# Patient Record
Sex: FEMALE | Race: WHITE | Employment: OTHER | URBAN - METROPOLITAN AREA
[De-identification: names, ages, dates, MRNs, and addresses within clinical notes are randomized per-mention and may not be internally consistent; named-entity substitution may affect disease eponyms.]

---

## 2017-04-27 ENCOUNTER — HOSPITAL ENCOUNTER (OUTPATIENT)
Dept: CARDIAC CATH/INVASIVE PROCEDURES | Age: 72
Discharge: HOME | End: 2017-04-28
Attending: INTERNAL MEDICINE | Admitting: INTERNAL MEDICINE
Payer: MEDICARE

## 2017-04-27 LAB
GLUCOSE BLD-MCNC: 109 MG/DL (ref 74–106)
PLATELET # BLD: 185 K/UL (ref 140–450)
POC BUN: 28 MG/DL (ref 6–20)
POC CHLORIDE: 103 MMOL/L (ref 98–110)
POC CREATININE: 1.5 MG/DL (ref 0.6–1.4)
POC HEMATOCRIT: 36 % (ref 36–46)
POC HEMOGLOBIN: 12.2 GM/DL (ref 12–16)
POC POTASSIUM: 4.2 MMOL/L (ref 3.5–5.1)
POC SODIUM: 142 MMOL/L (ref 136–145)

## 2017-04-27 PROCEDURE — 82565 ASSAY OF CREATININE: CPT

## 2017-04-27 PROCEDURE — 84295 ASSAY OF SERUM SODIUM: CPT

## 2017-04-27 PROCEDURE — 93567 NJX CAR CTH SPRVLV AORTGRPHY: CPT | Performed by: INTERNAL MEDICINE

## 2017-04-27 PROCEDURE — 84520 ASSAY OF UREA NITROGEN: CPT

## 2017-04-27 PROCEDURE — 94640 AIRWAY INHALATION TREATMENT: CPT

## 2017-04-27 PROCEDURE — 84132 ASSAY OF SERUM POTASSIUM: CPT

## 2017-04-27 PROCEDURE — 6370000000 HC RX 637 (ALT 250 FOR IP): Performed by: INTERNAL MEDICINE

## 2017-04-27 PROCEDURE — 92928 PRQ TCAT PLMT NTRAC ST 1 LES: CPT | Performed by: INTERNAL MEDICINE

## 2017-04-27 PROCEDURE — 6360000002 HC RX W HCPCS

## 2017-04-27 PROCEDURE — 85014 HEMATOCRIT: CPT

## 2017-04-27 PROCEDURE — C1894 INTRO/SHEATH, NON-LASER: HCPCS

## 2017-04-27 PROCEDURE — 82947 ASSAY GLUCOSE BLOOD QUANT: CPT

## 2017-04-27 PROCEDURE — 93460 R&L HRT ART/VENTRICLE ANGIO: CPT | Performed by: INTERNAL MEDICINE

## 2017-04-27 PROCEDURE — 2580000003 HC RX 258: Performed by: INTERNAL MEDICINE

## 2017-04-27 PROCEDURE — C1725 CATH, TRANSLUMIN NON-LASER: HCPCS

## 2017-04-27 PROCEDURE — 7100000010 HC PHASE II RECOVERY - FIRST 15 MIN

## 2017-04-27 PROCEDURE — C1751 CATH, INF, PER/CENT/MIDLINE: HCPCS

## 2017-04-27 PROCEDURE — C1876 STENT, NON-COA/NON-COV W/DEL: HCPCS

## 2017-04-27 PROCEDURE — 6370000000 HC RX 637 (ALT 250 FOR IP)

## 2017-04-27 PROCEDURE — 7100000011 HC PHASE II RECOVERY - ADDTL 15 MIN

## 2017-04-27 PROCEDURE — 82435 ASSAY OF BLOOD CHLORIDE: CPT

## 2017-04-27 PROCEDURE — C1769 GUIDE WIRE: HCPCS

## 2017-04-27 PROCEDURE — C1887 CATHETER, GUIDING: HCPCS

## 2017-04-27 PROCEDURE — 2580000003 HC RX 258

## 2017-04-27 PROCEDURE — C1760 CLOSURE DEV, VASC: HCPCS

## 2017-04-27 PROCEDURE — 85049 AUTOMATED PLATELET COUNT: CPT

## 2017-04-27 RX ORDER — SODIUM CHLORIDE 0.9 % (FLUSH) 0.9 %
10 SYRINGE (ML) INJECTION EVERY 12 HOURS SCHEDULED
Status: DISCONTINUED | OUTPATIENT
Start: 2017-04-27 | End: 2017-04-28 | Stop reason: HOSPADM

## 2017-04-27 RX ORDER — METOPROLOL SUCCINATE 25 MG/1
25 TABLET, EXTENDED RELEASE ORAL EVERY EVENING
Status: DISCONTINUED | OUTPATIENT
Start: 2017-04-27 | End: 2017-04-28 | Stop reason: HOSPADM

## 2017-04-27 RX ORDER — SODIUM CHLORIDE 0.9 % (FLUSH) 0.9 %
10 SYRINGE (ML) INJECTION PRN
Status: DISCONTINUED | OUTPATIENT
Start: 2017-04-27 | End: 2017-04-28 | Stop reason: HOSPADM

## 2017-04-27 RX ORDER — ZOLPIDEM TARTRATE 5 MG/1
5 TABLET ORAL NIGHTLY PRN
Status: DISCONTINUED | OUTPATIENT
Start: 2017-04-27 | End: 2017-04-28 | Stop reason: HOSPADM

## 2017-04-27 RX ORDER — ACETAMINOPHEN 325 MG/1
650 TABLET ORAL EVERY 4 HOURS PRN
Status: DISCONTINUED | OUTPATIENT
Start: 2017-04-27 | End: 2017-04-28 | Stop reason: HOSPADM

## 2017-04-27 RX ORDER — CLOPIDOGREL BISULFATE 75 MG/1
75 TABLET ORAL DAILY
Status: DISCONTINUED | OUTPATIENT
Start: 2017-04-28 | End: 2017-04-28 | Stop reason: HOSPADM

## 2017-04-27 RX ORDER — PANTOPRAZOLE SODIUM 20 MG/1
20 TABLET, DELAYED RELEASE ORAL
Status: DISCONTINUED | OUTPATIENT
Start: 2017-04-28 | End: 2017-04-28 | Stop reason: HOSPADM

## 2017-04-27 RX ORDER — MONTELUKAST SODIUM 10 MG/1
10 TABLET ORAL NIGHTLY
Status: DISCONTINUED | OUTPATIENT
Start: 2017-04-27 | End: 2017-04-28 | Stop reason: HOSPADM

## 2017-04-27 RX ORDER — TRAZODONE HYDROCHLORIDE 50 MG/1
50 TABLET ORAL NIGHTLY
Status: DISCONTINUED | OUTPATIENT
Start: 2017-04-27 | End: 2017-04-28 | Stop reason: HOSPADM

## 2017-04-27 RX ORDER — ONDANSETRON 2 MG/ML
4 INJECTION INTRAMUSCULAR; INTRAVENOUS EVERY 6 HOURS PRN
Status: DISCONTINUED | OUTPATIENT
Start: 2017-04-27 | End: 2017-04-28 | Stop reason: HOSPADM

## 2017-04-27 RX ORDER — ASPIRIN 81 MG/1
81 TABLET ORAL EVERY OTHER DAY
Status: DISCONTINUED | OUTPATIENT
Start: 2017-04-28 | End: 2017-04-28 | Stop reason: HOSPADM

## 2017-04-27 RX ORDER — PRAVASTATIN SODIUM 20 MG
20 TABLET ORAL NIGHTLY
Status: DISCONTINUED | OUTPATIENT
Start: 2017-04-27 | End: 2017-04-28 | Stop reason: HOSPADM

## 2017-04-27 RX ORDER — FOLIC ACID 1 MG/1
3 TABLET ORAL DAILY
Status: DISCONTINUED | OUTPATIENT
Start: 2017-04-28 | End: 2017-04-28 | Stop reason: HOSPADM

## 2017-04-27 RX ORDER — SODIUM CHLORIDE 9 MG/ML
INJECTION, SOLUTION INTRAVENOUS CONTINUOUS
Status: DISCONTINUED | OUTPATIENT
Start: 2017-04-27 | End: 2017-04-28 | Stop reason: HOSPADM

## 2017-04-27 RX ORDER — SODIUM CHLORIDE 9 MG/ML
INJECTION, SOLUTION INTRAVENOUS CONTINUOUS
Status: DISCONTINUED | OUTPATIENT
Start: 2017-04-27 | End: 2017-04-27

## 2017-04-27 RX ORDER — METOPROLOL SUCCINATE 50 MG/1
50 TABLET, EXTENDED RELEASE ORAL DAILY
Status: DISCONTINUED | OUTPATIENT
Start: 2017-04-28 | End: 2017-04-28 | Stop reason: HOSPADM

## 2017-04-27 RX ADMIN — Medication 2 PUFF: at 21:39

## 2017-04-27 RX ADMIN — MONTELUKAST SODIUM 10 MG: 10 TABLET ORAL at 21:31

## 2017-04-27 RX ADMIN — METOPROLOL SUCCINATE 25 MG: 25 TABLET, FILM COATED, EXTENDED RELEASE ORAL at 21:31

## 2017-04-27 RX ADMIN — SODIUM CHLORIDE: 9 INJECTION, SOLUTION INTRAVENOUS at 11:03

## 2017-04-27 RX ADMIN — SODIUM CHLORIDE: 9 INJECTION, SOLUTION INTRAVENOUS at 20:30

## 2017-04-27 RX ADMIN — ZOLPIDEM TARTRATE 5 MG: 5 TABLET ORAL at 21:30

## 2017-04-27 RX ADMIN — PRAVASTATIN SODIUM 20 MG: 20 TABLET ORAL at 21:31

## 2017-04-27 ASSESSMENT — PAIN SCALES - GENERAL: PAINLEVEL_OUTOF10: 2

## 2017-04-28 VITALS
OXYGEN SATURATION: 97 % | DIASTOLIC BLOOD PRESSURE: 55 MMHG | SYSTOLIC BLOOD PRESSURE: 128 MMHG | HEART RATE: 68 BPM | TEMPERATURE: 97.7 F | WEIGHT: 170 LBS | HEIGHT: 62 IN | RESPIRATION RATE: 19 BRPM | BODY MASS INDEX: 31.28 KG/M2

## 2017-04-28 LAB
ANION GAP SERPL CALCULATED.3IONS-SCNC: 16 MMOL/L (ref 9–17)
BUN BLDV-MCNC: 20 MG/DL (ref 8–23)
BUN/CREAT BLD: ABNORMAL (ref 9–20)
CALCIUM SERPL-MCNC: 8.7 MG/DL (ref 8.6–10.4)
CHLORIDE BLD-SCNC: 107 MMOL/L (ref 98–107)
CO2: 22 MMOL/L (ref 20–31)
CREAT SERPL-MCNC: 1.03 MG/DL (ref 0.5–0.9)
GFR AFRICAN AMERICAN: >60 ML/MIN
GFR NON-AFRICAN AMERICAN: 53 ML/MIN
GFR SERPL CREATININE-BSD FRML MDRD: ABNORMAL ML/MIN/{1.73_M2}
GFR SERPL CREATININE-BSD FRML MDRD: ABNORMAL ML/MIN/{1.73_M2}
GLUCOSE BLD-MCNC: 112 MG/DL (ref 70–99)
POTASSIUM SERPL-SCNC: 4 MMOL/L (ref 3.7–5.3)
SODIUM BLD-SCNC: 145 MMOL/L (ref 135–144)

## 2017-04-28 PROCEDURE — 36415 COLL VENOUS BLD VENIPUNCTURE: CPT

## 2017-04-28 PROCEDURE — 6370000000 HC RX 637 (ALT 250 FOR IP): Performed by: INTERNAL MEDICINE

## 2017-04-28 PROCEDURE — 94640 AIRWAY INHALATION TREATMENT: CPT

## 2017-04-28 PROCEDURE — 80048 BASIC METABOLIC PNL TOTAL CA: CPT

## 2017-04-28 RX ORDER — NITROGLYCERIN 0.4 MG/1
0.4 TABLET SUBLINGUAL EVERY 5 MIN PRN
Qty: 25 TABLET | Refills: 3 | Status: SHIPPED | OUTPATIENT
Start: 2017-04-28 | End: 2022-01-27 | Stop reason: ALTCHOICE

## 2017-04-28 RX ORDER — CLOPIDOGREL BISULFATE 75 MG/1
75 TABLET ORAL DAILY
Qty: 30 TABLET | Refills: 3 | Status: SHIPPED | OUTPATIENT
Start: 2017-04-28 | End: 2018-06-11 | Stop reason: ALTCHOICE

## 2017-04-28 RX ADMIN — PANTOPRAZOLE SODIUM 20 MG: 20 TABLET, DELAYED RELEASE ORAL at 09:38

## 2017-04-28 RX ADMIN — METOPROLOL SUCCINATE 50 MG: 50 TABLET, FILM COATED, EXTENDED RELEASE ORAL at 09:38

## 2017-04-28 RX ADMIN — ASPIRIN 81 MG: 81 TABLET, COATED ORAL at 09:38

## 2017-04-28 RX ADMIN — FOLIC ACID 3 MG: 1 TABLET ORAL at 09:38

## 2017-04-28 RX ADMIN — Medication 2 PUFF: at 10:33

## 2017-04-28 RX ADMIN — CLOPIDOGREL 75 MG: 75 TABLET, FILM COATED ORAL at 09:38

## 2017-04-28 ASSESSMENT — PAIN SCALES - GENERAL: PAINLEVEL_OUTOF10: 0

## 2017-06-26 PROBLEM — I25.10 CAD IN NATIVE ARTERY: Status: ACTIVE | Noted: 2017-06-26

## 2017-09-11 ENCOUNTER — HOSPITAL ENCOUNTER (OUTPATIENT)
Dept: MAMMOGRAPHY | Age: 72
Discharge: HOME | End: 2017-09-11
Payer: MEDICARE

## 2017-09-11 DIAGNOSIS — Z12.31 ENCOUNTER FOR SCREENING MAMMOGRAM FOR BREAST CANCER: ICD-10-CM

## 2017-09-11 PROCEDURE — 77063 BREAST TOMOSYNTHESIS BI: CPT

## 2018-01-23 ENCOUNTER — HOSPITAL ENCOUNTER (OUTPATIENT)
Age: 73
Setting detail: SPECIMEN
Discharge: HOME | End: 2018-01-23
Payer: MEDICARE

## 2018-01-23 DIAGNOSIS — N30.01 ACUTE CYSTITIS WITH HEMATURIA: ICD-10-CM

## 2018-01-25 LAB
CULTURE: ABNORMAL
CULTURE: ABNORMAL
Lab: ABNORMAL
ORGANISM: ABNORMAL
SPECIMEN DESCRIPTION: ABNORMAL
STATUS: ABNORMAL

## 2018-04-25 ENCOUNTER — HOSPITAL ENCOUNTER (OUTPATIENT)
Dept: GENERAL RADIOLOGY | Facility: CLINIC | Age: 73
Discharge: HOME | End: 2018-04-27
Payer: MEDICARE

## 2018-04-25 DIAGNOSIS — J40 BRONCHITIS: ICD-10-CM

## 2018-04-25 DIAGNOSIS — J45.41 MODERATE PERSISTENT ASTHMA WITH ACUTE EXACERBATION: ICD-10-CM

## 2018-04-25 PROCEDURE — 71046 X-RAY EXAM CHEST 2 VIEWS: CPT

## 2018-06-11 PROBLEM — J43.8 OTHER EMPHYSEMA (HCC): Status: ACTIVE | Noted: 2018-06-11

## 2018-10-17 ENCOUNTER — HOSPITAL ENCOUNTER (OUTPATIENT)
Dept: MAMMOGRAPHY | Age: 73
Discharge: HOME | End: 2018-10-19
Payer: MEDICARE

## 2018-10-17 DIAGNOSIS — Z12.39 BREAST CANCER SCREENING: ICD-10-CM

## 2018-10-17 PROCEDURE — 77063 BREAST TOMOSYNTHESIS BI: CPT

## 2019-03-18 ENCOUNTER — HOSPITAL ENCOUNTER (OUTPATIENT)
Age: 74
Setting detail: SPECIMEN
Discharge: HOME | End: 2019-03-18
Payer: MEDICARE

## 2019-03-18 DIAGNOSIS — K21.9 GASTROESOPHAGEAL REFLUX DISEASE WITHOUT ESOPHAGITIS: ICD-10-CM

## 2019-03-18 DIAGNOSIS — L40.50 PSORIATIC ARTHRITIS (HCC): ICD-10-CM

## 2019-03-18 DIAGNOSIS — E78.00 PURE HYPERCHOLESTEROLEMIA: ICD-10-CM

## 2019-03-18 LAB
ALBUMIN SERPL-MCNC: 4.4 G/DL (ref 3.5–5.2)
ALBUMIN/GLOBULIN RATIO: 1.8 (ref 1–2.5)
ALP BLD-CCNC: 61 U/L (ref 35–104)
ALT SERPL-CCNC: 18 U/L (ref 5–33)
ANION GAP SERPL CALCULATED.3IONS-SCNC: 16 MMOL/L (ref 9–17)
AST SERPL-CCNC: 17 U/L
BILIRUB SERPL-MCNC: 0.6 MG/DL (ref 0.3–1.2)
BUN BLDV-MCNC: 30 MG/DL (ref 8–23)
BUN/CREAT BLD: ABNORMAL (ref 9–20)
CALCIUM SERPL-MCNC: 9.2 MG/DL (ref 8.6–10.4)
CHLORIDE BLD-SCNC: 103 MMOL/L (ref 98–107)
CHOLESTEROL/HDL RATIO: 2.8
CHOLESTEROL: 219 MG/DL
CO2: 25 MMOL/L (ref 20–31)
CREAT SERPL-MCNC: 1.23 MG/DL (ref 0.5–0.9)
GFR AFRICAN AMERICAN: 52 ML/MIN
GFR NON-AFRICAN AMERICAN: 43 ML/MIN
GFR SERPL CREATININE-BSD FRML MDRD: ABNORMAL ML/MIN/{1.73_M2}
GFR SERPL CREATININE-BSD FRML MDRD: ABNORMAL ML/MIN/{1.73_M2}
GLUCOSE FASTING: 102 MG/DL (ref 70–99)
HCT VFR BLD CALC: 38.4 % (ref 36.3–47.1)
HDLC SERPL-MCNC: 79 MG/DL
HEMOGLOBIN: 12.2 G/DL (ref 11.9–15.1)
LDL CHOLESTEROL: 111 MG/DL (ref 0–130)
MCH RBC QN AUTO: 29.2 PG (ref 25.2–33.5)
MCHC RBC AUTO-ENTMCNC: 31.8 G/DL (ref 28.4–34.8)
MCV RBC AUTO: 91.9 FL (ref 82.6–102.9)
NRBC AUTOMATED: 0 PER 100 WBC
PDW BLD-RTO: 16.9 % (ref 11.8–14.4)
PLATELET # BLD: 200 K/UL (ref 138–453)
PMV BLD AUTO: 11.5 FL (ref 8.1–13.5)
POTASSIUM SERPL-SCNC: 3.9 MMOL/L (ref 3.7–5.3)
RBC # BLD: 4.18 M/UL (ref 3.95–5.11)
SODIUM BLD-SCNC: 144 MMOL/L (ref 135–144)
TOTAL PROTEIN: 6.9 G/DL (ref 6.4–8.3)
TRIGL SERPL-MCNC: 147 MG/DL
VLDLC SERPL CALC-MCNC: ABNORMAL MG/DL (ref 1–30)
WBC # BLD: 8.9 K/UL (ref 3.5–11.3)

## 2019-03-19 PROBLEM — N28.9 RENAL INSUFFICIENCY: Status: ACTIVE | Noted: 2019-03-19

## 2019-05-02 ENCOUNTER — HOSPITAL ENCOUNTER (OUTPATIENT)
Age: 74
Setting detail: SPECIMEN
Discharge: HOME | End: 2019-05-02
Payer: MEDICARE

## 2019-05-02 DIAGNOSIS — N28.9 RENAL INSUFFICIENCY: ICD-10-CM

## 2019-05-02 LAB
ANION GAP SERPL CALCULATED.3IONS-SCNC: 13 MMOL/L (ref 9–17)
BUN BLDV-MCNC: 24 MG/DL (ref 8–23)
BUN/CREAT BLD: ABNORMAL (ref 9–20)
CALCIUM SERPL-MCNC: 9.4 MG/DL (ref 8.6–10.4)
CHLORIDE BLD-SCNC: 101 MMOL/L (ref 98–107)
CO2: 25 MMOL/L (ref 20–31)
CREAT SERPL-MCNC: 1.15 MG/DL (ref 0.5–0.9)
GFR AFRICAN AMERICAN: 56 ML/MIN
GFR NON-AFRICAN AMERICAN: 46 ML/MIN
GFR SERPL CREATININE-BSD FRML MDRD: ABNORMAL ML/MIN/{1.73_M2}
GFR SERPL CREATININE-BSD FRML MDRD: ABNORMAL ML/MIN/{1.73_M2}
GLUCOSE BLD-MCNC: 114 MG/DL (ref 70–99)
POTASSIUM SERPL-SCNC: 4 MMOL/L (ref 3.7–5.3)
SODIUM BLD-SCNC: 139 MMOL/L (ref 135–144)

## 2019-07-31 ENCOUNTER — HOSPITAL ENCOUNTER (OUTPATIENT)
Dept: GENERAL RADIOLOGY | Facility: CLINIC | Age: 74
Discharge: HOME | End: 2019-08-02
Payer: MEDICARE

## 2019-07-31 DIAGNOSIS — M25.562 LEFT MEDIAL KNEE PAIN: ICD-10-CM

## 2019-07-31 PROCEDURE — 73560 X-RAY EXAM OF KNEE 1 OR 2: CPT

## 2019-11-20 ENCOUNTER — HOSPITAL ENCOUNTER (OUTPATIENT)
Dept: MAMMOGRAPHY | Age: 74
Discharge: HOME | End: 2019-11-22
Payer: MEDICARE

## 2019-11-20 DIAGNOSIS — Z12.39 BREAST CANCER SCREENING: ICD-10-CM

## 2019-11-20 PROCEDURE — 77063 BREAST TOMOSYNTHESIS BI: CPT

## 2020-08-24 ENCOUNTER — HOSPITAL ENCOUNTER (OUTPATIENT)
Dept: GENERAL RADIOLOGY | Facility: CLINIC | Age: 75
Discharge: HOME | End: 2020-08-26
Payer: MEDICARE

## 2020-08-24 PROCEDURE — 73660 X-RAY EXAM OF TOE(S): CPT

## 2020-09-09 PROBLEM — N18.30 STAGE 3 CHRONIC KIDNEY DISEASE (HCC): Status: ACTIVE | Noted: 2019-03-19

## 2020-09-09 PROBLEM — N20.0 NEPHROLITHIASIS: Status: ACTIVE | Noted: 2020-09-09

## 2020-09-14 ENCOUNTER — HOSPITAL ENCOUNTER (OUTPATIENT)
Dept: ULTRASOUND IMAGING | Age: 75
Discharge: HOME | End: 2020-09-16
Payer: MEDICARE

## 2020-09-14 PROCEDURE — 76770 US EXAM ABDO BACK WALL COMP: CPT

## 2020-09-24 ENCOUNTER — HOSPITAL ENCOUNTER (OUTPATIENT)
Age: 75
Setting detail: SPECIMEN
Discharge: HOME | End: 2020-09-24
Payer: MEDICARE

## 2020-09-24 LAB
ALBUMIN SERPL-MCNC: 4.1 G/DL (ref 3.5–5.2)
ANION GAP SERPL CALCULATED.3IONS-SCNC: 12 MMOL/L (ref 9–17)
BUN BLDV-MCNC: 24 MG/DL (ref 8–23)
BUN/CREAT BLD: ABNORMAL (ref 9–20)
CALCIUM SERPL-MCNC: 9.5 MG/DL (ref 8.6–10.4)
CHLORIDE BLD-SCNC: 103 MMOL/L (ref 98–107)
CO2: 25 MMOL/L (ref 20–31)
CREAT SERPL-MCNC: 1.21 MG/DL (ref 0.5–0.9)
CREATININE URINE: 163.7 MG/DL (ref 28–217)
FREE KAPPA/LAMBDA RATIO: 1.5 (ref 0.26–1.65)
GFR AFRICAN AMERICAN: 53 ML/MIN
GFR NON-AFRICAN AMERICAN: 43 ML/MIN
GFR SERPL CREATININE-BSD FRML MDRD: ABNORMAL ML/MIN/{1.73_M2}
GFR SERPL CREATININE-BSD FRML MDRD: ABNORMAL ML/MIN/{1.73_M2}
GLUCOSE BLD-MCNC: 121 MG/DL (ref 70–99)
HCT VFR BLD CALC: 38.5 % (ref 36.3–47.1)
HEMOGLOBIN: 12.1 G/DL (ref 11.9–15.1)
KAPPA FREE LIGHT CHAINS QNT: 2.6 MG/DL (ref 0.37–1.94)
LAMBDA FREE LIGHT CHAINS QNT: 1.73 MG/DL (ref 0.57–2.63)
MCH RBC QN AUTO: 28.6 PG (ref 25.2–33.5)
MCHC RBC AUTO-ENTMCNC: 31.4 G/DL (ref 28.4–34.8)
MCV RBC AUTO: 91 FL (ref 82.6–102.9)
NRBC AUTOMATED: 0 PER 100 WBC
PDW BLD-RTO: 16.2 % (ref 11.8–14.4)
PHOSPHORUS: 3.5 MG/DL (ref 2.6–4.5)
PLATELET # BLD: 198 K/UL (ref 138–453)
PMV BLD AUTO: 11.6 FL (ref 8.1–13.5)
POTASSIUM SERPL-SCNC: 4.3 MMOL/L (ref 3.7–5.3)
PTH INTACT: 52.87 PG/ML (ref 15–65)
RBC # BLD: 4.23 M/UL (ref 3.95–5.11)
SODIUM BLD-SCNC: 140 MMOL/L (ref 135–144)
TOTAL PROTEIN, URINE: 18 MG/DL
URINE TOTAL PROTEIN CREATININE RATIO: 0.11 (ref 0–0.2)
WBC # BLD: 8.5 K/UL (ref 3.5–11.3)

## 2020-09-25 LAB — ANTI-NUCLEAR ANTIBODY (ANA): NEGATIVE

## 2020-09-28 LAB
ALBUMIN (CALCULATED): 4.3 G/DL (ref 3.2–5.2)
ALBUMIN PERCENT: 68 % (ref 45–65)
ALPHA 1 PERCENT: 3 % (ref 3–6)
ALPHA 2 PERCENT: 10 % (ref 6–13)
ALPHA-1-GLOBULIN: 0.2 G/DL (ref 0.1–0.4)
ALPHA-2-GLOBULIN: 0.6 G/DL (ref 0.5–0.9)
BETA GLOBULIN: 0.6 G/DL (ref 0.5–1.1)
BETA PERCENT: 10 % (ref 11–19)
GAMMA GLOBULIN %: 10 % (ref 9–20)
GAMMA GLOBULIN: 0.7 G/DL (ref 0.5–1.5)
PATHOLOGIST: ABNORMAL
PATHOLOGIST: NORMAL
PROTEIN ELECTROPHORESIS, SERUM: ABNORMAL
SERUM IFX INTERP: NORMAL
TOTAL PROT. SUM,%: 101 % (ref 98–102)
TOTAL PROT. SUM: 6.4 G/DL (ref 6.3–8.2)
TOTAL PROTEIN: 6.4 G/DL (ref 6.4–8.3)

## 2020-10-06 ENCOUNTER — TELEPHONE (OUTPATIENT)
Dept: ONCOLOGY | Age: 75
End: 2020-10-06

## 2020-10-06 NOTE — TELEPHONE ENCOUNTER
#1 RECEIVED FAXED REFERRAL TO DR Neal Arnold, CALLED PT NO ANSWER, LEFT MESSAGE TO SCHEDULE CONSULTATION APPT.

## 2020-10-07 PROBLEM — D47.2 MGUS (MONOCLONAL GAMMOPATHY OF UNKNOWN SIGNIFICANCE): Status: ACTIVE | Noted: 2020-10-07

## 2020-10-27 ENCOUNTER — HOSPITAL ENCOUNTER (OUTPATIENT)
Facility: MEDICAL CENTER | Age: 75
Discharge: HOME | End: 2020-10-27
Payer: MEDICARE

## 2020-10-27 ENCOUNTER — TELEPHONE (OUTPATIENT)
Dept: ONCOLOGY | Age: 75
End: 2020-10-27

## 2020-10-27 ENCOUNTER — INITIAL CONSULT (OUTPATIENT)
Dept: ONCOLOGY | Age: 75
End: 2020-10-27
Payer: MEDICARE

## 2020-10-27 VITALS
BODY MASS INDEX: 28.92 KG/M2 | HEART RATE: 79 BPM | HEIGHT: 63 IN | DIASTOLIC BLOOD PRESSURE: 60 MMHG | TEMPERATURE: 97.6 F | SYSTOLIC BLOOD PRESSURE: 132 MMHG | WEIGHT: 163.2 LBS

## 2020-10-27 DIAGNOSIS — D47.2 MGUS (MONOCLONAL GAMMOPATHY OF UNKNOWN SIGNIFICANCE): ICD-10-CM

## 2020-10-27 LAB
ABSOLUTE EOS #: 0.55 K/UL (ref 0–0.4)
ABSOLUTE IMMATURE GRANULOCYTE: 0.22 K/UL (ref 0–0.3)
ABSOLUTE LYMPH #: 4.47 K/UL (ref 1–4.8)
ABSOLUTE MONO #: 0.87 K/UL (ref 0.2–0.8)
BASOPHILS # BLD: 0 %
BASOPHILS ABSOLUTE: 0 K/UL (ref 0–0.2)
DIFFERENTIAL TYPE: ABNORMAL
EOSINOPHILS RELATIVE PERCENT: 5 % (ref 1–4)
FREE KAPPA/LAMBDA RATIO: 1.5 (ref 0.26–1.65)
HCT VFR BLD CALC: 41.8 % (ref 36.3–47.1)
HEMOGLOBIN: 13.3 G/DL (ref 11.9–15.1)
IGA, LOW RANGE: 50 MG/DL (ref 70–400)
IGA: ABNORMAL MG/DL (ref 70–400)
IGG: 942 MG/DL (ref 700–1600)
IGM: 337 MG/DL (ref 40–230)
IMMATURE GRANULOCYTES: 2 %
KAPPA FREE LIGHT CHAINS QNT: 2.44 MG/DL (ref 0.37–1.94)
LAMBDA FREE LIGHT CHAINS QNT: 1.63 MG/DL (ref 0.57–2.63)
LYMPHOCYTES # BLD: 41 % (ref 24–44)
MCH RBC QN AUTO: 27.4 PG (ref 25.2–33.5)
MCHC RBC AUTO-ENTMCNC: 31.8 G/DL (ref 28.4–34.8)
MCV RBC AUTO: 86 FL (ref 82.6–102.9)
MONOCYTES # BLD: 8 % (ref 1–7)
NRBC AUTOMATED: 0 PER 100 WBC
PDW BLD-RTO: 15.1 % (ref 11.8–14.4)
PLATELET # BLD: 228 K/UL (ref 138–453)
PLATELET ESTIMATE: ABNORMAL
PMV BLD AUTO: 10.5 FL (ref 8.1–13.5)
RBC # BLD: 4.86 M/UL (ref 3.95–5.11)
RBC # BLD: ABNORMAL 10*6/UL
SEG NEUTROPHILS: 44 % (ref 36–66)
SEGMENTED NEUTROPHILS ABSOLUTE COUNT: 4.79 K/UL (ref 1.8–7.7)
WBC # BLD: 10.9 K/UL (ref 3.5–11.3)
WBC # BLD: ABNORMAL 10*3/UL

## 2020-10-27 PROCEDURE — 1090F PRES/ABSN URINE INCON ASSESS: CPT | Performed by: INTERNAL MEDICINE

## 2020-10-27 PROCEDURE — 85025 COMPLETE CBC W/AUTO DIFF WBC: CPT

## 2020-10-27 PROCEDURE — 99204 OFFICE O/P NEW MOD 45 MIN: CPT | Performed by: INTERNAL MEDICINE

## 2020-10-27 PROCEDURE — 82784 ASSAY IGA/IGD/IGG/IGM EACH: CPT

## 2020-10-27 PROCEDURE — 36415 COLL VENOUS BLD VENIPUNCTURE: CPT

## 2020-10-27 PROCEDURE — 99201 HC NEW PT, E/M LEVEL 1: CPT | Performed by: INTERNAL MEDICINE

## 2020-10-27 PROCEDURE — G8417 CALC BMI ABV UP PARAM F/U: HCPCS | Performed by: INTERNAL MEDICINE

## 2020-10-27 PROCEDURE — 84155 ASSAY OF PROTEIN SERUM: CPT

## 2020-10-27 PROCEDURE — 86334 IMMUNOFIX E-PHORESIS SERUM: CPT

## 2020-10-27 PROCEDURE — G8427 DOCREV CUR MEDS BY ELIG CLIN: HCPCS | Performed by: INTERNAL MEDICINE

## 2020-10-27 PROCEDURE — 83883 ASSAY NEPHELOMETRY NOT SPEC: CPT

## 2020-10-27 PROCEDURE — 84165 PROTEIN E-PHORESIS SERUM: CPT

## 2020-10-27 PROCEDURE — G8484 FLU IMMUNIZE NO ADMIN: HCPCS | Performed by: INTERNAL MEDICINE

## 2020-10-27 NOTE — LETTER
_    Violetta Anne MD    10/27/2020     Katharina Mcginnis MD   [address] [facility]    Dear Dr Marnie Kinney: Thank you for referring Jacklyn Merlin, 1945, to me for evaluation. Below are the relevant portions of my assessment and plan of care. Ms. Jacklyn Merlin is a very pleasant 76 y.o. female with history of multiple co morbidities as listed. Patient is referred for further management of abnormal kappa light chain immunoglobulin. Patient had recent evaluation by nephrology for chronic renal insufficiency. .  Work-up was done which included serum protein electrophoresis and immunofixation. Patient was noted to have elevated kappa light chain immunoglobulin and slightly limited M protein with the mild monoclonal gammopathy. Patient denies any fever or night sweats. No repeated infections. She had chronic body aches including chronic back pain. No recent changes. No chest pain or shortness of breath. No fever. No other complaints. Patient denies smoking or alcohol drinking. PAST MEDICAL HISTORY: has a past medical history of Anemia, unspecified, Aortic stenosis, Arthritis, AV block, BBB (bundle branch block), Breast cancer ([facility] [address] Bundle branch block, CAD (coronary artery disease), Carcinoma in situ of breast, Esophageal reflux, Essential hypertension, benign, Insulin resistance, MGUS (monoclonal gammopathy of unknown significance), Nephrolithiasis, Osteoporosis, Pure hypercholesterolemia, Stage 3 chronic kidney disease, and Unspecified asthma(493.90). PAST SURGICAL HISTORY: has a past surgical history that includes Tubal ligation; Mastectomy (Left); Cardiac surgery; Colon surgery; Colonoscopy (2/6/06); Pacemaker insertion ([id number]); Breast reconstruction (Right); Breast surgery (Left); Abdomen surgery; eye surgery (Right); Foot neuroma surgery (Right); Wrist fracture surgery;  Upper gastrointestinal endoscopy (9/2007); Upper gastrointestinal endoscopy (3/2006); Upper gastrointestinal endoscopy (8/2005); Dilation and curettage of uterus (1973); pacemaker placement (4/23/2015); Cardiac catheterization (04/27/2017); Coronary angioplasty with stent (04/27/2017); Cataract removal (Left, 05/20/2019); Lithotripsy (2012); and Cystoscopy (07/31/2012). CURRENT MEDICATIONS:  has a current medication list which includes the following prescription(s): pravastatin, golimumab, montelukast, fluticasone, valsartan-hydrochlorothiazide, denosumab, budesonide-formoterol, xopenex hfa, replens, fluticasone, aspirin, vitamin d3, probiotic product, prednisolone acetate, calcium-vitamin d, metoprolol succinate, lansoprazole, nitroglycerin, and folic acid. ALLERGIES:  is allergic to benadryl [diphenhydramine]; codeine; estrogens; hctz [hydrochlorothiazide]; lisinopril; phenergan [promethazine hcl]; and tape [adhesive tape]. FAMILY HISTORY: Negative for any hematological or oncological conditions. SOCIAL HISTORY:  reports that she quit smoking about 11 years ago. Her smoking use included cigarettes. She has a 10.00 pack-year smoking history. She has never used smokeless tobacco. She reports that she does not drink alcohol or use drugs. REVIEW OF SYSTEMS:     · General: No weakness or fatigue. No unanticipated weight loss or decreased appetite. No fever or chills. · Eyes: No blurred vision, eye pain or double vision. · Ears: No hearing problems or drainage. No tinnitus. · Throat: No sore throat, problems with swallowing or dysphagia. · Respiratory: No cough, sputum or hemoptysis. No shortness of breath. No pleuritic chest pain. · Cardiovascular: No chest pain, orthopnea or PND. No lower extremity edema. No palpitation. · Gastrointestinal: No problems with swallowing. No abdominal pain or bloating. No nausea or vomiting. No diarrhea or constipation. No GI bleeding. · Genitourinary: No dysuria, hematuria, frequency or urgency. · Musculoskeletal: No muscle aches or pains. No limitation of movement. No back pain. No gait disturbance, No joint complaints. · Dermatologic: No skin rashes or pruritus. No skin lesions or discolorations. · Psychiatric: No depression, anxiety, or stress or signs of schizophrenia. No change in mood or affect. · Hematologic: No history of bleeding tendency. No bruises or ecchymosis. No history of clotting problems. · Infectious disease: No fever, chills or frequent infections. · Endocrine: No polydipsia or polyuria. No temperature intolerance. · Neurologic: No headaches or dizziness. No weakness or numbness of the extremities. No changes in balance, coordination,  memory, mentation, behavior. · Allergic/Immunologic: No nasal congestion or hives. No repeated infections. PHYSICAL EXAM:  The patient is not in acute distress. Vital signs: Blood pressure 132/60, pulse 79, temperature 97.6 °F (36.4 °C), temperature source Temporal, height 5' 3\" (1.6 m), weight 163 lb 3.2 oz (74 kg), not currently breastfeeding.      General appearance - well appearing, not in pain or distress  Mental status - good mood, alert and oriented  Eyes - pupils equal and reactive, extraocular eye movements intact  Ears - bilateral TM's and external ear canals normal  Nose - normal and patent, no erythema, discharge or polyps  Mouth - mucous membranes moist, pharynx normal without lesions  Neck - supple, no significant adenopathy  Lymphatics - no palpable lymphadenopathy, no hepatosplenomegaly  Chest - clear to auscultation, no wheezes, rales or rhonchi, symmetric air entry  Heart - normal rate, regular rhythm, normal S1, S2, no murmurs, rubs, clicks or gallops  Abdomen - soft, nontender, nondistended, no masses or organomegaly  Neurological - alert, oriented, normal speech, no focal findings or movement disorder noted Musculoskeletal - no joint tenderness, deformity or swelling  Extremities - peripheral pulses normal, no pedal edema, no clubbing or cyanosis  Skin - normal coloration and turgor, no rashes, no suspicious skin lesions noted     Review of Diagnostic data:   Lab Results   Component Value Date    WBC 10.9 10/27/2020    HGB 13.3 10/27/2020    HCT 41.8 10/27/2020    MCV 86.0 10/27/2020     10/27/2020       Chemistry        Component Value Date/Time     09/24/2020 1503    K 4.3 09/24/2020 1503     09/24/2020 1503    CO2 25 09/24/2020 1503    BUN 24 (H) 09/24/2020 1503    CREATININE 1.21 (H) 09/24/2020 1503        Component Value Date/Time    CALCIUM 9.5 09/24/2020 1503    ALKPHOS 65 05/20/2020    AST 18 05/20/2020    ALT 15 05/20/2020    BILITOT 0.5 05/20/2020            IMPRESSION:   Paraproteinemia/monoclonal gammopathy  MGUS  Chronic renal insufficiency  Breast cancer in remission. Diagnosed more than 25 years ago. Multiple comorbidities as listed    PLAN: For more than 60 minutes of face to face discussion, I explained to the patient the nature of his problem with paraproteinemia and slightly repeated kappa light chain immunoglobulin. M protein is slightly repeated. Multiple myeloma is quite unlikely. Probably dealing with MGUS. No need for bone marrow testing. I will check immunoglobulins panel and will monitor closely. We will consider further work-up if we see any significant increase. Patient will continue follow-up with her nephrologist.  Patient's questions were answered to the best of her satisfaction and she verbalized full understanding and agreement. If you have questions, please do not hesitate to call me. I look forward to following [place] along with you.     Sincerely,                            [address] [facility]                              This note is created with the assistance of a speech recognition program.

## 2020-10-27 NOTE — TELEPHONE ENCOUNTER
[place] ARRIVES AMBULATORY FOR CONSULTATION  DR. Vikram Romero IN TO SEE PT  ORDERS RECEIVED  RV 6 MONTHS  LABS SOON AND BEFORE RV  MD VISIT 1/27/21 @ 1PM  LABS CDP IGG IGA IGM KAPPA LAMBDA FREE LT CHAINS  ON EXIT AND ON 1/20/21  AVS PRINTED WITH INSTRUCTIONS GIVEN TO PT  PT DISCHARGED AMBULATORY

## 2020-10-28 NOTE — PROGRESS NOTES
_               Ms. Gerard Ballard is a very pleasant 76 y.o. female with history of multiple co morbidities as listed. Patient is referred for further management of abnormal kappa light chain immunoglobulin. Patient had recent evaluation by nephrology for chronic renal insufficiency. .  Work-up was done which included serum protein electrophoresis and immunofixation. Patient was noted to have elevated kappa light chain immunoglobulin and slightly limited M protein with the mild monoclonal gammopathy. Patient denies any fever or night sweats. No repeated infections. She had chronic body aches including chronic back pain. No recent changes. No chest pain or shortness of breath. No fever. No other complaints. Patient denies smoking or alcohol drinking. PAST MEDICAL HISTORY: has a past medical history of Anemia, unspecified, Aortic stenosis, Arthritis, AV block, BBB (bundle branch block), Breast cancer ([facility] [address] Bundle branch block, CAD (coronary artery disease), Carcinoma in situ of breast, Esophageal reflux, Essential hypertension, benign, Insulin resistance, MGUS (monoclonal gammopathy of unknown significance), Nephrolithiasis, Osteoporosis, Pure hypercholesterolemia, Stage 3 chronic kidney disease, and Unspecified asthma(493.90). PAST SURGICAL HISTORY: has a past surgical history that includes Tubal ligation; Mastectomy (Left); Cardiac surgery; Colon surgery; Colonoscopy (2/6/06); Pacemaker insertion ([id number]); Breast reconstruction (Right); Breast surgery (Left); Abdomen surgery; eye surgery (Right); Foot neuroma surgery (Right); Wrist fracture surgery; Upper gastrointestinal endoscopy (9/2007); Upper gastrointestinal endoscopy (3/2006); Upper gastrointestinal endoscopy (8/2005); Dilation and curettage of uterus (1973); pacemaker placement (4/23/2015); Cardiac catheterization (04/27/2017);  Coronary angioplasty with stent (04/27/2017); Cataract removal (Left, 05/20/2019); Lithotripsy (2012); and Cystoscopy (07/31/2012). CURRENT MEDICATIONS:  has a current medication list which includes the following prescription(s): pravastatin, golimumab, montelukast, fluticasone, valsartan-hydrochlorothiazide, denosumab, budesonide-formoterol, xopenex hfa, replens, fluticasone, aspirin, vitamin d3, probiotic product, prednisolone acetate, calcium-vitamin d, metoprolol succinate, lansoprazole, nitroglycerin, and folic acid. ALLERGIES:  is allergic to benadryl [diphenhydramine]; codeine; estrogens; hctz [hydrochlorothiazide]; lisinopril; phenergan [promethazine hcl]; and tape [adhesive tape]. FAMILY HISTORY: Negative for any hematological or oncological conditions. SOCIAL HISTORY:  reports that she quit smoking about 11 years ago. Her smoking use included cigarettes. She has a 10.00 pack-year smoking history. She has never used smokeless tobacco. She reports that she does not drink alcohol or use drugs. REVIEW OF SYSTEMS:     · General: No weakness or fatigue. No unanticipated weight loss or decreased appetite. No fever or chills. · Eyes: No blurred vision, eye pain or double vision. · Ears: No hearing problems or drainage. No tinnitus. · Throat: No sore throat, problems with swallowing or dysphagia. · Respiratory: No cough, sputum or hemoptysis. No shortness of breath. No pleuritic chest pain. · Cardiovascular: No chest pain, orthopnea or PND. No lower extremity edema. No palpitation. · Gastrointestinal: No problems with swallowing. No abdominal pain or bloating. No nausea or vomiting. No diarrhea or constipation. No GI bleeding. · Genitourinary: No dysuria, hematuria, frequency or urgency. · Musculoskeletal: No muscle aches or pains. No limitation of movement. No back pain. No gait disturbance, No joint complaints. · Dermatologic: No skin rashes or pruritus. No skin lesions or discolorations.

## 2020-10-29 LAB
ALBUMIN (CALCULATED): 4.8 G/DL (ref 3.2–5.2)
ALBUMIN PERCENT: 66 % (ref 45–65)
ALPHA 1 PERCENT: 2 % (ref 3–6)
ALPHA 2 PERCENT: 10 % (ref 6–13)
ALPHA-1-GLOBULIN: 0.2 G/DL (ref 0.1–0.4)
ALPHA-2-GLOBULIN: 0.7 G/DL (ref 0.5–0.9)
BETA GLOBULIN: 0.7 G/DL (ref 0.5–1.1)
BETA PERCENT: 10 % (ref 11–19)
GAMMA GLOBULIN %: 12 % (ref 9–20)
GAMMA GLOBULIN: 0.8 G/DL (ref 0.5–1.5)
PATHOLOGIST: ABNORMAL
PATHOLOGIST: NORMAL
PROTEIN ELECTROPHORESIS, SERUM: ABNORMAL
SERUM IFX INTERP: NORMAL
TOTAL PROT. SUM,%: 100 % (ref 98–102)
TOTAL PROT. SUM: 7.2 G/DL (ref 6.3–8.2)
TOTAL PROTEIN: 7.2 G/DL (ref 6.4–8.3)

## 2021-03-02 ENCOUNTER — HOSPITAL ENCOUNTER (OUTPATIENT)
Age: 76
Discharge: HOME | End: 2021-03-02
Payer: MEDICARE

## 2021-03-02 DIAGNOSIS — I10 ESSENTIAL HYPERTENSION: ICD-10-CM

## 2021-03-02 DIAGNOSIS — N18.31 STAGE 3A CHRONIC KIDNEY DISEASE (HCC): ICD-10-CM

## 2021-03-02 DIAGNOSIS — I35.0 NONRHEUMATIC AORTIC VALVE STENOSIS: ICD-10-CM

## 2021-03-02 LAB
ALBUMIN SERPL-MCNC: 4.3 G/DL (ref 3.5–5.2)
ANION GAP SERPL CALCULATED.3IONS-SCNC: 12 MMOL/L (ref 9–17)
BUN BLDV-MCNC: 23 MG/DL (ref 8–23)
BUN/CREAT BLD: ABNORMAL (ref 9–20)
CALCIUM SERPL-MCNC: 9.9 MG/DL (ref 8.6–10.4)
CHLORIDE BLD-SCNC: 104 MMOL/L (ref 98–107)
CO2: 23 MMOL/L (ref 20–31)
CREAT SERPL-MCNC: 1.2 MG/DL (ref 0.5–0.9)
CREATININE URINE: 136.2 MG/DL (ref 28–217)
GFR AFRICAN AMERICAN: 53 ML/MIN
GFR NON-AFRICAN AMERICAN: 44 ML/MIN
GFR SERPL CREATININE-BSD FRML MDRD: ABNORMAL ML/MIN/{1.73_M2}
GFR SERPL CREATININE-BSD FRML MDRD: ABNORMAL ML/MIN/{1.73_M2}
GLUCOSE BLD-MCNC: 115 MG/DL (ref 70–99)
HCT VFR BLD CALC: 37.9 % (ref 36.3–47.1)
HEMOGLOBIN: 11.8 G/DL (ref 11.9–15.1)
MCH RBC QN AUTO: 26.4 PG (ref 25.2–33.5)
MCHC RBC AUTO-ENTMCNC: 31.1 G/DL (ref 28.4–34.8)
MCV RBC AUTO: 84.8 FL (ref 82.6–102.9)
NRBC AUTOMATED: 0 PER 100 WBC
PDW BLD-RTO: 16.5 % (ref 11.8–14.4)
PHOSPHORUS: 4.2 MG/DL (ref 2.6–4.5)
PLATELET # BLD: 214 K/UL (ref 138–453)
PMV BLD AUTO: 11.6 FL (ref 8.1–13.5)
POTASSIUM SERPL-SCNC: 4.6 MMOL/L (ref 3.7–5.3)
RBC # BLD: 4.47 M/UL (ref 3.95–5.11)
SODIUM BLD-SCNC: 139 MMOL/L (ref 135–144)
TOTAL PROTEIN, URINE: 17 MG/DL
URINE TOTAL PROTEIN CREATININE RATIO: 0.12 (ref 0–0.2)
WBC # BLD: 9.7 K/UL (ref 3.5–11.3)

## 2021-03-02 PROCEDURE — 80048 BASIC METABOLIC PNL TOTAL CA: CPT

## 2021-03-02 PROCEDURE — 82040 ASSAY OF SERUM ALBUMIN: CPT

## 2021-03-02 PROCEDURE — 82570 ASSAY OF URINE CREATININE: CPT

## 2021-03-02 PROCEDURE — 84100 ASSAY OF PHOSPHORUS: CPT

## 2021-03-02 PROCEDURE — 36415 COLL VENOUS BLD VENIPUNCTURE: CPT

## 2021-03-02 PROCEDURE — 85027 COMPLETE CBC AUTOMATED: CPT

## 2021-03-02 PROCEDURE — 84156 ASSAY OF PROTEIN URINE: CPT

## 2021-03-18 PROBLEM — I25.10 CAD IN NATIVE ARTERY: Status: RESOLVED | Noted: 2017-06-26 | Resolved: 2021-03-18

## 2021-04-20 ENCOUNTER — HOSPITAL ENCOUNTER (OUTPATIENT)
Age: 76
Setting detail: SPECIMEN
Discharge: HOME | End: 2021-04-20
Payer: MEDICARE

## 2021-04-20 ENCOUNTER — HOSPITAL ENCOUNTER (OUTPATIENT)
Facility: MEDICAL CENTER | Age: 76
Discharge: HOME | End: 2021-04-20
Payer: MEDICARE

## 2021-04-20 DIAGNOSIS — D47.2 MGUS (MONOCLONAL GAMMOPATHY OF UNKNOWN SIGNIFICANCE): ICD-10-CM

## 2021-04-20 DIAGNOSIS — E78.00 PURE HYPERCHOLESTEROLEMIA: ICD-10-CM

## 2021-04-20 LAB
ABSOLUTE EOS #: 1.12 K/UL (ref 0–0.44)
ABSOLUTE IMMATURE GRANULOCYTE: 0 K/UL (ref 0–0.3)
ABSOLUTE LYMPH #: 4.09 K/UL (ref 1.1–3.7)
ABSOLUTE MONO #: 0.84 K/UL (ref 0.1–1.2)
BASOPHILS # BLD: 1 % (ref 0–2)
BASOPHILS ABSOLUTE: 0.09 K/UL (ref 0–0.2)
CHOLESTEROL/HDL RATIO: 2.9
CHOLESTEROL: 182 MG/DL
DIFFERENTIAL TYPE: ABNORMAL
EOSINOPHILS RELATIVE PERCENT: 12 % (ref 1–4)
FREE KAPPA/LAMBDA RATIO: 1.88 (ref 0.26–1.65)
HCT VFR BLD CALC: 38.4 % (ref 36.3–47.1)
HDLC SERPL-MCNC: 63 MG/DL
HEMOGLOBIN: 12.2 G/DL (ref 11.9–15.1)
IGA: 52 MG/DL (ref 70–400)
IGG: 973 MG/DL (ref 700–1600)
IGM: 340 MG/DL (ref 40–230)
IMMATURE GRANULOCYTES: 0 %
KAPPA FREE LIGHT CHAINS QNT: 3.26 MG/DL (ref 0.37–1.94)
LAMBDA FREE LIGHT CHAINS QNT: 1.73 MG/DL (ref 0.57–2.63)
LDL CHOLESTEROL: 88 MG/DL (ref 0–130)
LYMPHOCYTES # BLD: 44 % (ref 24–43)
MCH RBC QN AUTO: 26.3 PG (ref 25.2–33.5)
MCHC RBC AUTO-ENTMCNC: 31.8 G/DL (ref 28.4–34.8)
MCV RBC AUTO: 82.9 FL (ref 82.6–102.9)
MONOCYTES # BLD: 9 % (ref 3–12)
NRBC AUTOMATED: 0 PER 100 WBC
PDW BLD-RTO: 15.1 % (ref 11.8–14.4)
PLATELET # BLD: 197 K/UL (ref 138–453)
PLATELET ESTIMATE: ABNORMAL
PMV BLD AUTO: 10.8 FL (ref 8.1–13.5)
RBC # BLD: 4.63 M/UL (ref 3.95–5.11)
RBC # BLD: ABNORMAL 10*6/UL
SEG NEUTROPHILS: 34 % (ref 36–65)
SEGMENTED NEUTROPHILS ABSOLUTE COUNT: 3.16 K/UL (ref 1.5–8.1)
TRIGL SERPL-MCNC: 154 MG/DL
VLDLC SERPL CALC-MCNC: ABNORMAL MG/DL (ref 1–30)
WBC # BLD: 9.3 K/UL (ref 3.5–11.3)
WBC # BLD: ABNORMAL 10*3/UL

## 2021-04-20 PROCEDURE — 80061 LIPID PANEL: CPT

## 2021-04-20 PROCEDURE — 85025 COMPLETE CBC W/AUTO DIFF WBC: CPT

## 2021-04-20 PROCEDURE — 84155 ASSAY OF PROTEIN SERUM: CPT

## 2021-04-20 PROCEDURE — 83883 ASSAY NEPHELOMETRY NOT SPEC: CPT

## 2021-04-20 PROCEDURE — 86334 IMMUNOFIX E-PHORESIS SERUM: CPT

## 2021-04-20 PROCEDURE — 82784 ASSAY IGA/IGD/IGG/IGM EACH: CPT

## 2021-04-20 PROCEDURE — 36415 COLL VENOUS BLD VENIPUNCTURE: CPT

## 2021-04-20 PROCEDURE — 84165 PROTEIN E-PHORESIS SERUM: CPT

## 2021-04-20 NOTE — RESULT ENCOUNTER NOTE
Please notify [place]. Results have been reviewed and are normal and/or in a range that I feel is acceptable.

## 2021-04-21 ENCOUNTER — HOSPITAL ENCOUNTER (OUTPATIENT)
Facility: MEDICAL CENTER | Age: 76
End: 2021-04-21
Payer: MEDICARE

## 2021-04-21 LAB
ALBUMIN (CALCULATED): 4.6 G/DL (ref 3.2–5.2)
ALBUMIN PERCENT: 67 % (ref 45–65)
ALPHA 1 PERCENT: 3 % (ref 3–6)
ALPHA 2 PERCENT: 10 % (ref 6–13)
ALPHA-1-GLOBULIN: 0.2 G/DL (ref 0.1–0.4)
ALPHA-2-GLOBULIN: 0.7 G/DL (ref 0.5–0.9)
BETA GLOBULIN: 0.6 G/DL (ref 0.5–1.1)
BETA PERCENT: 9 % (ref 11–19)
GAMMA GLOBULIN %: 12 % (ref 9–20)
GAMMA GLOBULIN: 0.8 G/DL (ref 0.5–1.5)
PATHOLOGIST: ABNORMAL
PATHOLOGIST: NORMAL
PROTEIN ELECTROPHORESIS, SERUM: ABNORMAL
SERUM IFX INTERP: NORMAL
TOTAL PROT. SUM,%: 101 % (ref 98–102)
TOTAL PROT. SUM: 6.9 G/DL (ref 6.3–8.2)
TOTAL PROTEIN: 6.9 G/DL (ref 6.4–8.3)

## 2021-04-27 ENCOUNTER — TELEPHONE (OUTPATIENT)
Dept: ONCOLOGY | Age: 76
End: 2021-04-27

## 2021-04-27 ENCOUNTER — OFFICE VISIT (OUTPATIENT)
Dept: ONCOLOGY | Age: 76
End: 2021-04-27
Payer: MEDICARE

## 2021-04-27 VITALS
HEART RATE: 78 BPM | TEMPERATURE: 97.6 F | BODY MASS INDEX: 29.51 KG/M2 | WEIGHT: 166.6 LBS | DIASTOLIC BLOOD PRESSURE: 60 MMHG | RESPIRATION RATE: 16 BRPM | SYSTOLIC BLOOD PRESSURE: 110 MMHG

## 2021-04-27 DIAGNOSIS — D47.2 MGUS (MONOCLONAL GAMMOPATHY OF UNKNOWN SIGNIFICANCE): Primary | ICD-10-CM

## 2021-04-27 PROCEDURE — G8399 PT W/DXA RESULTS DOCUMENT: HCPCS | Performed by: INTERNAL MEDICINE

## 2021-04-27 PROCEDURE — G8417 CALC BMI ABV UP PARAM F/U: HCPCS | Performed by: INTERNAL MEDICINE

## 2021-04-27 PROCEDURE — G8427 DOCREV CUR MEDS BY ELIG CLIN: HCPCS | Performed by: INTERNAL MEDICINE

## 2021-04-27 PROCEDURE — 1090F PRES/ABSN URINE INCON ASSESS: CPT | Performed by: INTERNAL MEDICINE

## 2021-04-27 PROCEDURE — 1123F ACP DISCUSS/DSCN MKR DOCD: CPT | Performed by: INTERNAL MEDICINE

## 2021-04-27 PROCEDURE — 99214 OFFICE O/P EST MOD 30 MIN: CPT | Performed by: INTERNAL MEDICINE

## 2021-04-27 PROCEDURE — 99211 OFF/OP EST MAY X REQ PHY/QHP: CPT | Performed by: INTERNAL MEDICINE

## 2021-04-27 PROCEDURE — 4040F PNEUMOC VAC/ADMIN/RCVD: CPT | Performed by: INTERNAL MEDICINE

## 2021-04-27 PROCEDURE — 1036F TOBACCO NON-USER: CPT | Performed by: INTERNAL MEDICINE

## 2021-05-05 NOTE — PROGRESS NOTES
_     Chief Complaint   Patient presents with    Follow-up   [address] Other     can left arm be used for blood draws/ blood pressures , mastectomy 28 years ago      DIAGNOSIS:       Paraproteinemia/monoclonal gammopathy  MGUS  Chronic renal insufficiency  Breast cancer in remission. Diagnosed more than 25 years ago. Multiple comorbidities as listed      CURRENT THERAPY:         Observation monitoring for MGUS    BRIEF CASE HISTORY:      Ms. Gabrielle Purdy is a very pleasant 68 y.o. female with history of multiple co morbidities as listed. Patient is referred for further management of abnormal kappa light chain immunoglobulin. Patient had recent evaluation by nephrology for chronic renal insufficiency. .  Work-up was done which included serum protein electrophoresis and immunofixation. Patient was noted to have elevated kappa light chain immunoglobulin and slightly limited M protein with the mild monoclonal gammopathy. Patient denies any fever or night sweats. No repeated infections. She had chronic body aches including chronic back pain. No recent changes. No chest pain or shortness of breath. No fever. No other complaints. Patient denies smoking or alcohol drinking. INTERIM HISTORY:   Patient seen for follow-up paraproteinemia. Doing well clinically. No chest pain or shortness of breath. No back pain or bone aches. No fever. No weight loss or decreased appetite. No other complaints.           PAST MEDICAL HISTORY: has a past medical history of Anemia, unspecified, Aortic stenosis, Arthritis, AV block, BBB (bundle branch block), Breast cancer ([facility] [address] Bundle branch block, CAD (coronary artery disease), Carcinoma in situ of breast, Esophageal reflux, Essential hypertension, benign, Insulin resistance, MGUS (monoclonal gammopathy of unknown significance), Nephrolithiasis, Osteoporosis, Pure · Ears: No hearing problems or drainage. No tinnitus. · Throat: No sore throat, problems with swallowing or dysphagia. · Respiratory: No cough, sputum or hemoptysis. No shortness of breath. No pleuritic chest pain. · Cardiovascular: No chest pain, orthopnea or PND. No lower extremity edema. No palpitation. · Gastrointestinal: No problems with swallowing. No abdominal pain or bloating. No nausea or vomiting. No diarrhea or constipation. No GI bleeding. · Genitourinary: No dysuria, hematuria, frequency or urgency. · Musculoskeletal: No muscle aches or pains. No limitation of movement. No back pain. No gait disturbance, No joint complaints. · Dermatologic: No skin rashes or pruritus. No skin lesions or discolorations. · Psychiatric: No depression, anxiety, or stress or signs of schizophrenia. No change in mood or affect. · Hematologic: No history of bleeding tendency. No bruises or ecchymosis. No history of clotting problems. · Infectious disease: No fever, chills or frequent infections. · Endocrine: No polydipsia or polyuria. No temperature intolerance. · Neurologic: No headaches or dizziness. No weakness or numbness of the extremities. No changes in balance, coordination,  memory, mentation, behavior. · Allergic/Immunologic: No nasal congestion or hives. No repeated infections. PHYSICAL EXAM:  The patient is not in acute distress. Vital signs: Blood pressure 110/60, pulse 78, temperature 97.6 °F (36.4 °C), temperature source Temporal, resp. rate 16, weight 166 lb 9.6 oz (75.6 kg), not currently breastfeeding.      General appearance - well appearing, not in pain or distress  Mental status - good mood, alert and oriented  Eyes - pupils equal and reactive, extraocular eye movements intact  Ears - bilateral TM's and external ear canals normal  Nose - normal and patent, no erythema, discharge or polyps  Mouth - mucous membranes moist, pharynx normal without lesions  Neck - supple, no significant increase. Patient will continue follow-up with her nephrologist.  Patient's questions were answered to the best of her satisfaction and she verbalized full understanding and agreement.

## 2021-05-20 ENCOUNTER — TELEPHONE (OUTPATIENT)
Dept: INTERVENTIONAL RADIOLOGY/VASCULAR | Age: 76
End: 2021-05-20

## 2021-05-20 NOTE — TELEPHONE ENCOUNTER
Contacted Corry Lopez PA office to confirm that liver bx is still needed to be completed as attempts have been made with no response. Nurse stated that she will send message to PA to confirm as pt has been declining all treatment. Call back number given.

## 2021-05-21 ENCOUNTER — TELEPHONE (OUTPATIENT)
Dept: INTERVENTIONAL RADIOLOGY/VASCULAR | Age: 76
End: 2021-05-21

## 2021-05-21 NOTE — TELEPHONE ENCOUNTER
Per Corry Barlow's office, would like bx to be completed. However, have also been unable to reach pt. Office stated that they will continue reach out to pt and call us to schedule.

## 2021-05-24 ENCOUNTER — HOSPITAL ENCOUNTER (OUTPATIENT)
Dept: MAMMOGRAPHY | Age: 76
Discharge: HOME | End: 2021-05-26
Payer: MEDICARE

## 2021-05-24 DIAGNOSIS — Z12.31 ENCOUNTER FOR SCREENING MAMMOGRAM FOR BREAST CANCER: ICD-10-CM

## 2021-05-24 PROCEDURE — 77063 BREAST TOMOSYNTHESIS BI: CPT

## 2021-06-02 ENCOUNTER — HOSPITAL ENCOUNTER (OUTPATIENT)
Dept: CARDIAC CATH/INVASIVE PROCEDURES | Age: 76
Discharge: HOME | End: 2021-06-02
Payer: MEDICARE

## 2021-06-02 VITALS
SYSTOLIC BLOOD PRESSURE: 164 MMHG | OXYGEN SATURATION: 99 % | HEART RATE: 60 BPM | BODY MASS INDEX: 29.41 KG/M2 | DIASTOLIC BLOOD PRESSURE: 60 MMHG | WEIGHT: 166 LBS | HEIGHT: 63 IN | TEMPERATURE: 98.4 F | RESPIRATION RATE: 19 BRPM

## 2021-06-02 LAB
GFR NON-AFRICAN AMERICAN: 40 ML/MIN
GFR SERPL CREATININE-BSD FRML MDRD: 48 ML/MIN
GFR SERPL CREATININE-BSD FRML MDRD: ABNORMAL ML/MIN/{1.73_M2}
GLUCOSE BLD-MCNC: 107 MG/DL (ref 74–100)
PLATELET # BLD: 204 K/UL (ref 138–453)
POC BUN: 27 MG/DL (ref 8–26)
POC CHLORIDE: 102 MMOL/L (ref 98–107)
POC CREATININE: 1.3 MG/DL (ref 0.51–1.19)
POC HEMATOCRIT: 37 % (ref 36–46)
POC HEMOGLOBIN: 12.6 G/DL (ref 12–16)
POC POTASSIUM: 3.9 MMOL/L (ref 3.5–4.5)
POC SODIUM: 142 MMOL/L (ref 138–146)

## 2021-06-02 PROCEDURE — 6360000004 HC RX CONTRAST MEDICATION

## 2021-06-02 PROCEDURE — 84520 ASSAY OF UREA NITROGEN: CPT

## 2021-06-02 PROCEDURE — 2709999900 HC NON-CHARGEABLE SUPPLY

## 2021-06-02 PROCEDURE — 2500000003 HC RX 250 WO HCPCS

## 2021-06-02 PROCEDURE — 93454 CORONARY ARTERY ANGIO S&I: CPT | Performed by: INTERNAL MEDICINE

## 2021-06-02 PROCEDURE — 85049 AUTOMATED PLATELET COUNT: CPT

## 2021-06-02 PROCEDURE — 84132 ASSAY OF SERUM POTASSIUM: CPT

## 2021-06-02 PROCEDURE — 82565 ASSAY OF CREATININE: CPT

## 2021-06-02 PROCEDURE — 6360000002 HC RX W HCPCS

## 2021-06-02 PROCEDURE — 85014 HEMATOCRIT: CPT

## 2021-06-02 PROCEDURE — 84295 ASSAY OF SERUM SODIUM: CPT

## 2021-06-02 PROCEDURE — 82435 ASSAY OF BLOOD CHLORIDE: CPT

## 2021-06-02 PROCEDURE — 82947 ASSAY GLUCOSE BLOOD QUANT: CPT

## 2021-06-02 PROCEDURE — C1725 CATH, TRANSLUMIN NON-LASER: HCPCS

## 2021-06-02 PROCEDURE — C1769 GUIDE WIRE: HCPCS

## 2021-06-02 PROCEDURE — C1894 INTRO/SHEATH, NON-LASER: HCPCS

## 2021-06-02 RX ORDER — SODIUM CHLORIDE 9 MG/ML
INJECTION, SOLUTION INTRAVENOUS CONTINUOUS
Status: DISCONTINUED | OUTPATIENT
Start: 2021-06-02 | End: 2021-06-03 | Stop reason: HOSPADM

## 2021-06-02 RX ADMIN — SODIUM CHLORIDE: 9 INJECTION, SOLUTION INTRAVENOUS at 12:51

## 2021-06-02 NOTE — PROGRESS NOTES
Ambulated to bathroom and in halls. Gait steady. . Right radial puncture site and right  assessment unchanged.

## 2021-06-02 NOTE — PROGRESS NOTES
Received post procedure to [facility] to room 14. Assessment obtained. Restrictions reviewed with patient. Post procedure pathway initiated. Right radial  site soft ,Vasc band  dry and intact. No hematoma noted. Family at side. Patient without complaints.

## 2021-06-02 NOTE — OP NOTE
[facility]    CARDIAC CATHETERIZATION    Date:   6/2/2021  Patient name:  [place]  Date of admission:  6/2/2021 11:42 AM  MRN:   [id number]  YOB: 1945    Operators:  Primary:   Armand Gibson MD (Attending Physician)    Assistant/CV fellow: Elvira Malcolm MD      Procedure performed:     [x] Left Heart Catheterization. [] Graft Angiography. [x] Left Ventriculography. [] Right Heart Catheterization. [x] Coronary Angiography. [] Aortic Valve Studies. [] PCI:      [] Other:       Pre Procedure Conscious Sedation Data:  ASA Class:    [] I [x] II [] III [] IV    Mallampati Class:  [] I [x] II [] III [] IV      Indication:  [] STEMI      [] + Stress test  [] ACS      [] + EKG Changes  [] Non Q MI       [] Significant Risk Factors  [] Recurrent Angina             [] Diabetes Mellitus    [] New LBBB      [] Other.  [] CHF / Low EF changes     [] Abnormal CTA / Ca Score      Procedure:  Access:  [] Femoral  [] Radial  artery       [x] Right  [] Left    Procedure: After informed consent was obtained with explanation of the risks and benefits, patient was brought to the cath lab. The access area was prepped and draped in sterile fashion. 1% lidocaine was used for local block. The artery was cannulated with 6  Fr sheath with brisk arterial blood return. The side port was frequently flushed and aspirated with normal saline. Estimated Blood Loss:  [x] Minimal < 25 cc [] Moderate 25-50 cc  [] >50 cc    Findings:    LMCA: Mild irregularities 10-20%. LAD: Mild irregularities 10-20%. second diagonal has 50% mid stenosis. LCx: Mild irregularities 10-20%. RCA: patent ostial stent, 50% instent restenosis. No pressure dampening,   (Stress test showing apical ischemia). Conclusions:    1. Patent RCA stent with non-obstructive CAD.        Recommendations:  Post-cath protocol  Continue optimal medical therapy  Risk factor modification      ____________________________________________________________________    History and Risk Factors    [x] Hypertension     [] Family history of CAD  [x] Hyperlipidemia     [] Cerebrovascular Disease   [] Prior MI       [] Peripheral Vascular disease   [x] Prior PCI              [] Diabetes Mellitus    [] Left Main PCI. [] Currently on Dialysis. [] Prior CABG. [] Currently smoker. [] Cardiac Arrest outside of healthcare facility. [] Yes    [x] No        Witnessed     [] Yes   [] No     Arrest after arrival of EMS  [] Yes   [] No     [] Cardiac Arrest at other Facility. [] Yes   [] No    Pre-Procedure Information. Heart Failure       [] Yes    [x] No        Class  [] I      [] II  [] III    [] IV. New Diagnosis    [] Yes  [] No    HF Type      [] Systolic   [] Diastolic          [] Unknown. Diagnostic Test:   EKG       [] Normal   [x] Abnormal    New antiarrhythmia medications:    [] Yes   [] No   New onset atrial fibrillation / Flutter     [] Yes   [] No   ECG Abnormalities:      [] V. Fib   [] [place] T[place]           [] NS V. T   [] New LBBB           [] T. Inv  []  ST dev > 0.5 mm         [] PVC's freq  [] PVC's infrequent    Stress Test Performed:      [x] Yes    [] No     Type:     [] Stress Echo   [] Exercise Stress Test (no imaging)      [x] Stress Nuclear  [] Stress Imaging     Results   [] Negative   [x] Positive        [] Indeterminate  [] Unavailable     If Positive/ Risk / Extent of Ischemia:       [] Low  [] Intermediate         [] High  [] Unavailable      Cardiac CTA Performed:     [] Yes    [x] No      Results   [] CAD   [] Non obstructive CAD      [] No CAD   [] Uncertain      [] Unknown   [] Structural Disease.      Pre Procedure Medications:   [x] Yes    [] No         [x] ASA   [x] Beta Blockers      [] Nitrate   [] Ca Channel Blockers      [] Ranolazine   [] Statin       [] Plavix/Others antiplatelets      Electronically signed on 6/2/2021 at 2:29 PM by:    Aida Bowie MD  Fellow, [id number] MD, Attending Physician  [facility].

## 2021-06-02 NOTE — H&P
[facility] Cardiology   History & Physical               Today's Date: 6/2/2021  Patient Name: Cristina Castillo  Date of admission: 6/2/2021 11:42 AM  Patient's age: 68 y. o., 1945  Admission Dx: Abnormal stress ECG [R94.39]    Reason for Admission:  Left heart cath    CHIEF COMPLAINT:  Chest pain   No chief complaint on file. History Obtained From:  patient, electronic medical record    HISTORY OF PRESENT ILLNESS:      The patient is a 68 y.o. female who is admitted to the hospital for left heart cath. She has history of coronary artery disease s/p PCI with last Cardiac cath on 04/27/2017 showing stenosis in the right coronary artery successfully treated with bare metal stent. The previous stent to the LAD was widely patent. Moderate aortic stenosis was seen with a mean transaortic gradient of 30 mmHg and normal right heart pressures. LVEF was 55%. He was complaining of chest pain and worsening shortness of breath last echocardiogram and stress test was ordered. Past Medical History:   has a past medical history of Anemia, unspecified, Aortic stenosis, Arthritis, AV block, BBB (bundle branch block), Breast cancer ([facility] [address] Bundle branch block, CAD (coronary artery disease), Carcinoma in situ of breast, Esophageal reflux, Essential hypertension, benign, Insulin resistance, MGUS (monoclonal gammopathy of unknown significance), Nephrolithiasis, Osteoporosis, Pure hypercholesterolemia, Stage 3 chronic kidney disease, and Unspecified asthma(493.90). Past Surgical History:   has a past surgical history that includes Tubal ligation; Mastectomy (Left); Colon surgery; Colonoscopy (2/6/06); Pacemaker insertion ([id number]); Breast reconstruction (Right); Breast surgery (Left); eye surgery (Right); Foot neuroma surgery (Right); Wrist fracture surgery; Upper gastrointestinal endoscopy (9/2007); Upper gastrointestinal endoscopy (3/2006); Upper gastrointestinal endoscopy (8/2005);  Dilation and curettage of uterus (1973); pacemaker placement (4/23/2015); Coronary angioplasty with stent (04/27/2017); Cataract removal (Left, 05/20/2019); Lithotripsy (2012); Cystoscopy (07/31/2012); Tooth Extraction (03/05/2021); Abdomen surgery; Cardiac surgery; and Cardiac catheterization (04/27/2017). Home Medications:    Prior to Admission medications    Medication Sig Start Date End Date Taking?  Authorizing Provider   nystatin (MYCOSTATIN) 481689 UNIT/GM powder Apply 3 times daily prn rash 5/27/21  Yes Tommy Ross MD   nystatin (MYCOSTATIN) 982239 UNIT/GM cream Apply at HS prn groin rash 5/27/21  Yes Tommy Ross MD   pravastatin (PRAVACHOL) 20 MG tablet TAKE 1 TABLET BY MOUTH EVERY DAY IN THE EVENING 5/10/21  Yes Tommy Ross MD   Omega-3 Fatty Acids (OMEGA 3 500 PO) Take 640 mg by mouth daily   Yes Historical Provider, MD   fluticasone (CUTIVATE) 0.05 % cream APPLY TOPICALLY TO AFFECTED AREA TWICE DAILY 12/24/20  Yes Tommy Ross MD   montelukast (SINGULAIR) 10 MG tablet TAKE 1 TABLET BY MOUTH EVERY DAY IN THE EVENING 12/16/20  Yes Tommy Ross MD   Golimumab ([address] ARIA IV) Infuse intravenously Every 8 weeks   Yes Historical Provider, MD   valsartan-hydrochlorothiazide (DIOVAN-HCT) 80-12.5 MG per tablet TAKE 1 TABLET BY MOUTH EVERY DAY 6/27/19  Yes Historical Provider, MD   budesonide-formoterol (SYMBICORT) 160-4.5 MCG/ACT AERO INHALE 2 PUFFS BY MOUTH TWICE DAILY 3/11/19  Yes Tommy Ross MD   aspirin 81 MG tablet Take 81 mg by mouth every other day    Yes Historical Provider, MD   Cholecalciferol (VITAMIN D3) 2000 UNITS CAPS Take by mouth daily    Yes Historical Provider, MD   Probiotic Product (PROBIOTIC DAILY PO) Take by mouth daily    Yes Historical Provider, MD   Calcium Carbonate-Vitamin D (CALCIUM-VITAMIN D) 500-200 MG-UNIT per tablet Take 2 tablets by mouth daily    Yes Historical Provider, MD   metoprolol (TOPROL-XL) 50 MG XL tablet Take 75 mg by mouth daily Take full tab in am and half tab in pm   Yes Historical Provider, MD   lansoprazole (PREVACID) 15 MG capsule Take 15 mg by mouth daily. Yes Historical Provider, MD   Denosumab (PROLIA SC) Inject into the skin Every 6 months    Historical Provider, MD   XOPENEX HFA 45 MCG/ACT inhaler INHALE TWO PUFFS BY MOUTH EVERY FOUR HOURS  Patient taking differently: PRN 10/10/17   Stefan Lora MD   nitroGLYCERIN (NITROSTAT) 0.4 MG SL tablet Place 1 tablet under the tongue every 5 minutes as needed for Chest pain up to max of 3 total doses. If no relief after 1 dose, call 911. 4/28/17   ROSALINDA Corona - CNP   Vaginal Lubricant (REPLENS) GEL Place vaginally    Historical Provider, MD   fluticasone (FLONASE) 50 MCG/ACT nasal spray USE TWO SPRAYS IN EACH NOSTRIL DAILY   Patient taking differently: as needed  3/4/16   Stefan Lora MD   prednisoLONE acetate (PRED FORTE) 1 % ophthalmic suspension as needed  4/17/15   Historical Provider, MD        Current Facility-Administered Medications: 0.9 % sodium chloride infusion, , Intravenous, Continuous    Allergies:  Azithromycin, Benadryl [diphenhydramine], Codeine, Estrogens, Hctz [hydrochlorothiazide], Lisinopril, Phenergan [promethazine hcl], Tape [adhesive tape], and Amoxicillin    Social History:   reports that she quit smoking about 11 years ago. Her smoking use included cigarettes. She has a 10.00 pack-year smoking history. She has never used smokeless tobacco. She reports that she does not drink alcohol and does not use drugs. Family History: family history includes Cancer in her mother and sister; Heart Disease in her brother and father; Hypertension in her father and mother. REVIEW OF SYSTEMS:      · Constitutional: there has been no unanticipated weight loss. · Eyes: No visual changes or diplopia. · ENT: No Headaches  · Cardiovascular:  Remaining as above  · Respiratory: No cough  · Gastrointestinal: No abdominal pain. No change in bowel or bladder habits. · Genitourinary: No dysuria, trouble voiding, or hematuria. · Neurological: No headache. PHYSICAL EXAM:      There were no vitals taken for this visit. No intake or output data in the 24 hours ending 06/02/21 1227        Constitutional and General Appearance:   alert, cooperative, no distress and appears stated age  HEENT:  · PERRL, EOMI  Respiratory:  · Normal excursion and expansion without use of accessory muscles  · Resp Auscultation:  Good respiratory effort. No for increased work of breathing. On auscultation: clear to auscultation bilaterally  Cardiovascular:  · Regular rate and rhythm  · E4/I3  · Systolic murmur   · The apical impulse is not displaced  Abdomen:  · Soft  · Bowel sounds present  · Non-tender to palpation  Extremities:  · No cyanosis or clubbing  · Lower extremity edema: No  Skin:  · Warm and dry  Neurological:  · Alert and oriented. · Moves all extremities well      Labs:     CBC: No results for input(s): WBC, HGB, HCT, PLT in the last 72 hours. BMP: No results for input(s): NA, K, CO2, BUN, CREATININE, LABGLOM, GLUCOSE in the last 72 hours. Pro-BNP:  No results for input(s): PROBNP in the last 72 hours. BNP: No results for input(s): BNP in the last 72 hours. PT/INR: No results for input(s): PROTIME, INR in the last 72 hours. APTT:No results for input(s): APTT in the last 72 hours. CARDIAC ENZYMES:No results for input(s): CKTOTAL, CKMB, CKMBINDEX, TROPONINI in the last 72 hours. Invalid input(s):  TROPONINT  No results for input(s): TROPONINT in the last 72 hours. FASTING LIPID PANEL:  Lab Results   Component Value Date    HDL 63 04/20/2021    LDLCALC 79 05/20/2020    TRIG 154 04/20/2021     LIVER PROFILE:No results for input(s): AST, ALT, LABALBU in the last 72 hours. Patient's Active Problem List  Active Problems:    * No active hospital problems. *  Resolved Problems:    * No resolved hospital problems.  *        IMPRESSION: 1. Chest pain  2. Positive stress test as above  3. Aortic stenosis . Remains moderate-to-severe  4. CAD with h/o PCI to RCA;  5. Essential HTN  6. Hyperlipidemia  7. SSS and AVB with dual chamber pacemaker      RECOMMENDATIONS:  1. Left heart cath  2. Need cardiac cath for risk stratification. Risk and benefits of cardiac catheterization were discussed in detail. Risk of bleeding, requiring blood transfusion, vascular complication requiring surgery, renal insufficieny with need of dialysis, CVA, MI, death and anesthesia complications including intubation were discussed. Patient agrees to proceed and verbalizes understanding. Pre Procedure Conscious Sedation Data:     ASA Class:                  [] I [x] II [] III [] IV     Mallampati Class:       [] I [x] II [] III [] IV        Discussed with patient and Nurse. Karen Mahmood MD, M.D. Fellow, [address] [place] [address]      Please note that part of this chart were generated using voice recognition  dictation software. Although every effort was made to ensure the accuracy of this automated transcription, some errors in transcription may have occurred. Attestation signed by      Attending Physician Statement:    I have discussed the care [facility] , including pertinent history and exam findings, with the Cardiology fellow/resident. I have seen and examined the patient and the key elements of all parts of the encounter have been performed by me. I agree with the assessment, plan and orders as documented by the fellow/resident, after I modified exam findings and plan of treatments, and the final version is my approved version of the assessment.      Additional Comments:

## 2021-06-17 PROBLEM — I49.5 SICK SINUS SYNDROME (HCC): Status: ACTIVE | Noted: 2021-06-17

## 2021-06-18 ENCOUNTER — TELEPHONE (OUTPATIENT)
Dept: ONCOLOGY | Age: 76
End: 2021-06-18

## 2021-08-10 ENCOUNTER — HOSPITAL ENCOUNTER (OUTPATIENT)
Age: 76
Setting detail: SPECIMEN
Discharge: HOME | End: 2021-08-10
Payer: MEDICARE

## 2021-08-11 DIAGNOSIS — N95.2 ATROPHIC VAGINITIS: ICD-10-CM

## 2021-08-11 DIAGNOSIS — N76.0 ACUTE VAGINITIS: ICD-10-CM

## 2021-08-11 DIAGNOSIS — R30.0 DYSURIA: ICD-10-CM

## 2021-08-11 LAB
DIRECT EXAM: NORMAL
Lab: NORMAL
SPECIMEN DESCRIPTION: NORMAL

## 2021-08-12 LAB
CULTURE: ABNORMAL
Lab: ABNORMAL
SPECIMEN DESCRIPTION: ABNORMAL

## 2021-08-17 ENCOUNTER — HOSPITAL ENCOUNTER (OUTPATIENT)
Age: 76
Setting detail: SPECIMEN
Discharge: HOME | End: 2021-08-17
Payer: MEDICARE

## 2021-08-17 DIAGNOSIS — N30.00 ACUTE CYSTITIS WITHOUT HEMATURIA: ICD-10-CM

## 2021-08-18 LAB
CULTURE: NORMAL
Lab: NORMAL
SPECIMEN DESCRIPTION: NORMAL

## 2021-08-20 ENCOUNTER — HOSPITAL ENCOUNTER (OUTPATIENT)
Age: 76
Setting detail: SPECIMEN
Discharge: HOME | End: 2021-08-20
Payer: MEDICARE

## 2021-08-20 DIAGNOSIS — I35.0 NONRHEUMATIC AORTIC VALVE STENOSIS: ICD-10-CM

## 2021-08-20 DIAGNOSIS — I10 ESSENTIAL HYPERTENSION: ICD-10-CM

## 2021-08-20 DIAGNOSIS — N18.31 STAGE 3A CHRONIC KIDNEY DISEASE (HCC): ICD-10-CM

## 2021-08-20 LAB
ALBUMIN SERPL-MCNC: 4.3 G/DL (ref 3.5–5.2)
ANION GAP SERPL CALCULATED.3IONS-SCNC: 10 MMOL/L (ref 9–17)
BUN BLDV-MCNC: 28 MG/DL (ref 8–23)
BUN/CREAT BLD: ABNORMAL (ref 9–20)
CALCIUM SERPL-MCNC: 9.6 MG/DL (ref 8.6–10.4)
CHLORIDE BLD-SCNC: 102 MMOL/L (ref 98–107)
CO2: 26 MMOL/L (ref 20–31)
CREAT SERPL-MCNC: 1.22 MG/DL (ref 0.5–0.9)
CREATININE URINE: 136 MG/DL (ref 28–217)
GFR AFRICAN AMERICAN: 52 ML/MIN
GFR NON-AFRICAN AMERICAN: 43 ML/MIN
GFR SERPL CREATININE-BSD FRML MDRD: ABNORMAL ML/MIN/{1.73_M2}
GFR SERPL CREATININE-BSD FRML MDRD: ABNORMAL ML/MIN/{1.73_M2}
GLUCOSE BLD-MCNC: 85 MG/DL (ref 70–99)
HCT VFR BLD CALC: 39.7 % (ref 36.3–47.1)
HEMOGLOBIN: 11.8 G/DL (ref 11.9–15.1)
MCH RBC QN AUTO: 26.2 PG (ref 25.2–33.5)
MCHC RBC AUTO-ENTMCNC: 29.7 G/DL (ref 28.4–34.8)
MCV RBC AUTO: 88.2 FL (ref 82.6–102.9)
NRBC AUTOMATED: 0 PER 100 WBC
PDW BLD-RTO: 16 % (ref 11.8–14.4)
PHOSPHORUS: 4 MG/DL (ref 2.6–4.5)
PLATELET # BLD: 194 K/UL (ref 138–453)
PMV BLD AUTO: 11.2 FL (ref 8.1–13.5)
POTASSIUM SERPL-SCNC: 4.5 MMOL/L (ref 3.7–5.3)
RBC # BLD: 4.5 M/UL (ref 3.95–5.11)
SODIUM BLD-SCNC: 138 MMOL/L (ref 135–144)
TOTAL PROTEIN, URINE: 22 MG/DL
URINE TOTAL PROTEIN CREATININE RATIO: 0.16 (ref 0–0.2)
WBC # BLD: 9.8 K/UL (ref 3.5–11.3)

## 2021-08-20 PROCEDURE — 82040 ASSAY OF SERUM ALBUMIN: CPT

## 2021-08-20 PROCEDURE — 36415 COLL VENOUS BLD VENIPUNCTURE: CPT

## 2021-08-20 PROCEDURE — 84156 ASSAY OF PROTEIN URINE: CPT

## 2021-08-20 PROCEDURE — 80048 BASIC METABOLIC PNL TOTAL CA: CPT

## 2021-08-20 PROCEDURE — 82570 ASSAY OF URINE CREATININE: CPT

## 2021-08-20 PROCEDURE — 85027 COMPLETE CBC AUTOMATED: CPT

## 2021-08-20 PROCEDURE — 84100 ASSAY OF PHOSPHORUS: CPT

## 2021-10-22 ENCOUNTER — HOSPITAL ENCOUNTER (OUTPATIENT)
Facility: MEDICAL CENTER | Age: 76
End: 2021-10-22
Payer: MEDICARE

## 2021-10-26 ENCOUNTER — TELEPHONE (OUTPATIENT)
Dept: ONCOLOGY | Age: 76
End: 2021-10-26

## 2021-10-26 ENCOUNTER — OFFICE VISIT (OUTPATIENT)
Dept: ONCOLOGY | Age: 76
End: 2021-10-26
Payer: MEDICARE

## 2021-10-26 VITALS — RESPIRATION RATE: 16 BRPM | BODY MASS INDEX: 30.71 KG/M2 | WEIGHT: 167.9 LBS | TEMPERATURE: 98.1 F

## 2021-10-26 DIAGNOSIS — D47.2 MGUS (MONOCLONAL GAMMOPATHY OF UNKNOWN SIGNIFICANCE): Primary | ICD-10-CM

## 2021-10-26 PROCEDURE — 99214 OFFICE O/P EST MOD 30 MIN: CPT | Performed by: INTERNAL MEDICINE

## 2021-10-26 PROCEDURE — 1123F ACP DISCUSS/DSCN MKR DOCD: CPT | Performed by: INTERNAL MEDICINE

## 2021-10-26 PROCEDURE — 99211 OFF/OP EST MAY X REQ PHY/QHP: CPT | Performed by: INTERNAL MEDICINE

## 2021-10-26 PROCEDURE — G8399 PT W/DXA RESULTS DOCUMENT: HCPCS | Performed by: INTERNAL MEDICINE

## 2021-10-26 PROCEDURE — 1036F TOBACCO NON-USER: CPT | Performed by: INTERNAL MEDICINE

## 2021-10-26 PROCEDURE — G8484 FLU IMMUNIZE NO ADMIN: HCPCS | Performed by: INTERNAL MEDICINE

## 2021-10-26 PROCEDURE — G8417 CALC BMI ABV UP PARAM F/U: HCPCS | Performed by: INTERNAL MEDICINE

## 2021-10-26 PROCEDURE — 1090F PRES/ABSN URINE INCON ASSESS: CPT | Performed by: INTERNAL MEDICINE

## 2021-10-26 PROCEDURE — 4040F PNEUMOC VAC/ADMIN/RCVD: CPT | Performed by: INTERNAL MEDICINE

## 2021-10-26 PROCEDURE — G8428 CUR MEDS NOT DOCUMENT: HCPCS | Performed by: INTERNAL MEDICINE

## 2021-10-26 NOTE — TELEPHONE ENCOUNTER
[place] ARRIVES AMBULATORY FOR MD VISIT  DR Marcos Coleman IN TO SEE PATIENT  ORDERS RECEIVED  RV 6 MONTHS WITH LABS BEFORE  REFER TO GENETICS  RV 6 MONTHS WITH LABS BEFORE  NOTIFIED [address] OF REFERRAL  LABS CDP SIFX KAPPA LAMBDA FREE LIGHT CHAINS IGG IGA IGM TO BE DONE 4/14/22 AT [facility]  MD VISIT 4/28/22 @2PM  AVS PRINTED AND GIVEN TO PATIENT WITH INSTRUCTIONS  PATIENT DISCHARGED AMBULATORY

## 2021-11-01 ENCOUNTER — HOSPITAL ENCOUNTER (OUTPATIENT)
Age: 76
Setting detail: SPECIMEN
Discharge: HOME | End: 2021-11-01
Payer: MEDICARE

## 2021-11-01 DIAGNOSIS — N95.2 ATROPHIC VAGINITIS: ICD-10-CM

## 2021-11-02 LAB
DIRECT EXAM: NORMAL
Lab: NORMAL
SPECIMEN DESCRIPTION: NORMAL

## 2021-11-02 NOTE — RESULT ENCOUNTER NOTE
Please notify [place]. Results have been reviewed and are normal   We are waiting for her Rakesh Leon dr to clear her for topical estrogens.      If we dont hear from them by Thursday AM please call them

## 2021-11-02 NOTE — PROGRESS NOTES
_     Chief Complaint   Patient presents with    Follow-up    Discuss Labs     scanned in media     DIAGNOSIS:       Paraproteinemia/monoclonal gammopathy  MGUS  Chronic renal insufficiency  Breast cancer in remission. Diagnosed more than 25 years ago. Multiple comorbidities as listed      CURRENT THERAPY:         Observation monitoring for MGUS    BRIEF CASE HISTORY:      Ms. Kaylie Caldwell is a very pleasant 68 y.o. female with history of multiple co morbidities as listed. Patient is referred for further management of abnormal kappa light chain immunoglobulin. Patient had recent evaluation by nephrology for chronic renal insufficiency. .  Work-up was done which included serum protein electrophoresis and immunofixation. Patient was noted to have elevated kappa light chain immunoglobulin and slightly limited M protein with the mild monoclonal gammopathy. Patient denies any fever or night sweats. No repeated infections. She had chronic body aches including chronic back pain. No recent changes. No chest pain or shortness of breath. No fever. No other complaints. Patient denies smoking or alcohol drinking. INTERIM HISTORY:   Patient seen for follow-up paraproteinemia. Doing well clinically. No chest pain or shortness of breath. No back pain or bone aches. No fever. No weight loss or decreased appetite. No other complaints.           PAST MEDICAL HISTORY: has a past medical history of Anemia, unspecified, Aortic stenosis, Arthritis, AV block, BBB (bundle branch block), Breast cancer ([address] Bundle branch block, CAD (coronary artery disease), Carcinoma in situ of breast, Esophageal reflux, Essential hypertension, benign, Insulin resistance, MGUS (monoclonal gammopathy of unknown significance), Nephrolithiasis, Osteoporosis, Pure hypercholesterolemia, Stage 3 chronic kidney disease ([address] and Unspecified asthma(493.90). PAST SURGICAL HISTORY: has a past surgical history that includes Tubal ligation; Mastectomy (Left); Colon surgery; Colonoscopy (02/06/2006); Pacemaker insertion (05/01/2007); Breast reconstruction (Right); Breast surgery (Left); eye surgery (Right); Foot neuroma surgery (Right); Wrist fracture surgery; Upper gastrointestinal endoscopy (09/2007); Upper gastrointestinal endoscopy (03/2006); Upper gastrointestinal endoscopy (08/2005); Dilation and curettage of uterus (1973); pacemaker placement (04/23/2015); Coronary angioplasty with stent (04/27/2017); Cataract removal (Left, 05/20/2019); Lithotripsy (2012); Cystoscopy (07/31/2012); Tooth Extraction (03/05/2021); Abdomen surgery; Cardiac surgery; Cardiac catheterization (04/27/2017); and Cardiac catheterization (2021). CURRENT MEDICATIONS:  has a current medication list which includes the following prescription(s): nystatin, vitamin e, pravastatin, omega-3 fatty acids, fluticasone, golimumab, valsartan-hydrochlorothiazide, denosumab, budesonide-formoterol, xopenex hfa, replens, fluticasone, aspirin, vitamin d3, probiotic product, prednisolone acetate, calcium-vitamin d, metoprolol succinate, lansoprazole, montelukast, and nitroglycerin. ALLERGIES:  is allergic to azithromycin, benadryl [diphenhydramine], codeine, estrogens, hctz [hydrochlorothiazide], lisinopril, phenergan [promethazine hcl], tape [adhesive tape], and amoxicillin. FAMILY HISTORY: Negative for any hematological or oncological conditions. SOCIAL HISTORY:  reports that she quit smoking about 12 years ago. Her smoking use included cigarettes. She has a 10.00 pack-year smoking history. She has never used smokeless tobacco. She reports that she does not drink alcohol and does not use drugs. REVIEW OF SYSTEMS:     · General: No weakness or fatigue. No unanticipated weight loss or decreased appetite. No fever or chills.    · Eyes: No blurred vision, eye pain or double vision. · Ears: No hearing problems or drainage. No tinnitus. · Throat: No sore throat, problems with swallowing or dysphagia. · Respiratory: No cough, sputum or hemoptysis. No shortness of breath. No pleuritic chest pain. · Cardiovascular: No chest pain, orthopnea or PND. No lower extremity edema. No palpitation. · Gastrointestinal: No problems with swallowing. No abdominal pain or bloating. No nausea or vomiting. No diarrhea or constipation. No GI bleeding. · Genitourinary: No dysuria, hematuria, frequency or urgency. · Musculoskeletal: No muscle aches or pains. No limitation of movement. No back pain. No gait disturbance, No joint complaints. · Dermatologic: No skin rashes or pruritus. No skin lesions or discolorations. · Psychiatric: No depression, anxiety, or stress or signs of schizophrenia. No change in mood or affect. · Hematologic: No history of bleeding tendency. No bruises or ecchymosis. No history of clotting problems. · Infectious disease: No fever, chills or frequent infections. · Endocrine: No polydipsia or polyuria. No temperature intolerance. · Neurologic: No headaches or dizziness. No weakness or numbness of the extremities. No changes in balance, coordination,  memory, mentation, behavior. · Allergic/Immunologic: No nasal congestion or hives. No repeated infections. PHYSICAL EXAM:  The patient is not in acute distress. Vital signs: Temperature 98.1 °F (36.7 °C), temperature source Temporal, resp. rate 16, weight 167 lb 14.4 oz (76.2 kg), not currently breastfeeding.      General appearance - well appearing, not in pain or distress  Mental status - good mood, alert and oriented  Eyes - pupils equal and reactive, extraocular eye movements intact  Ears - bilateral TM's and external ear canals normal  Nose - normal and patent, no erythema, discharge or polyps  Mouth - mucous membranes moist, pharynx normal without lesions  Neck - supple, no significant adenopathy  Lymphatics - no palpable lymphadenopathy, no hepatosplenomegaly  Chest - clear to auscultation, no wheezes, rales or rhonchi, symmetric air entry  Heart - normal rate, regular rhythm, normal S1, S2, no murmurs, rubs, clicks or gallops  Abdomen - soft, nontender, nondistended, no masses or organomegaly  Neurological - alert, oriented, normal speech, no focal findings or movement disorder noted  Musculoskeletal - no joint tenderness, deformity or swelling  Extremities - peripheral pulses normal, no pedal edema, no clubbing or cyanosis  Skin - normal coloration and turgor, no rashes, no suspicious skin lesions noted     Review of Diagnostic data:   Lab Results   Component Value Date    WBC 9.8 08/20/2021    HGB 11.8 (L) 08/20/2021    HCT 39.7 08/20/2021    MCV 88.2 08/20/2021     08/20/2021       Chemistry        Component Value Date/Time     08/20/2021 1535    K 4.5 08/20/2021 1535     08/20/2021 1535    CO2 26 08/20/2021 1535    BUN 28 (H) 08/20/2021 1535    CREATININE 1.22 (H) 08/20/2021 1535        Component Value Date/Time    CALCIUM 9.6 08/20/2021 1535    ALKPHOS 65 05/20/2020 0000    AST 18 05/20/2020 0000    ALT 15 05/20/2020 0000    BILITOT 0.5 05/20/2020 0000            IMPRESSION:   Paraproteinemia/monoclonal gammopathy  MGUS  Chronic renal insufficiency  Breast cancer in remission. Diagnosed more than 25 years ago. Multiple comorbidities as listed    PLAN: I again explained to the patient the nature of his problem with paraproteinemia and slightly repeated kappa light chain immunoglobulin. M protein is slightly repeated. Multiple myeloma is quite unlikely. Probably dealing with MGUS. No need for bone marrow testing. There is a need for continued monitoring. I explained the importance of monitoring. I will check immunoglobulins panel and will monitor closely. We will repeat the labs soon and again before next visit.   I was here we will consider further work-up if we see any significant increase. Patient will continue follow-up with her nephrologist.  Patient's questions were answered to the best of her satisfaction and she verbalized full understanding and agreement.

## 2021-11-05 ENCOUNTER — TELEPHONE (OUTPATIENT)
Dept: ONCOLOGY | Age: 76
End: 2021-11-05

## 2021-11-05 NOTE — TELEPHONE ENCOUNTER
RECEIVED VM FROM [address] [place] [address] OFFICE  . STATES LETTER WAS FAXED ON 11/01/21 TO INQUIRE IF CLEARED BY DR Lynette Amezcua TO USE ESTROGEN CREAM.     WRITER DID NOT  FIND FAX . REVIEWED CHART AND TOOK DR Desiree Mccloud NOTES TO DR Lynette Amezcua TO REVIEW. DR Lynette Amezcua WROTE RESPONSE THAT IS CLEAR FOR USING TOPICAL ESTROGENS FORM HEME/ONC PERSPECTIVE. FAXED HIS NOTE TO DR Desiree Mccloud OFFICE THIS  [phone]/ CONFIRMATION.

## 2021-11-17 ENCOUNTER — OFFICE VISIT (OUTPATIENT)
Dept: DERMATOLOGY | Age: 76
End: 2021-11-17
Payer: MEDICARE

## 2021-11-17 VITALS
HEIGHT: 63 IN | OXYGEN SATURATION: 93 % | TEMPERATURE: 97.2 F | WEIGHT: 165.6 LBS | BODY MASS INDEX: 29.34 KG/M2 | HEART RATE: 70 BPM | SYSTOLIC BLOOD PRESSURE: 135 MMHG | DIASTOLIC BLOOD PRESSURE: 68 MMHG

## 2021-11-17 DIAGNOSIS — L30.9 VULVAR DERMATITIS: ICD-10-CM

## 2021-11-17 DIAGNOSIS — L57.8 ACTINIC SKIN DAMAGE: ICD-10-CM

## 2021-11-17 DIAGNOSIS — L82.0 INFLAMED SEBORRHEIC KERATOSIS: ICD-10-CM

## 2021-11-17 DIAGNOSIS — L91.8 ACROCHORDON: ICD-10-CM

## 2021-11-17 DIAGNOSIS — L82.1 SEBORRHEIC KERATOSIS: Primary | ICD-10-CM

## 2021-11-17 DIAGNOSIS — L72.0 MILIAL CYST: ICD-10-CM

## 2021-11-17 PROCEDURE — G8427 DOCREV CUR MEDS BY ELIG CLIN: HCPCS | Performed by: DERMATOLOGY

## 2021-11-17 PROCEDURE — G8417 CALC BMI ABV UP PARAM F/U: HCPCS | Performed by: DERMATOLOGY

## 2021-11-17 PROCEDURE — 1123F ACP DISCUSS/DSCN MKR DOCD: CPT | Performed by: DERMATOLOGY

## 2021-11-17 PROCEDURE — 1036F TOBACCO NON-USER: CPT | Performed by: DERMATOLOGY

## 2021-11-17 PROCEDURE — G8399 PT W/DXA RESULTS DOCUMENT: HCPCS | Performed by: DERMATOLOGY

## 2021-11-17 PROCEDURE — 17110 DESTRUCTION B9 LES UP TO 14: CPT | Performed by: DERMATOLOGY

## 2021-11-17 PROCEDURE — 99204 OFFICE O/P NEW MOD 45 MIN: CPT | Performed by: DERMATOLOGY

## 2021-11-17 PROCEDURE — G8484 FLU IMMUNIZE NO ADMIN: HCPCS | Performed by: DERMATOLOGY

## 2021-11-17 PROCEDURE — 4040F PNEUMOC VAC/ADMIN/RCVD: CPT | Performed by: DERMATOLOGY

## 2021-11-17 PROCEDURE — 1090F PRES/ABSN URINE INCON ASSESS: CPT | Performed by: DERMATOLOGY

## 2021-11-17 RX ORDER — FLUCONAZOLE 200 MG/1
TABLET ORAL
Qty: 2 TABLET | Refills: 0 | Status: SHIPPED | OUTPATIENT
Start: 2021-11-17 | End: 2021-12-16 | Stop reason: ALTCHOICE

## 2021-11-17 RX ORDER — FOLIC ACID 1 MG/1
1 TABLET ORAL DAILY
COMMUNITY
End: 2021-12-16 | Stop reason: ALTCHOICE

## 2021-11-17 RX ORDER — MOXIFLOXACIN 5 MG/ML
1 SOLUTION/ DROPS OPHTHALMIC 3 TIMES DAILY
COMMUNITY
End: 2021-12-16 | Stop reason: ALTCHOICE

## 2021-11-17 RX ORDER — LEUCOVORIN CALCIUM 5 MG/1
5 TABLET ORAL DAILY
COMMUNITY
End: 2021-12-16 | Stop reason: ALTCHOICE

## 2021-11-17 RX ORDER — FUROSEMIDE 20 MG/1
20 TABLET ORAL 2 TIMES DAILY
COMMUNITY
End: 2021-12-16 | Stop reason: ALTCHOICE

## 2021-11-17 RX ORDER — TRIAMCINOLONE ACETONIDE 0.25 MG/G
OINTMENT TOPICAL
Qty: 80 G | Refills: 2 | Status: SHIPPED | OUTPATIENT
Start: 2021-11-17 | End: 2021-11-24

## 2021-11-17 NOTE — PROGRESS NOTES
Dermatology Patient Note  [facility] [id number]  [address]  Dept: [phone]  Dept Fax: [phone]      VISITDATE: 11/17/2021   REFERRING PROVIDER: Valeria Boone is a 68 y.o. female  who presents today in the office for:    No chief complaint on file. HISTORY OF PRESENT ILLNESS:  ***    MEDICAL PROBLEMS:  Patient Active Problem List    Diagnosis Date Noted    Sick sinus syndrome ([facility] 06/17/2021     Pacemaker       MGUS (monoclonal gammopathy of unknown significance) 10/07/2020     IgM kappa monoclonal gammopathy quantified at 0.06 g/dL      Nephrolithiasis 09/09/2020     Lithotripsy in 2012      Stage 3 chronic kidney disease ([facility] 03/19/2019     Tommy Solitario MD: Baseline creatinine has been in the 1.11.3 range with slow progression. Likely etiology ischemic nephrosclerosis given history of coronary artery disease. Also suspected EFRNANDO from Mtx use. Doubt glomerular ds from Psoriatic arthritis      Other emphysema ([facility] 06/11/2018    Insomnia 12/13/2016     Med contract signed 1/23/18      Murmur, cardiac 12/13/2016     Dr. Eric Bennett follows this. Mitral valve -- per, pt Dr. Joseluis Hernandez thinks she will need a valve replacement in 2017.  Atrophic vaginitis 03/21/2016     On Replens (non-hormonal) treatment       Hiatal hernia 11/17/2015     Dr. Efren Sawyer has told her she should not have this surgically repaired.  Genital herpes simplex type 2 04/20/2015     Pos labs 4/2015      AV heart block 12/31/2014     Paroxysmal 4/30/07      Bundle branch block 12/31/2014    Aortic stenosis 12/31/2014     CARROL 0.6 cm2, peak gradient 30 mmg Hg      Osteoporosis 12/31/2014     Off Actonel. GYN has ordered this in the past.  Pt will not take any meds for this except Vit D and Ca. Dr. Yudi Clark ordered her dexa 2018 and reccommended evista or prolia. Pt did not want injection.   Pt says that she was having muscle pains with the fosamax, so she quit the med. Dr. Dyan Hughes ordered prolia (first injection will be 12/13/18). 6/30/2020-prolia      Arthritis with psoriasis ([address] 12/01/2014     On MTX, leucovorin and now Simponi - She no longer wants to see dr. Dyan Hughes;  Needs a ref to Dr. Henson Pass Pacemaker 07/29/2014     Duel chamber 5/1/07      Pure hypercholesterolemia 07/16/2014     Pt went off the zocor due to muscle cramps. Also had cramps with lipitor and crestor.  Doing great on Pravachol      Essential hypertension 07/16/2014     Echo 1/20/15 EF 55%, mod AS , mild MR and TR  Dr. Vickey Shirley Asthma 07/16/2014     Dr. Meena Ordaz, \"allergic asthma\"       Esophageal reflux 07/16/2014    Carcinoma in situ of breast 07/16/2014     Estrogen positive       Anemia 07/16/2014    Arthritis 07/16/2014     Pt see's Rheum       Insulin resistance 07/16/2014    CAD (coronary artery disease) 07/16/2014     She had heart cath 9/2014, With LAD stent 4/2017  Echo 1/20/15 (Nl EF, grade 1 LV DD, Mod AS, Mild MR & TR)         CURRENT MEDICATIONS:   Current Outpatient Medications   Medication Sig Dispense Refill    Omega-3 Fatty Acids (OMEGA 3 500 PO) Take 640 mg by mouth daily      fluticasone (CUTIVATE) 0.05 % cream APPLY TOPICALLY TO AFFECTED AREA TWICE DAILY 60 g 2    Denosumab (PROLIA SC) Inject into the skin Every 6 months      budesonide-formoterol (SYMBICORT) 160-4.5 MCG/ACT AERO INHALE 2 PUFFS BY MOUTH TWICE DAILY 10.2 Inhaler 3    fluticasone (FLONASE) 50 MCG/ACT nasal spray USE TWO SPRAYS IN EACH NOSTRIL DAILY  (Patient taking differently: as needed ) 16 g 1    aspirin 81 MG tablet Take 81 mg by mouth every other day       Cholecalciferol (VITAMIN D3) 2000 UNITS CAPS Take by mouth daily       Calcium Carbonate-Vitamin D (CALCIUM-VITAMIN D) 500-200 MG-UNIT per tablet Take 2 tablets by mouth daily       montelukast (SINGULAIR) 10 MG tablet TAKE 1 TABLET BY MOUTH EVERY DAY IN THE EVENING 90 tablet 0  Vitamin E 400 units TABS Take by mouth 2 times daily      pravastatin (PRAVACHOL) 20 MG tablet TAKE 1 TABLET BY MOUTH EVERY DAY IN THE EVENING 90 tablet 0    Golimumab (SIMPONI ARIA IV) Infuse intravenously Every 8 weeks      valsartan-hydrochlorothiazide (DIOVAN-HCT) 80-12.5 MG per tablet TAKE 1 TABLET BY MOUTH EVERY DAY  2    XOPENEX HFA 45 MCG/ACT inhaler INHALE TWO PUFFS BY MOUTH EVERY FOUR HOURS (Patient taking differently: PRN) 15 Inhaler 0    nitroGLYCERIN (NITROSTAT) 0.4 MG SL tablet Place 1 tablet under the tongue every 5 minutes as needed for Chest pain up to max of 3 total doses. If no relief after 1 dose, call 911. 25 tablet 3    Vaginal Lubricant (REPLENS) GEL Place vaginally      Probiotic Product (PROBIOTIC DAILY PO) Take by mouth daily       prednisoLONE acetate (PRED FORTE) 1 % ophthalmic suspension as needed       metoprolol (TOPROL-XL) 50 MG XL tablet Take 75 mg by mouth daily Take full tab in am and half tab in pm      lansoprazole (PREVACID) 15 MG capsule Take 15 mg by mouth daily. No current facility-administered medications for this visit.        ALLERGIES:   Allergies   Allergen Reactions    Azithromycin Diarrhea    Benadryl [Diphenhydramine]     Codeine Nausea Only    Estrogens Other (See Comments)     Estrogen positive Breast cancer    Hctz [Hydrochlorothiazide]     Lisinopril     Phenergan [Promethazine Hcl] Other (See Comments)     Dystonia    Tape [place] Tape]     Amoxicillin Diarrhea       SOCIAL HISTORY:  Social History     Tobacco Use    Smoking status: Former Smoker     Packs/day: 1.00     Years: 10.00     Pack years: 10.00     Types: Cigarettes     Quit date: 6/10/2009     Years since quittin.4    Smokeless tobacco: Never Used   Substance Use Topics    Alcohol use: No       Pertinent ROS:  Review of Systems  Skin: Denies any new changing, growing or bleeding lesions or rashes except as described in the HPI   Constitutional: Denies fevers, chills, and malaise. PHYSICAL EXAM:   Temp 97.2 °F (36.2 °C)   Ht 5' 2.5\" (1.588 m)   Wt 165 lb 9.6 oz (75.1 kg)   BMI 29.81 kg/m²     The patient is generally well appearing, well nourished, alert and conversational. Affect is normal.    Cutaneous Exam:  Physical Exam  {Blank single:28413::\"Face and neck only was examined. \",\"Focused exam of *** was performed\",\"Acne exam: exam of face, neck, chest, and back was performed. \",\"Sun-exposed skin: head/face, neck, both arms, digits and nails were examined. \",\"Waist-up skin: the head/face,neck, both arms, chest, back, abdomen, digits and/or nails, was examined. \",\"Sun exposed + limited LEs: Head/face,neck, both arms, digits and/or nails and legs visible with pants/shorts and shoes/socks on was examined. \",\"Waist-up + limited LEs: Head/face,neck, both arms, chest, back, abdomen, digits and/or nails, and legs visible with pants/shorts and shoes/socks on was examined. \",\"Total body skin exam including external genitalia: head/face, neck, both arms, chest, back, abdomen, both legs, genitalia, buttocks, digits and/or nails, was examined. Complete visualization of scalp may be limited by hair density, length, and/or style\",\"Total body skin exam excluding external genitalia: head/face, neck, both arms, chest, back, abdomen, both legs, buttocks, digits and/or nails, was examined. Genital exam was deferred as patient denied having any lesions in this area. Complete visualization of scalp may be limited by hair density, length, and/or style\"}    Facial covering {Blank single:65722::\"was\",\"was not\"} removed during examination. Diagnoses/exam findings/medical history pertinent to this visit are listed below:    Assessment:  {No diagnosis found.  (Refresh or delete this SmartLink)}     Plan:  ***    RTC ***    Future Appointments   Date Time Provider Lanette Lambert   12/16/2021  1:45 PM Mandi Prado MD [facility] None   1/13/2022  1:00 PM Jones [facility]   3/9/2022  1:50 PM MD [facility] None   4/28/2022  2:00 PM Aly Varma MD [facility]   7/8/2022  1:30 PM SCHEDULE, BARB HERRERA RN BARB [facility] None         There are no Patient Instructions on file for this visit. This note was created with the assistance of a speech-recognition program.  Although the intention is to generate a document that actually reflects the content of the visit, no guarantees can be provided that every mistake has been identified and corrected by editing. I, Dr. Ariana Austin, personally performed the services described in this documentation, as scribed by [facility] in my presence, and it is both accurate and complete.      Electronically signed by Feli Rai MD on 11/17/21 at 12:01 PM EST

## 2021-11-17 NOTE — PATIENT INSTRUCTIONS
- Take Diflucan 1 pill today then again in 1 week. - Triamcinolone oint twice daily to groin & buttock (can use fluticasone on buttock)  - Avoid use of products in groin area (wash only with water, use plain toilet paper)  -       Cryotherapy    Liquid Nitrogen - \"freeze\" (Cryotherapy)  Your doctor has treated your skin lesions with a very cold substance. The liquid nitrogen is so cold that it may feel like the skin is burning during application. A clear blister or blood blister may form after treatment and may later form a scab. Leave the area alone. Usually this scab will fall of within 1-2 weeks. The area should be kept clean and can be covered with Vaseline and a Band-Aid if needed. If a large blister develops it is ok to use a clean needle to gently pop the blister. Please call our office with any concerns at [phone]. Sun Protection     There are two types of sun rays that are harmful to the skin. UVA rays cause skin aging and skin cancer, such as melanoma. UVB rays cause sunburns, cataracts, and also contribute to skin cancer. The American-Academy of Dermatology recommends that children and adults wear a broad spectrum, waterproof sunscreen with a Sun Protection Factor (SPF) of 30 or higher. It is important to check the ingredient label to be sure the sunscreen will protect the skin from both UVA and UVB sunrays. Your sunscreen should contain at least one of the following ingredients: titanium dioxide, zinc oxide, or avobenzone. Sunscreen will not be effective unless it is applied to all exposed skin. Sunscreens work best if they are applied 30 minutes before sun exposure. They should be reapplied every 2 hours and after any water exposure. Sunscreen is not perfect. It is important to use other methods to protect the skin from sun exposure also. Wear hats, sunglasses and other sun protective clothing when outdoors.   Stay in the shade during the peak hours of sun exposure between 10 AM and 4 PM.      Seborrheic Keratosis  Seborrheic keratoses are common benign growths of unknown cause seen in adults due to a thickening of an area of the top skin layer. Who's At Risk  Although they can occur anytime after puberty, almost everyone over 48 has one or more of these and they increase in number with age. Some families have an inherited tendency to grow multiple lesions. Men and women are equally as likely to develop seborrheic keratoses. Dark-skinned people are less affected than those with light skin; a variant seen in blacks is called dermatosis papulosa nigra. Signs & Symptoms  One or more spots can occur anywhere on the body, except for palms, soles, and mucous membranes (eg, in the mouth or rectum). They do not go away. They do not turn into cancers, but some cancers resemble seborrheic keratosis. They start as light brown to skin-colored, flat areas, which are round to oval and of varying size (usually less than a half inch, but sometimes much larger). As they grow thicker and rise above the skin surface, seborrheic keratoses may become dark brown to almost black with a \"stuck on\" appearance. The surface may feel smooth or rough. Self-Care Guidelines  No treatment is needed unless there is irritation from clothing with itching or bleeding. There is no way to prevent new spots from forming. Some lotions with alpha hydroxyl acids may make the areas feel smoother with regular use but will not eliminate them. OTC freezing techniques are available but usually not effective. When to [facility]  If a spot on the skin is growing, bleeding, painful, or itchy, or any other concerning changes, then see your doctor.

## 2021-11-18 NOTE — PROGRESS NOTES
Dermatology Patient Note  [facility] [address]  [address]  Dept: [phone]  Dept Fax: [phone]      VISITDATE: 11/17/2021   REFERRING PROVIDER: Rita Mayers is a 68 y.o. female  who presents today in the office for:    New Patient (Skin tags on the neck. Itching on the rt side of the neck, and a growth on the left side of the neck. Itching in the vaginal area, took an estrogen cream and a fungus cream. Pharm. Suggested Hydrocortisone which helped with itching. Also a lesion on the leg. FBSC- no self/family hx of skin CA.)      HISTORY OF PRESENT ILLNESS:  Patient with history of psoriatic arthritis on Simponi presents with complaints above    - she continues to itch on her vulva although hydrocortisone helps. She states when using estrogen cream it was \"so itchy she wanted to cry\"  - she reports recent history of jock itch which was treated with antifungals      MEDICAL PROBLEMS:  Patient Active Problem List    Diagnosis Date Noted    Sick sinus syndrome ([facility] 06/17/2021     Pacemaker       MGUS (monoclonal gammopathy of unknown significance) 10/07/2020     IgM kappa monoclonal gammopathy quantified at 0.06 g/dL      Nephrolithiasis 09/09/2020     Lithotripsy in 2012      Stage 3 chronic kidney disease ([facility] 03/19/2019     Al Valdovinos MD: Baseline creatinine has been in the 1.1-1.3 range with slow progression. Likely etiology ischemic nephrosclerosis given history of coronary artery disease. Also suspected FERNANDO from Mtx use. Doubt glomerular ds from Psoriatic arthritis      Other emphysema ([facility] 06/11/2018    Insomnia 12/13/2016     Med contract signed 1/23/18      Murmur, cardiac 12/13/2016     Dr. Vashti Mcginnis follows this. Mitral valve -- per, pt Dr. Lucas Arriaga thinks she will need a valve replacement in 2017.         Atrophic vaginitis 03/21/2016     On Replens (non-hormonal) treatment       Hiatal hernia 11/17/2015     Dr. Cedric Rogel has told her she should not have this surgically repaired.  Genital herpes simplex type 2 04/20/2015     Pos labs 4/2015      AV heart block 12/31/2014     Paroxysmal 4/30/07      Bundle branch block 12/31/2014    Aortic stenosis 12/31/2014     CARROL 0.6 cm2, peak gradient 30 mmg Hg      Osteoporosis 12/31/2014     Off Actonel. GYN has ordered this in the past.  Pt will not take any meds for this except Vit D and Ca. Dr. Real Salazar ordered her dexa 2018 and reccommended evista or prolia. Pt did not want injection. Pt says that she was having muscle pains with the fosamax, so she quit the med. Dr. Real Salazar ordered prolia (first injection will be 12/13/18). 6/30/2020-prolia      Arthritis with psoriasis ([facility] 12/01/2014     On MTX, leucovorin and now Simponi - She no longer wants to see dr. Real Salazar;  Needs a ref to Dr. Xander Andujar Pacemaker 07/29/2014     Duel chamber 5/1/07      Pure hypercholesterolemia 07/16/2014     Pt went off the zocor due to muscle cramps. Also had cramps with lipitor and crestor.  Doing great on Pravachol      Essential hypertension 07/16/2014     Echo 1/20/15 EF 55%, mod AS , mild MR and TR  Dr. Debbie Beaulieu Asthma 07/16/2014     Dr. Sara Escobar, \"allergic asthma\"       Esophageal reflux 07/16/2014    Carcinoma in situ of breast 07/16/2014     Estrogen positive       Anemia 07/16/2014    Arthritis 07/16/2014     Pt see's Rheum       Insulin resistance 07/16/2014    CAD (coronary artery disease) 07/16/2014     She had heart cath 9/2014, With LAD stent 4/2017  Echo 1/20/15 (Nl EF, grade 1 LV DD, Mod AS, Mild MR & TR)         CURRENT MEDICATIONS:   Current Outpatient Medications   Medication Sig Dispense Refill    folic acid (FOLVITE) 1 MG tablet Take 1 mg by mouth daily Indications: 0.5 mg      furosemide (LASIX) 20 MG tablet Take 20 mg by mouth 2 times daily      leucovorin calcium (WELLCOVORIN) 5 MG tablet Take 5 mg by mouth daily  methotrexate (RHEUMATREX) 2.5 MG chemo tablet Take by mouth once a week      moxifloxacin (VIGAMOX) 0.5 % ophthalmic solution 1 drop 3 times daily      montelukast (SINGULAIR) 10 MG tablet TAKE 1 TABLET BY MOUTH EVERY DAY IN THE EVENING 90 tablet 0    Vitamin E 400 units TABS Take by mouth 2 times daily      Omega-3 Fatty Acids (OMEGA 3 500 PO) Take 640 mg by mouth daily      fluticasone (CUTIVATE) 0.05 % cream APPLY TOPICALLY TO AFFECTED AREA TWICE DAILY 60 g 2    Golimumab (SIMPONI ARIA IV) Infuse intravenously Every 8 weeks      valsartan-hydrochlorothiazide (DIOVAN-HCT) 80-12.5 MG per tablet TAKE 1 TABLET BY MOUTH EVERY DAY  2    Denosumab (PROLIA SC) Inject into the skin Every 6 months      budesonide-formoterol (SYMBICORT) 160-4.5 MCG/ACT AERO INHALE 2 PUFFS BY MOUTH TWICE DAILY 10.2 Inhaler 3    XOPENEX HFA 45 MCG/ACT inhaler INHALE TWO PUFFS BY MOUTH EVERY FOUR HOURS (Patient taking differently: PRN) 15 Inhaler 0    nitroGLYCERIN (NITROSTAT) 0.4 MG SL tablet Place 1 tablet under the tongue every 5 minutes as needed for Chest pain up to max of 3 total doses. If no relief after 1 dose, call 911. 25 tablet 3    Vaginal Lubricant (REPLENS) GEL Place vaginally      fluticasone (FLONASE) 50 MCG/ACT nasal spray USE TWO SPRAYS IN EACH NOSTRIL DAILY  (Patient taking differently: as needed ) 16 g 1    aspirin 81 MG tablet Take 81 mg by mouth every other day       Cholecalciferol (VITAMIN D3) 2000 UNITS CAPS Take by mouth daily       Probiotic Product (PROBIOTIC DAILY PO) Take by mouth daily       Calcium Carbonate-Vitamin D (CALCIUM-VITAMIN D) 500-200 MG-UNIT per tablet Take 2 tablets by mouth daily       metoprolol (TOPROL-XL) 50 MG XL tablet Take 75 mg by mouth daily Take full tab in am and half tab in pm      lansoprazole (PREVACID) 15 MG capsule Take 15 mg by mouth daily. No current facility-administered medications for this visit.        ALLERGIES:   Allergies   Allergen Reactions    Azithromycin Diarrhea    Benadryl [Diphenhydramine]     Codeine Nausea Only    Estrogens Other (See Comments)     Estrogen positive Breast cancer    Hctz [Hydrochlorothiazide]     Lisinopril     Phenergan [Promethazine Hcl] Other (See Comments)     Dystonia    Tape [place] Tape]     Amoxicillin Diarrhea       SOCIAL HISTORY:  Social History     Tobacco Use    Smoking status: Former Smoker     Packs/day: 1.00     Years: 10.00     Pack years: 10.00     Types: Cigarettes     Quit date: 6/10/2009     Years since quittin.4    Smokeless tobacco: Never Used   Substance Use Topics    Alcohol use: No       Pertinent ROS:  Review of Systems  Skin: Denies any new changing, growing or bleeding lesions or rashes except as described in the HPI   Constitutional: Denies fevers, chills, and malaise. PHYSICAL EXAM:   /68 (Site: Left Upper Arm, Position: Sitting, Cuff Size: Medium Adult)   Pulse 70   Temp 97.2 °F (36.2 °C)   Ht 5' 2.5\" (1.588 m)   Wt 165 lb 9.6 oz (75.1 kg)   SpO2 93%   BMI 29.81 kg/m²     The patient is generally well appearing, well nourished, alert and conversational. Affect is normal.    Cutaneous Exam:  Physical Exam  Total body skin exam excluding external genitalia: head/face, neck, both arms, chest, back, abdomen, both legs, buttocks, digits and/or nails, was examined. Genital exam was deferred as patient denied having any lesions in this area. Complete visualization of scalp may be limited by hair density, length, and/or style    Facial covering was removed during examination. Diagnoses/exam findings/medical history pertinent to this visit are listed below:    Assessment:   Diagnosis Orders   1. Seborrheic keratosis     2. Actinic skin damage     3. Acrochordon     4. Milial cyst     5. Inflamed seborrheic keratosis  SD DESTRUCTION BENIGN LESIONS UP TO 14   6. Vulvar dermatitis          Plan:  1. Seborrheic keratosis  reassurance and education    2.  Actinic skin damage  - patient was counseled that UV-damaged skin increases lifetime risk for skin cancer  - I recommended the patient apply broad spectrum spf 30+ sunscreen daily, reapplying every 2 hours. - In additional to regular use of sunscreen, I recommended broad-rimmed hats, long sleeves, and seeking the shade. 3. Acrochordon  reassurance and education    4. Milial cyst  reassurance and education    5. Inflamed seborrheic keratosis  Cryotherapy: After verbal consent was obtained including discussion of the risks (lesion persistence, lesion recurrence and hypo/hyperpigmentation) and benefits (resolution of the lesion) 1 total Inflamed Seborrheic Keratosis on the left back were treated with liquid nitrogen to achieve a 2-3 mm freeze border.  - AL DESTRUCTION BENIGN LESIONS UP TO 14    6. Vulvar dermatitis - ddx includes contact dermatitis, LP, candidiasis, lichen sclerosus  - triam 0.025 twice daily  - fluconazole 200 mg now and in 1 week  - if not resolved next appt, consider biopsy    7. Eczematous patch of right buttock (favor ecema over psoriasis despite history of PsA)  - patient has been using fluticasone, ok to continue         RTC 3 months    Future Appointments   Date Time Provider Lanette Petra   12/16/2021  1:45 PM MD BARB Gonsalez [facility] None   1/13/2022  1:00 PM Naeem Andrade [facility]   1/27/2022 11:30 AM Felicitas Middleton MD  derm [facility]   3/9/2022  1:50 PM MD BARB Castillo Mau None   4/28/2022  2:00 PM Neo Reyes MD [facility]   7/8/2022  1:30 PM SCHEDULE, BARB HERRERA RN AFL [facility] None         Patient Instructions   - Take Diflucan 1 pill today then again in 1 week.   - Triamcinolone oint twice daily to groin & buttock (can use fluticasone on buttock)  - Avoid use of products in groin area (wash only with water, use plain toilet paper)  -       Cryotherapy    Liquid Nitrogen - \"freeze\" (Cryotherapy)  Your doctor has treated your skin lesions with a very cold substance. The liquid nitrogen is so cold that it may feel like the skin is burning during application. A clear blister or blood blister may form after treatment and may later form a scab. Leave the area alone. Usually this scab will fall of within 1-2 weeks. The area should be kept clean and can be covered with Vaseline and a Band-Aid if needed. If a large blister develops it is ok to use a clean needle to gently pop the blister. Please call our office with any concerns at [phone]. Sun Protection     There are two types of sun rays that are harmful to the skin. UVA rays cause skin aging and skin cancer, such as melanoma. UVB rays cause sunburns, cataracts, and also contribute to skin cancer. The American-Academy of Dermatology recommends that children and adults wear a broad spectrum, waterproof sunscreen with a Sun Protection Factor (SPF) of 30 or higher. It is important to check the ingredient label to be sure the sunscreen will protect the skin from both UVA and UVB sunrays. Your sunscreen should contain at least one of the following ingredients: titanium dioxide, zinc oxide, or avobenzone. Sunscreen will not be effective unless it is applied to all exposed skin. Sunscreens work best if they are applied 30 minutes before sun exposure. They should be reapplied every 2 hours and after any water exposure. Sunscreen is not perfect. It is important to use other methods to protect the skin from sun exposure also. Wear hats, sunglasses and other sun protective clothing when outdoors. Stay in the shade during the peak hours of sun exposure between 10 AM and 4 PM.      Seborrheic Keratosis  Seborrheic keratoses are common benign growths of unknown cause seen in adults due to a thickening of an area of the top skin layer.   Who's At Risk  Although they can occur anytime after puberty, almost everyone over 50 has one or more of these and they increase in number with

## 2022-01-06 ENCOUNTER — HOSPITAL ENCOUNTER (OUTPATIENT)
Facility: MEDICAL CENTER | Age: 77
End: 2022-01-06

## 2022-01-13 ENCOUNTER — INITIAL CONSULT (OUTPATIENT)
Dept: ONCOLOGY | Age: 77
End: 2022-01-13
Payer: MEDICARE

## 2022-01-13 DIAGNOSIS — Z80.41 FAMILY HISTORY OF OVARIAN CANCER: ICD-10-CM

## 2022-01-13 DIAGNOSIS — Z85.3 HISTORY OF BREAST CANCER: Primary | ICD-10-CM

## 2022-01-13 DIAGNOSIS — Z80.3 FAMILY HISTORY OF BREAST CANCER: ICD-10-CM

## 2022-01-13 PROCEDURE — 96040 PR GENETIC COUNSELING, EACH 30 MIN: CPT | Performed by: GENETIC COUNSELOR, MS

## 2022-01-13 NOTE — PROGRESS NOTES
3 [facility]   Hereditary Cancer Risk Assessment     Name: [place]   YOB: 1945   Date of Consultation: 1/13/22     Ms. Megan Lucero was seen at the [address] [place] [address] for genetic counseling on 1/13/22. Ms. Megan Lucero was referred by Susu Mirza MD due to her personal history of breast cancer. PERSONAL HISTORY   Ms. Megan Lucero is a 68 y.o. female with a prior history of breast cancer. She underwent left mastectomy in 18 (age 53y). Ms. Megan Lucero reports menarche at age 15, first child at age 24, and underwent natural menopause at age 48. Ms. Megan Lucero has never had a hysterectomy and both ovaries are intact. FAMILY HISTORY  Ms. Megan Lucero has one daughter (54y) and two granddaughters (in their 25s). Her full brother is living at age 80, no cancer. Her full sister passed away at age 44 from ovarian cancer. Ms. Sveta Flores mother passed away at age 80 from metastatic cancer. 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 Her mother had 5 brothers and 5 sisters. At least one of Ms. Gunderson's maternal aunts had ovarian cancer in her 76s. Another maternal aunt had a blood cancer. Her maternal grandmother had liver cancer. Ms. Sveta Flores father passed away at age 64, no cancer. Her paternal grandmother passed away from breast cancer at age 55. Otherwise, there are no known cancers in her extended paternal family. Ms. Megan Lucero reports [place] and [place] ancestry and denies any known Ashkenazi Scientologist heritage. RISK ASSESSMENT   We discussed that approximately 5-10% of cancers are due to a hereditary gene mutation which causes an increased risk for certain cancers. Hereditary cancers are typically diagnosed at younger ages (under age 46y) and occur in multiple generations of a family.  Multiple individuals with the same type of cancer (example: breast or colorectal) or uncommon cancers (example: ovarian, pancreatic, male breast cancer) are also features of hereditary cancers. We discussed that Ms. Gunderson's personal history is somewhat concerning for a hereditary predisposition given that she was diagnosed with early onset breast cancer and that she has several close relatives with breast and ovarian cancer. In summary, Ms. Catherine Turk meets the [facility] (NCCN) guidelines for genetic testing of the BRCA1/2 genes based on her diagnosis of breast cancer under age 48 and having a first degree relative with ovarian cancer and a first degree relative with breast cancer. The NCCN guidelines also recognize that an individual's personal and/or family history may be explained by more than one inherited cancer syndrome. Thus, a multi-gene panel may be more efficient, more cost effecting, and increases the yield of detecting a hereditary mutation which would impact medical management. Given her personal and/or family history, we recommend testing for the following genes at minimum: JOURDAN, CHEK2, NBN, and PALB2. DISCUSSION  We discussed that the BRCA1/2 genes are the most common genes associated with hereditary breast and ovarian cancer. We also discussed that genetic testing is available for multiple other genes related to hereditary cancer. Some of these genes are known to carry a significant increased risk for several cancers including colon, breast, uterine, ovarian, stomach, and pancreatic cancer, while some of these genes are believed to have a moderate increased risk for breast and other cancers. We discussed the possibility of finding a mutation in genes with limited information to guide medical management, as well we as the possibility of identifying variants of uncertain significance (VUS). We discussed the risks, benefits, and limitations of genetic testing.  Possible test results were discussed as well as potential screening and prevention strategies. Specifically, we discussed increased breast cancer surveillance by mammogram and breast MRI as well as the option of prophylactic mastectomy. We discussed the recommendation for prophylactic oophorectomy for results which suggest an increased risk for ovarian cancer. Lastly, we discussed that the results of Ms. Gunderson's genetic testing may be beneficial in defining her risk for cancer as well as for her family members. SUMMARY & PLAN  1) Ms. Miriam Rivera meets the NCCN criteria for genetic testing based on her personal history of breast cancer and family history of breast and ovarian cancer. 2) Genetic testing via a multi-gene panel was recommended and offered to Ms. Miiram Rivera. 3) Ms. Miriam Rivera elected to proceed with the CancerNext Expanded + RNA Insight gene panel. 4) Ms. Miriam Rivera is aware that she will receive a notification from [facility] if the out of pocket cost for testing exceeds $100 (based on individual insurance plan) and the option to proceed with the self pay price of $249.     5) Informed consent was obtained and a blood sample was sent to [facility]. We will call Ms. Miriam Rivera with results as soon as they are available. A follow up appointment may be recommended. A summary letter with results and final medical management recommendations will be sent once available. A total of 40 minutes were spent face to face with Ms. Miriam Rivera and 50% of the time was spent educating and counseling. The [facility] would be glad to offer our assistance should you have any questions or concerns about this information. Please feel free to contact us at [phone]. Catia Ho.  Lawrence Aragon MS, [facility]   Licensed Genetic Counselor

## 2022-01-27 ENCOUNTER — OFFICE VISIT (OUTPATIENT)
Dept: DERMATOLOGY | Age: 77
End: 2022-01-27
Payer: MEDICARE

## 2022-01-27 VITALS
DIASTOLIC BLOOD PRESSURE: 72 MMHG | TEMPERATURE: 97.1 F | SYSTOLIC BLOOD PRESSURE: 135 MMHG | HEART RATE: 76 BPM | OXYGEN SATURATION: 95 % | WEIGHT: 172 LBS | HEIGHT: 63 IN | BODY MASS INDEX: 30.48 KG/M2

## 2022-01-27 DIAGNOSIS — L30.8 OTHER ECZEMA: ICD-10-CM

## 2022-01-27 DIAGNOSIS — L30.9 VULVAR DERMATITIS: Primary | ICD-10-CM

## 2022-01-27 DIAGNOSIS — B35.4 TINEA CORPORIS: ICD-10-CM

## 2022-01-27 PROCEDURE — G8417 CALC BMI ABV UP PARAM F/U: HCPCS | Performed by: DERMATOLOGY

## 2022-01-27 PROCEDURE — 1090F PRES/ABSN URINE INCON ASSESS: CPT | Performed by: DERMATOLOGY

## 2022-01-27 PROCEDURE — 1123F ACP DISCUSS/DSCN MKR DOCD: CPT | Performed by: DERMATOLOGY

## 2022-01-27 PROCEDURE — G8399 PT W/DXA RESULTS DOCUMENT: HCPCS | Performed by: DERMATOLOGY

## 2022-01-27 PROCEDURE — 4040F PNEUMOC VAC/ADMIN/RCVD: CPT | Performed by: DERMATOLOGY

## 2022-01-27 PROCEDURE — G8484 FLU IMMUNIZE NO ADMIN: HCPCS | Performed by: DERMATOLOGY

## 2022-01-27 PROCEDURE — G8427 DOCREV CUR MEDS BY ELIG CLIN: HCPCS | Performed by: DERMATOLOGY

## 2022-01-27 PROCEDURE — 1036F TOBACCO NON-USER: CPT | Performed by: DERMATOLOGY

## 2022-01-27 PROCEDURE — 99213 OFFICE O/P EST LOW 20 MIN: CPT | Performed by: DERMATOLOGY

## 2022-01-27 RX ORDER — TRIAMCINOLONE ACETONIDE 0.25 MG/G
OINTMENT TOPICAL
COMMUNITY
Start: 2021-12-27 | End: 2022-01-27 | Stop reason: SDUPTHER

## 2022-01-27 RX ORDER — TRIAMCINOLONE ACETONIDE 0.25 MG/G
OINTMENT TOPICAL
Qty: 80 G | Refills: 2 | Status: SHIPPED | OUTPATIENT
Start: 2022-01-27 | End: 2022-04-20

## 2022-01-27 NOTE — PATIENT INSTRUCTIONS
For patches on buttocks:  - apply ciclopirox twice daily for 2 weeks  - OK to use triamcinolone if itchy    For vulvar dermatitis:  - apply triamcinolone to affected areas as needed    Call if getting worse, but follow up in 6-7 months

## 2022-01-27 NOTE — PROGRESS NOTES
Dermatology Patient Note  [facility] [address]  [place] [id number]  Dept: [phone]  Dept Fax: [phone]      VISITDATE: 1/27/2022   REFERRING PROVIDER: No ref. provider found      Javid Carr is a 68 y.o. female  who presents today in the office for:    Dermatitis (2m f/u GROIN dermatitis- PT states she is doing much better and she is happy with the progress she is making witht he provided treatment plan. )      HISTORY OF PRESENT ILLNESS:  As above. Patient states that she has another patch similar to one she had previously on her buttocks. Says insurance will not cover the fluticasone she was using for it. She uses triamcinolone on it and says it is mostly gone. Patient states she had jock itch this summer. MEDICAL PROBLEMS:  Patient Active Problem List    Diagnosis Date Noted    Sick sinus syndrome ([facility] 06/17/2021     Pacemaker       MGUS (monoclonal gammopathy of unknown significance) 10/07/2020     IgM kappa monoclonal gammopathy quantified at 0.06 g/dL      Nephrolithiasis 09/09/2020     Lithotripsy in 2012      Stage 3 chronic kidney disease ([facility] 03/19/2019     Laurel Sibley MD: Baseline creatinine has been in the 1.1-1.3 range with slow progression. Likely etiology ischemic nephrosclerosis given history of coronary artery disease. Also suspected FERNANDO from Mtx use. Doubt glomerular ds from Psoriatic arthritis      [facility] 06/11/2018    Insomnia 12/13/2016     Med contract signed 1/23/18      Murmur, cardiac 12/13/2016     Dr. Geraldo Rucker follows this. Mitral valve -- per, pt Dr. Joie Pabon thinks she will need a valve replacement in 2017.  Atrophic vaginitis 03/21/2016     On Replens (non-hormonal) treatment       Hiatal hernia 11/17/2015     Dr. Dillon Mcqueen has told her she should not have this surgically repaired.        Genital herpes simplex type 2 04/20/2015     Pos labs 4/2015      AV heart block 12/31/2014     Paroxysmal 4/30/07      Bundle branch block 12/31/2014    Aortic stenosis 12/31/2014     CARROL 0.6 cm2, peak gradient 30 mmg Hg      Osteoporosis 12/31/2014     Off Actonel. GYN has ordered this in the past.  Pt will not take any meds for this except Vit D and Ca. Dr. Richie Prasad ordered her dexa 2018 and reccommended evista or prolia. Pt did not want injection. Pt says that she was having muscle pains with the fosamax, so she quit the med. Dr. Richie Prasad ordered prolia (first injection will be 12/13/18). 6/30/2020-prolia      Arthritis with psoriasis ([facility] 12/01/2014     On MTX, leucovorin and now Simponi - She no longer wants to see dr. Richie Prasad;  Needs a ref to Dr. Lonna Homans Pacemaker 07/29/2014     Duel chamber 5/1/07      Pure hypercholesterolemia 07/16/2014     Pt went off the zocor due to muscle cramps. Also had cramps with lipitor and crestor.  Doing great on Pravachol      Essential hypertension 07/16/2014     Echo 1/20/15 EF 55%, mod AS , mild MR and TR  Dr. Jillian Taylor Asthma 07/16/2014     Dr. Jing Yadav, \"allergic asthma\"       Esophageal reflux 07/16/2014    Carcinoma in situ of breast 07/16/2014     Estrogen positive       Anemia 07/16/2014    Arthritis 07/16/2014     Pt see's Rheum       Insulin resistance 07/16/2014    CAD (coronary artery disease) 07/16/2014     She had heart cath 9/2014, With LAD stent 4/2017  Echo 1/20/15 (Nl EF, grade 1 LV DD, Mod AS, Mild MR & TR)         CURRENT MEDICATIONS:   Current Outpatient Medications   Medication Sig Dispense Refill    triamcinolone (KENALOG) 0.025 % ointment       montelukast (SINGULAIR) 10 MG tablet TAKE 1 TABLET BY MOUTH EVERY DAY IN THE EVENING 90 tablet 0    valACYclovir (VALTREX) 500 MG tablet TAKE 1 TABLET BY MOUTH TWICE A DAY FOR 3 DAYS 6 tablet 3    pravastatin (PRAVACHOL) 20 MG tablet TAKE 1 TABLET BY MOUTH EVERY DAY IN THE EVENING 90 tablet 0    Vitamin E 400 units TABS Take by mouth 2 times daily      Omega-3 Fatty Acids (OMEGA 3 500 PO) Take 640 mg by mouth daily      fluticasone (CUTIVATE) 0.05 % cream APPLY TOPICALLY TO AFFECTED AREA TWICE DAILY 60 g 2    Golimumab (SIMPONI ARIA IV) Infuse intravenously Every 8 weeks      valsartan-hydrochlorothiazide (DIOVAN-HCT) 80-12.5 MG per tablet TAKE 1 TABLET BY MOUTH EVERY DAY  2    Denosumab (PROLIA SC) Inject into the skin Every 6 months      budesonide-formoterol (SYMBICORT) 160-4.5 MCG/ACT AERO INHALE 2 PUFFS BY MOUTH TWICE DAILY 10.2 Inhaler 3    XOPENEX HFA 45 MCG/ACT inhaler INHALE TWO PUFFS BY MOUTH EVERY FOUR HOURS (Patient taking differently: PRN) 15 Inhaler 0    fluticasone (FLONASE) 50 MCG/ACT nasal spray USE TWO SPRAYS IN EACH NOSTRIL DAILY  (Patient taking differently: as needed ) 16 g 1    aspirin 81 MG tablet Take 81 mg by mouth every other day       Cholecalciferol (VITAMIN D3) 2000 UNITS CAPS Take by mouth daily       Probiotic Product (PROBIOTIC DAILY PO) Take by mouth daily       metoprolol (TOPROL-XL) 50 MG XL tablet Take 75 mg by mouth daily Take full tab in am and half tab in pm      lansoprazole (PREVACID) 15 MG capsule Take 15 mg by mouth daily. No current facility-administered medications for this visit.        ALLERGIES:   Allergies   Allergen Reactions    Azithromycin Diarrhea    Benadryl [Diphenhydramine]     Codeine Nausea Only    Estrogens Other (See Comments)     Estrogen positive Breast cancer    Hctz [Hydrochlorothiazide]     Lisinopril     Phenergan [Promethazine Hcl] Other (See Comments)     Dystonia    Tape Jacqlyn Kidd Tape]     Amoxicillin Diarrhea       SOCIAL HISTORY:  Social History     Tobacco Use    Smoking status: Former Smoker     Packs/day: 1.00     Years: 10.00     Pack years: 10.00     Types: Cigarettes     Quit date: 6/10/2009     Years since quittin.6    Smokeless tobacco: Never Used   Substance Use Topics    Alcohol use: No       Pertinent ROS:  Review of Systems  Skin: Denies any new changing, growing or bleeding lesions or rashes except as described in the HPI   Constitutional: Denies fevers, chills, and malaise. PHYSICAL EXAM:   /72 (Site: Left Upper Arm, Position: Sitting, Cuff Size: Medium Adult)   Pulse 76   Temp 97.1 °F (36.2 °C) (Temporal)   Ht 5' 3\" (1.6 m)   Wt 172 lb (78 kg)   LMP  (LMP Unknown)   SpO2 95%   Breastfeeding No   BMI 30.47 kg/m²     The patient is generally well appearing, well nourished, alert and conversational. Affect is normal.    Cutaneous Exam:  Physical Exam  Focused exam of buttocks, genitals was performed    Facial covering was not removed during examination. Diagnoses/exam findings/medical history pertinent to this visit are listed below:    Assessment:   Diagnosis Orders   1. Vulvar dermatitis     2. Other eczema          Plan:  Vulvar dermatitis  - improved  - start triamcinolone 0.025% ointment to affected areas as needed  - discussed with patient that cream may contain more preservatives which could cause an allergic reaction, patient would like ointment    Eczematous patch of right buttock  - KOH negative but could be due to diflucan  - start ciclopirox twice daily for 2 weeks  - if ineffective continue triamcinolone 0.025% to affected area as needed    RTC 6-7 months    Future Appointments   Date Time Provider Lanette Lambert   3/17/2022  1:45 PM MD BARB Fitzpatrick SylvLkFP None   3/30/2022  1:50 PM MD BARB Giordano Neph Jose None   4/28/2022  2:00 PM Amadou Vieyra [facility]   7/8/2022  1:30 PM SCHEDULE, BARB DAY SylvLhumaFP None         There are no Patient Instructions on file for this visit.     This note was created with the assistance of a speech-recognition program.  Although the intention is to generate a document that actually reflects the content of the visit, no guarantees can be provided that every mistake has been identified and corrected by editing. I, Dr. Ismael Freedman, personally performed the services described in this documentation, as scribed by Doneen Homans in my presence, and it is both accurate and complete.     Electronically signed by Marylin Gonzalez MD on 1/27/22 at 11:36 AM EST

## 2022-01-31 ENCOUNTER — TELEPHONE (OUTPATIENT)
Dept: DERMATOLOGY | Age: 77
End: 2022-01-31

## 2022-02-03 ENCOUNTER — TELEPHONE (OUTPATIENT)
Dept: ONCOLOGY | Age: 77
End: 2022-02-03

## 2022-02-03 NOTE — TELEPHONE ENCOUNTER
3 [facility]   Hereditary Cancer Risk Assessment     Name: Abdulaziz Nogueira  YOB: 1945  Date of Results Disclosure: 02/03/22      HISTORY   Ms. Marquis Locke was seen for genetic counseling at the request of Hugo Bourgeois MD due to her personal and family history of cancer. At that time, Ms. Marquis Locke chose to pursue genetic testing via the CancerNext Expanded + RNA gene panel. These results were discussed with Ms. Marquis Locke via telephone. A summary of Ms. Gunderson's results and recommendations are below. RESULTS  [email or web address] CancerNext-Expanded Panel + RNAinsight: NEGATIVE - NO CLINICALLY SIGNIFICANT MUTATIONS DETECTED   This panel included the analysis of 77 genes associated with hereditary cancer including: AIP, ALK, APC, JOURDAN, BAP1, BARD1, BLM, BMPR1A, BRCA1, BRCA2, BRIP1, CDC73, CDH1, CDK4, CDKN1B, CDKN2A, CHEK2, CTNNA1, DICER1, EGFR, EGLN1, EPCAM, FANCC, FH, FLCN, GALNT12, GREM1, HOXB13, KIF1B, KIT1, LZTR1, MAX, MEN1, MET, MITF, MLH1, MSH2, MSH3, MSH6, MUTYH, NBN, NF1, NF2, NTHL1, PALB2, PDGFRA, PHOX2B, PMS2, POLD1, POLE, POT1, LLDHA3R, PTCH1, PTEN, RAD51C, RAD51D, RB1, RECQL, RET, SDHA, SDHAF2, SDHB, SDHC, ,SDHD, SMAD4, SMARCA4, SMARCB1, SMARCE1, STK11, SUFU, TFFL791, TP53, TSC1, TSC2, VHL, and XRCC2. In addition, no clinically relevant aberrant RNA transcripts were detected in select genes. Please refer to genetic test report for technical details. We discussed that Ms. Gunderson's negative test result greatly reduces the likelihood that her personal history of cancer is due to a hereditary mutation. It is possible that her personal history of cancer may be due to a gene for which testing was not performed or which has yet to be discovered. RECOMMENDATIONS  1) Ms. Marquis Locke should continue breast cancer surveillance as directed by her physicians. 2) Ms. Marquis Locke should continue general population cancer screening guidelines as directed by her physicians. RECOMMENDATIONS FOR FAMILY MEMBERS   1) Genetic testing is not recommended for Ms. Gunderson's children based on her negative test results. However, this recommendation does not take into consideration any family history of cancer in their paternal family. 2) It is possible that the cancers in Ms. Gunderson's family are due to a hereditary gene mutation that she did not inherit. Therefore, her relatives (particularly those with a current or previous cancer diagnosis) may consider genetic counseling and testing to clarify this possibility. Relatives may contact the [address] [place] [address] at [phone] or locate a genetic counselor at [email or web address].     3) We encourage Ms. Gunderson's relatives to discuss their family history of cancer with their physicians to determine the most appropriate cancer screening recommendations. Ms. Yariel Corrigan female relatives may benefit from a formal breast cancer risk assessment by the Toula Bale or Betsy Contrerasieu risk models to determine if additional breast cancer screening is warranted. SUMMARY & PLAN   1) Ms. Bolanos genetic test results are negative meaning there were no clinically significant mutations detected in the 77 genes analyzed. 2) There are no changes in medical management for Ms. Artemio Baldwin based on her negative genetic test results. 3) We encourage Ms. Artemio Baldwin to contact us every 1-2 years to determine if there are any new genetic testing or research options available. 4) We encourage Ms. Artemio Baldwin to contact us with updates to her personal and/or familys cancer history as this information may alter our assessment and/or recommendations. The [address] [place] [address] would be glad to offer our assistance should you have any questions or concerns about this information. Please feel free to contact us at [phone]. Casimiro Lhezmjo. [address] Scott MS, [facility]   Licensed Genetic Counselor         CC:  Ms. Deborah Ayala Kirt Mckeon MD

## 2022-03-21 ENCOUNTER — HOSPITAL ENCOUNTER (OUTPATIENT)
Age: 77
Setting detail: SPECIMEN
Discharge: HOME | End: 2022-03-21
Payer: MEDICARE

## 2022-03-21 DIAGNOSIS — N18.31 STAGE 3A CHRONIC KIDNEY DISEASE (HCC): ICD-10-CM

## 2022-03-21 DIAGNOSIS — N20.0 NEPHROLITHIASIS: ICD-10-CM

## 2022-03-21 DIAGNOSIS — I35.0 NONRHEUMATIC AORTIC VALVE STENOSIS: ICD-10-CM

## 2022-03-21 LAB
ALBUMIN SERPL-MCNC: 4.3 G/DL (ref 3.5–5.2)
ANION GAP SERPL CALCULATED.3IONS-SCNC: 13 MMOL/L (ref 9–17)
BUN BLDV-MCNC: 22 MG/DL (ref 8–23)
CALCIUM SERPL-MCNC: 9.6 MG/DL (ref 8.6–10.4)
CHLORIDE BLD-SCNC: 99 MMOL/L (ref 98–107)
CO2: 24 MMOL/L (ref 20–31)
CREAT SERPL-MCNC: 1.26 MG/DL (ref 0.5–0.9)
CREATININE URINE: 221.9 MG/DL (ref 28–217)
GFR AFRICAN AMERICAN: 50 ML/MIN
GFR NON-AFRICAN AMERICAN: 41 ML/MIN
GFR SERPL CREATININE-BSD FRML MDRD: ABNORMAL ML/MIN/{1.73_M2}
GLUCOSE BLD-MCNC: 101 MG/DL (ref 70–99)
HCT VFR BLD CALC: 36.3 % (ref 36.3–47.1)
HEMOGLOBIN: 11.7 G/DL (ref 11.9–15.1)
MCH RBC QN AUTO: 27.1 PG (ref 25.2–33.5)
MCHC RBC AUTO-ENTMCNC: 32.2 G/DL (ref 28.4–34.8)
MCV RBC AUTO: 84.2 FL (ref 82.6–102.9)
NRBC AUTOMATED: 0 PER 100 WBC
PDW BLD-RTO: 15.5 % (ref 11.8–14.4)
PHOSPHORUS: 3.6 MG/DL (ref 2.6–4.5)
PLATELET # BLD: 191 K/UL (ref 138–453)
PMV BLD AUTO: 11.8 FL (ref 8.1–13.5)
POTASSIUM SERPL-SCNC: 4.4 MMOL/L (ref 3.7–5.3)
PTH INTACT: 85.1 PG/ML (ref 15–65)
RBC # BLD: 4.31 M/UL (ref 3.95–5.11)
SODIUM BLD-SCNC: 136 MMOL/L (ref 135–144)
TOTAL PROTEIN, URINE: 32 MG/DL
URINE TOTAL PROTEIN CREATININE RATIO: 0.14 (ref 0–0.2)
WBC # BLD: 8.3 K/UL (ref 3.5–11.3)

## 2022-03-21 PROCEDURE — 82040 ASSAY OF SERUM ALBUMIN: CPT

## 2022-03-21 PROCEDURE — 85027 COMPLETE CBC AUTOMATED: CPT

## 2022-03-21 PROCEDURE — 82570 ASSAY OF URINE CREATININE: CPT

## 2022-03-21 PROCEDURE — 84100 ASSAY OF PHOSPHORUS: CPT

## 2022-03-21 PROCEDURE — 36415 COLL VENOUS BLD VENIPUNCTURE: CPT

## 2022-03-21 PROCEDURE — 84156 ASSAY OF PROTEIN URINE: CPT

## 2022-03-21 PROCEDURE — 83970 ASSAY OF PARATHORMONE: CPT

## 2022-03-21 PROCEDURE — 80048 BASIC METABOLIC PNL TOTAL CA: CPT

## 2022-04-19 DIAGNOSIS — L30.9 VULVAR DERMATITIS: ICD-10-CM

## 2022-04-20 RX ORDER — TRIAMCINOLONE ACETONIDE 0.25 MG/G
OINTMENT TOPICAL
Qty: 80 G | Refills: 2 | Status: SHIPPED | OUTPATIENT
Start: 2022-04-20 | End: 2022-09-19 | Stop reason: SDUPTHER

## 2022-05-04 ENCOUNTER — HOSPITAL ENCOUNTER (OUTPATIENT)
Age: 77
Setting detail: SPECIMEN
Discharge: HOME | End: 2022-05-04
Payer: MEDICARE

## 2022-05-04 DIAGNOSIS — D47.2 MGUS (MONOCLONAL GAMMOPATHY OF UNKNOWN SIGNIFICANCE): ICD-10-CM

## 2022-05-04 LAB
ABSOLUTE EOS #: 0.43 K/UL (ref 0–0.44)
ABSOLUTE IMMATURE GRANULOCYTE: 0.02 K/UL (ref 0–0.3)
ABSOLUTE LYMPH #: 2.85 K/UL (ref 1.1–3.7)
ABSOLUTE MONO #: 0.89 K/UL (ref 0.1–1.2)
BASOPHILS # BLD: 1 % (ref 0–2)
BASOPHILS ABSOLUTE: 0.08 K/UL (ref 0–0.2)
EOSINOPHILS RELATIVE PERCENT: 5 % (ref 1–4)
FREE KAPPA/LAMBDA RATIO: 1.82 (ref 0.26–1.65)
HCT VFR BLD CALC: 39.1 % (ref 36.3–47.1)
HEMOGLOBIN: 12.3 G/DL (ref 11.9–15.1)
IGA: 55 MG/DL (ref 70–400)
IGG: 966 MG/DL (ref 700–1600)
IGM: 326 MG/DL (ref 40–230)
IMMATURE GRANULOCYTES: 0 %
KAPPA FREE LIGHT CHAINS QNT: 3.58 MG/DL (ref 0.37–1.94)
LAMBDA FREE LIGHT CHAINS QNT: 1.97 MG/DL (ref 0.57–2.63)
LYMPHOCYTES # BLD: 32 % (ref 24–43)
MCH RBC QN AUTO: 26.6 PG (ref 25.2–33.5)
MCHC RBC AUTO-ENTMCNC: 31.5 G/DL (ref 28.4–34.8)
MCV RBC AUTO: 84.6 FL (ref 82.6–102.9)
MONOCYTES # BLD: 10 % (ref 3–12)
NRBC AUTOMATED: 0 PER 100 WBC
PDW BLD-RTO: 15.5 % (ref 11.8–14.4)
PLATELET # BLD: 206 K/UL (ref 138–453)
PMV BLD AUTO: 10.8 FL (ref 8.1–13.5)
RBC # BLD: 4.62 M/UL (ref 3.95–5.11)
RBC # BLD: ABNORMAL 10*6/UL
SEG NEUTROPHILS: 52 % (ref 36–65)
SEGMENTED NEUTROPHILS ABSOLUTE COUNT: 4.74 K/UL (ref 1.5–8.1)
WBC # BLD: 9 K/UL (ref 3.5–11.3)

## 2022-05-04 PROCEDURE — 83883 ASSAY NEPHELOMETRY NOT SPEC: CPT

## 2022-05-04 PROCEDURE — 85025 COMPLETE CBC W/AUTO DIFF WBC: CPT

## 2022-05-04 PROCEDURE — 84165 PROTEIN E-PHORESIS SERUM: CPT

## 2022-05-04 PROCEDURE — 82784 ASSAY IGA/IGD/IGG/IGM EACH: CPT

## 2022-05-04 PROCEDURE — 86334 IMMUNOFIX E-PHORESIS SERUM: CPT

## 2022-05-04 PROCEDURE — 36415 COLL VENOUS BLD VENIPUNCTURE: CPT

## 2022-05-04 PROCEDURE — 84155 ASSAY OF PROTEIN SERUM: CPT

## 2022-05-05 LAB
ALBUMIN (CALCULATED): 4.4 G/DL (ref 3.2–5.2)
ALBUMIN PERCENT: 64 % (ref 45–65)
ALPHA 1 PERCENT: 3 % (ref 3–6)
ALPHA 2 PERCENT: 10 % (ref 6–13)
ALPHA-1-GLOBULIN: 0.2 G/DL (ref 0.1–0.4)
ALPHA-2-GLOBULIN: 0.7 G/DL (ref 0.5–0.9)
BETA GLOBULIN: 0.7 G/DL (ref 0.5–1.1)
BETA PERCENT: 11 % (ref 11–19)
GAMMA GLOBULIN %: 13 % (ref 9–20)
GAMMA GLOBULIN: 0.9 G/DL (ref 0.5–1.5)
PATHOLOGIST: NORMAL
PATHOLOGIST: NORMAL
PROTEIN ELECTROPHORESIS, SERUM: NORMAL
SERUM IFX INTERP: NORMAL
TOTAL PROT. SUM,%: 101 % (ref 98–102)
TOTAL PROT. SUM: 6.9 G/DL (ref 6.3–8.2)
TOTAL PROTEIN: 6.8 G/DL (ref 6.4–8.3)

## 2022-05-12 ENCOUNTER — HOSPITAL ENCOUNTER (OUTPATIENT)
Facility: MEDICAL CENTER | Age: 77
End: 2022-05-12

## 2022-05-12 ENCOUNTER — TELEPHONE (OUTPATIENT)
Dept: ONCOLOGY | Age: 77
End: 2022-05-12

## 2022-05-12 ENCOUNTER — OFFICE VISIT (OUTPATIENT)
Dept: ONCOLOGY | Age: 77
End: 2022-05-12
Payer: MEDICARE

## 2022-05-12 VITALS
RESPIRATION RATE: 16 BRPM | TEMPERATURE: 97.4 F | HEART RATE: 62 BPM | BODY MASS INDEX: 33.34 KG/M2 | SYSTOLIC BLOOD PRESSURE: 136 MMHG | DIASTOLIC BLOOD PRESSURE: 56 MMHG | WEIGHT: 170.7 LBS

## 2022-05-12 DIAGNOSIS — D47.2 MGUS (MONOCLONAL GAMMOPATHY OF UNKNOWN SIGNIFICANCE): Primary | ICD-10-CM

## 2022-05-12 PROCEDURE — 1036F TOBACCO NON-USER: CPT | Performed by: INTERNAL MEDICINE

## 2022-05-12 PROCEDURE — 99214 OFFICE O/P EST MOD 30 MIN: CPT | Performed by: INTERNAL MEDICINE

## 2022-05-12 PROCEDURE — G8417 CALC BMI ABV UP PARAM F/U: HCPCS | Performed by: INTERNAL MEDICINE

## 2022-05-12 PROCEDURE — 1090F PRES/ABSN URINE INCON ASSESS: CPT | Performed by: INTERNAL MEDICINE

## 2022-05-12 PROCEDURE — G8399 PT W/DXA RESULTS DOCUMENT: HCPCS | Performed by: INTERNAL MEDICINE

## 2022-05-12 PROCEDURE — 4040F PNEUMOC VAC/ADMIN/RCVD: CPT | Performed by: INTERNAL MEDICINE

## 2022-05-12 PROCEDURE — 1123F ACP DISCUSS/DSCN MKR DOCD: CPT | Performed by: INTERNAL MEDICINE

## 2022-05-12 PROCEDURE — G8427 DOCREV CUR MEDS BY ELIG CLIN: HCPCS | Performed by: INTERNAL MEDICINE

## 2022-05-12 PROCEDURE — 99211 OFF/OP EST MAY X REQ PHY/QHP: CPT | Performed by: INTERNAL MEDICINE

## 2022-05-12 NOTE — TELEPHONE ENCOUNTER
[place] ARRIVES AMBULATORY FOR MD VISIT  DR Lou Martin IN TO SEE PATIENT  ORDERS RECEIVED  RV 6 MONTHS WITH LABS BEFORE  [facility]I FOR [facility]  MADI SPOKE TO PATIENT TODAY  LABS CDP IGG IGA IGM KAPPA LAMBDA FREE LT CHAINS SIFX 11/15/22, ORDERS GIVEN TO PATIENT  MD VISIT 11/22/22 @3:30PM  AVS PRINTED AND GIVEN TO PATIENT WITH INSTRUCTIONS  PATIENT DISCHARGED AMBULATORY

## 2022-05-12 NOTE — PROGRESS NOTES
_     Chief Complaint   Patient presents with    Follow-up    Discuss Labs     DIAGNOSIS:       Paraproteinemia/monoclonal gammopathy  MGUS  Chronic renal insufficiency  Breast cancer in remission. Diagnosed more than 25 years ago. Multiple comorbidities as listed      CURRENT THERAPY:         Observation monitoring for MGUS    BRIEF CASE HISTORY:      Ms. Jhon Dominguez is a very pleasant 68 y.o. female with history of multiple co morbidities as listed. Patient is referred for further management of abnormal kappa light chain immunoglobulin. Patient had recent evaluation by nephrology for chronic renal insufficiency. .  Work-up was done which included serum protein electrophoresis and immunofixation. Patient was noted to have elevated kappa light chain immunoglobulin and slightly limited M protein with the mild monoclonal gammopathy. Patient denies any fever or night sweats. No repeated infections. She had chronic body aches including chronic back pain. No recent changes. No chest pain or shortness of breath. No fever. No other complaints. Patient denies smoking or alcohol drinking. INTERIM HISTORY:   Patient seen for follow-up paraproteinemia. Doing well clinically. No chest pain or shortness of breath. No back pain or bone aches. No fever. No weight loss or decreased appetite. No other complaints. PAST MEDICAL HISTORY: has a past medical history of Anemia, unspecified, Aortic stenosis, Arthritis, AV block, BBB (bundle branch block), Breast cancer ([address] Bundle branch block, CAD (coronary artery disease), Carcinoma in situ of breast, Esophageal reflux, Essential hypertension, benign, Insulin resistance, MGUS (monoclonal gammopathy of unknown significance), Nephrolithiasis, Osteoporosis, Pure hypercholesterolemia, Stage 3 chronic kidney disease ([address] and Unspecified asthma(493.90).     PAST SURGICAL HISTORY: has a past surgical history that includes Tubal ligation; Mastectomy (Left); Colon surgery; Colonoscopy (02/06/2006); Pacemaker insertion (05/01/2007); Breast reconstruction (Right); Breast surgery (Left); eye surgery (Right); Foot neuroma surgery (Right); Wrist fracture surgery; Upper gastrointestinal endoscopy (09/2007); Upper gastrointestinal endoscopy (03/2006); Upper gastrointestinal endoscopy (08/2005); Dilation and curettage of uterus (1973); pacemaker placement (04/23/2015); Coronary angioplasty with stent (04/27/2017); Cataract removal (Left, 05/20/2019); Lithotripsy (2012); Cystoscopy (07/31/2012); Tooth Extraction (03/05/2021); Abdomen surgery; Cardiac surgery; Cardiac catheterization (04/27/2017); and Cardiac catheterization (2021). CURRENT MEDICATIONS:  has a current medication list which includes the following prescription(s): montelukast, triamcinolone, calcium carbonate, vitamin c, therapeutic multivitamin-minerals, pravastatin, ciclopirox, vitamin e, omega-3 fatty acids, fluticasone, golimumab, valsartan-hydrochlorothiazide, denosumab, budesonide-formoterol, xopenex hfa, fluticasone, aspirin, vitamin d3, probiotic product, metoprolol succinate, and lansoprazole. ALLERGIES:  is allergic to azithromycin, benadryl [diphenhydramine], codeine, estrogens, hctz [hydrochlorothiazide], lisinopril, phenergan [promethazine hcl], tape [adhesive tape], and amoxicillin. FAMILY HISTORY: Negative for any hematological or oncological conditions. SOCIAL HISTORY:  reports that she quit smoking about 12 years ago. Her smoking use included cigarettes. She has a 10.00 pack-year smoking history. She has never used smokeless tobacco. She reports that she does not drink alcohol and does not use drugs. REVIEW OF SYSTEMS:     · General: No weakness or fatigue. No unanticipated weight loss or decreased appetite. No fever or chills. · Eyes: No blurred vision, eye pain or double vision. · Ears: No hearing problems or drainage. No tinnitus. · Throat: No sore throat, problems with swallowing or dysphagia. · Respiratory: No cough, sputum or hemoptysis. No shortness of breath. No pleuritic chest pain. · Cardiovascular: No chest pain, orthopnea or PND. No lower extremity edema. No palpitation. · Gastrointestinal: No problems with swallowing. No abdominal pain or bloating. No nausea or vomiting. No diarrhea or constipation. No GI bleeding. · Genitourinary: No dysuria, hematuria, frequency or urgency. · Musculoskeletal: No muscle aches or pains. No limitation of movement. No back pain. No gait disturbance, No joint complaints. · Dermatologic: No skin rashes or pruritus. No skin lesions or discolorations. · Psychiatric: No depression, anxiety, or stress or signs of schizophrenia. No change in mood or affect. · Hematologic: No history of bleeding tendency. No bruises or ecchymosis. No history of clotting problems. · Infectious disease: No fever, chills or frequent infections. · Endocrine: No polydipsia or polyuria. No temperature intolerance. · Neurologic: No headaches or dizziness. No weakness or numbness of the extremities. No changes in balance, coordination,  memory, mentation, behavior. · Allergic/Immunologic: No nasal congestion or hives. No repeated infections. PHYSICAL EXAM:  The patient is not in acute distress. Vital signs: Blood pressure (!) 136/56, pulse 62, temperature 97.4 °F (36.3 °C), temperature source Temporal, resp. rate 16, weight 170 lb 11.2 oz (77.4 kg), not currently breastfeeding.      General appearance - well appearing, not in pain or distress  Mental status - good mood, alert and oriented  Eyes - pupils equal and reactive, extraocular eye movements intact  Ears - bilateral TM's and external ear canals normal  Nose - normal and patent, no erythema, discharge or polyps  Mouth - mucous membranes moist, pharynx normal without lesions  Neck - supple, no significant adenopathy  Lymphatics - no palpable lymphadenopathy, no hepatosplenomegaly  Chest - clear to auscultation, no wheezes, rales or rhonchi, symmetric air entry  Heart - normal rate, regular rhythm, normal S1, S2, no murmurs, rubs, clicks or gallops  Abdomen - soft, nontender, nondistended, no masses or organomegaly  Neurological - alert, oriented, normal speech, no focal findings or movement disorder noted  Musculoskeletal - no joint tenderness, deformity or swelling  Extremities - peripheral pulses normal, no pedal edema, no clubbing or cyanosis  Skin - normal coloration and turgor, no rashes, no suspicious skin lesions noted     Review of Diagnostic data:   Lab Results   Component Value Date    WBC 9.0 05/04/2022    HGB 12.3 05/04/2022    HCT 39.1 05/04/2022    MCV 84.6 05/04/2022     05/04/2022       Chemistry        Component Value Date/Time     03/21/2022 1426    K 4.4 03/21/2022 1426    CL 99 03/21/2022 1426    CO2 24 03/21/2022 1426    BUN 22 03/21/2022 1426    CREATININE 1.26 (H) 03/21/2022 1426        Component Value Date/Time    CALCIUM 9.6 03/21/2022 1426    ALKPHOS 65 05/20/2020 0000    AST 18 05/20/2020 0000    ALT 15 05/20/2020 0000    BILITOT 0.5 05/20/2020 0000            IMPRESSION:   Paraproteinemia/monoclonal gammopathy  MGUS  Chronic renal insufficiency  Breast cancer in remission. Diagnosed more than 25 years ago. Multiple comorbidities as listed    PLAN: I again explained to the patient the nature of his problem with paraproteinemia and slightly repeated kappa light chain immunoglobulin. M protein is slightly repeated. Multiple myeloma is quite unlikely. Probably dealing with MGUS. No need for bone marrow testing. There is a need for continued monitoring. I explained the importance of monitoring. I will check immunoglobulins panel and will monitor closely. We will repeat the labs soon and again before next visit.   I was here we will consider further work-up if we see any significant increase. Patient was referred clinical trial through [facility] and she agreed. She signed a consent. Patient will continue follow-up with her nephrologist.  Patient's questions were answered to the best of her satisfaction and she verbalized full understanding and agreement.

## 2022-06-24 ENCOUNTER — HOSPITAL ENCOUNTER (OUTPATIENT)
Age: 77
Discharge: HOME | End: 2022-06-24
Payer: MEDICARE

## 2022-06-24 DIAGNOSIS — E78.00 PURE HYPERCHOLESTEROLEMIA: ICD-10-CM

## 2022-06-24 LAB
ALBUMIN SERPL-MCNC: 4.4 G/DL (ref 3.5–5.2)
ALP BLD-CCNC: 77 U/L (ref 35–104)
ALT SERPL-CCNC: 12 U/L (ref 5–33)
AST SERPL-CCNC: 18 U/L
CHOLESTEROL/HDL RATIO: 3.4
CHOLESTEROL: 196 MG/DL
CREAT SERPL-MCNC: 1.12 MG/DL (ref 0.5–0.9)
GFR AFRICAN AMERICAN: 57 ML/MIN
GFR NON-AFRICAN AMERICAN: 47 ML/MIN
GFR SERPL CREATININE-BSD FRML MDRD: ABNORMAL ML/MIN/{1.73_M2}
HCT VFR BLD CALC: 38.3 % (ref 36.3–47.1)
HDLC SERPL-MCNC: 58 MG/DL
HEMOGLOBIN: 12.2 G/DL (ref 11.9–15.1)
HIGH SENSITIVE C-REACTIVE PROTEIN: 30.5 MG/L
LDL CHOLESTEROL: 99 MG/DL (ref 0–130)
MCH RBC QN AUTO: 27.1 PG (ref 25.2–33.5)
MCHC RBC AUTO-ENTMCNC: 31.9 G/DL (ref 28.4–34.8)
MCV RBC AUTO: 85.1 FL (ref 82.6–102.9)
NRBC AUTOMATED: 0 PER 100 WBC
PDW BLD-RTO: 15.5 % (ref 11.8–14.4)
PLATELET # BLD: 211 K/UL (ref 138–453)
PMV BLD AUTO: 11 FL (ref 8.1–13.5)
RBC # BLD: 4.5 M/UL (ref 3.95–5.11)
SEDIMENTATION RATE, ERYTHROCYTE: 32 MM/HR (ref 0–30)
TRIGL SERPL-MCNC: 195 MG/DL
WBC # BLD: 8.9 K/UL (ref 3.5–11.3)

## 2022-06-24 PROCEDURE — 80061 LIPID PANEL: CPT

## 2022-06-24 PROCEDURE — 36415 COLL VENOUS BLD VENIPUNCTURE: CPT

## 2022-06-24 PROCEDURE — 82565 ASSAY OF CREATININE: CPT

## 2022-06-24 PROCEDURE — 85652 RBC SED RATE AUTOMATED: CPT

## 2022-06-24 PROCEDURE — 84075 ASSAY ALKALINE PHOSPHATASE: CPT

## 2022-06-24 PROCEDURE — 85027 COMPLETE CBC AUTOMATED: CPT

## 2022-06-24 PROCEDURE — 84450 TRANSFERASE (AST) (SGOT): CPT

## 2022-06-24 PROCEDURE — 84460 ALANINE AMINO (ALT) (SGPT): CPT

## 2022-06-24 PROCEDURE — 82040 ASSAY OF SERUM ALBUMIN: CPT

## 2022-06-24 PROCEDURE — 86141 C-REACTIVE PROTEIN HS: CPT

## 2022-06-30 PROBLEM — I12.9 RENAL HYPERTENSION: Status: ACTIVE | Noted: 2022-06-30

## 2022-07-05 ENCOUNTER — HOSPITAL ENCOUNTER (OUTPATIENT)
Dept: MAMMOGRAPHY | Age: 77
Discharge: HOME | End: 2022-07-07
Payer: MEDICARE

## 2022-07-05 DIAGNOSIS — Z12.31 ENCOUNTER FOR SCREENING MAMMOGRAM FOR MALIGNANT NEOPLASM OF BREAST: ICD-10-CM

## 2022-07-05 DIAGNOSIS — Z78.0 POSTMENOPAUSAL: ICD-10-CM

## 2022-07-05 PROCEDURE — 77080 DXA BONE DENSITY AXIAL: CPT

## 2022-07-05 PROCEDURE — 77063 BREAST TOMOSYNTHESIS BI: CPT

## 2022-09-19 ENCOUNTER — OFFICE VISIT (OUTPATIENT)
Dept: DERMATOLOGY | Age: 77
End: 2022-09-19
Payer: MEDICARE

## 2022-09-19 VITALS
HEIGHT: 63 IN | BODY MASS INDEX: 29.06 KG/M2 | OXYGEN SATURATION: 93 % | HEART RATE: 88 BPM | TEMPERATURE: 90.1 F | SYSTOLIC BLOOD PRESSURE: 113 MMHG | WEIGHT: 164 LBS | DIASTOLIC BLOOD PRESSURE: 72 MMHG

## 2022-09-19 DIAGNOSIS — L30.9 VULVAR DERMATITIS: Primary | ICD-10-CM

## 2022-09-19 DIAGNOSIS — I78.8 CAPILLARITIS: ICD-10-CM

## 2022-09-19 PROCEDURE — 1090F PRES/ABSN URINE INCON ASSESS: CPT | Performed by: DERMATOLOGY

## 2022-09-19 PROCEDURE — 1036F TOBACCO NON-USER: CPT | Performed by: DERMATOLOGY

## 2022-09-19 PROCEDURE — 1123F ACP DISCUSS/DSCN MKR DOCD: CPT | Performed by: DERMATOLOGY

## 2022-09-19 PROCEDURE — G8399 PT W/DXA RESULTS DOCUMENT: HCPCS | Performed by: DERMATOLOGY

## 2022-09-19 PROCEDURE — G8427 DOCREV CUR MEDS BY ELIG CLIN: HCPCS | Performed by: DERMATOLOGY

## 2022-09-19 PROCEDURE — G8417 CALC BMI ABV UP PARAM F/U: HCPCS | Performed by: DERMATOLOGY

## 2022-09-19 PROCEDURE — 99213 OFFICE O/P EST LOW 20 MIN: CPT | Performed by: DERMATOLOGY

## 2022-09-19 RX ORDER — VALACYCLOVIR HYDROCHLORIDE 500 MG/1
TABLET, FILM COATED ORAL
COMMUNITY
Start: 2022-08-23

## 2022-09-19 RX ORDER — TRIAMCINOLONE ACETONIDE 0.25 MG/G
OINTMENT TOPICAL
Qty: 80 G | Refills: 2 | Status: SHIPPED | OUTPATIENT
Start: 2022-09-19

## 2022-09-19 RX ORDER — ZOLPIDEM TARTRATE 10 MG/1
TABLET ORAL
COMMUNITY
Start: 2022-08-23 | End: 2022-09-27 | Stop reason: SDUPTHER

## 2022-09-19 NOTE — PROGRESS NOTES
Dermatology Patient Note  [address]  [facility]  Dept: [phone]  Dept Fax: [phone]      VISITDATE: 9/19/2022   REFERRING PROVIDER: No ref. provider found      Arnav Alicea is a 68 y.o. female  who presents today in the office for:    Follow-up (Skin doing better since last visit. Needs triam. Refils )      HISTORY OF PRESENT ILLNESS:  As above. Patient states that she uses triamcinolone as needed. She uses it at least once every two days up to x2 daily. Patient states she also uses an estrogen cream for her vagina, which helps her dryness and decreases the itchiness. MEDICAL PROBLEMS:  Patient Active Problem List    Diagnosis Date Noted    Renal hypertension 06/30/2022     Priority: Medium    Sick sinus syndrome ([facility] 06/17/2021     Pacemaker       MGUS (monoclonal gammopathy of unknown significance) 10/07/2020     IgM kappa monoclonal gammopathy quantified at 0.06 g/dL      Nephrolithiasis 09/09/2020     Lithotripsy in 2012      Stage 3 chronic kidney disease ([facility] 03/19/2019     Uma Marie MD: Baseline creatinine has been in the 1.1-1.3 range with slow progression. Likely etiology ischemic nephrosclerosis given history of coronary artery disease. Also suspected FERNANDO from Mtx use. Doubt glomerular ds from Psoriatic arthritis      Other emphysema ([facility] 06/11/2018    Insomnia 12/13/2016     Med contract signed 1/23/18      Murmur, cardiac 12/13/2016     Dr. Navneet Reid follows this. Mitral valve -- per, pt Dr. Elsy Crump thinks she will need a valve replacement in 2017. Atrophic vaginitis 03/21/2016     On Replens (non-hormonal) treatment       Hiatal hernia 11/17/2015     Dr. Poly Quintana has told her she should not have this surgically repaired.        Genital herpes simplex type 2 04/20/2015     Pos labs 4/2015      AV heart block 12/31/2014     Paroxysmal 4/30/07      Bundle branch block 12/31/2014    Aortic stenosis 12/31/2014     CARROL 0.6 cm2, peak gradient 30 mmg Hg      Osteoporosis 12/31/2014     Off Actonel. GYN has ordered this in the past.  Pt will not take any meds for this except Vit D and Ca. Dr. Mayito Robertson ordered her dexa 2018 and reccommended evista or prolia. Pt did not want injection. Pt says that she was having muscle pains with the fosamax, so she quit the med. Dr. Mayito Robertson ordered prolia (first injection will be 12/13/18). 6/30/2020-prolia      Arthritis with psoriasis ([facility] 12/01/2014     On MTX, leucovorin and now Simponi - She no longer wants to see dr. Mayito Robertson;  Needs a ref to Dr. Ksenia Gillespie      Pacemaker 07/29/2014     Duel chamber 5/1/07      Pure hypercholesterolemia 07/16/2014     Pt went off the zocor due to muscle cramps. Also had cramps with lipitor and crestor.  Doing great on Pravachol      Asthma 07/16/2014     Dr. Дмитрий Clarke, \"allergic asthma\"       Esophageal reflux 07/16/2014    Carcinoma in situ of breast 07/16/2014     Estrogen positive       Anemia 07/16/2014    Arthritis 07/16/2014     Pt see's Rheum       Insulin resistance 07/16/2014    CAD (coronary artery disease) 07/16/2014     She had heart cath 9/2014, With LAD stent 4/2017  Echo 1/20/15 (Nl EF, grade 1 LV DD, Mod AS, Mild MR & TR)         CURRENT MEDICATIONS:   Current Outpatient Medications   Medication Sig Dispense Refill    zolpidem (AMBIEN) 10 MG tablet TAKE 1 TABLET BY MOUTH EVERYDAY AT BEDTIME      valACYclovir (VALTREX) 500 MG tablet TAKE 1 TABLET BY MOUTH TWICE A DAY FOR 3 DAYS      pravastatin (PRAVACHOL) 20 MG tablet TAKE 1 TABLET BY MOUTH EVERY DAY IN THE EVENING 90 tablet 0    montelukast (SINGULAIR) 10 MG tablet TAKE 1 TABLET BY MOUTH EVERY DAY IN THE EVENING 90 tablet 0    triamcinolone (KENALOG) 0.025 % ointment APPLY TO VULVA TWICE DAILY 80 g 2    calcium carbonate (OSCAL) 500 MG TABS tablet Take 1,000 mg by mouth daily      Multiple Vitamins-Minerals (THERAPEUTIC MULTIVITAMIN-MINERALS) tablet Take 1 tablet by mouth daily      Vitamin E 400 units TABS Take by mouth 2 times daily      Omega-3 Fatty Acids (OMEGA 3 500 PO) Take 640 mg by mouth daily      fluticasone (CUTIVATE) 0.05 % cream APPLY TOPICALLY TO AFFECTED AREA TWICE DAILY 60 g 2    Golimumab (SIMPONI ARIA IV) Infuse intravenously Every 8 weeks      valsartan-hydrochlorothiazide (DIOVAN-HCT) 80-12.5 MG per tablet TAKE 1 TABLET BY MOUTH EVERY DAY  2    Denosumab (PROLIA SC) Inject into the skin Every 6 months      budesonide-formoterol (SYMBICORT) 160-4.5 MCG/ACT AERO INHALE 2 PUFFS BY MOUTH TWICE DAILY 10.2 Inhaler 3    aspirin 81 MG tablet Take 81 mg by mouth every other day       Cholecalciferol (VITAMIN D3) 2000 UNITS CAPS Take by mouth daily       Probiotic Product (PROBIOTIC DAILY PO) Take by mouth daily       metoprolol (TOPROL-XL) 50 MG XL tablet Take 75 mg by mouth daily Take full tab in am and half tab in pm      lansoprazole (PREVACID) 15 MG delayed release capsule Take 15 mg by mouth daily. Ascorbic Acid (VITAMIN C) 250 MG tablet Take 250 mg by mouth daily (Patient not taking: Reported on 9/19/2022)      ciclopirox (LOPROX) 0.77 % cream Apply to rash on buttock twice daily x 2 weeks (Patient not taking: Reported on 9/19/2022) 90 g 2    XOPENEX HFA 45 MCG/ACT inhaler INHALE TWO PUFFS BY MOUTH EVERY FOUR HOURS (Patient taking differently: PRN) 15 Inhaler 0     No current facility-administered medications for this visit.        ALLERGIES:   Allergies   Allergen Reactions    Azithromycin Diarrhea    Benadryl [Diphenhydramine]     Codeine Nausea Only    Estrogens Other (See Comments)     Estrogen positive Breast cancer    Hctz [Hydrochlorothiazide]     Lisinopril     Phenergan [Promethazine Hcl] Other (See Comments)     Dystonia    Tape Joycelyn Constant Tape]     Amoxicillin Diarrhea       SOCIAL HISTORY:  Social History     Tobacco Use    Smoking status: Former     Packs/day: 1.00     Years: 10.00     Pack years: 10.00     Types: Cigarettes Quit date: 6/10/2009     Years since quittin.2    Smokeless tobacco: Never   Substance Use Topics    Alcohol use: No       Pertinent ROS:  Review of Systems  Skin: Denies any new changing, growing or bleeding lesions or rashes except as described in the HPI   Constitutional: Denies fevers, chills, and malaise. PHYSICAL EXAM:   /72   Pulse 88   Temp (!) 90.1 °F (32.3 °C)   Ht 5' 2.5\" (1.588 m)   Wt 164 lb (74.4 kg)   LMP  (LMP Unknown)   SpO2 93%   BMI 29.52 kg/m²     The patient is generally well appearing, well nourished, alert and conversational. Affect is normal.    Cutaneous Exam:  Physical Exam  Focused exam of groin was performed    Facial covering was not removed during examination. Diagnoses/exam findings/medical history pertinent to this visit are listed below:    Assessment:   Diagnosis Orders   1. Vulvar dermatitis        2. Capillaritis             Plan:  Vulvar dermatitis  - stable chronic illness   - no skin atrophy observed  - continue triamcinolone 0.025% ointment to affected areas as needed  - OK to continue estrogen cream prescribed by OBGYNe    Capillaritis of left dorsal foot  - not related to shoes  - reassurance and education   - may decrease appearance through use of compression socks, patient is not interested    RTC 1 year    Future Appointments   Date Time Provider Lanette Lambert   2022  2:00 PM Lorence Klinefelter, MD [facility] None   2022  2:50 PM Luci Ruiz MD [facility] None   2022  3:30 PM Nuvia Yusuf MD  [facility]         Patient Instructions   - Continue triamcinolone 0.025% ointment to affected areas as needed  - OK to continue estrogen cream prescribed by OBGYN, use up to x2 daily for 2 weeks at a time. If you need to increase this topical's frequency then please let me know. Derrick Gregg, personally scribed the services dictated to me by Dr. Gail Okeefe in this documentation.      I, Dr. Gail Okeefe, personally performed the services described in this documentation, as scribed by Juanjose Santana in my presence, and it is both accurate and complete.     Electronically signed by Uziel Robbins MD on 9/19/2022 at 10:30 PM

## 2022-09-19 NOTE — PATIENT INSTRUCTIONS
- Continue triamcinolone 0.025% ointment to affected areas as needed  - OK to continue estrogen cream prescribed by OBGYN, use up to x2 daily for 2 weeks at a time. If you need to increase this topical's frequency then please let me know.

## 2022-09-21 ENCOUNTER — HOSPITAL ENCOUNTER (OUTPATIENT)
Age: 77
Setting detail: SPECIMEN
Discharge: HOME | End: 2022-09-21
Payer: MEDICARE

## 2022-09-21 DIAGNOSIS — N18.31 STAGE 3A CHRONIC KIDNEY DISEASE (HCC): ICD-10-CM

## 2022-09-21 LAB
ALBUMIN SERPL-MCNC: 4.6 G/DL (ref 3.5–5.2)
ANION GAP SERPL CALCULATED.3IONS-SCNC: 9 MMOL/L (ref 9–17)
BUN BLDV-MCNC: 27 MG/DL (ref 8–23)
CALCIUM SERPL-MCNC: 9.9 MG/DL (ref 8.6–10.4)
CHLORIDE BLD-SCNC: 101 MMOL/L (ref 98–107)
CO2: 28 MMOL/L (ref 20–31)
CREAT SERPL-MCNC: 1.3 MG/DL (ref 0.5–0.9)
CREATININE URINE: 89 MG/DL (ref 28–217)
GFR AFRICAN AMERICAN: 48 ML/MIN
GFR NON-AFRICAN AMERICAN: 40 ML/MIN
GFR SERPL CREATININE-BSD FRML MDRD: ABNORMAL ML/MIN/{1.73_M2}
GLUCOSE BLD-MCNC: 107 MG/DL (ref 70–99)
HCT VFR BLD CALC: 38.4 % (ref 36.3–47.1)
HEMOGLOBIN: 12.2 G/DL (ref 11.9–15.1)
MCH RBC QN AUTO: 26.9 PG (ref 25.2–33.5)
MCHC RBC AUTO-ENTMCNC: 31.8 G/DL (ref 28.4–34.8)
MCV RBC AUTO: 84.8 FL (ref 82.6–102.9)
NRBC AUTOMATED: 0 PER 100 WBC
PDW BLD-RTO: 16 % (ref 11.8–14.4)
PHOSPHORUS: 3.4 MG/DL (ref 2.6–4.5)
PLATELET # BLD: 207 K/UL (ref 138–453)
PMV BLD AUTO: 11.5 FL (ref 8.1–13.5)
POTASSIUM SERPL-SCNC: 4.5 MMOL/L (ref 3.7–5.3)
PTH INTACT: 64.9 PG/ML (ref 14–72)
RBC # BLD: 4.53 M/UL (ref 3.95–5.11)
SODIUM BLD-SCNC: 138 MMOL/L (ref 135–144)
TOTAL PROTEIN, URINE: 11 MG/DL
URINE TOTAL PROTEIN CREATININE RATIO: 0.12 (ref 0–0.2)
WBC # BLD: 10.7 K/UL (ref 3.5–11.3)

## 2022-09-21 PROCEDURE — 82040 ASSAY OF SERUM ALBUMIN: CPT

## 2022-09-21 PROCEDURE — 84156 ASSAY OF PROTEIN URINE: CPT

## 2022-09-21 PROCEDURE — 84100 ASSAY OF PHOSPHORUS: CPT

## 2022-09-21 PROCEDURE — 83970 ASSAY OF PARATHORMONE: CPT

## 2022-09-21 PROCEDURE — 80048 BASIC METABOLIC PNL TOTAL CA: CPT

## 2022-09-21 PROCEDURE — 36415 COLL VENOUS BLD VENIPUNCTURE: CPT

## 2022-09-21 PROCEDURE — 82570 ASSAY OF URINE CREATININE: CPT

## 2022-09-21 PROCEDURE — 85027 COMPLETE CBC AUTOMATED: CPT

## 2022-11-16 ENCOUNTER — HOSPITAL ENCOUNTER (OUTPATIENT)
Age: 77
Setting detail: SPECIMEN
Discharge: HOME | End: 2022-11-16
Payer: MEDICARE

## 2022-11-16 DIAGNOSIS — D47.2 MGUS (MONOCLONAL GAMMOPATHY OF UNKNOWN SIGNIFICANCE): Primary | ICD-10-CM

## 2022-11-16 DIAGNOSIS — D47.2 MGUS (MONOCLONAL GAMMOPATHY OF UNKNOWN SIGNIFICANCE): ICD-10-CM

## 2022-11-16 LAB
ABSOLUTE EOS #: 0.45 K/UL (ref 0–0.44)
ABSOLUTE IMMATURE GRANULOCYTE: <0.03 K/UL (ref 0–0.3)
ABSOLUTE LYMPH #: 2.36 K/UL (ref 1.1–3.7)
ABSOLUTE MONO #: 0.8 K/UL (ref 0.1–1.2)
BASOPHILS # BLD: 1 % (ref 0–2)
BASOPHILS ABSOLUTE: 0.07 K/UL (ref 0–0.2)
EOSINOPHILS RELATIVE PERCENT: 6 % (ref 1–4)
FREE KAPPA/LAMBDA RATIO: 1.78 (ref 0.26–1.65)
HCT VFR BLD CALC: 36.1 % (ref 36.3–47.1)
HEMOGLOBIN: 11.3 G/DL (ref 11.9–15.1)
IGA, LOW RANGE: 48 MG/DL (ref 70–400)
IGA: ABNORMAL MG/DL (ref 70–400)
IGG: 840 MG/DL (ref 700–1600)
IGM: 285 MG/DL (ref 40–230)
IMMATURE GRANULOCYTES: 0 %
KAPPA FREE LIGHT CHAINS QNT: 3.3 MG/DL (ref 0.37–1.94)
LAMBDA FREE LIGHT CHAINS QNT: 1.85 MG/DL (ref 0.57–2.63)
LYMPHOCYTES # BLD: 32 % (ref 24–43)
MCH RBC QN AUTO: 27.7 PG (ref 25.2–33.5)
MCHC RBC AUTO-ENTMCNC: 31.3 G/DL (ref 28.4–34.8)
MCV RBC AUTO: 88.5 FL (ref 82.6–102.9)
MONOCYTES # BLD: 11 % (ref 3–12)
NRBC AUTOMATED: 0 PER 100 WBC
PDW BLD-RTO: 16.4 % (ref 11.8–14.4)
PLATELET # BLD: 201 K/UL (ref 138–453)
PMV BLD AUTO: 11.2 FL (ref 8.1–13.5)
RBC # BLD: 4.08 M/UL (ref 3.95–5.11)
RBC # BLD: ABNORMAL 10*6/UL
SEG NEUTROPHILS: 50 % (ref 36–65)
SEGMENTED NEUTROPHILS ABSOLUTE COUNT: 3.75 K/UL (ref 1.5–8.1)
WBC # BLD: 7.4 K/UL (ref 3.5–11.3)

## 2022-11-16 PROCEDURE — 86334 IMMUNOFIX E-PHORESIS SERUM: CPT

## 2022-11-16 PROCEDURE — 82784 ASSAY IGA/IGD/IGG/IGM EACH: CPT

## 2022-11-16 PROCEDURE — 83521 IG LIGHT CHAINS FREE EACH: CPT

## 2022-11-16 PROCEDURE — 84165 PROTEIN E-PHORESIS SERUM: CPT

## 2022-11-16 PROCEDURE — 36415 COLL VENOUS BLD VENIPUNCTURE: CPT

## 2022-11-16 PROCEDURE — 85025 COMPLETE CBC W/AUTO DIFF WBC: CPT

## 2022-11-16 PROCEDURE — 84155 ASSAY OF PROTEIN SERUM: CPT

## 2022-11-18 LAB
ALBUMIN (CALCULATED): 4.3 G/DL (ref 3.2–5.2)
ALBUMIN PERCENT: 67 % (ref 45–65)
ALPHA 1 PERCENT: 3 % (ref 3–6)
ALPHA 2 PERCENT: 10 % (ref 6–13)
ALPHA-1-GLOBULIN: 0.2 G/DL (ref 0.1–0.4)
ALPHA-2-GLOBULIN: 0.6 G/DL (ref 0.5–0.9)
BETA GLOBULIN: 0.6 G/DL (ref 0.5–1.1)
BETA PERCENT: 10 % (ref 11–19)
GAMMA GLOBULIN %: 11 % (ref 9–20)
GAMMA GLOBULIN: 0.7 G/DL (ref 0.5–1.5)
PATHOLOGIST: ABNORMAL
PATHOLOGIST: NORMAL
PROTEIN ELECTROPHORESIS, SERUM: ABNORMAL
SERUM IFX INTERP: NORMAL
TOTAL PROT. SUM,%: 101 % (ref 98–102)
TOTAL PROT. SUM: 6.4 G/DL (ref 6.3–8.2)
TOTAL PROTEIN: 6.4 G/DL (ref 6.4–8.3)

## 2022-11-22 ENCOUNTER — TELEPHONE (OUTPATIENT)
Dept: ONCOLOGY | Age: 77
End: 2022-11-22

## 2022-11-22 ENCOUNTER — OFFICE VISIT (OUTPATIENT)
Dept: ONCOLOGY | Age: 77
End: 2022-11-22
Payer: MEDICARE

## 2022-11-22 VITALS
SYSTOLIC BLOOD PRESSURE: 120 MMHG | BODY MASS INDEX: 30.11 KG/M2 | RESPIRATION RATE: 16 BRPM | DIASTOLIC BLOOD PRESSURE: 71 MMHG | TEMPERATURE: 98.5 F | WEIGHT: 167.3 LBS | HEART RATE: 63 BPM

## 2022-11-22 DIAGNOSIS — D47.2 MGUS (MONOCLONAL GAMMOPATHY OF UNKNOWN SIGNIFICANCE): Primary | ICD-10-CM

## 2022-11-22 PROCEDURE — 99211 OFF/OP EST MAY X REQ PHY/QHP: CPT | Performed by: INTERNAL MEDICINE

## 2022-11-22 PROCEDURE — G8417 CALC BMI ABV UP PARAM F/U: HCPCS | Performed by: INTERNAL MEDICINE

## 2022-11-22 PROCEDURE — G8484 FLU IMMUNIZE NO ADMIN: HCPCS | Performed by: INTERNAL MEDICINE

## 2022-11-22 PROCEDURE — 99214 OFFICE O/P EST MOD 30 MIN: CPT | Performed by: INTERNAL MEDICINE

## 2022-11-22 PROCEDURE — 1090F PRES/ABSN URINE INCON ASSESS: CPT | Performed by: INTERNAL MEDICINE

## 2022-11-22 PROCEDURE — 1123F ACP DISCUSS/DSCN MKR DOCD: CPT | Performed by: INTERNAL MEDICINE

## 2022-11-22 PROCEDURE — G8427 DOCREV CUR MEDS BY ELIG CLIN: HCPCS | Performed by: INTERNAL MEDICINE

## 2022-11-22 PROCEDURE — 1036F TOBACCO NON-USER: CPT | Performed by: INTERNAL MEDICINE

## 2022-11-22 PROCEDURE — G8399 PT W/DXA RESULTS DOCUMENT: HCPCS | Performed by: INTERNAL MEDICINE

## 2022-11-22 RX ORDER — ZOLPIDEM TARTRATE 10 MG/1
TABLET ORAL
COMMUNITY
Start: 2022-11-04

## 2022-11-22 NOTE — TELEPHONE ENCOUNTER
[place] ARRIVES AMBULATORY FOR MD VISIT  DR Lis Scales IN TO SEE PATIENT  ORDERS RECEIVED  RV 6 MONTHS WITH LABS BEFORE  LABS CDP IGG IGA IGM KAPPA LAMBDA FREE LT CHAINS SIFX SPEP 05/18/23, ORDERS GIVEN TO PT  MD VISIT 05/25/23 @2PM  AVS PRINTED AND GIVEN TO PATIENT WITH INSTRUCTIONS  PATIENT DISCHARGED AMBULATORY

## 2022-11-25 NOTE — PROGRESS NOTES
_     Chief Complaint   Patient presents with    Follow-up    Discuss Labs     DIAGNOSIS:       Paraproteinemia/monoclonal gammopathy  MGUS  Chronic renal insufficiency  Breast cancer in remission. Diagnosed more than 25 years ago. Multiple comorbidities as listed      CURRENT THERAPY:         Observation monitoring for MGUS    BRIEF CASE HISTORY:      Ms. Taylor English is a very pleasant 68 y.o. female with history of multiple co morbidities as listed. Patient is referred for further management of abnormal kappa light chain immunoglobulin. Patient had recent evaluation by nephrology for chronic renal insufficiency. .  Work-up was done which included serum protein electrophoresis and immunofixation. Patient was noted to have elevated kappa light chain immunoglobulin and slightly limited M protein with the mild monoclonal gammopathy. Patient denies any fever or night sweats. No repeated infections. She had chronic body aches including chronic back pain. No recent changes. No chest pain or shortness of breath. No fever. No other complaints. Patient denies smoking or alcohol drinking. INTERIM HISTORY:   Patient seen for follow-up paraproteinemia. Doing well clinically. No chest pain or shortness of breath. No back pain or bone aches. No fever. No weight loss or decreased appetite. No other complaints.           PAST MEDICAL HISTORY: has a past medical history of Anemia, unspecified, Aortic stenosis, Arthritis, AV block, BBB (bundle branch block), Breast cancer ([facility] [address] Bundle branch block, CAD (coronary artery disease), Carcinoma in situ of breast, Esophageal reflux, Essential hypertension, benign, Insulin resistance, MGUS (monoclonal gammopathy of unknown significance), Nephrolithiasis, Osteoporosis, Patient in clinical research study, Patient in clinical research study, Pure hypercholesterolemia, Stage 3 chronic kidney disease ([facility] [address] and Unspecified asthma(493.90). PAST SURGICAL HISTORY: has a past surgical history that includes Tubal ligation; Mastectomy (Left); Colon surgery; Colonoscopy (02/06/2006); Pacemaker insertion (05/01/2007); Breast reconstruction (Right); Breast surgery (Left); eye surgery (Right); Foot neuroma surgery (Right); Wrist fracture surgery; Upper gastrointestinal endoscopy (09/2007); Upper gastrointestinal endoscopy (03/2006); Upper gastrointestinal endoscopy (08/2005); Dilation and curettage of uterus (1973); pacemaker placement (04/23/2015); Coronary angioplasty with stent (04/27/2017); Cataract removal (Left, 05/20/2019); Lithotripsy (2012); Cystoscopy (07/31/2012); Tooth Extraction (03/05/2021); Abdomen surgery; Cardiac surgery; Cardiac catheterization (04/27/2017); and Cardiac catheterization (2021). CURRENT MEDICATIONS:  has a current medication list which includes the following prescription(s): zolpidem, montelukast, fluticasone, valacyclovir, triamcinolone, pravastatin, calcium carbonate, vitamin c, therapeutic multivitamin-minerals, ciclopirox, vitamin e, omega-3 fatty acids, golimumab, valsartan-hydrochlorothiazide, denosumab, budesonide-formoterol, xopenex hfa, aspirin, vitamin d3, probiotic product, metoprolol succinate, and lansoprazole. ALLERGIES:  is allergic to azithromycin, benadryl [diphenhydramine], codeine, estrogens, hctz [hydrochlorothiazide], lisinopril, phenergan [promethazine hcl], tape [adhesive tape], and amoxicillin. FAMILY HISTORY: Negative for any hematological or oncological conditions. SOCIAL HISTORY:  reports that she quit smoking about 13 years ago. Her smoking use included cigarettes. She has a 10.00 pack-year smoking history. She has never used smokeless tobacco. She reports that she does not drink alcohol and does not use drugs. REVIEW OF SYSTEMS:     General: No weakness or fatigue. No unanticipated weight loss or decreased appetite.  No fever or chills. Eyes: No blurred vision, eye pain or double vision. Ears: No hearing problems or drainage. No tinnitus. Throat: No sore throat, problems with swallowing or dysphagia. Respiratory: No cough, sputum or hemoptysis. No shortness of breath. No pleuritic chest pain. Cardiovascular: No chest pain, orthopnea or PND. No lower extremity edema. No palpitation. Gastrointestinal: No problems with swallowing. No abdominal pain or bloating. No nausea or vomiting. No diarrhea or constipation. No GI bleeding. Genitourinary: No dysuria, hematuria, frequency or urgency. Musculoskeletal: No muscle aches or pains. No limitation of movement. No back pain. No gait disturbance, No joint complaints. Dermatologic: No skin rashes or pruritus. No skin lesions or discolorations. Psychiatric: No depression, anxiety, or stress or signs of schizophrenia. No change in mood or affect. Hematologic: No history of bleeding tendency. No bruises or ecchymosis. No history of clotting problems. Infectious disease: No fever, chills or frequent infections. Endocrine: No polydipsia or polyuria. No temperature intolerance. Neurologic: No headaches or dizziness. No weakness or numbness of the extremities. No changes in balance, coordination,  memory, mentation, behavior. Allergic/Immunologic: No nasal congestion or hives. No repeated infections. PHYSICAL EXAM:  The patient is not in acute distress. Vital signs: Blood pressure 120/71, pulse 63, temperature 98.5 °F (36.9 °C), temperature source Temporal, resp. rate 16, weight 167 lb 4.8 oz (75.9 kg), not currently breastfeeding.      General appearance - well appearing, not in pain or distress  Mental status - good mood, alert and oriented  Eyes - pupils equal and reactive, extraocular eye movements intact  Ears - bilateral TM's and external ear canals normal  Nose - normal and patent, no erythema, discharge or polyps  Mouth - mucous membranes moist, pharynx normal without lesions  Neck - supple, no significant adenopathy  Lymphatics - no palpable lymphadenopathy, no hepatosplenomegaly  Chest - clear to auscultation, no wheezes, rales or rhonchi, symmetric air entry  Heart - normal rate, regular rhythm, normal S1, S2, no murmurs, rubs, clicks or gallops  Abdomen - soft, nontender, nondistended, no masses or organomegaly  Neurological - alert, oriented, normal speech, no focal findings or movement disorder noted  Musculoskeletal - no joint tenderness, deformity or swelling  Extremities - peripheral pulses normal, no pedal edema, no clubbing or cyanosis  Skin - normal coloration and turgor, no rashes, no suspicious skin lesions noted     Review of Diagnostic data:   Lab Results   Component Value Date    WBC 7.4 11/16/2022    HGB 11.3 (L) 11/16/2022    HCT 36.1 (L) 11/16/2022    MCV 88.5 11/16/2022     11/16/2022       Chemistry        Component Value Date/Time     09/21/2022 1613    K 4.5 09/21/2022 1613     09/21/2022 1613    CO2 28 09/21/2022 1613    BUN 27 (H) 09/21/2022 1613    CREATININE 1.30 (H) 09/21/2022 1613        Component Value Date/Time    CALCIUM 9.9 09/21/2022 1613    ALKPHOS 77 06/24/2022 1347    AST 18 06/24/2022 1347    ALT 12 06/24/2022 1347    BILITOT 0.5 05/20/2020 0000            IMPRESSION:   Paraproteinemia/monoclonal gammopathy  MGUS  Chronic renal insufficiency  Breast cancer in remission. Diagnosed more than 25 years ago. Multiple comorbidities as listed    PLAN: I again explained to the patient the nature of his problem with paraproteinemia and slightly repeated kappa light chain immunoglobulin. M protein is slightly repeated. Multiple myeloma is quite unlikely. Probably dealing with MGUS. No need for bone marrow testing. There is a need for continued monitoring. I explained the importance of monitoring. I will check immunoglobulins panel and will monitor closely.   We will repeat the labs soon and again before next visit.  I was here we will consider further work-up if we see any significant increase. Patient was referred clinical trial through [facility] and she agreed. She signed a consent. Patient will continue follow-up with her nephrologist.  Patient's questions were answered to the best of her satisfaction and she verbalized full understanding and agreement.

## 2023-01-16 ENCOUNTER — HOSPITAL ENCOUNTER (OUTPATIENT)
Dept: CARDIAC CATH/INVASIVE PROCEDURES | Age: 78
Discharge: HOME | End: 2023-01-16
Payer: MEDICARE

## 2023-01-16 VITALS
BODY MASS INDEX: 30.18 KG/M2 | WEIGHT: 164 LBS | HEART RATE: 67 BPM | OXYGEN SATURATION: 96 % | SYSTOLIC BLOOD PRESSURE: 147 MMHG | DIASTOLIC BLOOD PRESSURE: 49 MMHG | HEIGHT: 62 IN | TEMPERATURE: 98.2 F | RESPIRATION RATE: 22 BRPM

## 2023-01-16 LAB
EGFR, POC: 46 ML/MIN/1.73M2
GLUCOSE BLD-MCNC: 92 MG/DL (ref 74–100)
PLATELET # BLD: 190 K/UL (ref 138–453)
POC BUN: 24 MG/DL (ref 8–26)
POC CHLORIDE: 103 MMOL/L (ref 98–107)
POC CREATININE: 1.2 MG/DL (ref 0.51–1.19)
POC HEMATOCRIT: 40 % (ref 36–46)
POC HEMOGLOBIN: 13.6 G/DL (ref 12–16)
POC IONIZED CALCIUM: 1.23 MMOL/L (ref 1.15–1.33)
POC POTASSIUM: 4.3 MMOL/L (ref 3.5–4.5)
POC SODIUM: 140 MMOL/L (ref 138–146)

## 2023-01-16 PROCEDURE — 82565 ASSAY OF CREATININE: CPT

## 2023-01-16 PROCEDURE — 99152 MOD SED SAME PHYS/QHP 5/>YRS: CPT

## 2023-01-16 PROCEDURE — 82435 ASSAY OF BLOOD CHLORIDE: CPT

## 2023-01-16 PROCEDURE — 84520 ASSAY OF UREA NITROGEN: CPT

## 2023-01-16 PROCEDURE — 85014 HEMATOCRIT: CPT

## 2023-01-16 PROCEDURE — 2580000003 HC RX 258: Performed by: INTERNAL MEDICINE

## 2023-01-16 PROCEDURE — 6360000002 HC RX W HCPCS

## 2023-01-16 PROCEDURE — C1769 GUIDE WIRE: HCPCS

## 2023-01-16 PROCEDURE — 82330 ASSAY OF CALCIUM: CPT

## 2023-01-16 PROCEDURE — 93460 R&L HRT ART/VENTRICLE ANGIO: CPT

## 2023-01-16 PROCEDURE — 7100000001 HC PACU RECOVERY - ADDTL 15 MIN

## 2023-01-16 PROCEDURE — 85049 AUTOMATED PLATELET COUNT: CPT

## 2023-01-16 PROCEDURE — 7100000000 HC PACU RECOVERY - FIRST 15 MIN

## 2023-01-16 PROCEDURE — 2500000003 HC RX 250 WO HCPCS

## 2023-01-16 PROCEDURE — C1894 INTRO/SHEATH, NON-LASER: HCPCS

## 2023-01-16 PROCEDURE — 6360000004 HC RX CONTRAST MEDICATION

## 2023-01-16 PROCEDURE — C1751 CATH, INF, PER/CENT/MIDLINE: HCPCS

## 2023-01-16 PROCEDURE — 99153 MOD SED SAME PHYS/QHP EA: CPT

## 2023-01-16 PROCEDURE — C1887 CATHETER, GUIDING: HCPCS

## 2023-01-16 PROCEDURE — 84132 ASSAY OF SERUM POTASSIUM: CPT

## 2023-01-16 PROCEDURE — 82947 ASSAY GLUCOSE BLOOD QUANT: CPT

## 2023-01-16 PROCEDURE — 2709999900 HC NON-CHARGEABLE SUPPLY

## 2023-01-16 PROCEDURE — 84295 ASSAY OF SERUM SODIUM: CPT

## 2023-01-16 PROCEDURE — C1892 INTRO/SHEATH,FIXED,PEEL-AWAY: HCPCS

## 2023-01-16 RX ORDER — ACETAMINOPHEN 325 MG/1
650 TABLET ORAL EVERY 4 HOURS PRN
Status: DISCONTINUED | OUTPATIENT
Start: 2023-01-16 | End: 2023-01-17 | Stop reason: HOSPADM

## 2023-01-16 RX ORDER — SODIUM CHLORIDE 0.9 % (FLUSH) 0.9 %
5-40 SYRINGE (ML) INJECTION PRN
Status: DISCONTINUED | OUTPATIENT
Start: 2023-01-16 | End: 2023-01-17 | Stop reason: HOSPADM

## 2023-01-16 RX ORDER — SODIUM CHLORIDE 9 MG/ML
INJECTION, SOLUTION INTRAVENOUS PRN
Status: DISCONTINUED | OUTPATIENT
Start: 2023-01-16 | End: 2023-01-17 | Stop reason: HOSPADM

## 2023-01-16 RX ORDER — SODIUM CHLORIDE 0.9 % (FLUSH) 0.9 %
5-40 SYRINGE (ML) INJECTION EVERY 12 HOURS SCHEDULED
Status: DISCONTINUED | OUTPATIENT
Start: 2023-01-16 | End: 2023-01-17 | Stop reason: HOSPADM

## 2023-01-16 RX ORDER — SODIUM CHLORIDE 9 MG/ML
INJECTION, SOLUTION INTRAVENOUS CONTINUOUS
Status: DISCONTINUED | OUTPATIENT
Start: 2023-01-16 | End: 2023-01-17 | Stop reason: HOSPADM

## 2023-01-16 RX ADMIN — SODIUM CHLORIDE: 9 INJECTION, SOLUTION INTRAVENOUS at 11:58

## 2023-01-16 NOTE — DISCHARGE INSTRUCTIONS
Discharge Instructions for angiogram  Home Care     Ok to shower in AM. Discontinue band aid in AM.   Do not apply further band aids. Keep clean, dry and open to air. No powder or lotion.  Do not soak in a pool or tub and do not swim for one week.   If there is any bleeding at the catheter site, apply firm pressure with your hands until the bleeding stops. If bleeding continues after 3 minutes call 911.   If there is any swelling or firm areas at your puncture site, this could be bleeding under the skin(hematoma), and if you have any concerns seek help immediately.        Drink plenty of fluids after the test. This will flush the x-ray dye from your system.   Return to your normal diet.       The sedative will make you sleepy. Rest until the effects have worn off.   Ask your doctor when you will be able to return to work.   Avoid heavy lifting objects greater than 10  pounds, physically demanding activities, and sexual activity for 5-7 days.   Your activity will also depend upon where the catheter was inserted: Do not sit for long periods of time. Try to change positions frequently.   Medications   If advised by your doctor, resume taking your normal medicines. Use acetaminophen (Tylenol) for pain relief.   Do not take metformin (Glucophage) or glyburide and metformin (Glucovance) for 48 hours after the test.    If you had to stop taking these medications before the procedure, ask your doctor when you can resume taking them:   If youAnti-inflammatory drugs (eg, ibuprofen )   Blood thinners, such as warfarin (Coumadin)   Clopidogrel (Plavix)   If you are taking medicines, follow these general guidelines:   Take your medicine as directed. Do not change the amount or the schedule.   Do not stop taking them without talking to your doctor.   Do not share them.   Know what the results and side effects. Report them to your doctor.   Some drugs can be dangerous when mixed. Talk to a doctor or pharmacist if you are taking  more than one drug. This includes over-the-counter medicine and herb or dietary supplements. Plan ahead for refills so you don't run out. Call Your Doctor If Any of the Following Occurs     :   Signs of infection, including fever and chills   Redness, swelling, increasing pain, excessive bleeding, or any discharge from the catheter insertion site   CALL 911 if you have symptoms including:   Drooping facial muscles   Changes in vision or speech   Difficulty walking or using your limbs   Change in sensation to affected leg, including numbness, feeling cold, or change in color   Extreme sweating, nausea or vomiting   Dizziness or lightheadedness   Chest pain   Rapid, irregular heartbeat   Palpitations   Cough, shortness of breath, or difficulty breathing   Weakness or fainting   If you think you have an emergency, CALL 911 . Coronary artery disease (CAD) occurs when plaque builds up in the arteries that bring oxygen-rich blood to your heart. Plaque is a fatty substance made of cholesterol, calcium, and other substances in the blood. This process is called hardening of the arteries, or atherosclerosis. What happens when you have coronary artery disease? Plaque may narrow the coronary arteries. Narrowed arteries cause poor blood flow. This can lead to angina symptoms such as chest pain or discomfort. If blood flow is completely blocked, you could have a heart attack. You can slow CAD and reduce the risk of future problems by making changes in your lifestyle. These include quitting smoking and eating heart-healthy foods. Treatments for CAD, along with changes in your lifestyle, can help you live a longer and healthier life. How can you prevent coronary artery disease? Do not smoke. It may be the best thing you can do to prevent heart disease. If you need help quitting, talk to your doctor about stop-smoking programs and medicines.  These can increase your chances of quitting for good. Be active. Get at least 30 minutes of exercise on most days of the week. Walking is a good choice. You also may want to do other activities, such as running, swimming, cycling, or playing tennis or team sports. Eat heart-healthy foods. Eat more fruits and vegetables and less foods that contain saturated and trans fats. Limit alcohol, sodium, and sweets. Stay at a healthy weight. Lose weight if you need to. Manage other health problems such as diabetes, high blood pressure, and high cholesterol. Talk to your doctor about taking a daily aspirin. Manage stress. Stress can hurt your heart. To keep stress low, talk about your problems and feelings. Don't keep your feelings hidden. How is coronary artery disease treated? Your doctor will suggest that you make lifestyle changes. For example, your doctor may ask you to eat healthy foods, quit smoking, lose extra weight, and be more active. You will have to take medicines. Your doctor may suggest a procedure to open narrowed or blocked arteries. This is called angioplasty. Or your doctor may suggest using healthy blood vessels to create detours around narrowed or blocked arteries. This is called bypass surgery. Follow-up care is a key part of your treatment and safety. Be sure to make and go to all appointments, and call your doctor if you are having problems. It's also a good idea to know your test results and keep a list of the medicines you take. Where can you learn more? Go to https://ele.[employer]. org and sign in to your [id number] account. Enter [phone] in the [facility] box to learn more about Learning About Coronary Artery Disease (CAD).     If you do not have an account, please click on the Sign Up Now link. © 5770-8565 Healthwise, Incorporated. Care instructions adapted under license by [facility] ([facility]).  This care instruction is for use with your licensed healthcare professional. If you have questions about a medical condition or this instruction, always ask your healthcare professional. [place] [address] any warranty or liability for your use of this information. Content Version: 84.9.381710; Current as of: February 20, 2015      Discharge Instructions      SEDATION / ANALGESIA INFORMATION / [address] have received the sedation/analgesia medication during your visit    Sedation/analgesia is used during short medical procedures under controlled supervision. The medication will produce a strong relaxation. You will be able to hear, speak and follow instructions, but your memory and alertness will be decreased. You will be able to swallow and breathe on your own. During sedation/analgesia your blood pressure, heart and breathing will be watched closely. After the procedure, you may not remember what was said or done. You may have the following effects from the medication. \" Drowsiness, dizziness, sleepiness or confusion. \" Difficulty remembering or delayed reaction times. \" Loss of fine muscle control or difficulty with your balance especially while walking. \" Difficulty focusing or blurred vision. You may not be aware of slight changes in your behavior and/or your reaction time because of the medication used during the procedure. Therefore you should follow these instructions. \" Have someone responsible help you with your care. \" Do not drive for 24 hours. \" Do not operate equipment for 24 hours (lawnmowers, power tools, kitchen accessories, stove). \" Do not drink any alcoholic beverages for a minimum of 24 hours. \" Do not make important personal, legal or business decisions for 24 hours. \" You may experience dizziness or lightheadedness. Move slowly and carefully, do not make sudden position changes. \" Drink extra amounts of fluids today. \" Increase your diet as tolerated (unless you have received specific instructions from your doctor).   \" If you feel nauseated, continue with liquids until the nausea is gone. \" Notify your physician if you have not urinated within 8 hours after the procedure.   \" Resume your medications unless otherwise instructed

## 2023-01-16 NOTE — PROGRESS NOTES
Ambulated to bathroom and in halls. Gait steady. . Right groin puncture site and right pedal pulse assessment unchanged. All discharge instructions reviewed, questions answered, paper signed and given copy. Patient discharged per wheelchair with daughter and belongings.

## 2023-01-16 NOTE — PROGRESS NOTES
Received post cardiac cath procedure to [facility] room 10. Assessment obtained. Restrictions reviewed with patient. Post procedure pathway initiated. Right groin site soft , dressing dry and intact. No hematoma noted. Family at side. Patient without complaints. Head of bed flat with right leg straight.

## 2023-01-16 NOTE — OP NOTE
[facility]    CARDIAC CATHETERIZATION    Date:   1/16/2023  Patient name:  [place]  Date of admission:  1/16/2023 11:24 AM  MRN:   [id number]  YOB: 1945    Operators:  Primary:   Sathya Carmona MD (Attending Physician)    Assistant fellow   : Rogers Membreno ( CV fellow )        Procedure performed:     [x] Left Heart Catheterization. [] Graft Angiography. [x] Left Ventriculography. [] Right Heart Catheterization. [x] Coronary Angiography. [] Aortic Valve Studies. [] PCI:      [] Other:       Pre Procedure Conscious Sedation Data:  ASA Class:    [] I [] II [x] III [] IV    Mallampati Class:  [x] I [] II [] III [] IV      Indication:  - Severe AS   - TABARES       Procedure:  Access:  Right femoral artery and vein     Procedure: After informed consent was obtained with explanation of the risks and benefits, patient was brought to the cath lab. The access area was prepped and draped in sterile fashion. 1% lidocaine was used for local block. The artery was cannulated with 6  Fr sheath with brisk arterial blood return. The side port was frequently flushed and aspirated with normal saline. Findings:   Angiographic Findings        Cardiac Arteries and Lesion Findings       LMCA: Normal 0% stenosis. LAD: Normal 0% stenosis. LCx: Normal 0% stenosis. RCA: Normal 0% stenosis. proximal in stent 50% stenosis      Coronary Tree        Dominance: Right       LV Analysis   LV function assessed as:Normal.      Conclusions        Procedure Summary        Patent RCA stent    Minimal disease in LAD and LCX    Preserved LV function    Aortic stenosis with mean gradient 59 mm Hg, valve area of 0.35        Recommendations     +--------+-----+----+----------------------+---+---------------------------+   ! Location! pCO2 ! pO2 !% Saturation          ! Hgb! O2 Content                 ! +--------+-----+----+----------------------+---+---------------------------+   ! FA      !      ! !90.7                  !   !                           !   +--------+-----+----+----------------------+---+---------------------------+   ! PA      !     !    !59.5                  !   !                           !   +--------+-----+----+----------------------+---+---------------------------+     Pressure   +-----+--------------------------------------------------------------------+   ! Site ! Pressure                                                            ! +-----+--------------------------------------------------------------------+   ! RA   !10/8 (7)                                                            !   +-----+--------------------------------------------------------------------+   ! RV   !41/3 ,7                                                             !   +-----+--------------------------------------------------------------------+   ! PA   !40/16 (24)                                                          !   +-----+--------------------------------------------------------------------+   ! PCW  !15/16 (13)                                                          !   +-----+--------------------------------------------------------------------+   ! AO   !149/52 (87)                                                         !   +-----+--------------------------------------------------------------------+   ! LV   !204/7 ,16                                                           ! +-----+--------------------------------------------------------------------+     Cardiac Output   +------+-----------------------+--------------------------+----------------+   ! Method! CO (l/min)             ! CI (l/min/m2)             ! SV (ml)         !   +------+-----------------------+--------------------------+----------------+   ! Sendy  !2.66                   !1.5                       !42.98           !   +------+-----------------------+--------------------------+----------------+     Valve Gradients and Areas   +----------+--------+--------+--------+---------+----------+---------------+   !Valve     !Peak    !Mean    !Area    !Index    !Flow      !Source         !   +----------+--------+--------+--------+---------+----------+---------------+   !Aortic    !55      !59      !0.35    !0.2      !119.87    !Sendy           !   +----------+--------+--------+--------+---------+----------+---------------+   !Aortic    !55      !59      !        !         !          !               !     Estimated Blood Loss: 15 mL      ____________________________________________________________________    History and Risk Factors    [x] Hypertension     [] Family history of CAD  [x] Hyperlipidemia     [] Cerebrovascular Disease   [] Prior MI       [] Peripheral Vascular disease   [] Prior PCI              [] Diabetes Mellitus    [] Left Main PCI.     [] Currently on Dialysis.  [] Prior CABG.      [] Currently smoker.    [] Cardiac Arrest outside of healthcare facility. [] Yes    [x] No        Witnessed     [] Yes   [] No     Arrest after arrival of EMS  [] Yes   [] No     [] Cardiac Arrest at other Facility.    [] Yes   [x] No    Pre-Procedure Information.  Heart Failure       [x] Yes    [] No        Class  [] I      [x] II  [] III    [] IV.     New Diagnosis    [] Yes  [x] No    HF Type      [] Systolic   [x] Diastolic          [] Unknown.    Diagnostic Test:   EKG       [] Normal   [x] Abnormal    New antiarrhythmia medications:    [] Yes   [] No   New onset atrial fibrillation / Flutter     [] Yes   [] No   ECG Abnormalities:      [] V. Fib   [] [place] T[place]           [] NS V. T   [] New LBBB           [] T. Inv  []  ST dev > 0.5 mm         [] PVC's freq  [] PVC's infrequent    Stress Test Performed:      [] Yes    [x] No     Type:     [] Stress Echo   [] Exercise Stress Test (no imaging)      [] Stress Nuclear  [] Stress Imaging     Results   [] Negative   [] Positive        [] Indeterminate  [] Unavailable     If Positive/ Risk / Extent of  Ischemia:       [] Low  [] Intermediate         [] High  [] Unavailable      Cardiac CTA Performed:     [] Yes    [x] No      Results   [] CAD   [] Non obstructive CAD      [] No CAD   [] Uncertain      [] Unknown   [] Structural Disease. Pre Procedure Medications:   [x] Yes    [] No         [x] ASA   [] Beta Blockers      [] Nitrate   [] Ca Channel Blockers      [] Ranolazine   [] Statin       [] Plavix/Others antiplatelets      Electronically signed on 1/16/2023 at 2:23 PM by:    Anup Cifuentes MD  Fellow, [address]      I have reviewed the case / procedure with resident / fellow  I have examined the patient personally  Patient agree with treatment plan as discussed before, final arrangement based on my evaluation and exam.    Risk and benefit of procedure planned were explained in details. Procedure was performed by me personally, with all aspect of the procedure being done using standard protocol. Note was modified based on my own assessment and treatment.     Anni Estrada MD  [facility]

## 2023-01-16 NOTE — H&P
[facility]    Consult / H&P               Today's Date: 1/16/2023  Patient Name: Joie Alexis  Date of admission: No admission date for patient encounter. Patient's age: 66 y. o., 1945  Admission Dx: No admission diagnoses are documented for this encounter. Reason for Consult:  Cardiac evaluation    Requesting Physician: No admitting provider for patient encounter. CHIEF COMPLAINT:  Elective cath    History Obtained From:  patient    HISTORY OF PRESENT ILLNESS:      The patient is a 66 y.o. female is here for elective cath   H/o Moderate to severe AS on ECHO   C/o TABARES and chest pain     Past Medical History:   has a past medical history of Anemia, unspecified, Aortic stenosis, Arthritis, AV block, BBB (bundle branch block), Breast cancer ([address] Bundle branch block, CAD (coronary artery disease), Carcinoma in situ of breast, Esophageal reflux, Essential hypertension, benign, Insulin resistance, MGUS (monoclonal gammopathy of unknown significance), Nephrolithiasis, Osteoporosis, Patient in clinical research study, Patient in clinical research study, Pure hypercholesterolemia, Stage 3 chronic kidney disease ([address] and Unspecified asthma(493.90). Past Surgical History:   has a past surgical history that includes Tubal ligation; Mastectomy (Left); Colon surgery; Colonoscopy (02/06/2006); Pacemaker insertion (05/01/2007); Breast reconstruction (Right); Breast surgery (Left); eye surgery (Right); Foot neuroma surgery (Right); Wrist fracture surgery; Upper gastrointestinal endoscopy (09/2007); Upper gastrointestinal endoscopy (03/2006); Upper gastrointestinal endoscopy (08/2005); Dilation and curettage of uterus (1973); pacemaker placement (04/23/2015); Coronary angioplasty with stent (04/27/2017); Cataract removal (Left, 05/20/2019); Lithotripsy (2012); Cystoscopy (07/31/2012); Tooth Extraction (03/05/2021);  Abdomen surgery; Cardiac surgery; Cardiac catheterization (04/27/2017); and Cardiac catheterization (2021). Home Medications:    Prior to Admission medications    Medication Sig Start Date End Date Taking?  Authorizing Provider   dicyclomine (BENTYL) 10 MG capsule Take 1 capsule by mouth 3 times daily as needed ((IBS)) 12/27/22   Merna Alfred MD   citalopram (CELEXA) 10 MG tablet Take 1 tablet by mouth daily 12/27/22   Merna Alfred MD   zolpidem (AMBIEN) 10 MG tablet TAKE 1 TABLET BY MOUTH EVERYDAY AT BEDTIME 11/4/22   Historical Provider, MD   montelukast (SINGULAIR) 10 MG tablet TAKE 1 TABLET BY MOUTH EVERY DAY IN THE EVENING 10/24/22   Merna Alfred MD   fluticasone (CUTIVATE) 0.05 % cream APPLY TO AFFECTED AREA TWICE A DAY 10/3/22   Merna Alfred MD   valACYclovir (VALTREX) 500 MG tablet PRN 8/23/22   Historical Provider, MD   triamcinolone (KENALOG) 0.025 % ointment APPLY TO VULVA TWICE DAILY 9/19/22   Julio Cesar Ahmadi MD   pravastatin (PRAVACHOL) 20 MG tablet TAKE 1 TABLET BY MOUTH EVERY DAY IN THE EVENING 9/5/22   Merna Alfred MD   calcium carbonate (OSCAL) 500 MG TABS tablet Take 1,000 mg by mouth daily    Historical Provider, MD   Ascorbic Acid (VITAMIN C) 250 MG tablet Take 250 mg by mouth daily    Historical Provider, MD   Multiple Vitamins-Minerals (THERAPEUTIC MULTIVITAMIN-MINERALS) tablet Take 1 tablet by mouth daily    Historical Provider, MD   ciclopirox (LOPROX) 0.77 % cream Apply to rash on buttock twice daily x 2 weeks 1/27/22   Julio Cesar Ahmadi MD   Vitamin E 400 units TABS Take by mouth 2 times daily    Historical Provider, MD   Omega-3 Fatty Acids (OMEGA 3 500 PO) Take 640 mg by mouth daily    Historical Provider, MD   Golimumab ([address] ARIA IV) Infuse intravenously Every 8 weeks    Historical Provider, MD   valsartan-hydrochlorothiazide (DIOVAN-HCT) 80-12.5 MG per tablet TAKE 1 TABLET BY MOUTH EVERY DAY 6/27/19   Historical Provider, MD   Denosumab (PROLIA SC) Inject into the skin Every 6 months    Historical Provider, MD   budesonide-formoterol (SYMBICORT) 160-4.5 MCG/ACT AERO INHALE 2 PUFFS BY MOUTH TWICE DAILY 3/11/19   Nicole Bazan MD   XOPENEX HFA 45 MCG/ACT inhaler INHALE TWO PUFFS BY MOUTH EVERY FOUR HOURS  Patient taking differently: PRN 10/10/17   Nicole Bazan MD   aspirin 81 MG tablet Take 81 mg by mouth every other day     Historical Provider, MD   Cholecalciferol (VITAMIN D3) 2000 UNITS CAPS Take by mouth daily     Historical Provider, MD   Probiotic Product (PROBIOTIC DAILY PO) Take by mouth daily     Historical Provider, MD   metoprolol (TOPROL-XL) 50 MG XL tablet Take 75 mg by mouth daily Take full tab in am and half tab in pm    Historical Provider, MD   lansoprazole (PREVACID) 15 MG delayed release capsule Take 15 mg by mouth daily. Historical Provider, MD      No current facility-administered medications for this encounter. Allergies:  Azithromycin, Benadryl [diphenhydramine], Codeine, Estrogens, Hctz [hydrochlorothiazide], Lisinopril, Phenergan [promethazine hcl], Tape [adhesive tape], and Amoxicillin    Social History:   reports that she quit smoking about 13 years ago. Her smoking use included cigarettes. She has a 10.00 pack-year smoking history. She has never used smokeless tobacco. She reports that she does not drink alcohol and does not use drugs. Family History: family history includes Cancer in her mother and sister; Heart Disease in her brother and father; Hypertension in her father and mother. No h/o sudden cardiac death. No for premature CAD    REVIEW OF SYSTEMS:    Constitutional: there has been no unanticipated weight loss. There's been No change in energy level, No change in activity level. Eyes: No visual changes or diplopia. No scleral icterus. ENT: No Headaches  Cardiovascular: No cardiac history  Respiratory: No previous pulmonary problems, No cough  Gastrointestinal: No abdominal pain. No change in bowel or bladder habits.   Genitourinary: No dysuria, trouble voiding, or hematuria. Musculoskeletal:  No gait disturbance, No weakness or joint complaints. Integumentary: No rash or pruritis. Neurological: No headache, diplopia, change in muscle strength, numbness or tingling. No change in gait, balance, coordination, mood, affect, memory, mentation, behavior. Psychiatric: No anxiety, or depression. Endocrine: No temperature intolerance. No excessive thirst, fluid intake, or urination. No tremor. Hematologic/Lymphatic: No abnormal bruising or bleeding, blood clots or swollen lymph nodes. Allergic/Immunologic: No nasal congestion or hives. PHYSICAL EXAM:      LMP  (LMP Unknown)    Constitutional and General Appearance: alert, cooperative, no distress and appears stated age  [de-identified]: PERRL, no cervical lymphadenopathy. No masses palpable. Normal oral mucosa  Respiratory:  Normal excursion and expansion without use of accessory muscles  Resp Auscultation: Good respiratory effort. No for increased work of breathing. On auscultation: clear to auscultation bilaterally  Cardiovascular:  5/6 systolic ejection murmur present in aortic area   Abdomen:   No masses or tenderness  Bowel sounds present  Extremities:   No Cyanosis or Clubbing   Lower extremity edema: No   Skin: Warm and dry  Neurological:  Alert and oriented. Moves all extremities well  No abnormalities of mood, affect, memory, mentation, or behavior are noted    DATA:      Labs:     CBC: No results for input(s): WBC, HGB, HCT, PLT in the last 72 hours. BMP: No results for input(s): NA, K, CO2, BUN, CREATININE, LABGLOM, GLUCOSE in the last 72 hours. BNP: No results for input(s): BNP in the last 72 hours. PT/INR: No results for input(s): PROTIME, INR in the last 72 hours. APTT:No results for input(s): APTT in the last 72 hours. CARDIAC ENZYMES:No results for input(s): CKTOTAL, CKMB, CKMBINDEX, TROPONINI in the last 72 hours.   FASTING LIPID PANEL:  Lab Results   Component Value Date/Time    HDL 58 06/24/2022 01:46 PM    LDLCALC 79 05/20/2020 12:00 AM    TRIG 195 06/24/2022 01:46 PM     LIVER PROFILE:No results for input(s): AST, ALT, LABALBU in the last 72 hours. IMPRESSION:    Patient Active Problem List   Diagnosis    Pure hypercholesterolemia    Asthma    Esophageal reflux    Carcinoma in situ of breast    Anemia    Insulin resistance    CAD (coronary artery disease)    Pacemaker    Arthritis with psoriasis (HCC)    AV heart block    Bundle branch block    Aortic stenosis    Osteoporosis    Genital herpes simplex type 2    Hiatal hernia    Atrophic vaginitis    Insomnia    Murmur, cardiac    Other emphysema (HCC)    Stage 3 chronic kidney disease (HCC)    Nephrolithiasis    MGUS (monoclonal gammopathy of unknown significance)    Sick sinus syndrome (HCC)    Renal hypertension         ECHO   1. Overall preserved LV systolic function, EF 30%. 2. Reduced LV diastolic function, DD grade II. 3. Aortic stenosis with a mean gradient of 45 mmHg  4. Moderate aortic insufficiency. 5. Mild to moderate mitral regurgitation. 6. Mild tricuspid regurgitation. 7. Increased PAP to 45 mm                   Aortic stenosis  Severe. Recent ECHO showed mean gradient 45 and valve area 0.4 cm.     2. CAD  Remains stable with catheterization from 6/2/21 showing a widely patent RCA stent with no other areas of coronary stenosis. Will continue ASA and metoprolol;    3. Essential HTN  Maintaining excellent control on valsartan and metoprolol    4. Hyperlipidemia  Continue pravastatin;    5. SSS and AVB with dual chamber pacemaker  PPM is functioning normally on evaluation today; will continue current settings. Discussed with patient and Nurse. Electronically signed by Mackenzie Rodriguez MD on 1/16/2023 at 7:38 AM    [facility]      [phone]    I reviewed the patient history personally, and examined by me during the visit.   I have reviewed the H&P/Consult / Progress note as completed, and made appropriate changes to the patient exam and treatment plans.       I have reviewed the case in details including physical exam and treatment plan with resident / fellow / NP    Patient treatment plan was explained to patient, correction in notes was made as appropriate, and discussed final arrangement based on  my evaluation and exam.    Additional Recommendations:      Sathya Carmona MD  [facility]

## 2023-01-16 NOTE — PROGRESS NOTES
Patient admitted, consent signed and questions answered. Patient ready for procedure. Call light to reach with side rails up 2 of 2. Bialteral groin areas clipped with writer and Pedro Marie present. Daughter Leyda Mckeon at bedside with patient.   History and physical needs complete

## 2023-01-18 ENCOUNTER — TELEPHONE (OUTPATIENT)
Dept: CARDIOLOGY CLINIC | Age: 78
End: 2023-01-18

## 2023-01-18 DIAGNOSIS — I35.0 AORTIC STENOSIS, SEVERE: Primary | ICD-10-CM

## 2023-01-18 NOTE — TELEPHONE ENCOUNTER
Writer ALMA for patient and called daughter to discuss the next step in the TAVR process is an appointment with the cardiothoracic surgeon and the office will be contacting her to schedule.

## 2023-02-07 DIAGNOSIS — I71.21 ANEURYSM OF ASCENDING AORTA WITHOUT RUPTURE: Primary | ICD-10-CM

## 2023-03-15 ENCOUNTER — INITIAL CONSULT (OUTPATIENT)
Dept: CARDIOTHORACIC SURGERY | Age: 78
End: 2023-03-15
Payer: MEDICARE

## 2023-03-15 VITALS
RESPIRATION RATE: 20 BRPM | HEART RATE: 66 BPM | DIASTOLIC BLOOD PRESSURE: 62 MMHG | BODY MASS INDEX: 30.36 KG/M2 | HEIGHT: 62 IN | SYSTOLIC BLOOD PRESSURE: 113 MMHG | OXYGEN SATURATION: 98 % | WEIGHT: 165 LBS | TEMPERATURE: 98 F

## 2023-03-15 DIAGNOSIS — I35.0 AORTIC STENOSIS, SEVERE: Primary | ICD-10-CM

## 2023-03-15 PROCEDURE — 99203 OFFICE O/P NEW LOW 30 MIN: CPT | Performed by: NURSE PRACTITIONER

## 2023-03-15 PROCEDURE — 1123F ACP DISCUSS/DSCN MKR DOCD: CPT | Performed by: NURSE PRACTITIONER

## 2023-03-15 PROCEDURE — G8427 DOCREV CUR MEDS BY ELIG CLIN: HCPCS | Performed by: NURSE PRACTITIONER

## 2023-03-15 PROCEDURE — 1090F PRES/ABSN URINE INCON ASSESS: CPT | Performed by: NURSE PRACTITIONER

## 2023-03-15 PROCEDURE — 1036F TOBACCO NON-USER: CPT | Performed by: NURSE PRACTITIONER

## 2023-03-15 PROCEDURE — G8399 PT W/DXA RESULTS DOCUMENT: HCPCS | Performed by: NURSE PRACTITIONER

## 2023-03-15 PROCEDURE — G8417 CALC BMI ABV UP PARAM F/U: HCPCS | Performed by: NURSE PRACTITIONER

## 2023-03-15 PROCEDURE — G8484 FLU IMMUNIZE NO ADMIN: HCPCS | Performed by: NURSE PRACTITIONER

## 2023-03-15 NOTE — PROGRESS NOTES
[facility] Cardiothoracic Surgery  Consult    Patient's Name/Date of Birth: Nehal Wilcox / 1945 (64 y.o.)    Date: March 15, 2023     Chief Complaint: severe AS    HPI: Nehal Wilcox is a 66 y.o.  female who presented to cardiothoracic surgery today for evaluation regarding her severe aortic stenosis. Based on echocardiogram patient has severe aortic stenosis with preserved EF. No coronary artery disease. Patient has multiple comorbidities including poor physical therapy. Patient came today to discuss the option of TAVR. Patient admits to having TABARES. Increased fatigue.       ROS:   CONSTITUTIONAL:  A&0x4  Respiratory: TABARES with exertion only  Cardiovascular: negative  Gastrointestinal: negative  Genitourinary:negative  Hematologic/lymphatic: negative  Musculoskeletal:negative  Neurological: negative  Endocrine: negative  Psychiatric: negative  Past Medical History:   Diagnosis Date    Anemia, unspecified 07/16/2014    Aortic stenosis     mild    Arthritis 07/16/2014    AV block     BBB (bundle branch block)     Breast cancer [facility])     age 52 / left breast    Bundle branch block     CAD (coronary artery disease) 07/16/2014    Carcinoma in situ of breast 07/16/2014    Esophageal reflux 07/16/2014    Essential hypertension, benign 07/16/2014    Insulin resistance 07/16/2014    MGUS (monoclonal gammopathy of unknown significance) 10/07/2020    IgM kappa monoclonal gammopathy quantified at 0.06 g/dL    Nephrolithiasis 09/09/2020    Lithotripsy in 2012    Osteoporosis     Patient in clinical research study     Patient in clinical research study 05/12/2022    [facility] [id number]    Pure hypercholesterolemia 07/16/2014    Stage 3 chronic kidney disease ([address] 03/19/2019    Unspecified asthma(493.90) 07/16/2014     Past Surgical History:   Procedure Laterality Date    ABDOMEN SURGERY      bowel resection    BREAST RECONSTRUCTION Right     BREAST SURGERY Left     mastectomy    CARDIAC CATHETERIZATION 2017    with Dr. Ribera [place]      [address]  2023    Right and left heart cath    CARDIAC SURGERY      cardiac cath and stent    CATARACT REMOVAL Left 2019    Dr Madonna Pinto      COLONOSCOPY  2006    CORONARY ANGIOPLASTY WITH STENT PLACEMENT  2017    LAD stent    CYSTOSCOPY  2012    DILATION AND CURETTAGE OF UTERUS  1973    EYE SURGERY Right     cataract sx    FOOT NEUROMA SURGERY Right     LITHOTRIPSY  2012    MASTECTOMY Left     PACEMAKER INSERTION  2007    PACEMAKER PLACEMENT  2015    Generator change; medtronic device    TOOTH EXTRACTION  2021    TUBAL LIGATION      UPPER GASTROINTESTINAL ENDOSCOPY  2007    UPPER GASTROINTESTINAL ENDOSCOPY  2006    UPPER GASTROINTESTINAL ENDOSCOPY  2005    WRIST FRACTURE SURGERY       Allergies   Allergen Reactions    Azithromycin Diarrhea    Benadryl [Diphenhydramine]     Codeine Nausea Only    Estrogens Other (See Comments)     Estrogen positive Breast cancer    Hctz [Hydrochlorothiazide]     Lisinopril     Phenergan [Promethazine Hcl] Other (See Comments)     Dystonia    Tape [Adhesive Tape]     Amoxicillin Diarrhea     Family History   Problem Relation Age of Onset    Cancer Mother     Hypertension Mother     Heart Disease Father     Hypertension Father     Cancer Sister     Heart Disease Brother      Social History     Socioeconomic History    Marital status: Single     Spouse name: Not on file    Number of children: 1    Years of education: Not on file    Highest education level: Not on file   Occupational History    Occupation: retired   Tobacco Use    Smoking status: Former     Packs/day: 1.00     Years: 10.00     Pack years: 10.00     Types: Cigarettes     Quit date: 6/10/2009     Years since quittin.7    Smokeless tobacco: Never   Substance and Sexual Activity    Alcohol use: No    Drug use: No    Sexual activity: Not on file   Other Topics Concern Not on file   Social History Narrative    Not on file     Social Determinants of Health     Financial Resource Strain: Not on file   Food Insecurity: Not on file   Transportation Needs: Not on file   Physical Activity: Not on file   Stress: Not on file   Social Connections: Not on file   Intimate Partner Violence: Not on file   Housing Stability: Not on file       Current Outpatient Medications   Medication Sig Dispense Refill    pravastatin (PRAVACHOL) 20 MG tablet TAKE 1 TABLET BY MOUTH EVERY DAY IN THE EVENING 90 tablet 0    valACYclovir (VALTREX) 500 MG tablet TAKE 1 TABLET BY MOUTH TWICE A DAY FOR 3 DAYS 6 tablet 3    montelukast (SINGULAIR) 10 MG tablet TAKE 1 TABLET BY MOUTH EVERY DAY IN THE EVENING 90 tablet 0    citalopram (CELEXA) 10 MG tablet Take 1 tablet by mouth daily 30 tablet 5    zolpidem (AMBIEN) 10 MG tablet TAKE 1 TABLET BY MOUTH EVERYDAY AT BEDTIME      fluticasone (CUTIVATE) 0.05 % cream APPLY TO AFFECTED AREA TWICE A DAY 60 g 2    triamcinolone (KENALOG) 0.025 % ointment APPLY TO VULVA TWICE DAILY 80 g 2    calcium carbonate (OSCAL) 500 MG TABS tablet Take 1,000 mg by mouth daily      Multiple Vitamins-Minerals (THERAPEUTIC MULTIVITAMIN-MINERALS) tablet Take 1 tablet by mouth daily      ciclopirox (LOPROX) 0.77 % cream Apply to rash on buttock twice daily x 2 weeks 90 g 2    Vitamin E 400 units TABS Take by mouth 2 times daily      Omega-3 Fatty Acids (OMEGA 3 500 PO) Take 640 mg by mouth daily      Golimumab ([place] ARIA IV) Infuse intravenously Every 8 weeks      valsartan-hydrochlorothiazide (DIOVAN-HCT) 80-12.5 MG per tablet TAKE 1 TABLET BY MOUTH EVERY DAY  2    Denosumab (PROLIA SC) Inject into the skin Every 6 months      budesonide-formoterol (SYMBICORT) 160-4.5 MCG/ACT AERO INHALE 2 PUFFS BY MOUTH TWICE DAILY 10.2 Inhaler 3    XOPENEX HFA 45 MCG/ACT inhaler INHALE TWO PUFFS BY MOUTH EVERY FOUR HOURS (Patient taking differently: PRN) 15 Inhaler 0    aspirin 81 MG tablet Take 81 mg by mouth every other day       Cholecalciferol (VITAMIN D3) 2000 UNITS CAPS Take by mouth daily       Probiotic Product (PROBIOTIC DAILY PO) Take by mouth daily       metoprolol (TOPROL-XL) 50 MG XL tablet Take 75 mg by mouth daily Take full tab in am and half tab in pm      lansoprazole (PREVACID) 15 MG delayed release capsule Take 15 mg by mouth daily. No current facility-administered medications for this visit. Physical Exam:  Vitals:    03/15/23 0914   BP: 113/62   Pulse: 66   Resp: 20   Temp: 98 °F (36.7 °C)   SpO2: 98%     Weight: Weight: 165 lb (74.8 kg)    Weight: 165 lb (74.8 kg)        General: Alert and Oriented x3. Sitting up in bed. No apparent distress. HEENT:  Normocephalic and atraumatic. PERRL. EOMI. Lips and oral mucosa moist and without lesions. Neck:  Supple. Trachea midline. Chest:  No abnormality. Equal and symmetric expansion with respiration. Lungs:  Clear to auscultation. Cardiac:  Regular rate and rhythm without murmurs, rubs or gallops. Abdomen:  Soft, non-tender, normoactive bowel sounds. No masses or organomegaly. Extremities:  No cyanosis, clubbing, or edema. Intact pulses in all four extremities. Musculoskeletal:  Intact range of motion of peripheral joints. Normal muscular strength. Neurologic:  Cranial nerves are grossly intact. Non-focal sensory deficits on exam.  Psychiatric: Mood and affect are appropriate. Imaging Studies:    Cardiac Cath: Cardiac Arteries and Lesion Findings       LMCA: Normal 0% stenosis. LAD: Normal 0% stenosis. LCx: Normal 0% stenosis. RCA: Normal 0% stenosis. proximal in stent 50% stenosis      Echo: Shows represents severe aortic stenosis based on gradients and area reviewed  Valve Gradients and Areas   +----------+--------+--------+--------+---------+----------+---------------+   ! Valve     ! Peak    ! Mean    ! Area    ! Index    ! Flow      ! Source         ! +----------+--------+--------+--------+---------+----------+---------------+   ! Aortic    !55      !59      !0.35    !0.2      !119.87    ! Sendy           !   +----------+--------+--------+--------+---------+----------+---------------+   ! Aortic    !54      !61      !        !         !          !               !        CT:NA      Assessment   Patient Active Problem List   Diagnosis    Pure hypercholesterolemia    Asthma    Esophageal reflux    Carcinoma in situ of breast    Anemia    Insulin resistance    CAD (coronary artery disease)    Pacemaker    Arthritis with psoriasis (HCC)    AV heart block    Bundle branch block    Aortic stenosis    Osteoporosis    Genital herpes simplex type 2    Hiatal hernia    Atrophic vaginitis    Insomnia    Murmur, cardiac    Other emphysema (HCC)    Stage 3 chronic kidney disease (HCC)    Nephrolithiasis    MGUS (monoclonal gammopathy of unknown significance)    Sick sinus syndrome ([address]    Renal hypertension       Risks Reviewed w/pt  No plans for surgery     PLAN:  Patient will be evaluated for catheter with cardiology. Dr. Tano Cabello agrees. No plans for open heart surgery.   Structural heart bedside      ROSALINDA Vásquez - NP, CNP  Phone: [phone]

## 2023-03-20 DIAGNOSIS — I35.0 AORTIC VALVE STENOSIS, ETIOLOGY OF CARDIAC VALVE DISEASE UNSPECIFIED: Primary | ICD-10-CM

## 2023-03-23 DIAGNOSIS — L30.9 VULVAR DERMATITIS: ICD-10-CM

## 2023-03-24 RX ORDER — TRIAMCINOLONE ACETONIDE 0.25 MG/G
OINTMENT TOPICAL
Qty: 75 G | Refills: 2 | Status: SHIPPED | OUTPATIENT
Start: 2023-03-24

## 2023-03-28 PROBLEM — F39 MOOD DISORDER (HCC): Status: ACTIVE | Noted: 2023-03-28

## 2023-04-03 ENCOUNTER — TELEPHONE (OUTPATIENT)
Dept: CARDIOLOGY CLINIC | Age: 78
End: 2023-04-03

## 2023-04-26 DIAGNOSIS — R06.02 SHORTNESS OF BREATH: ICD-10-CM

## 2023-04-26 DIAGNOSIS — R42 DIZZINESS AND GIDDINESS: ICD-10-CM

## 2023-04-26 DIAGNOSIS — I35.0 AORTIC VALVE STENOSIS, ETIOLOGY OF CARDIAC VALVE DISEASE UNSPECIFIED: Primary | ICD-10-CM

## 2023-05-02 ENCOUNTER — TELEPHONE (OUTPATIENT)
Dept: CARDIOLOGY CLINIC | Age: 78
End: 2023-05-02

## 2023-05-08 ENCOUNTER — TELEPHONE (OUTPATIENT)
Dept: CARDIOLOGY | Age: 78
End: 2023-05-08

## 2023-05-08 ENCOUNTER — PREP FOR PROCEDURE (OUTPATIENT)
Dept: CARDIOLOGY | Age: 78
End: 2023-05-08

## 2023-05-08 ENCOUNTER — HOSPITAL ENCOUNTER (OUTPATIENT)
Age: 78
Discharge: HOME | End: 2023-05-08
Payer: MEDICARE

## 2023-05-08 ENCOUNTER — TELEPHONE (OUTPATIENT)
Dept: CARDIOLOGY CLINIC | Age: 78
End: 2023-05-08

## 2023-05-08 DIAGNOSIS — N18.30 STAGE 3 CHRONIC KIDNEY DISEASE, UNSPECIFIED WHETHER STAGE 3A OR 3B CKD (HCC): Primary | ICD-10-CM

## 2023-05-08 DIAGNOSIS — I35.0 AORTIC VALVE STENOSIS, ETIOLOGY OF CARDIAC VALVE DISEASE UNSPECIFIED: ICD-10-CM

## 2023-05-08 LAB
ANION GAP SERPL CALCULATED.3IONS-SCNC: 19 MMOL/L (ref 9–17)
BUN SERPL-MCNC: 22 MG/DL (ref 8–23)
CALCIUM SERPL-MCNC: 9.6 MG/DL (ref 8.6–10.4)
CHLORIDE SERPL-SCNC: 102 MMOL/L (ref 98–107)
CO2 SERPL-SCNC: 19 MMOL/L (ref 20–31)
CREAT SERPL-MCNC: 1.25 MG/DL (ref 0.5–0.9)
GFR SERPL CREATININE-BSD FRML MDRD: 44 ML/MIN/1.73M2
GLUCOSE SERPL-MCNC: 110 MG/DL (ref 70–99)
POTASSIUM SERPL-SCNC: 4.3 MMOL/L (ref 3.7–5.3)
SODIUM SERPL-SCNC: 140 MMOL/L (ref 135–144)

## 2023-05-08 PROCEDURE — 36415 COLL VENOUS BLD VENIPUNCTURE: CPT

## 2023-05-08 PROCEDURE — 80048 BASIC METABOLIC PNL TOTAL CA: CPT

## 2023-05-08 RX ORDER — ACETYLCYSTEINE 200 MG/ML
600 SOLUTION ORAL; RESPIRATORY (INHALATION) 2 TIMES DAILY
Qty: 12 ML | Refills: 0 | OUTPATIENT
Start: 2023-05-10 | End: 2023-05-12

## 2023-05-08 RX ORDER — SODIUM CHLORIDE 9 MG/ML
INJECTION, SOLUTION INTRAVENOUS CONTINUOUS
Status: CANCELLED | OUTPATIENT
Start: 2023-05-11 | End: 2023-05-11

## 2023-05-08 NOTE — TELEPHONE ENCOUNTER
[employer]  (5/10 and 5/11) ordered per telephone to [facility] ([employer].

## 2023-05-11 ENCOUNTER — HOSPITAL ENCOUNTER (OUTPATIENT)
Dept: PULMONOLOGY | Age: 78
Discharge: HOME | End: 2023-05-11
Payer: MEDICARE

## 2023-05-11 ENCOUNTER — HOSPITAL ENCOUNTER (OUTPATIENT)
Dept: CARDIOLOGY CLINIC | Age: 78
Discharge: HOME | End: 2023-05-11
Attending: INTERNAL MEDICINE
Payer: MEDICARE

## 2023-05-11 ENCOUNTER — HOSPITAL ENCOUNTER (OUTPATIENT)
Dept: ONCOLOGY | Age: 78
Discharge: HOME | End: 2023-05-11
Attending: INTERNAL MEDICINE | Admitting: INTERNAL MEDICINE
Payer: MEDICARE

## 2023-05-11 ENCOUNTER — HOSPITAL ENCOUNTER (OUTPATIENT)
Dept: CT IMAGING | Age: 78
Discharge: HOME | End: 2023-05-13
Payer: MEDICARE

## 2023-05-11 ENCOUNTER — HOSPITAL ENCOUNTER (OUTPATIENT)
Dept: VASCULAR LAB | Age: 78
Discharge: HOME | End: 2023-05-11
Payer: MEDICARE

## 2023-05-11 VITALS
DIASTOLIC BLOOD PRESSURE: 68 MMHG | SYSTOLIC BLOOD PRESSURE: 150 MMHG | HEART RATE: 67 BPM | RESPIRATION RATE: 18 BRPM | TEMPERATURE: 98.2 F | OXYGEN SATURATION: 96 %

## 2023-05-11 DIAGNOSIS — Z82.49 FAMILY HISTORY OF PREMATURE CAD: ICD-10-CM

## 2023-05-11 DIAGNOSIS — N18.30 STAGE 3 CHRONIC KIDNEY DISEASE, UNSPECIFIED WHETHER STAGE 3A OR 3B CKD (HCC): ICD-10-CM

## 2023-05-11 DIAGNOSIS — R06.02 SHORTNESS OF BREATH: ICD-10-CM

## 2023-05-11 DIAGNOSIS — I35.0 AORTIC VALVE STENOSIS, ETIOLOGY OF CARDIAC VALVE DISEASE UNSPECIFIED: ICD-10-CM

## 2023-05-11 DIAGNOSIS — N18.32 STAGE 3B CHRONIC KIDNEY DISEASE (HCC): Primary | ICD-10-CM

## 2023-05-11 DIAGNOSIS — R42 DIZZINESS AND GIDDINESS: ICD-10-CM

## 2023-05-11 PROBLEM — N18.9 CKD (CHRONIC KIDNEY DISEASE): Status: ACTIVE | Noted: 2023-05-11

## 2023-05-11 PROCEDURE — 75572 CT HRT W/3D IMAGE: CPT

## 2023-05-11 PROCEDURE — 93880 EXTRACRANIAL BILAT STUDY: CPT

## 2023-05-11 PROCEDURE — G1010 CDSM STANSON: HCPCS

## 2023-05-11 PROCEDURE — 2580000003 HC RX 258: Performed by: NURSE PRACTITIONER

## 2023-05-11 PROCEDURE — 96361 HYDRATE IV INFUSION ADD-ON: CPT

## 2023-05-11 PROCEDURE — 6360000004 HC RX CONTRAST MEDICATION: Performed by: NURSE PRACTITIONER

## 2023-05-11 PROCEDURE — 96360 HYDRATION IV INFUSION INIT: CPT

## 2023-05-11 RX ORDER — DICYCLOMINE HYDROCHLORIDE 10 MG/1
10 CAPSULE ORAL 3 TIMES DAILY PRN
COMMUNITY

## 2023-05-11 RX ORDER — SODIUM CHLORIDE 9 MG/ML
INJECTION, SOLUTION INTRAVENOUS CONTINUOUS
Status: ACTIVE | OUTPATIENT
Start: 2023-05-11 | End: 2023-05-11

## 2023-05-11 RX ADMIN — SODIUM CHLORIDE: 9 INJECTION, SOLUTION INTRAVENOUS at 08:17

## 2023-05-11 RX ADMIN — IOPAMIDOL 180 ML: 755 INJECTION, SOLUTION INTRAVENOUS at 12:34

## 2023-05-11 ASSESSMENT — ENCOUNTER SYMPTOMS
BLOOD IN STOOL: 0
ABDOMINAL PAIN: 0
COUGH: 1
DIARRHEA: 1
EYE DISCHARGE: 0
SHORTNESS OF BREATH: 1

## 2023-05-11 NOTE — PROGRESS NOTES
Date:  2023  Time:  10:15 AM    [facility] at [facility]    Office: [phone] Fax: [phone]    Re:  Akbar Lilly   Patient : 1945    + shortness of breath w/ most activity   Decreased exercise tolerance  + constant   fatigue  Denies Edema   + chest discomfort from hiatal hernia   Denies  palpitations   No orthopnea , nor PND         Limiting activities to avoid symptoms   Breast L   age 52  No chemo no radiation   And reconstuction  Lift on R            Review of Systems   Constitutional:  Positive for activity change and fatigue. Negative for chills and fever. Eyes:  Negative for discharge and visual disturbance. Respiratory:  Positive for cough (from asthma) and shortness of breath. Cardiovascular:  Negative for chest pain and leg swelling. Gastrointestinal:  Positive for diarrhea (has meds for prn use). Negative for abdominal pain and blood in stool. Endocrine: Negative for cold intolerance and heat intolerance. Genitourinary:  Negative for dysuria and flank pain. Musculoskeletal:  Negative for joint swelling and myalgias. Skin:  Negative for pallor and rash. Neurological:  Negative for dizziness and headaches. Psychiatric/Behavioral:  Negative for hallucinations and suicidal ideas. Physical Exam  Vitals and nursing note reviewed. Constitutional:       Comments:      HENT:      Head: Normocephalic and atraumatic. Eyes:      General: No scleral icterus. Conjunctiva/sclera: Conjunctivae normal.   Cardiovascular:      Rate and Rhythm: Normal rate and regular rhythm. Heart sounds: Murmur heard. Comments: Grade III/VI systolic ejection murmur    Pulmonary:      Effort: Pulmonary effort is normal.      Breath sounds: Normal breath sounds. No wheezing or rales. Abdominal:      General: Bowel sounds are normal.      Palpations: Abdomen is soft. Musculoskeletal:         General: Normal range of motion.       Cervical back:

## 2023-05-12 ENCOUNTER — APPOINTMENT (OUTPATIENT)
Dept: VASCULAR LAB | Age: 78
End: 2023-05-12
Payer: MEDICARE

## 2023-05-12 NOTE — PROCEDURES
[facility]                               PULMONARY FUNCTION    PATIENT NAME: Connor Perry                  :        1945  MED REC NO:   [id number]                             ROOM:  ACCOUNT NO:   [de-identified]                           ADMIT DATE: 2023  PROVIDER:     Berkley Bobby    DATE OF PROCEDURE:  2023    The patient's spirometry shows obstructive flow-volume loop. FEV1 55%  predicted, FVC 63% predicted. FEV1 to FVC ratio is 67. Total lung capacity by body box 98% predicted, % predicted and  diffusion capacity normal at 122% predicted, uncorrected. Corrected for  alveolar volume 147% predicted with increase in airway resistance. FINAL IMPRESSION:  This is stage II COPD with mild hyperinflation and  normal diffusion capacity. Clinical correlation advised.         Therese Acevedo    D: 2023 22:38:45       T: 2023 22:41:14     [email or web address]  Job#: [id number]     Doc#: [id number]    CC:

## 2023-05-16 ENCOUNTER — TELEPHONE (OUTPATIENT)
Dept: CARDIOLOGY CLINIC | Age: 78
End: 2023-05-16

## 2023-05-16 ENCOUNTER — HOSPITAL ENCOUNTER (OUTPATIENT)
Age: 78
Setting detail: SPECIMEN
Discharge: HOME | End: 2023-05-16
Payer: MEDICARE

## 2023-05-16 DIAGNOSIS — N18.32 STAGE 3B CHRONIC KIDNEY DISEASE (HCC): ICD-10-CM

## 2023-05-16 LAB
ANION GAP SERPL CALCULATED.3IONS-SCNC: 11 MMOL/L (ref 9–17)
BUN SERPL-MCNC: 19 MG/DL (ref 8–23)
BUN/CREAT SERPL: 16 (ref 9–20)
CALCIUM SERPL-MCNC: 9.6 MG/DL (ref 8.6–10.4)
CHLORIDE SERPL-SCNC: 101 MMOL/L (ref 98–107)
CO2 SERPL-SCNC: 27 MMOL/L (ref 20–31)
CREAT SERPL-MCNC: 1.21 MG/DL (ref 0.5–0.9)
GFR SERPL CREATININE-BSD FRML MDRD: 46 ML/MIN/1.73M2
GLUCOSE SERPL-MCNC: 103 MG/DL (ref 70–99)
POTASSIUM SERPL-SCNC: 4.6 MMOL/L (ref 3.7–5.3)
SODIUM SERPL-SCNC: 139 MMOL/L (ref 135–144)

## 2023-05-16 PROCEDURE — 80048 BASIC METABOLIC PNL TOTAL CA: CPT

## 2023-05-16 PROCEDURE — 36415 COLL VENOUS BLD VENIPUNCTURE: CPT

## 2023-06-07 ENCOUNTER — HOSPITAL ENCOUNTER (OUTPATIENT)
Age: 78
Discharge: HOME | End: 2023-06-07
Payer: MEDICARE

## 2023-06-07 DIAGNOSIS — D47.2 MGUS (MONOCLONAL GAMMOPATHY OF UNKNOWN SIGNIFICANCE): ICD-10-CM

## 2023-06-07 LAB
BASOPHILS # BLD: 0.09 K/UL (ref 0–0.2)
BASOPHILS NFR BLD: 1 % (ref 0–2)
EOSINOPHIL # BLD: 0.46 K/UL (ref 0–0.44)
EOSINOPHILS RELATIVE PERCENT: 5 % (ref 1–4)
ERYTHROCYTE [DISTWIDTH] IN BLOOD BY AUTOMATED COUNT: 14.6 % (ref 11.8–14.4)
FREE KAPPA/LAMBDA RATIO: 1.65 (ref 0.26–1.65)
HCT VFR BLD AUTO: 36.4 % (ref 36.3–47.1)
HGB BLD-MCNC: 11.5 G/DL (ref 11.9–15.1)
IGA SERPL-MCNC: 51 MG/DL (ref 70–400)
IGG SERPL-MCNC: 826 MG/DL (ref 700–1600)
IGM SERPL-MCNC: 285 MG/DL (ref 40–230)
IMM GRANULOCYTES # BLD AUTO: 0.03 K/UL (ref 0–0.3)
IMM GRANULOCYTES NFR BLD: 0 %
KAPPA LC FREE SER-MCNC: 3.54 MG/DL (ref 0.37–1.94)
LAMBDA LC FREE SERPL-MCNC: 2.15 MG/DL (ref 0.57–2.63)
LYMPHOCYTES # BLD: 24 % (ref 24–43)
LYMPHOCYTES NFR BLD: 2.2 K/UL (ref 1.1–3.7)
MCH RBC QN AUTO: 27.6 PG (ref 25.2–33.5)
MCHC RBC AUTO-ENTMCNC: 31.6 G/DL (ref 28.4–34.8)
MCV RBC AUTO: 87.3 FL (ref 82.6–102.9)
MONOCYTES NFR BLD: 0.86 K/UL (ref 0.1–1.2)
MONOCYTES NFR BLD: 9 % (ref 3–12)
NEUTROPHILS NFR BLD: 61 % (ref 36–65)
NEUTS SEG NFR BLD: 5.6 K/UL (ref 1.5–8.1)
NRBC AUTOMATED: 0 PER 100 WBC
PLATELET # BLD AUTO: 194 K/UL (ref 138–453)
PMV BLD AUTO: 10.8 FL (ref 8.1–13.5)
RBC # BLD AUTO: 4.17 M/UL (ref 3.95–5.11)
RBC # BLD: ABNORMAL 10*6/UL
WBC OTHER # BLD: 9.2 K/UL (ref 3.5–11.3)

## 2023-06-07 PROCEDURE — 82784 ASSAY IGA/IGD/IGG/IGM EACH: CPT

## 2023-06-07 PROCEDURE — 84165 PROTEIN E-PHORESIS SERUM: CPT

## 2023-06-07 PROCEDURE — 83521 IG LIGHT CHAINS FREE EACH: CPT

## 2023-06-07 PROCEDURE — 85027 COMPLETE CBC AUTOMATED: CPT

## 2023-06-07 PROCEDURE — 86334 IMMUNOFIX E-PHORESIS SERUM: CPT

## 2023-06-07 PROCEDURE — 36415 COLL VENOUS BLD VENIPUNCTURE: CPT

## 2023-06-07 PROCEDURE — 84155 ASSAY OF PROTEIN SERUM: CPT

## 2023-06-09 LAB
ALBUMIN PERCENT: 68 % (ref 45–65)
ALBUMIN SERPL-MCNC: 4.5 G/DL (ref 3.2–5.2)
ALPHA 2 PERCENT: 10 % (ref 6–13)
ALPHA1 GLOB SERPL ELPH-MCNC: 0.2 G/DL (ref 0.1–0.4)
ALPHA1 GLOB SERPL ELPH-MCNC: 3 % (ref 3–6)
ALPHA2 GLOB SERPL ELPH-MCNC: 0.6 G/DL (ref 0.5–0.9)
B-GLOBULIN SERPL ELPH-MCNC: 0.7 G/DL (ref 0.5–1.1)
B-GLOBULIN SERPL ELPH-MCNC: 10 % (ref 11–19)
GAMMA GLOB SERPL ELPH-MCNC: 0.7 G/DL (ref 0.5–1.5)
GAMMA GLOBULIN %: 10 % (ref 9–20)
INTERPRETATION SERPL IFE-IMP: NORMAL
PATHOLOGIST: ABNORMAL
PATHOLOGIST: NORMAL
PROT PATTERN SERPL ELPH-IMP: ABNORMAL
PROT SERPL-MCNC: 6.6 G/DL (ref 6.4–8.3)
TOTAL PROT. SUM,%: 101 % (ref 98–102)
TOTAL PROT. SUM: 6.7 G/DL (ref 6.3–8.2)

## 2023-06-16 PROBLEM — I51.89 DIASTOLIC DYSFUNCTION: Status: ACTIVE | Noted: 2023-06-16

## 2023-06-16 PROBLEM — J44.9 COPD (CHRONIC OBSTRUCTIVE PULMONARY DISEASE) (HCC): Status: ACTIVE | Noted: 2018-06-11

## 2023-06-16 PROBLEM — J42 CHRONIC BRONCHITIS (HCC): Status: ACTIVE | Noted: 2023-06-16

## 2023-06-16 PROBLEM — Z87.891 FORMER SMOKER: Status: ACTIVE | Noted: 2023-06-16

## 2023-06-19 ENCOUNTER — HOSPITAL ENCOUNTER (OUTPATIENT)
Age: 78
Setting detail: SPECIMEN
Discharge: HOME | End: 2023-06-19
Payer: MEDICARE

## 2023-06-19 ENCOUNTER — TELEPHONE (OUTPATIENT)
Dept: CARDIOLOGY CLINIC | Age: 78
End: 2023-06-19

## 2023-06-19 DIAGNOSIS — N18.30 STAGE 3 CHRONIC KIDNEY DISEASE, UNSPECIFIED WHETHER STAGE 3A OR 3B CKD (HCC): Primary | ICD-10-CM

## 2023-06-19 DIAGNOSIS — N18.30 STAGE 3 CHRONIC KIDNEY DISEASE, UNSPECIFIED WHETHER STAGE 3A OR 3B CKD (HCC): ICD-10-CM

## 2023-06-19 LAB
ANION GAP SERPL CALCULATED.3IONS-SCNC: 10 MMOL/L (ref 9–17)
BUN SERPL-MCNC: 18 MG/DL (ref 8–23)
BUN/CREAT SERPL: 16 (ref 9–20)
CALCIUM SERPL-MCNC: 9.4 MG/DL (ref 8.6–10.4)
CHLORIDE SERPL-SCNC: 103 MMOL/L (ref 98–107)
CO2 SERPL-SCNC: 30 MMOL/L (ref 20–31)
CREAT SERPL-MCNC: 1.16 MG/DL (ref 0.5–0.9)
GFR SERPL CREATININE-BSD FRML MDRD: 48 ML/MIN/1.73M2
GLUCOSE SERPL-MCNC: 117 MG/DL (ref 70–99)
POTASSIUM SERPL-SCNC: 4 MMOL/L (ref 3.7–5.3)
SODIUM SERPL-SCNC: 143 MMOL/L (ref 135–144)

## 2023-06-19 PROCEDURE — 80048 BASIC METABOLIC PNL TOTAL CA: CPT

## 2023-06-19 PROCEDURE — 36415 COLL VENOUS BLD VENIPUNCTURE: CPT

## 2023-06-19 RX ORDER — CLOPIDOGREL 300 MG/1
300 TABLET, FILM COATED ORAL ONCE
OUTPATIENT
Start: 2023-06-19 | End: 2023-06-19

## 2023-06-19 RX ORDER — SODIUM CHLORIDE 9 MG/ML
INJECTION, SOLUTION INTRAVENOUS CONTINUOUS
OUTPATIENT
Start: 2023-06-19

## 2023-06-19 RX ORDER — SODIUM CHLORIDE 9 MG/ML
INJECTION, SOLUTION INTRAVENOUS PRN
OUTPATIENT
Start: 2023-06-19

## 2023-06-19 NOTE — TELEPHONE ENCOUNTER
Plans for Structural Heart Procedure. Pt updated on meds to take in am as recommended by Dr Mike Cunningham. Pt to arrive here at 0630. NPO at midnight tonight. Patient verbalizes understanding. Stat labs ordered for pt d/t pt having N/V last evening. There is concern for loss of fluids. See orders.

## 2023-06-19 NOTE — PROGRESS NOTES
Plans for Structural Heart Procedure. Pt updated on meds to take in am as recommended by Dr Gabriela Ryan. Pt to arrive here at 0630. NPO at midnight tonight. Patient verbalizes understanding.

## 2023-06-20 ENCOUNTER — HOSPITAL ENCOUNTER (INPATIENT)
Age: 78
LOS: 5 days | Discharge: HOME | DRG: 267 | End: 2023-06-25
Attending: INTERNAL MEDICINE | Admitting: STUDENT IN AN ORGANIZED HEALTH CARE EDUCATION/TRAINING PROGRAM
Payer: MEDICARE

## 2023-06-20 ENCOUNTER — HOSPITAL ENCOUNTER (OUTPATIENT)
Dept: CARDIAC CATH/INVASIVE PROCEDURES | Age: 78
Setting detail: SURGERY ADMIT
Discharge: HOME | End: 2023-06-20

## 2023-06-20 DIAGNOSIS — Z95.2 STATUS POST TRANSCATHETER AORTIC VALVE REPLACEMENT: Primary | ICD-10-CM

## 2023-06-20 PROBLEM — R55 SYNCOPE AND COLLAPSE: Status: ACTIVE | Noted: 2023-06-20

## 2023-06-20 LAB
ALBUMIN SERPL-MCNC: 4.1 G/DL (ref 3.5–5.2)
ALBUMIN/GLOB SERPL: 1.6 {RATIO} (ref 1–2.5)
ALP SERPL-CCNC: 66 U/L (ref 35–104)
ALT SERPL-CCNC: 33 U/L (ref 5–33)
ANION GAP SERPL CALCULATED.3IONS-SCNC: 10 MMOL/L (ref 9–17)
AST SERPL-CCNC: 37 U/L
BASOPHILS # BLD: 0.06 K/UL (ref 0–0.2)
BASOPHILS NFR BLD: 1 % (ref 0–2)
BILIRUB SERPL-MCNC: 0.3 MG/DL (ref 0.3–1.2)
BNP SERPL-MCNC: 4644 PG/ML
BUN SERPL-MCNC: 20 MG/DL (ref 8–23)
CALCIUM SERPL-MCNC: 9.2 MG/DL (ref 8.6–10.4)
CHLORIDE SERPL-SCNC: 101 MMOL/L (ref 98–107)
CO2 SERPL-SCNC: 26 MMOL/L (ref 20–31)
CREAT SERPL-MCNC: 1.28 MG/DL (ref 0.5–0.9)
EOSINOPHIL # BLD: 0.12 K/UL (ref 0–0.44)
EOSINOPHILS RELATIVE PERCENT: 1 % (ref 1–4)
ERYTHROCYTE [DISTWIDTH] IN BLOOD BY AUTOMATED COUNT: 14.6 % (ref 11.8–14.4)
GFR SERPL CREATININE-BSD FRML MDRD: 43 ML/MIN/1.73M2
GLUCOSE SERPL-MCNC: 104 MG/DL (ref 70–99)
HCT VFR BLD AUTO: 34.8 % (ref 36.3–47.1)
HGB BLD-MCNC: 11.4 G/DL (ref 11.9–15.1)
IMM GRANULOCYTES # BLD AUTO: 0.03 K/UL (ref 0–0.3)
IMM GRANULOCYTES NFR BLD: 0 %
LYMPHOCYTES # BLD: 16 % (ref 24–43)
LYMPHOCYTES NFR BLD: 1.69 K/UL (ref 1.1–3.7)
MAGNESIUM SERPL-MCNC: 2.4 MG/DL (ref 1.6–2.6)
MCH RBC QN AUTO: 27.8 PG (ref 25.2–33.5)
MCHC RBC AUTO-ENTMCNC: 32.8 G/DL (ref 28.4–34.8)
MCV RBC AUTO: 84.9 FL (ref 82.6–102.9)
MONOCYTES NFR BLD: 0.88 K/UL (ref 0.1–1.2)
MONOCYTES NFR BLD: 8 % (ref 3–12)
NEUTROPHILS NFR BLD: 74 % (ref 36–65)
NEUTS SEG NFR BLD: 7.8 K/UL (ref 1.5–8.1)
NRBC AUTOMATED: 0 PER 100 WBC
PLATELET # BLD AUTO: ABNORMAL K/UL (ref 138–453)
PLATELET, FLUORESCENCE: 79 K/UL (ref 138–453)
PLATELETS.RETICULATED NFR BLD AUTO: 5.7 % (ref 1.1–10.3)
POTASSIUM SERPL-SCNC: 4 MMOL/L (ref 3.7–5.3)
PROT SERPL-MCNC: 6.6 G/DL (ref 6.4–8.3)
RBC # BLD AUTO: 4.1 M/UL (ref 3.95–5.11)
RBC # BLD: ABNORMAL 10*6/UL
REASON FOR REJECTION: NORMAL
SODIUM SERPL-SCNC: 137 MMOL/L (ref 135–144)
SPECIMEN SOURCE: NORMAL
WBC OTHER # BLD: 10.6 K/UL (ref 3.5–11.3)
ZZ NTE CLEAN UP: ORDERED TEST: NORMAL

## 2023-06-20 PROCEDURE — 80053 COMPREHEN METABOLIC PANEL: CPT

## 2023-06-20 PROCEDURE — 83880 ASSAY OF NATRIURETIC PEPTIDE: CPT

## 2023-06-20 PROCEDURE — 2580000003 HC RX 258: Performed by: NURSE PRACTITIONER

## 2023-06-20 PROCEDURE — 83735 ASSAY OF MAGNESIUM: CPT

## 2023-06-20 PROCEDURE — 6360000002 HC RX W HCPCS: Performed by: NURSE PRACTITIONER

## 2023-06-20 PROCEDURE — 2060000000 HC ICU INTERMEDIATE R&B

## 2023-06-20 PROCEDURE — 6370000000 HC RX 637 (ALT 250 FOR IP): Performed by: NURSE PRACTITIONER

## 2023-06-20 PROCEDURE — 94640 AIRWAY INHALATION TREATMENT: CPT

## 2023-06-20 PROCEDURE — 85055 RETICULATED PLATELET ASSAY: CPT

## 2023-06-20 PROCEDURE — 85027 COMPLETE CBC AUTOMATED: CPT

## 2023-06-20 PROCEDURE — 99223 1ST HOSP IP/OBS HIGH 75: CPT | Performed by: INTERNAL MEDICINE

## 2023-06-20 PROCEDURE — 36415 COLL VENOUS BLD VENIPUNCTURE: CPT

## 2023-06-20 RX ORDER — ACETAMINOPHEN 650 MG/1
650 SUPPOSITORY RECTAL EVERY 6 HOURS PRN
Status: DISCONTINUED | OUTPATIENT
Start: 2023-06-20 | End: 2023-06-25 | Stop reason: HOSPADM

## 2023-06-20 RX ORDER — SODIUM CHLORIDE 9 MG/ML
INJECTION, SOLUTION INTRAVENOUS PRN
Status: DISCONTINUED | OUTPATIENT
Start: 2023-06-20 | End: 2023-06-25 | Stop reason: HOSPADM

## 2023-06-20 RX ORDER — VALSARTAN 80 MG/1
80 TABLET ORAL DAILY
Status: DISCONTINUED | OUTPATIENT
Start: 2023-06-21 | End: 2023-06-25 | Stop reason: HOSPADM

## 2023-06-20 RX ORDER — VALSARTAN AND HYDROCHLOROTHIAZIDE 80; 12.5 MG/1; MG/1
1 TABLET, FILM COATED ORAL DAILY
Status: DISCONTINUED | OUTPATIENT
Start: 2023-06-20 | End: 2023-06-20 | Stop reason: DRUGHIGH

## 2023-06-20 RX ORDER — POTASSIUM CHLORIDE 20 MEQ/1
40 TABLET, EXTENDED RELEASE ORAL PRN
Status: DISCONTINUED | OUTPATIENT
Start: 2023-06-20 | End: 2023-06-25 | Stop reason: HOSPADM

## 2023-06-20 RX ORDER — HYDROCHLOROTHIAZIDE 25 MG/1
12.5 TABLET ORAL DAILY
Status: DISCONTINUED | OUTPATIENT
Start: 2023-06-21 | End: 2023-06-21

## 2023-06-20 RX ORDER — BUDESONIDE AND FORMOTEROL FUMARATE DIHYDRATE 160; 4.5 UG/1; UG/1
2 AEROSOL RESPIRATORY (INHALATION) 2 TIMES DAILY
Status: DISCONTINUED | OUTPATIENT
Start: 2023-06-20 | End: 2023-06-25 | Stop reason: HOSPADM

## 2023-06-20 RX ORDER — POLYETHYLENE GLYCOL 3350 17 G/17G
17 POWDER, FOR SOLUTION ORAL DAILY PRN
Status: DISCONTINUED | OUTPATIENT
Start: 2023-06-20 | End: 2023-06-25 | Stop reason: HOSPADM

## 2023-06-20 RX ORDER — ENOXAPARIN SODIUM 100 MG/ML
40 INJECTION SUBCUTANEOUS DAILY
Status: DISCONTINUED | OUTPATIENT
Start: 2023-06-20 | End: 2023-06-25 | Stop reason: HOSPADM

## 2023-06-20 RX ORDER — METOPROLOL TARTRATE 50 MG/1
50 TABLET, FILM COATED ORAL DAILY
Status: DISCONTINUED | OUTPATIENT
Start: 2023-06-21 | End: 2023-06-25

## 2023-06-20 RX ORDER — M-VIT,TX,IRON,MINS/CALC/FOLIC 27MG-0.4MG
1 TABLET ORAL DAILY
Status: DISCONTINUED | OUTPATIENT
Start: 2023-06-20 | End: 2023-06-25 | Stop reason: HOSPADM

## 2023-06-20 RX ORDER — ALBUTEROL SULFATE 2.5 MG/3ML
2.5 SOLUTION RESPIRATORY (INHALATION) EVERY 4 HOURS PRN
Status: DISCONTINUED | OUTPATIENT
Start: 2023-06-20 | End: 2023-06-25 | Stop reason: HOSPADM

## 2023-06-20 RX ORDER — SODIUM CHLORIDE 0.9 % (FLUSH) 0.9 %
10 SYRINGE (ML) INJECTION PRN
Status: DISCONTINUED | OUTPATIENT
Start: 2023-06-20 | End: 2023-06-25 | Stop reason: HOSPADM

## 2023-06-20 RX ORDER — SODIUM CHLORIDE 0.9 % (FLUSH) 0.9 %
5-40 SYRINGE (ML) INJECTION EVERY 12 HOURS SCHEDULED
Status: DISCONTINUED | OUTPATIENT
Start: 2023-06-20 | End: 2023-06-25 | Stop reason: HOSPADM

## 2023-06-20 RX ORDER — CITALOPRAM 10 MG/1
10 TABLET ORAL DAILY
Status: DISCONTINUED | OUTPATIENT
Start: 2023-06-20 | End: 2023-06-25 | Stop reason: HOSPADM

## 2023-06-20 RX ORDER — PRAVASTATIN SODIUM 20 MG
20 TABLET ORAL EVERY EVENING
Status: DISCONTINUED | OUTPATIENT
Start: 2023-06-20 | End: 2023-06-25 | Stop reason: HOSPADM

## 2023-06-20 RX ORDER — ACETAMINOPHEN 325 MG/1
650 TABLET ORAL EVERY 6 HOURS PRN
Status: DISCONTINUED | OUTPATIENT
Start: 2023-06-20 | End: 2023-06-25 | Stop reason: HOSPADM

## 2023-06-20 RX ORDER — PANTOPRAZOLE SODIUM 40 MG/1
40 TABLET, DELAYED RELEASE ORAL
Status: DISCONTINUED | OUTPATIENT
Start: 2023-06-21 | End: 2023-06-25 | Stop reason: HOSPADM

## 2023-06-20 RX ORDER — MONTELUKAST SODIUM 10 MG/1
10 TABLET ORAL EVERY EVENING
Status: DISCONTINUED | OUTPATIENT
Start: 2023-06-20 | End: 2023-06-25 | Stop reason: HOSPADM

## 2023-06-20 RX ORDER — ONDANSETRON 4 MG/1
4 TABLET, ORALLY DISINTEGRATING ORAL EVERY 8 HOURS PRN
Status: DISCONTINUED | OUTPATIENT
Start: 2023-06-20 | End: 2023-06-25 | Stop reason: HOSPADM

## 2023-06-20 RX ORDER — POTASSIUM CHLORIDE 7.45 MG/ML
10 INJECTION INTRAVENOUS PRN
Status: DISCONTINUED | OUTPATIENT
Start: 2023-06-20 | End: 2023-06-25 | Stop reason: HOSPADM

## 2023-06-20 RX ORDER — ONDANSETRON 2 MG/ML
4 INJECTION INTRAMUSCULAR; INTRAVENOUS EVERY 6 HOURS PRN
Status: DISCONTINUED | OUTPATIENT
Start: 2023-06-20 | End: 2023-06-25 | Stop reason: HOSPADM

## 2023-06-20 RX ORDER — MAGNESIUM SULFATE 1 G/100ML
1000 INJECTION INTRAVENOUS PRN
Status: DISCONTINUED | OUTPATIENT
Start: 2023-06-20 | End: 2023-06-25 | Stop reason: HOSPADM

## 2023-06-20 RX ADMIN — PRAVASTATIN SODIUM 20 MG: 20 TABLET ORAL at 16:14

## 2023-06-20 RX ADMIN — Medication 1 TABLET: at 16:14

## 2023-06-20 RX ADMIN — MONTELUKAST SODIUM 10 MG: 10 TABLET, FILM COATED ORAL at 16:14

## 2023-06-20 RX ADMIN — METOPROLOL TARTRATE 25 MG: 25 TABLET, FILM COATED ORAL at 20:54

## 2023-06-20 RX ADMIN — BUDESONIDE AND FORMOTEROL FUMARATE DIHYDRATE 2 PUFF: 160; 4.5 AEROSOL RESPIRATORY (INHALATION) at 21:46

## 2023-06-20 RX ADMIN — ENOXAPARIN SODIUM 40 MG: 40 INJECTION SUBCUTANEOUS at 16:20

## 2023-06-20 RX ADMIN — BUDESONIDE AND FORMOTEROL FUMARATE DIHYDRATE 2 PUFF: 160; 4.5 AEROSOL RESPIRATORY (INHALATION) at 17:31

## 2023-06-20 RX ADMIN — CITALOPRAM 10 MG: 10 TABLET, FILM COATED ORAL at 16:15

## 2023-06-20 RX ADMIN — SODIUM CHLORIDE, PRESERVATIVE FREE 10 ML: 5 INJECTION INTRAVENOUS at 20:55

## 2023-06-20 ASSESSMENT — PAIN SCALES - GENERAL: PAINLEVEL_OUTOF10: 4

## 2023-06-21 LAB
ALBUMIN SERPL-MCNC: 3.8 G/DL (ref 3.5–5.2)
ALBUMIN/GLOB SERPL: 1.5 {RATIO} (ref 1–2.5)
ALP SERPL-CCNC: 60 U/L (ref 35–104)
ALT SERPL-CCNC: 29 U/L (ref 5–33)
ANION GAP SERPL CALCULATED.3IONS-SCNC: 11 MMOL/L (ref 9–17)
AST SERPL-CCNC: 36 U/L
BASOPHILS # BLD: 0.04 K/UL (ref 0–0.2)
BASOPHILS NFR BLD: 1 % (ref 0–2)
BILIRUB SERPL-MCNC: 0.4 MG/DL (ref 0.3–1.2)
BUN SERPL-MCNC: 20 MG/DL (ref 8–23)
CALCIUM SERPL-MCNC: 9 MG/DL (ref 8.6–10.4)
CHLORIDE SERPL-SCNC: 103 MMOL/L (ref 98–107)
CO2 SERPL-SCNC: 26 MMOL/L (ref 20–31)
CREAT SERPL-MCNC: 1.24 MG/DL (ref 0.5–0.9)
EOSINOPHIL # BLD: 0.29 K/UL (ref 0–0.44)
EOSINOPHILS RELATIVE PERCENT: 4 % (ref 1–4)
ERYTHROCYTE [DISTWIDTH] IN BLOOD BY AUTOMATED COUNT: 14.8 % (ref 11.8–14.4)
GFR SERPL CREATININE-BSD FRML MDRD: 45 ML/MIN/1.73M2
GLUCOSE SERPL-MCNC: 115 MG/DL (ref 70–99)
HCT VFR BLD AUTO: 33.2 % (ref 36.3–47.1)
HGB BLD-MCNC: 10.4 G/DL (ref 11.9–15.1)
IMM GRANULOCYTES # BLD AUTO: 0.03 K/UL (ref 0–0.3)
IMM GRANULOCYTES NFR BLD: 0 %
LYMPHOCYTES # BLD: 26 % (ref 24–43)
LYMPHOCYTES NFR BLD: 2.04 K/UL (ref 1.1–3.7)
MAGNESIUM SERPL-MCNC: 2.2 MG/DL (ref 1.6–2.6)
MCH RBC QN AUTO: 27.4 PG (ref 25.2–33.5)
MCHC RBC AUTO-ENTMCNC: 31.3 G/DL (ref 28.4–34.8)
MCV RBC AUTO: 87.4 FL (ref 82.6–102.9)
MONOCYTES NFR BLD: 1.01 K/UL (ref 0.1–1.2)
MONOCYTES NFR BLD: 13 % (ref 3–12)
NEUTROPHILS NFR BLD: 56 % (ref 36–65)
NEUTS SEG NFR BLD: 4.57 K/UL (ref 1.5–8.1)
NRBC AUTOMATED: 0 PER 100 WBC
PLATELET # BLD AUTO: 153 K/UL (ref 138–453)
PMV BLD AUTO: 10.7 FL (ref 8.1–13.5)
POTASSIUM SERPL-SCNC: 4.1 MMOL/L (ref 3.7–5.3)
PROT SERPL-MCNC: 6.3 G/DL (ref 6.4–8.3)
RBC # BLD AUTO: 3.8 M/UL (ref 3.95–5.11)
RBC # BLD: ABNORMAL 10*6/UL
SODIUM SERPL-SCNC: 140 MMOL/L (ref 135–144)
WBC OTHER # BLD: 8 K/UL (ref 3.5–11.3)

## 2023-06-21 PROCEDURE — 6370000000 HC RX 637 (ALT 250 FOR IP): Performed by: NURSE PRACTITIONER

## 2023-06-21 PROCEDURE — 86900 BLOOD TYPING SEROLOGIC ABO: CPT

## 2023-06-21 PROCEDURE — 99223 1ST HOSP IP/OBS HIGH 75: CPT | Performed by: INTERNAL MEDICINE

## 2023-06-21 PROCEDURE — 80053 COMPREHEN METABOLIC PANEL: CPT

## 2023-06-21 PROCEDURE — 99232 SBSQ HOSP IP/OBS MODERATE 35: CPT | Performed by: STUDENT IN AN ORGANIZED HEALTH CARE EDUCATION/TRAINING PROGRAM

## 2023-06-21 PROCEDURE — 85027 COMPLETE CBC AUTOMATED: CPT

## 2023-06-21 PROCEDURE — 94640 AIRWAY INHALATION TREATMENT: CPT

## 2023-06-21 PROCEDURE — 2580000003 HC RX 258: Performed by: NURSE PRACTITIONER

## 2023-06-21 PROCEDURE — 97162 PT EVAL MOD COMPLEX 30 MIN: CPT

## 2023-06-21 PROCEDURE — 83735 ASSAY OF MAGNESIUM: CPT

## 2023-06-21 PROCEDURE — 2060000000 HC ICU INTERMEDIATE R&B

## 2023-06-21 PROCEDURE — 6370000000 HC RX 637 (ALT 250 FOR IP): Performed by: STUDENT IN AN ORGANIZED HEALTH CARE EDUCATION/TRAINING PROGRAM

## 2023-06-21 PROCEDURE — 6360000002 HC RX W HCPCS: Performed by: NURSE PRACTITIONER

## 2023-06-21 PROCEDURE — 36415 COLL VENOUS BLD VENIPUNCTURE: CPT

## 2023-06-21 PROCEDURE — 86901 BLOOD TYPING SEROLOGIC RH(D): CPT

## 2023-06-21 PROCEDURE — 86850 RBC ANTIBODY SCREEN: CPT

## 2023-06-21 PROCEDURE — 86920 COMPATIBILITY TEST SPIN: CPT

## 2023-06-21 PROCEDURE — 97530 THERAPEUTIC ACTIVITIES: CPT

## 2023-06-21 RX ORDER — ROPINIROLE 0.25 MG/1
0.5 TABLET, FILM COATED ORAL ONCE
Status: COMPLETED | OUTPATIENT
Start: 2023-06-22 | End: 2023-06-22

## 2023-06-21 RX ORDER — CLOPIDOGREL BISULFATE 75 MG/1
300 TABLET ORAL ONCE
Status: COMPLETED | OUTPATIENT
Start: 2023-06-23 | End: 2023-06-23

## 2023-06-21 RX ORDER — SODIUM CHLORIDE 9 MG/ML
INJECTION, SOLUTION INTRAVENOUS CONTINUOUS
Status: DISCONTINUED | OUTPATIENT
Start: 2023-06-23 | End: 2023-06-23

## 2023-06-21 RX ORDER — SODIUM CHLORIDE 9 MG/ML
INJECTION, SOLUTION INTRAVENOUS PRN
Status: DISCONTINUED | OUTPATIENT
Start: 2023-06-21 | End: 2023-06-25 | Stop reason: HOSPADM

## 2023-06-21 RX ORDER — ZOLPIDEM TARTRATE 5 MG/1
5 TABLET ORAL NIGHTLY PRN
Status: DISCONTINUED | OUTPATIENT
Start: 2023-06-21 | End: 2023-06-25 | Stop reason: HOSPADM

## 2023-06-21 RX ADMIN — SODIUM CHLORIDE, PRESERVATIVE FREE 10 ML: 5 INJECTION INTRAVENOUS at 09:03

## 2023-06-21 RX ADMIN — SODIUM CHLORIDE, PRESERVATIVE FREE 10 ML: 5 INJECTION INTRAVENOUS at 21:44

## 2023-06-21 RX ADMIN — PANTOPRAZOLE SODIUM 40 MG: 40 TABLET, DELAYED RELEASE ORAL at 09:04

## 2023-06-21 RX ADMIN — CITALOPRAM 10 MG: 10 TABLET, FILM COATED ORAL at 09:03

## 2023-06-21 RX ADMIN — ACETAMINOPHEN 650 MG: 325 TABLET ORAL at 00:59

## 2023-06-21 RX ADMIN — METOPROLOL TARTRATE 25 MG: 25 TABLET, FILM COATED ORAL at 21:44

## 2023-06-21 RX ADMIN — Medication 1 TABLET: at 09:03

## 2023-06-21 RX ADMIN — BUDESONIDE AND FORMOTEROL FUMARATE DIHYDRATE 2 PUFF: 160; 4.5 AEROSOL RESPIRATORY (INHALATION) at 08:49

## 2023-06-21 RX ADMIN — BUDESONIDE AND FORMOTEROL FUMARATE DIHYDRATE 2 PUFF: 160; 4.5 AEROSOL RESPIRATORY (INHALATION) at 21:00

## 2023-06-21 RX ADMIN — ZOLPIDEM TARTRATE 5 MG: 5 TABLET ORAL at 21:46

## 2023-06-21 RX ADMIN — MONTELUKAST SODIUM 10 MG: 10 TABLET, FILM COATED ORAL at 18:20

## 2023-06-21 RX ADMIN — ENOXAPARIN SODIUM 40 MG: 40 INJECTION SUBCUTANEOUS at 14:55

## 2023-06-21 RX ADMIN — PRAVASTATIN SODIUM 20 MG: 20 TABLET ORAL at 18:20

## 2023-06-21 ASSESSMENT — PAIN DESCRIPTION - LOCATION: LOCATION: HEAD

## 2023-06-21 ASSESSMENT — PAIN DESCRIPTION - DESCRIPTORS: DESCRIPTORS: ACHING

## 2023-06-21 ASSESSMENT — PAIN SCALES - GENERAL: PAINLEVEL_OUTOF10: 3

## 2023-06-22 LAB
ALBUMIN SERPL-MCNC: 3.6 G/DL (ref 3.5–5.2)
ALBUMIN/GLOB SERPL: 1.4 {RATIO} (ref 1–2.5)
ALP SERPL-CCNC: 58 U/L (ref 35–104)
ALT SERPL-CCNC: 23 U/L (ref 5–33)
ANION GAP SERPL CALCULATED.3IONS-SCNC: 11 MMOL/L (ref 9–17)
AST SERPL-CCNC: 23 U/L
BASOPHILS # BLD: 0.07 K/UL (ref 0–0.2)
BASOPHILS NFR BLD: 1 % (ref 0–2)
BILIRUB SERPL-MCNC: 0.5 MG/DL (ref 0.3–1.2)
BUN SERPL-MCNC: 17 MG/DL (ref 8–23)
CALCIUM SERPL-MCNC: 8.5 MG/DL (ref 8.6–10.4)
CHLORIDE SERPL-SCNC: 102 MMOL/L (ref 98–107)
CO2 SERPL-SCNC: 24 MMOL/L (ref 20–31)
CREAT SERPL-MCNC: 1.09 MG/DL (ref 0.5–0.9)
EOSINOPHIL # BLD: 0.35 K/UL (ref 0–0.44)
EOSINOPHILS RELATIVE PERCENT: 5 % (ref 1–4)
ERYTHROCYTE [DISTWIDTH] IN BLOOD BY AUTOMATED COUNT: 14.9 % (ref 11.8–14.4)
GFR SERPL CREATININE-BSD FRML MDRD: 52 ML/MIN/1.73M2
GLUCOSE SERPL-MCNC: 110 MG/DL (ref 70–99)
HCT VFR BLD AUTO: 33.4 % (ref 36.3–47.1)
HGB BLD-MCNC: 10.3 G/DL (ref 11.9–15.1)
IMM GRANULOCYTES # BLD AUTO: <0.03 K/UL (ref 0–0.3)
IMM GRANULOCYTES NFR BLD: 0 %
LYMPHOCYTES # BLD: 30 % (ref 24–43)
LYMPHOCYTES NFR BLD: 2.25 K/UL (ref 1.1–3.7)
MAGNESIUM SERPL-MCNC: 2.3 MG/DL (ref 1.6–2.6)
MCH RBC QN AUTO: 27 PG (ref 25.2–33.5)
MCHC RBC AUTO-ENTMCNC: 30.8 G/DL (ref 28.4–34.8)
MCV RBC AUTO: 87.7 FL (ref 82.6–102.9)
MONOCYTES NFR BLD: 0.96 K/UL (ref 0.1–1.2)
MONOCYTES NFR BLD: 13 % (ref 3–12)
NEUTROPHILS NFR BLD: 51 % (ref 36–65)
NEUTS SEG NFR BLD: 3.8 K/UL (ref 1.5–8.1)
NRBC AUTOMATED: 0 PER 100 WBC
PLATELET # BLD AUTO: 175 K/UL (ref 138–453)
PMV BLD AUTO: 11 FL (ref 8.1–13.5)
POTASSIUM SERPL-SCNC: 3.9 MMOL/L (ref 3.7–5.3)
PROT SERPL-MCNC: 6.1 G/DL (ref 6.4–8.3)
RBC # BLD AUTO: 3.81 M/UL (ref 3.95–5.11)
RBC # BLD: ABNORMAL 10*6/UL
SODIUM SERPL-SCNC: 137 MMOL/L (ref 135–144)
WBC OTHER # BLD: 7.5 K/UL (ref 3.5–11.3)

## 2023-06-22 PROCEDURE — 99233 SBSQ HOSP IP/OBS HIGH 50: CPT | Performed by: NURSE PRACTITIONER

## 2023-06-22 PROCEDURE — 6370000000 HC RX 637 (ALT 250 FOR IP): Performed by: NURSE PRACTITIONER

## 2023-06-22 PROCEDURE — 94761 N-INVAS EAR/PLS OXIMETRY MLT: CPT

## 2023-06-22 PROCEDURE — 80053 COMPREHEN METABOLIC PANEL: CPT

## 2023-06-22 PROCEDURE — 2580000003 HC RX 258: Performed by: NURSE PRACTITIONER

## 2023-06-22 PROCEDURE — 94640 AIRWAY INHALATION TREATMENT: CPT

## 2023-06-22 PROCEDURE — 99232 SBSQ HOSP IP/OBS MODERATE 35: CPT | Performed by: STUDENT IN AN ORGANIZED HEALTH CARE EDUCATION/TRAINING PROGRAM

## 2023-06-22 PROCEDURE — 6360000002 HC RX W HCPCS: Performed by: NURSE PRACTITIONER

## 2023-06-22 PROCEDURE — 2060000000 HC ICU INTERMEDIATE R&B

## 2023-06-22 PROCEDURE — 85027 COMPLETE CBC AUTOMATED: CPT

## 2023-06-22 PROCEDURE — 83735 ASSAY OF MAGNESIUM: CPT

## 2023-06-22 PROCEDURE — 36415 COLL VENOUS BLD VENIPUNCTURE: CPT

## 2023-06-22 RX ADMIN — BUDESONIDE AND FORMOTEROL FUMARATE DIHYDRATE 2 PUFF: 160; 4.5 AEROSOL RESPIRATORY (INHALATION) at 21:20

## 2023-06-22 RX ADMIN — ROPINIROLE HYDROCHLORIDE 0.5 MG: 0.25 TABLET, FILM COATED ORAL at 00:48

## 2023-06-22 RX ADMIN — ACETAMINOPHEN 650 MG: 325 TABLET ORAL at 02:02

## 2023-06-22 RX ADMIN — ACETAMINOPHEN 650 MG: 325 TABLET ORAL at 10:57

## 2023-06-22 RX ADMIN — PRAVASTATIN SODIUM 20 MG: 20 TABLET ORAL at 16:34

## 2023-06-22 RX ADMIN — METOPROLOL TARTRATE 25 MG: 25 TABLET, FILM COATED ORAL at 20:46

## 2023-06-22 RX ADMIN — Medication 1 TABLET: at 08:16

## 2023-06-22 RX ADMIN — ENOXAPARIN SODIUM 40 MG: 40 INJECTION SUBCUTANEOUS at 16:33

## 2023-06-22 RX ADMIN — BUDESONIDE AND FORMOTEROL FUMARATE DIHYDRATE 2 PUFF: 160; 4.5 AEROSOL RESPIRATORY (INHALATION) at 08:38

## 2023-06-22 RX ADMIN — METOPROLOL TARTRATE 50 MG: 50 TABLET, FILM COATED ORAL at 08:16

## 2023-06-22 RX ADMIN — SODIUM CHLORIDE, PRESERVATIVE FREE 10 ML: 5 INJECTION INTRAVENOUS at 08:17

## 2023-06-22 RX ADMIN — SODIUM CHLORIDE, PRESERVATIVE FREE 10 ML: 5 INJECTION INTRAVENOUS at 20:48

## 2023-06-22 RX ADMIN — CITALOPRAM 10 MG: 10 TABLET, FILM COATED ORAL at 08:16

## 2023-06-22 RX ADMIN — VALSARTAN 80 MG: 80 TABLET, FILM COATED ORAL at 08:16

## 2023-06-22 RX ADMIN — MONTELUKAST SODIUM 10 MG: 10 TABLET, FILM COATED ORAL at 16:34

## 2023-06-22 RX ADMIN — PANTOPRAZOLE SODIUM 40 MG: 40 TABLET, DELAYED RELEASE ORAL at 05:00

## 2023-06-22 ASSESSMENT — PAIN DESCRIPTION - ORIENTATION: ORIENTATION: MID;UPPER

## 2023-06-22 ASSESSMENT — PAIN - FUNCTIONAL ASSESSMENT: PAIN_FUNCTIONAL_ASSESSMENT: ACTIVITIES ARE NOT PREVENTED

## 2023-06-22 ASSESSMENT — PAIN DESCRIPTION - DESCRIPTORS: DESCRIPTORS: ACHING

## 2023-06-22 ASSESSMENT — PAIN DESCRIPTION - LOCATION: LOCATION: BACK

## 2023-06-22 ASSESSMENT — PAIN SCALES - GENERAL
PAINLEVEL_OUTOF10: 4
PAINLEVEL_OUTOF10: 2
PAINLEVEL_OUTOF10: 3

## 2023-06-23 ENCOUNTER — ANESTHESIA (OUTPATIENT)
Dept: CARDIAC CATH/INVASIVE PROCEDURES | Age: 78
End: 2023-06-23
Payer: MEDICARE

## 2023-06-23 ENCOUNTER — APPOINTMENT (OUTPATIENT)
Dept: CT IMAGING | Age: 78
DRG: 267 | End: 2023-06-23
Attending: INTERNAL MEDICINE
Payer: MEDICARE

## 2023-06-23 ENCOUNTER — ANESTHESIA EVENT (OUTPATIENT)
Dept: CARDIAC CATH/INVASIVE PROCEDURES | Age: 78
End: 2023-06-23
Payer: MEDICARE

## 2023-06-23 ENCOUNTER — APPOINTMENT (OUTPATIENT)
Dept: CARDIAC CATH/INVASIVE PROCEDURES | Age: 78
DRG: 267 | End: 2023-06-23
Attending: INTERNAL MEDICINE
Payer: MEDICARE

## 2023-06-23 PROBLEM — Z95.2 S/P TAVR (TRANSCATHETER AORTIC VALVE REPLACEMENT): Status: ACTIVE | Noted: 2023-06-23

## 2023-06-23 PROBLEM — Z87.898 H/O SYNCOPE: Status: ACTIVE | Noted: 2023-06-23

## 2023-06-23 LAB
ACTIVATED CLOTTING TIME: 164 SEC (ref 79–149)
ACTIVATED CLOTTING TIME: 164 SEC (ref 79–149)
ACTIVATED CLOTTING TIME: 176 SEC (ref 79–149)
ANION GAP SERPL CALCULATED.3IONS-SCNC: 10 MMOL/L (ref 9–17)
BNP SERPL-MCNC: 4048 PG/ML
BUN SERPL-MCNC: 15 MG/DL (ref 8–23)
CALCIUM SERPL-MCNC: 9 MG/DL (ref 8.6–10.4)
CHLORIDE SERPL-SCNC: 105 MMOL/L (ref 98–107)
CO2 SERPL-SCNC: 25 MMOL/L (ref 20–31)
CREAT SERPL-MCNC: 1.01 MG/DL (ref 0.5–0.9)
EKG ATRIAL RATE: 74 BPM
EKG P AXIS: 55 DEGREES
EKG P-R INTERVAL: 162 MS
EKG Q-T INTERVAL: 420 MS
EKG QRS DURATION: 146 MS
EKG QTC CALCULATION (BAZETT): 466 MS
EKG R AXIS: -66 DEGREES
EKG T AXIS: 86 DEGREES
EKG VENTRICULAR RATE: 74 BPM
ERYTHROCYTE [DISTWIDTH] IN BLOOD BY AUTOMATED COUNT: 15 % (ref 11.8–14.4)
FERRITIN SERPL-MCNC: 40 NG/ML (ref 13–150)
GFR SERPL CREATININE-BSD FRML MDRD: 57 ML/MIN/1.73M2
GLUCOSE SERPL-MCNC: 104 MG/DL (ref 70–99)
HCT VFR BLD AUTO: 32.9 % (ref 36.3–47.1)
HCT VFR BLD AUTO: 34.3 % (ref 36.3–47.1)
HCT VFR BLD AUTO: 35 % (ref 36.3–47.1)
HGB BLD-MCNC: 10.8 G/DL (ref 11.9–15.1)
HGB BLD-MCNC: 10.8 G/DL (ref 11.9–15.1)
HGB BLD-MCNC: 10.9 G/DL (ref 11.9–15.1)
INR PPP: 1
IRON SATN MFR SERPL: 12 % (ref 20–55)
IRON SERPL-MCNC: 35 UG/DL (ref 37–145)
MAGNESIUM SERPL-MCNC: 2.2 MG/DL (ref 1.6–2.6)
MCH RBC QN AUTO: 27.8 PG (ref 25.2–33.5)
MCHC RBC AUTO-ENTMCNC: 31.5 G/DL (ref 28.4–34.8)
MCV RBC AUTO: 88.2 FL (ref 82.6–102.9)
NRBC AUTOMATED: 0 PER 100 WBC
PLATELET # BLD AUTO: 159 K/UL (ref 138–453)
PMV BLD AUTO: 10.8 FL (ref 8.1–13.5)
POTASSIUM SERPL-SCNC: 3.9 MMOL/L (ref 3.7–5.3)
PROTHROMBIN TIME: 13.3 SEC (ref 11.7–14.9)
RBC # BLD AUTO: 3.89 M/UL (ref 3.95–5.11)
SODIUM SERPL-SCNC: 140 MMOL/L (ref 135–144)
TIBC SERPL-MCNC: 303 UG/DL (ref 250–450)
UNSATURATED IRON BINDING CAPACITY: 268 UG/DL (ref 112–347)
WBC OTHER # BLD: 6.5 K/UL (ref 3.5–11.3)

## 2023-06-23 PROCEDURE — 02RF38Z REPLACEMENT OF AORTIC VALVE WITH ZOOPLASTIC TISSUE, PERCUTANEOUS APPROACH: ICD-10-PCS | Performed by: INTERNAL MEDICINE

## 2023-06-23 PROCEDURE — 82728 ASSAY OF FERRITIN: CPT

## 2023-06-23 PROCEDURE — 85014 HEMATOCRIT: CPT

## 2023-06-23 PROCEDURE — C1889 IMPLANT/INSERT DEVICE, NOC: HCPCS

## 2023-06-23 PROCEDURE — 2000000000 HC ICU R&B

## 2023-06-23 PROCEDURE — 6360000004 HC RX CONTRAST MEDICATION: Performed by: STUDENT IN AN ORGANIZED HEALTH CARE EDUCATION/TRAINING PROGRAM

## 2023-06-23 PROCEDURE — 2500000003 HC RX 250 WO HCPCS: Performed by: STUDENT IN AN ORGANIZED HEALTH CARE EDUCATION/TRAINING PROGRAM

## 2023-06-23 PROCEDURE — 2580000003 HC RX 258: Performed by: NURSE PRACTITIONER

## 2023-06-23 PROCEDURE — 027F3ZZ DILATION OF AORTIC VALVE, PERCUTANEOUS APPROACH: ICD-10-PCS | Performed by: INTERNAL MEDICINE

## 2023-06-23 PROCEDURE — 3700000000 HC ANESTHESIA ATTENDED CARE

## 2023-06-23 PROCEDURE — 83880 ASSAY OF NATRIURETIC PEPTIDE: CPT

## 2023-06-23 PROCEDURE — 7100000000 HC PACU RECOVERY - FIRST 15 MIN

## 2023-06-23 PROCEDURE — 2580000003 HC RX 258: Performed by: NURSE ANESTHETIST, CERTIFIED REGISTERED

## 2023-06-23 PROCEDURE — 6370000000 HC RX 637 (ALT 250 FOR IP): Performed by: NURSE PRACTITIONER

## 2023-06-23 PROCEDURE — 2500000003 HC RX 250 WO HCPCS: Performed by: NURSE ANESTHETIST, CERTIFIED REGISTERED

## 2023-06-23 PROCEDURE — 6360000002 HC RX W HCPCS: Performed by: STUDENT IN AN ORGANIZED HEALTH CARE EDUCATION/TRAINING PROGRAM

## 2023-06-23 PROCEDURE — 99232 SBSQ HOSP IP/OBS MODERATE 35: CPT | Performed by: STUDENT IN AN ORGANIZED HEALTH CARE EDUCATION/TRAINING PROGRAM

## 2023-06-23 PROCEDURE — 83540 ASSAY OF IRON: CPT

## 2023-06-23 PROCEDURE — 2580000003 HC RX 258: Performed by: INTERNAL MEDICINE

## 2023-06-23 PROCEDURE — 94640 AIRWAY INHALATION TREATMENT: CPT

## 2023-06-23 PROCEDURE — 85018 HEMOGLOBIN: CPT

## 2023-06-23 PROCEDURE — 85027 COMPLETE CBC AUTOMATED: CPT

## 2023-06-23 PROCEDURE — 93926 LOWER EXTREMITY STUDY: CPT

## 2023-06-23 PROCEDURE — 83735 ASSAY OF MAGNESIUM: CPT

## 2023-06-23 PROCEDURE — 93005 ELECTROCARDIOGRAM TRACING: CPT | Performed by: INTERNAL MEDICINE

## 2023-06-23 PROCEDURE — 2709999900 HC NON-CHARGEABLE SUPPLY

## 2023-06-23 PROCEDURE — B24BZZ4 ULTRASONOGRAPHY OF HEART WITH AORTA, TRANSESOPHAGEAL: ICD-10-PCS | Performed by: INTERNAL MEDICINE

## 2023-06-23 PROCEDURE — 6360000002 HC RX W HCPCS: Performed by: THORACIC SURGERY (CARDIOTHORACIC VASCULAR SURGERY)

## 2023-06-23 PROCEDURE — 36415 COLL VENOUS BLD VENIPUNCTURE: CPT

## 2023-06-23 PROCEDURE — C1769 GUIDE WIRE: HCPCS

## 2023-06-23 PROCEDURE — 80048 BASIC METABOLIC PNL TOTAL CA: CPT

## 2023-06-23 PROCEDURE — 6360000002 HC RX W HCPCS

## 2023-06-23 PROCEDURE — 2720000010 HC SURG SUPPLY STERILE

## 2023-06-23 PROCEDURE — 6360000002 HC RX W HCPCS: Performed by: NURSE PRACTITIONER

## 2023-06-23 PROCEDURE — 85347 COAGULATION TIME ACTIVATED: CPT

## 2023-06-23 PROCEDURE — 6360000002 HC RX W HCPCS: Performed by: NURSE ANESTHETIST, CERTIFIED REGISTERED

## 2023-06-23 PROCEDURE — 83550 IRON BINDING TEST: CPT

## 2023-06-23 PROCEDURE — 2700000000 HC OXYGEN THERAPY PER DAY

## 2023-06-23 PROCEDURE — 33361 REPLACE AORTIC VALVE PERQ: CPT

## 2023-06-23 PROCEDURE — 6370000000 HC RX 637 (ALT 250 FOR IP): Performed by: STUDENT IN AN ORGANIZED HEALTH CARE EDUCATION/TRAINING PROGRAM

## 2023-06-23 PROCEDURE — 33361 REPLACE AORTIC VALVE PERQ: CPT | Performed by: THORACIC SURGERY (CARDIOTHORACIC VASCULAR SURGERY)

## 2023-06-23 PROCEDURE — 70450 CT HEAD/BRAIN W/O DYE: CPT

## 2023-06-23 PROCEDURE — 85610 PROTHROMBIN TIME: CPT

## 2023-06-23 PROCEDURE — 7100000001 HC PACU RECOVERY - ADDTL 15 MIN

## 2023-06-23 PROCEDURE — 3700000001 HC ADD 15 MINUTES (ANESTHESIA)

## 2023-06-23 PROCEDURE — 74177 CT ABD & PELVIS W/CONTRAST: CPT

## 2023-06-23 PROCEDURE — 93005 ELECTROCARDIOGRAM TRACING: CPT | Performed by: NURSE PRACTITIONER

## 2023-06-23 PROCEDURE — 4B02XSZ MEASUREMENT OF CARDIAC PACEMAKER, EXTERNAL APPROACH: ICD-10-PCS | Performed by: INTERNAL MEDICINE

## 2023-06-23 PROCEDURE — 6370000000 HC RX 637 (ALT 250 FOR IP): Performed by: INTERNAL MEDICINE

## 2023-06-23 RX ORDER — HALOPERIDOL 5 MG/ML
2 INJECTION INTRAMUSCULAR ONCE
Status: COMPLETED | OUTPATIENT
Start: 2023-06-23 | End: 2023-06-23

## 2023-06-23 RX ORDER — PROPOFOL 10 MG/ML
INJECTION, EMULSION INTRAVENOUS
Status: COMPLETED
Start: 2023-06-23 | End: 2023-06-23

## 2023-06-23 RX ORDER — SODIUM CHLORIDE 0.9 % (FLUSH) 0.9 %
5-40 SYRINGE (ML) INJECTION PRN
Status: DISCONTINUED | OUTPATIENT
Start: 2023-06-23 | End: 2023-06-25 | Stop reason: HOSPADM

## 2023-06-23 RX ORDER — SODIUM CHLORIDE 9 MG/ML
INJECTION, SOLUTION INTRAVENOUS PRN
Status: DISCONTINUED | OUTPATIENT
Start: 2023-06-23 | End: 2023-06-25 | Stop reason: HOSPADM

## 2023-06-23 RX ORDER — LIDOCAINE HYDROCHLORIDE 10 MG/ML
INJECTION, SOLUTION EPIDURAL; INFILTRATION; INTRACAUDAL; PERINEURAL PRN
Status: DISCONTINUED | OUTPATIENT
Start: 2023-06-23 | End: 2023-06-23 | Stop reason: SDUPTHER

## 2023-06-23 RX ORDER — OXYCODONE HYDROCHLORIDE AND ACETAMINOPHEN 5; 325 MG/1; MG/1
1 TABLET ORAL EVERY 4 HOURS PRN
Status: DISCONTINUED | OUTPATIENT
Start: 2023-06-23 | End: 2023-06-25 | Stop reason: HOSPADM

## 2023-06-23 RX ORDER — SODIUM CHLORIDE 9 MG/ML
INJECTION, SOLUTION INTRAVENOUS CONTINUOUS
Status: DISCONTINUED | OUTPATIENT
Start: 2023-06-23 | End: 2023-06-23

## 2023-06-23 RX ORDER — FENTANYL CITRATE 50 UG/ML
25 INJECTION, SOLUTION INTRAMUSCULAR; INTRAVENOUS EVERY 4 HOURS PRN
Status: DISCONTINUED | OUTPATIENT
Start: 2023-06-23 | End: 2023-06-25 | Stop reason: HOSPADM

## 2023-06-23 RX ORDER — FENTANYL CITRATE 50 UG/ML
INJECTION, SOLUTION INTRAMUSCULAR; INTRAVENOUS
Status: COMPLETED
Start: 2023-06-23 | End: 2023-06-23

## 2023-06-23 RX ORDER — MIDAZOLAM HYDROCHLORIDE 1 MG/ML
INJECTION INTRAMUSCULAR; INTRAVENOUS PRN
Status: DISCONTINUED | OUTPATIENT
Start: 2023-06-23 | End: 2023-06-23 | Stop reason: SDUPTHER

## 2023-06-23 RX ORDER — LIDOCAINE 4 G/G
1 PATCH TOPICAL DAILY
Status: DISCONTINUED | OUTPATIENT
Start: 2023-06-23 | End: 2023-06-25 | Stop reason: HOSPADM

## 2023-06-23 RX ORDER — VERAPAMIL HYDROCHLORIDE 2.5 MG/ML
INJECTION, SOLUTION INTRAVENOUS
Status: DISPENSED
Start: 2023-06-23 | End: 2023-06-23

## 2023-06-23 RX ORDER — HYDROXYZINE HYDROCHLORIDE 10 MG/1
10 TABLET, FILM COATED ORAL 3 TIMES DAILY PRN
Status: DISCONTINUED | OUTPATIENT
Start: 2023-06-23 | End: 2023-06-25 | Stop reason: HOSPADM

## 2023-06-23 RX ORDER — DEXMEDETOMIDINE HYDROCHLORIDE 4 UG/ML
.1-1.5 INJECTION, SOLUTION INTRAVENOUS CONTINUOUS
Status: DISCONTINUED | OUTPATIENT
Start: 2023-06-23 | End: 2023-06-24

## 2023-06-23 RX ORDER — ONDANSETRON 2 MG/ML
4 INJECTION INTRAMUSCULAR; INTRAVENOUS EVERY 6 HOURS PRN
Status: DISCONTINUED | OUTPATIENT
Start: 2023-06-23 | End: 2023-06-25 | Stop reason: HOSPADM

## 2023-06-23 RX ORDER — SODIUM CHLORIDE 0.9 % (FLUSH) 0.9 %
5-40 SYRINGE (ML) INJECTION EVERY 12 HOURS SCHEDULED
Status: DISCONTINUED | OUTPATIENT
Start: 2023-06-23 | End: 2023-06-25 | Stop reason: HOSPADM

## 2023-06-23 RX ORDER — NITROGLYCERIN 20 MG/100ML
INJECTION INTRAVENOUS
Status: DISPENSED
Start: 2023-06-23 | End: 2023-06-23

## 2023-06-23 RX ORDER — SODIUM CHLORIDE 9 MG/ML
INJECTION, SOLUTION INTRAVENOUS CONTINUOUS
Status: ACTIVE | OUTPATIENT
Start: 2023-06-23 | End: 2023-06-23

## 2023-06-23 RX ORDER — LABETALOL HYDROCHLORIDE 5 MG/ML
10 INJECTION, SOLUTION INTRAVENOUS EVERY 30 MIN PRN
Status: DISCONTINUED | OUTPATIENT
Start: 2023-06-23 | End: 2023-06-25 | Stop reason: HOSPADM

## 2023-06-23 RX ORDER — HEPARIN SODIUM 1000 [USP'U]/ML
INJECTION, SOLUTION INTRAVENOUS; SUBCUTANEOUS PRN
Status: DISCONTINUED | OUTPATIENT
Start: 2023-06-23 | End: 2023-06-23 | Stop reason: SDUPTHER

## 2023-06-23 RX ORDER — MORPHINE SULFATE 2 MG/ML
1 INJECTION, SOLUTION INTRAMUSCULAR; INTRAVENOUS EVERY 4 HOURS PRN
Status: DISCONTINUED | OUTPATIENT
Start: 2023-06-23 | End: 2023-06-25 | Stop reason: HOSPADM

## 2023-06-23 RX ORDER — PROTAMINE SULFATE 10 MG/ML
INJECTION, SOLUTION INTRAVENOUS
Status: DISPENSED
Start: 2023-06-23 | End: 2023-06-23

## 2023-06-23 RX ORDER — ACETAMINOPHEN 325 MG/1
650 TABLET ORAL EVERY 4 HOURS PRN
Status: DISCONTINUED | OUTPATIENT
Start: 2023-06-23 | End: 2023-06-25 | Stop reason: HOSPADM

## 2023-06-23 RX ORDER — PROPOFOL 10 MG/ML
INJECTION, EMULSION INTRAVENOUS CONTINUOUS PRN
Status: DISCONTINUED | OUTPATIENT
Start: 2023-06-23 | End: 2023-06-23 | Stop reason: SDUPTHER

## 2023-06-23 RX ORDER — LIDOCAINE HYDROCHLORIDE 10 MG/ML
INJECTION, SOLUTION INFILTRATION; PERINEURAL
Status: COMPLETED
Start: 2023-06-23 | End: 2023-06-23

## 2023-06-23 RX ORDER — MORPHINE SULFATE 2 MG/ML
1 INJECTION, SOLUTION INTRAMUSCULAR; INTRAVENOUS ONCE
Status: COMPLETED | OUTPATIENT
Start: 2023-06-23 | End: 2023-06-23

## 2023-06-23 RX ORDER — LORAZEPAM 2 MG/ML
0.5 INJECTION INTRAMUSCULAR EVERY 6 HOURS PRN
Status: DISCONTINUED | OUTPATIENT
Start: 2023-06-23 | End: 2023-06-25 | Stop reason: HOSPADM

## 2023-06-23 RX ORDER — ROPINIROLE 0.25 MG/1
0.5 TABLET, FILM COATED ORAL NIGHTLY
Status: DISCONTINUED | OUTPATIENT
Start: 2023-06-23 | End: 2023-06-23

## 2023-06-23 RX ORDER — ROPINIROLE 0.25 MG/1
0.5 TABLET, FILM COATED ORAL 2 TIMES DAILY
Status: DISCONTINUED | OUTPATIENT
Start: 2023-06-23 | End: 2023-06-25 | Stop reason: HOSPADM

## 2023-06-23 RX ORDER — HYDROXYZINE HYDROCHLORIDE 10 MG/1
10 TABLET, FILM COATED ORAL 3 TIMES DAILY PRN
Status: DISCONTINUED | OUTPATIENT
Start: 2023-06-23 | End: 2023-06-23

## 2023-06-23 RX ORDER — FENTANYL CITRATE 50 UG/ML
25 INJECTION, SOLUTION INTRAMUSCULAR; INTRAVENOUS ONCE
Status: DISCONTINUED | OUTPATIENT
Start: 2023-06-23 | End: 2023-06-25 | Stop reason: HOSPADM

## 2023-06-23 RX ORDER — HEPARIN SODIUM 1000 [USP'U]/ML
INJECTION, SOLUTION INTRAVENOUS; SUBCUTANEOUS
Status: COMPLETED
Start: 2023-06-23 | End: 2023-06-23

## 2023-06-23 RX ORDER — CLOPIDOGREL BISULFATE 75 MG/1
75 TABLET ORAL DAILY
Status: DISCONTINUED | OUTPATIENT
Start: 2023-06-24 | End: 2023-06-25 | Stop reason: HOSPADM

## 2023-06-23 RX ORDER — LORAZEPAM 2 MG/ML
1 INJECTION INTRAMUSCULAR ONCE
Status: COMPLETED | OUTPATIENT
Start: 2023-06-23 | End: 2023-06-23

## 2023-06-23 RX ORDER — VANCOMYCIN HYDROCHLORIDE 1 G/20ML
INJECTION, POWDER, LYOPHILIZED, FOR SOLUTION INTRAVENOUS PRN
Status: DISCONTINUED | OUTPATIENT
Start: 2023-06-23 | End: 2023-06-23 | Stop reason: SDUPTHER

## 2023-06-23 RX ORDER — SODIUM CHLORIDE 9 MG/ML
INJECTION, SOLUTION INTRAVENOUS CONTINUOUS PRN
Status: DISCONTINUED | OUTPATIENT
Start: 2023-06-23 | End: 2023-06-23 | Stop reason: SDUPTHER

## 2023-06-23 RX ORDER — CYCLOBENZAPRINE HCL 10 MG
10 TABLET ORAL 3 TIMES DAILY PRN
Status: DISCONTINUED | OUTPATIENT
Start: 2023-06-23 | End: 2023-06-25 | Stop reason: HOSPADM

## 2023-06-23 RX ORDER — ASPIRIN 81 MG/1
81 TABLET, CHEWABLE ORAL DAILY
Status: DISCONTINUED | OUTPATIENT
Start: 2023-06-24 | End: 2023-06-25 | Stop reason: HOSPADM

## 2023-06-23 RX ORDER — PROPOFOL 10 MG/ML
INJECTION, EMULSION INTRAVENOUS PRN
Status: DISCONTINUED | OUTPATIENT
Start: 2023-06-23 | End: 2023-06-23

## 2023-06-23 RX ORDER — FENTANYL CITRATE 50 UG/ML
INJECTION, SOLUTION INTRAMUSCULAR; INTRAVENOUS PRN
Status: DISCONTINUED | OUTPATIENT
Start: 2023-06-23 | End: 2023-06-23 | Stop reason: SDUPTHER

## 2023-06-23 RX ADMIN — HYDROXYZINE HYDROCHLORIDE 10 MG: 10 TABLET ORAL at 15:36

## 2023-06-23 RX ADMIN — LORAZEPAM 1 MG: 2 INJECTION INTRAMUSCULAR; INTRAVENOUS at 10:26

## 2023-06-23 RX ADMIN — PROPOFOL 75 MCG/KG/MIN: 10 INJECTION, EMULSION INTRAVENOUS at 08:13

## 2023-06-23 RX ADMIN — ZOLPIDEM TARTRATE 5 MG: 5 TABLET ORAL at 22:59

## 2023-06-23 RX ADMIN — BUDESONIDE AND FORMOTEROL FUMARATE DIHYDRATE 2 PUFF: 160; 4.5 AEROSOL RESPIRATORY (INHALATION) at 21:50

## 2023-06-23 RX ADMIN — HALOPERIDOL LACTATE 2 MG: 5 INJECTION, SOLUTION INTRAMUSCULAR at 17:25

## 2023-06-23 RX ADMIN — VANCOMYCIN HYDROCHLORIDE 1000 MG: 1 INJECTION, POWDER, LYOPHILIZED, FOR SOLUTION INTRAVENOUS at 08:20

## 2023-06-23 RX ADMIN — FENTANYL CITRATE 25 MCG: 50 INJECTION, SOLUTION INTRAMUSCULAR; INTRAVENOUS at 14:12

## 2023-06-23 RX ADMIN — LIDOCAINE HYDROCHLORIDE 5 ML: 10 INJECTION, SOLUTION EPIDURAL; INFILTRATION; INTRACAUDAL; PERINEURAL at 08:14

## 2023-06-23 RX ADMIN — FENTANYL CITRATE 25 MCG: 50 INJECTION, SOLUTION INTRAMUSCULAR; INTRAVENOUS at 08:38

## 2023-06-23 RX ADMIN — SODIUM CHLORIDE, PRESERVATIVE FREE 10 ML: 5 INJECTION INTRAVENOUS at 21:54

## 2023-06-23 RX ADMIN — OXYCODONE HYDROCHLORIDE AND ACETAMINOPHEN 1 TABLET: 5; 325 TABLET ORAL at 22:29

## 2023-06-23 RX ADMIN — SODIUM CHLORIDE: 900 INJECTION INTRAVENOUS at 08:04

## 2023-06-23 RX ADMIN — MIDAZOLAM 1 MG: 1 INJECTION INTRAMUSCULAR; INTRAVENOUS at 08:09

## 2023-06-23 RX ADMIN — ROPINIROLE HYDROCHLORIDE 0.5 MG: 0.25 TABLET, FILM COATED ORAL at 17:20

## 2023-06-23 RX ADMIN — DEXMEDETOMIDINE HYDROCHLORIDE 0.2 MCG/KG/HR: 4 INJECTION, SOLUTION INTRAVENOUS at 17:24

## 2023-06-23 RX ADMIN — FENTANYL CITRATE 25 MCG: 50 INJECTION, SOLUTION INTRAMUSCULAR; INTRAVENOUS at 10:17

## 2023-06-23 RX ADMIN — VALSARTAN 80 MG: 80 TABLET, FILM COATED ORAL at 14:25

## 2023-06-23 RX ADMIN — MORPHINE SULFATE 1 MG: 2 INJECTION, SOLUTION INTRAMUSCULAR; INTRAVENOUS at 16:58

## 2023-06-23 RX ADMIN — FENTANYL CITRATE 50 MCG: 50 INJECTION, SOLUTION INTRAMUSCULAR; INTRAVENOUS at 08:10

## 2023-06-23 RX ADMIN — SODIUM CHLORIDE: 9 INJECTION, SOLUTION INTRAVENOUS at 09:43

## 2023-06-23 RX ADMIN — ONDANSETRON 4 MG: 2 INJECTION INTRAMUSCULAR; INTRAVENOUS at 15:20

## 2023-06-23 RX ADMIN — OXYCODONE HYDROCHLORIDE AND ACETAMINOPHEN 1 TABLET: 5; 325 TABLET ORAL at 17:21

## 2023-06-23 RX ADMIN — PHENYLEPHRINE HYDROCHLORIDE 25 MCG/MIN: 10 INJECTION INTRAVENOUS at 08:16

## 2023-06-23 RX ADMIN — FENTANYL CITRATE 25 MCG: 50 INJECTION, SOLUTION INTRAMUSCULAR; INTRAVENOUS at 08:49

## 2023-06-23 RX ADMIN — PRAVASTATIN SODIUM 20 MG: 20 TABLET ORAL at 22:59

## 2023-06-23 RX ADMIN — CLOPIDOGREL 300 MG: 75 TABLET, FILM COATED ORAL at 07:26

## 2023-06-23 RX ADMIN — ACETAMINOPHEN 325 MG: 325 TABLET ORAL at 17:21

## 2023-06-23 RX ADMIN — ACETAMINOPHEN 650 MG: 325 TABLET ORAL at 00:22

## 2023-06-23 RX ADMIN — CYCLOBENZAPRINE 10 MG: 10 TABLET, FILM COATED ORAL at 21:53

## 2023-06-23 RX ADMIN — MIDAZOLAM 1 MG: 1 INJECTION INTRAMUSCULAR; INTRAVENOUS at 08:55

## 2023-06-23 RX ADMIN — METOPROLOL TARTRATE 50 MG: 50 TABLET, FILM COATED ORAL at 10:46

## 2023-06-23 RX ADMIN — HYDROXYZINE HYDROCHLORIDE 10 MG: 10 TABLET ORAL at 22:01

## 2023-06-23 RX ADMIN — SODIUM CHLORIDE: 9 INJECTION, SOLUTION INTRAVENOUS at 06:19

## 2023-06-23 RX ADMIN — MORPHINE SULFATE 1 MG: 2 INJECTION, SOLUTION INTRAMUSCULAR; INTRAVENOUS at 17:21

## 2023-06-23 RX ADMIN — HEPARIN SODIUM 10000 UNITS: 1000 INJECTION INTRAVENOUS; SUBCUTANEOUS at 08:46

## 2023-06-23 RX ADMIN — HEPARIN SODIUM 3000 UNITS: 1000 INJECTION INTRAVENOUS; SUBCUTANEOUS at 08:58

## 2023-06-23 RX ADMIN — IOPAMIDOL 75 ML: 755 INJECTION, SOLUTION INTRAVENOUS at 17:52

## 2023-06-23 RX ADMIN — METOPROLOL TARTRATE 25 MG: 25 TABLET, FILM COATED ORAL at 21:53

## 2023-06-23 RX ADMIN — LORAZEPAM 0.5 MG: 2 INJECTION INTRAMUSCULAR; INTRAVENOUS at 16:36

## 2023-06-23 RX ADMIN — SODIUM CHLORIDE 10 ML/HR: 9 INJECTION, SOLUTION INTRAVENOUS at 20:04

## 2023-06-23 RX ADMIN — CYCLOBENZAPRINE 10 MG: 10 TABLET, FILM COATED ORAL at 17:21

## 2023-06-23 ASSESSMENT — PAIN - FUNCTIONAL ASSESSMENT
PAIN_FUNCTIONAL_ASSESSMENT: ACTIVITIES ARE NOT PREVENTED
PAIN_FUNCTIONAL_ASSESSMENT: PREVENTS OR INTERFERES SOME ACTIVE ACTIVITIES AND ADLS

## 2023-06-23 ASSESSMENT — PAIN SCALES - GENERAL
PAINLEVEL_OUTOF10: 10
PAINLEVEL_OUTOF10: 2
PAINLEVEL_OUTOF10: 10
PAINLEVEL_OUTOF10: 7
PAINLEVEL_OUTOF10: 0
PAINLEVEL_OUTOF10: 3

## 2023-06-23 ASSESSMENT — PAIN DESCRIPTION - ONSET: ONSET: AWAKENED FROM SLEEP

## 2023-06-23 ASSESSMENT — PAIN DESCRIPTION - DESCRIPTORS: DESCRIPTORS: DISCOMFORT

## 2023-06-23 ASSESSMENT — PAIN DESCRIPTION - LOCATION
LOCATION: GENERALIZED
LOCATION: HIP;LEG

## 2023-06-23 ASSESSMENT — PAIN DESCRIPTION - ORIENTATION
ORIENTATION: LEFT
ORIENTATION: RIGHT;LEFT;UPPER;LOWER

## 2023-06-23 ASSESSMENT — COPD QUESTIONNAIRES: CAT_SEVERITY: MODERATE

## 2023-06-23 ASSESSMENT — PAIN DESCRIPTION - FREQUENCY: FREQUENCY: CONTINUOUS

## 2023-06-23 NOTE — ANESTHESIA POSTPROCEDURE EVALUATION
Department of Anesthesiology  Postprocedure Note    Patient: Keyla Saenz  MRN: [id number]  YOB: 1945  Date of evaluation: 6/23/2023      Procedure Summary     Date: 06/23/23 Room / Location: [facility]    Anesthesia Start: 0804 Anesthesia Stop: 3015    Procedure: TAVR W/ ANESTHESIA Diagnosis:     Scheduled Providers:  Responsible Provider: Kingsley Ocasio MD    Anesthesia Type: MAC ASA Status: 3          Anesthesia Type: No value filed.     Dillan Phase I: Dillan Score: 6    Dillan Phase II:        Anesthesia Post Evaluation    Patient location during evaluation: PACU  Patient participation: complete - patient participated  Level of consciousness: awake and alert  Airway patency: patent  Nausea & Vomiting: no nausea and no vomiting  Complications: no  Cardiovascular status: blood pressure returned to baseline  Respiratory status: acceptable and spontaneous ventilation  Hydration status: stable

## 2023-06-23 NOTE — ANESTHESIA PRE PROCEDURE
Department of Anesthesiology  Preprocedure Note       Name:  Mag Coppola   Age:  66 y.o.  :  1945                                          MRN:  [id number]         Date:  2023      Surgeon: * Surgery not found *    Procedure:     Medications prior to admission:   Prior to Admission medications    Medication Sig Start Date End Date Taking?  Authorizing Provider   pravastatin (PRAVACHOL) 20 MG tablet TAKE 1 TABLET BY MOUTH EVERY DAY IN THE EVENING 23   Samuel Foley MD   dicyclomine (BENTYL) 10 MG capsule Take 1 capsule by mouth 3 times daily as needed    Historical Provider, MD   acetylcysteine (MUCOMYST) 20 % nebulizer solution Take 3 mLs by mouth 2 times daily for 2 days  Patient not taking: Reported on 2023 5/10/23 6/13/23  Marita Paget, APRN - CNP   montelukast (SINGULAIR) 10 MG tablet TAKE 1 TABLET BY MOUTH EVERY DAY IN THE EVENING 5/3/23   Samuel Foley MD   zolpidem (AMBIEN) 10 MG tablet TAKE 1 TABLET BY MOUTH EVERYDAY AT BEDTIME 23  Samuel Foley MD   triamcinolone (KENALOG) 0.025 % ointment APPLY TO VULVA TWICE DAILY 3/24/23   Debra Ahumada Heuring, MD   valACYclovir (VALTREX) 500 MG tablet TAKE 1 TABLET BY MOUTH TWICE A DAY FOR 3 DAYS 23   Samuel Foley MD   citalopram (CELEXA) 10 MG tablet Take 1 tablet by mouth daily 22   Samuel Foley MD   fluticasone (CUTIVATE) 0.05 % cream APPLY TO AFFECTED AREA TWICE A DAY 10/3/22   Samuel Foley MD   calcium carbonate (OSCAL) 500 MG TABS tablet Take 2 tablets by mouth daily    Historical Provider, MD   Multiple Vitamins-Minerals (THERAPEUTIC MULTIVITAMIN-MINERALS) tablet Take 1 tablet by mouth daily    Historical Provider, MD   Vitamin E 400 units TABS Take by mouth 2 times daily    Historical Provider, MD   Golimumab ([place] [address] ARIA IV) Infuse intravenously Every 8 weeks    Historical Provider, MD   valsartan-hydrochlorothiazide (DIOVAN-HCT) 80-12.5 MG per tablet TAKE

## 2023-06-24 LAB
ABO/RH: NORMAL
ANION GAP SERPL CALCULATED.3IONS-SCNC: 10 MMOL/L (ref 9–17)
ANTIBODY SCREEN: NEGATIVE
ARM BAND NUMBER: NORMAL
BLD PROD TYP BPU: NORMAL
BPU ID: NORMAL
BUN SERPL-MCNC: 12 MG/DL (ref 8–23)
CALCIUM SERPL-MCNC: 7.9 MG/DL (ref 8.6–10.4)
CHLORIDE SERPL-SCNC: 103 MMOL/L (ref 98–107)
CO2 SERPL-SCNC: 24 MMOL/L (ref 20–31)
CREAT SERPL-MCNC: 1.05 MG/DL (ref 0.5–0.9)
CROSSMATCH RESULT: NORMAL
DISPENSE STATUS BLOOD BANK: NORMAL
ERYTHROCYTE [DISTWIDTH] IN BLOOD BY AUTOMATED COUNT: 15 % (ref 11.8–14.4)
EXPIRATION DATE: NORMAL
GFR SERPL CREATININE-BSD FRML MDRD: 54 ML/MIN/1.73M2
GLUCOSE SERPL-MCNC: 90 MG/DL (ref 70–99)
HCT VFR BLD AUTO: 32.3 % (ref 36.3–47.1)
HCT VFR BLD AUTO: 32.6 % (ref 36.3–47.1)
HGB BLD-MCNC: 10.1 G/DL (ref 11.9–15.1)
HGB BLD-MCNC: 10.2 G/DL (ref 11.9–15.1)
LV EF: 55 %
LVEF MODALITY: NORMAL
MCH RBC QN AUTO: 27.4 PG (ref 25.2–33.5)
MCHC RBC AUTO-ENTMCNC: 31.3 G/DL (ref 28.4–34.8)
MCV RBC AUTO: 87.6 FL (ref 82.6–102.9)
NRBC BLD-RTO: 0 PER 100 WBC
PLATELET # BLD AUTO: ABNORMAL K/UL (ref 138–453)
PLATELET, FLUORESCENCE: 145 K/UL (ref 138–453)
PLATELETS.RETICULATED NFR BLD AUTO: 3.5 % (ref 1.1–10.3)
POTASSIUM SERPL-SCNC: 4 MMOL/L (ref 3.7–5.3)
RBC # BLD AUTO: 3.72 M/UL (ref 3.95–5.11)
SODIUM SERPL-SCNC: 137 MMOL/L (ref 135–144)
TRANSFUSION STATUS: NORMAL
UNIT DIVISION: 0
WBC OTHER # BLD: 8.6 K/UL (ref 3.5–11.3)

## 2023-06-24 PROCEDURE — 80048 BASIC METABOLIC PNL TOTAL CA: CPT

## 2023-06-24 PROCEDURE — 2580000003 HC RX 258: Performed by: STUDENT IN AN ORGANIZED HEALTH CARE EDUCATION/TRAINING PROGRAM

## 2023-06-24 PROCEDURE — 97110 THERAPEUTIC EXERCISES: CPT

## 2023-06-24 PROCEDURE — 85027 COMPLETE CBC AUTOMATED: CPT

## 2023-06-24 PROCEDURE — 99233 SBSQ HOSP IP/OBS HIGH 50: CPT | Performed by: INTERNAL MEDICINE

## 2023-06-24 PROCEDURE — 93306 TTE W/DOPPLER COMPLETE: CPT

## 2023-06-24 PROCEDURE — 2700000000 HC OXYGEN THERAPY PER DAY

## 2023-06-24 PROCEDURE — 97116 GAIT TRAINING THERAPY: CPT

## 2023-06-24 PROCEDURE — 93005 ELECTROCARDIOGRAM TRACING: CPT | Performed by: STUDENT IN AN ORGANIZED HEALTH CARE EDUCATION/TRAINING PROGRAM

## 2023-06-24 PROCEDURE — 2060000000 HC ICU INTERMEDIATE R&B

## 2023-06-24 PROCEDURE — 85018 HEMOGLOBIN: CPT

## 2023-06-24 PROCEDURE — 6370000000 HC RX 637 (ALT 250 FOR IP): Performed by: NURSE PRACTITIONER

## 2023-06-24 PROCEDURE — 6370000000 HC RX 637 (ALT 250 FOR IP): Performed by: STUDENT IN AN ORGANIZED HEALTH CARE EDUCATION/TRAINING PROGRAM

## 2023-06-24 PROCEDURE — 85055 RETICULATED PLATELET ASSAY: CPT

## 2023-06-24 PROCEDURE — 99232 SBSQ HOSP IP/OBS MODERATE 35: CPT | Performed by: STUDENT IN AN ORGANIZED HEALTH CARE EDUCATION/TRAINING PROGRAM

## 2023-06-24 PROCEDURE — 6360000002 HC RX W HCPCS: Performed by: NURSE PRACTITIONER

## 2023-06-24 PROCEDURE — 94761 N-INVAS EAR/PLS OXIMETRY MLT: CPT

## 2023-06-24 PROCEDURE — 85014 HEMATOCRIT: CPT

## 2023-06-24 PROCEDURE — 2580000003 HC RX 258: Performed by: NURSE PRACTITIONER

## 2023-06-24 PROCEDURE — 6360000002 HC RX W HCPCS: Performed by: STUDENT IN AN ORGANIZED HEALTH CARE EDUCATION/TRAINING PROGRAM

## 2023-06-24 PROCEDURE — 94640 AIRWAY INHALATION TREATMENT: CPT

## 2023-06-24 PROCEDURE — 36415 COLL VENOUS BLD VENIPUNCTURE: CPT

## 2023-06-24 PROCEDURE — 2580000003 HC RX 258: Performed by: INTERNAL MEDICINE

## 2023-06-24 RX ADMIN — CLOPIDOGREL BISULFATE 75 MG: 75 TABLET ORAL at 09:06

## 2023-06-24 RX ADMIN — VALSARTAN 80 MG: 80 TABLET, FILM COATED ORAL at 11:59

## 2023-06-24 RX ADMIN — SODIUM CHLORIDE, PRESERVATIVE FREE 10 ML: 5 INJECTION INTRAVENOUS at 21:54

## 2023-06-24 RX ADMIN — ASPIRIN 81 MG 81 MG: 81 TABLET ORAL at 09:06

## 2023-06-24 RX ADMIN — ROPINIROLE HYDROCHLORIDE 0.5 MG: 0.25 TABLET, FILM COATED ORAL at 09:07

## 2023-06-24 RX ADMIN — PANTOPRAZOLE SODIUM 40 MG: 40 TABLET, DELAYED RELEASE ORAL at 09:06

## 2023-06-24 RX ADMIN — IRON SUCROSE 200 MG: 20 INJECTION, SOLUTION INTRAVENOUS at 10:32

## 2023-06-24 RX ADMIN — Medication 1000 MG: at 09:15

## 2023-06-24 RX ADMIN — ROPINIROLE HYDROCHLORIDE 0.5 MG: 0.25 TABLET, FILM COATED ORAL at 21:54

## 2023-06-24 RX ADMIN — METOPROLOL TARTRATE 25 MG: 25 TABLET, FILM COATED ORAL at 21:53

## 2023-06-24 RX ADMIN — HYDROXYZINE HYDROCHLORIDE 10 MG: 10 TABLET ORAL at 21:54

## 2023-06-24 RX ADMIN — ACETAMINOPHEN 650 MG: 325 TABLET ORAL at 21:53

## 2023-06-24 RX ADMIN — CITALOPRAM 10 MG: 10 TABLET, FILM COATED ORAL at 09:10

## 2023-06-24 RX ADMIN — Medication 1 TABLET: at 09:08

## 2023-06-24 RX ADMIN — PRAVASTATIN SODIUM 20 MG: 20 TABLET ORAL at 18:00

## 2023-06-24 RX ADMIN — MONTELUKAST SODIUM 10 MG: 10 TABLET, FILM COATED ORAL at 18:00

## 2023-06-24 RX ADMIN — ENOXAPARIN SODIUM 40 MG: 40 INJECTION SUBCUTANEOUS at 14:33

## 2023-06-24 RX ADMIN — BUDESONIDE AND FORMOTEROL FUMARATE DIHYDRATE 2 PUFF: 160; 4.5 AEROSOL RESPIRATORY (INHALATION) at 09:43

## 2023-06-24 RX ADMIN — METOPROLOL TARTRATE 50 MG: 50 TABLET, FILM COATED ORAL at 09:07

## 2023-06-24 RX ADMIN — BUDESONIDE AND FORMOTEROL FUMARATE DIHYDRATE 2 PUFF: 160; 4.5 AEROSOL RESPIRATORY (INHALATION) at 22:22

## 2023-06-24 RX ADMIN — ALBUTEROL SULFATE 2.5 MG: 2.5 SOLUTION RESPIRATORY (INHALATION) at 18:16

## 2023-06-24 RX ADMIN — ACETAMINOPHEN 650 MG: 325 TABLET ORAL at 11:58

## 2023-06-24 ASSESSMENT — PAIN DESCRIPTION - DESCRIPTORS: DESCRIPTORS: ACHING

## 2023-06-24 ASSESSMENT — PAIN DESCRIPTION - ORIENTATION: ORIENTATION: LEFT

## 2023-06-24 ASSESSMENT — PAIN DESCRIPTION - LOCATION: LOCATION: HIP

## 2023-06-24 ASSESSMENT — PAIN SCALES - GENERAL
PAINLEVEL_OUTOF10: 0
PAINLEVEL_OUTOF10: 3
PAINLEVEL_OUTOF10: 0

## 2023-06-25 ENCOUNTER — APPOINTMENT (OUTPATIENT)
Dept: GENERAL RADIOLOGY | Age: 78
DRG: 267 | End: 2023-06-25
Attending: INTERNAL MEDICINE
Payer: MEDICARE

## 2023-06-25 VITALS
WEIGHT: 160.05 LBS | TEMPERATURE: 98.4 F | DIASTOLIC BLOOD PRESSURE: 65 MMHG | RESPIRATION RATE: 18 BRPM | BODY MASS INDEX: 29.45 KG/M2 | SYSTOLIC BLOOD PRESSURE: 135 MMHG | HEIGHT: 62 IN | HEART RATE: 82 BPM | OXYGEN SATURATION: 93 %

## 2023-06-25 PROBLEM — G25.81 RESTLESS LEGS: Status: ACTIVE | Noted: 2023-06-25

## 2023-06-25 LAB
ANION GAP SERPL CALCULATED.3IONS-SCNC: 12 MMOL/L (ref 9–17)
BASOPHILS # BLD: 0.06 K/UL (ref 0–0.2)
BASOPHILS NFR BLD: 1 % (ref 0–2)
BILIRUB UR QL STRIP: NEGATIVE
BUN SERPL-MCNC: 18 MG/DL (ref 8–23)
CALCIUM SERPL-MCNC: 8.1 MG/DL (ref 8.6–10.4)
CASTS #/AREA URNS LPF: NORMAL /LPF (ref 0–8)
CHLORIDE SERPL-SCNC: 104 MMOL/L (ref 98–107)
CLARITY UR: CLEAR
CO2 SERPL-SCNC: 22 MMOL/L (ref 20–31)
COLOR UR: ABNORMAL
CREAT SERPL-MCNC: 1.25 MG/DL (ref 0.5–0.9)
EKG ATRIAL RATE: 70 BPM
EKG P AXIS: 35 DEGREES
EKG P-R INTERVAL: 146 MS
EKG Q-T INTERVAL: 450 MS
EKG QRS DURATION: 158 MS
EKG QTC CALCULATION (BAZETT): 486 MS
EKG R AXIS: -77 DEGREES
EKG T AXIS: 92 DEGREES
EKG VENTRICULAR RATE: 70 BPM
EOSINOPHIL # BLD: 0.14 K/UL (ref 0–0.44)
EOSINOPHILS RELATIVE PERCENT: 2 % (ref 1–4)
EPI CELLS #/AREA URNS HPF: NORMAL /HPF (ref 0–5)
ERYTHROCYTE [DISTWIDTH] IN BLOOD BY AUTOMATED COUNT: 15 % (ref 11.8–14.4)
GFR SERPL CREATININE-BSD FRML MDRD: 44 ML/MIN/1.73M2
GLUCOSE SERPL-MCNC: 171 MG/DL (ref 70–99)
GLUCOSE UR STRIP-MCNC: NEGATIVE MG/DL
HCT VFR BLD AUTO: 31.8 % (ref 36.3–47.1)
HGB BLD-MCNC: 10 G/DL (ref 11.9–15.1)
HGB UR QL STRIP.AUTO: ABNORMAL
IMM GRANULOCYTES # BLD AUTO: 0.03 K/UL (ref 0–0.3)
IMM GRANULOCYTES NFR BLD: 0 %
KETONES UR STRIP-MCNC: NEGATIVE MG/DL
LACTIC ACID, SEPSIS WHOLE BLOOD: 1.5 MMOL/L (ref 0.5–1.9)
LEUKOCYTE ESTERASE UR QL STRIP: NEGATIVE
LYMPHOCYTES # BLD: 13 % (ref 24–43)
LYMPHOCYTES NFR BLD: 0.91 K/UL (ref 1.1–3.7)
MCH RBC QN AUTO: 27.9 PG (ref 25.2–33.5)
MCHC RBC AUTO-ENTMCNC: 31.4 G/DL (ref 28.4–34.8)
MCV RBC AUTO: 88.6 FL (ref 82.6–102.9)
MONOCYTES NFR BLD: 0.95 K/UL (ref 0.1–1.2)
MONOCYTES NFR BLD: 14 % (ref 3–12)
NEUTROPHILS NFR BLD: 70 % (ref 36–65)
NEUTS SEG NFR BLD: 4.78 K/UL (ref 1.5–8.1)
NITRITE UR QL STRIP: NEGATIVE
NRBC BLD-RTO: 0 PER 100 WBC
PH UR STRIP: 5.5 [PH] (ref 5–8)
PLATELET # BLD AUTO: 156 K/UL (ref 138–453)
PMV BLD AUTO: 11 FL (ref 8.1–13.5)
POTASSIUM SERPL-SCNC: 3.8 MMOL/L (ref 3.7–5.3)
PROCALCITONIN SERPL-MCNC: 0.45 NG/ML
PROT UR STRIP-MCNC: ABNORMAL MG/DL
RBC # BLD AUTO: 3.59 M/UL (ref 3.95–5.11)
RBC # BLD: ABNORMAL 10*6/UL
RBC #/AREA URNS HPF: NORMAL /HPF (ref 0–4)
SODIUM SERPL-SCNC: 138 MMOL/L (ref 135–144)
SP GR UR STRIP: 1.02 (ref 1–1.03)
UROBILINOGEN UR STRIP-ACNC: NORMAL
WBC #/AREA URNS HPF: NORMAL /HPF (ref 0–5)
WBC OTHER # BLD: 6.9 K/UL (ref 3.5–11.3)

## 2023-06-25 PROCEDURE — 87040 BLOOD CULTURE FOR BACTERIA: CPT

## 2023-06-25 PROCEDURE — 2580000003 HC RX 258: Performed by: NURSE PRACTITIONER

## 2023-06-25 PROCEDURE — 84145 PROCALCITONIN (PCT): CPT

## 2023-06-25 PROCEDURE — 6370000000 HC RX 637 (ALT 250 FOR IP): Performed by: NURSE PRACTITIONER

## 2023-06-25 PROCEDURE — 71045 X-RAY EXAM CHEST 1 VIEW: CPT

## 2023-06-25 PROCEDURE — 83605 ASSAY OF LACTIC ACID: CPT

## 2023-06-25 PROCEDURE — 6370000000 HC RX 637 (ALT 250 FOR IP): Performed by: STUDENT IN AN ORGANIZED HEALTH CARE EDUCATION/TRAINING PROGRAM

## 2023-06-25 PROCEDURE — 94640 AIRWAY INHALATION TREATMENT: CPT

## 2023-06-25 PROCEDURE — 94761 N-INVAS EAR/PLS OXIMETRY MLT: CPT

## 2023-06-25 PROCEDURE — 2580000003 HC RX 258: Performed by: INTERNAL MEDICINE

## 2023-06-25 PROCEDURE — 99239 HOSP IP/OBS DSCHRG MGMT >30: CPT | Performed by: STUDENT IN AN ORGANIZED HEALTH CARE EDUCATION/TRAINING PROGRAM

## 2023-06-25 PROCEDURE — 97165 OT EVAL LOW COMPLEX 30 MIN: CPT

## 2023-06-25 PROCEDURE — 80048 BASIC METABOLIC PNL TOTAL CA: CPT

## 2023-06-25 PROCEDURE — 2580000003 HC RX 258: Performed by: STUDENT IN AN ORGANIZED HEALTH CARE EDUCATION/TRAINING PROGRAM

## 2023-06-25 PROCEDURE — 97530 THERAPEUTIC ACTIVITIES: CPT

## 2023-06-25 PROCEDURE — 6360000002 HC RX W HCPCS: Performed by: STUDENT IN AN ORGANIZED HEALTH CARE EDUCATION/TRAINING PROGRAM

## 2023-06-25 PROCEDURE — 85027 COMPLETE CBC AUTOMATED: CPT

## 2023-06-25 PROCEDURE — 99233 SBSQ HOSP IP/OBS HIGH 50: CPT | Performed by: NURSE PRACTITIONER

## 2023-06-25 PROCEDURE — 6360000002 HC RX W HCPCS: Performed by: NURSE PRACTITIONER

## 2023-06-25 PROCEDURE — 2500000003 HC RX 250 WO HCPCS: Performed by: STUDENT IN AN ORGANIZED HEALTH CARE EDUCATION/TRAINING PROGRAM

## 2023-06-25 PROCEDURE — 81001 URINALYSIS AUTO W/SCOPE: CPT

## 2023-06-25 PROCEDURE — 36415 COLL VENOUS BLD VENIPUNCTURE: CPT

## 2023-06-25 PROCEDURE — 6370000000 HC RX 637 (ALT 250 FOR IP): Performed by: INTERNAL MEDICINE

## 2023-06-25 RX ORDER — HYDROCHLOROTHIAZIDE 25 MG/1
12.5 TABLET ORAL DAILY
Status: DISCONTINUED | OUTPATIENT
Start: 2023-06-25 | End: 2023-06-25 | Stop reason: HOSPADM

## 2023-06-25 RX ORDER — ROPINIROLE 0.5 MG/1
0.5 TABLET, FILM COATED ORAL 2 TIMES DAILY
Qty: 90 TABLET | Refills: 3 | Status: SHIPPED | OUTPATIENT
Start: 2023-06-25

## 2023-06-25 RX ORDER — OXYCODONE HYDROCHLORIDE AND ACETAMINOPHEN 5; 325 MG/1; MG/1
1 TABLET ORAL EVERY 4 HOURS PRN
Qty: 6 TABLET | Refills: 0 | Status: SHIPPED | OUTPATIENT
Start: 2023-06-25 | End: 2023-06-28

## 2023-06-25 RX ORDER — CARVEDILOL 6.25 MG/1
6.25 TABLET ORAL 2 TIMES DAILY WITH MEALS
Status: DISCONTINUED | OUTPATIENT
Start: 2023-06-25 | End: 2023-06-25 | Stop reason: HOSPADM

## 2023-06-25 RX ORDER — FERROUS SULFATE 325(65) MG
325 TABLET ORAL
Qty: 60 TABLET | Refills: 5 | Status: SHIPPED | OUTPATIENT
Start: 2023-06-25

## 2023-06-25 RX ORDER — CARVEDILOL 6.25 MG/1
6.25 TABLET ORAL 2 TIMES DAILY WITH MEALS
Qty: 60 TABLET | Refills: 3 | Status: SHIPPED | OUTPATIENT
Start: 2023-06-25

## 2023-06-25 RX ORDER — POLYETHYLENE GLYCOL 3350 17 G/17G
17 POWDER, FOR SOLUTION ORAL DAILY PRN
Status: DISCONTINUED | OUTPATIENT
Start: 2023-06-25 | End: 2023-06-25 | Stop reason: HOSPADM

## 2023-06-25 RX ORDER — CLOPIDOGREL BISULFATE 75 MG/1
75 TABLET ORAL DAILY
Qty: 30 TABLET | Refills: 3 | Status: SHIPPED | OUTPATIENT
Start: 2023-06-26

## 2023-06-25 RX ORDER — LABETALOL HYDROCHLORIDE 5 MG/ML
10 INJECTION, SOLUTION INTRAVENOUS ONCE
Status: COMPLETED | OUTPATIENT
Start: 2023-06-25 | End: 2023-06-25

## 2023-06-25 RX ADMIN — BUDESONIDE AND FORMOTEROL FUMARATE DIHYDRATE 2 PUFF: 160; 4.5 AEROSOL RESPIRATORY (INHALATION) at 07:59

## 2023-06-25 RX ADMIN — CLOPIDOGREL BISULFATE 75 MG: 75 TABLET ORAL at 08:24

## 2023-06-25 RX ADMIN — HYDROCHLOROTHIAZIDE 12.5 MG: 25 TABLET ORAL at 10:58

## 2023-06-25 RX ADMIN — OXYCODONE HYDROCHLORIDE AND ACETAMINOPHEN 1 TABLET: 5; 325 TABLET ORAL at 13:30

## 2023-06-25 RX ADMIN — SODIUM CHLORIDE, PRESERVATIVE FREE 10 ML: 5 INJECTION INTRAVENOUS at 08:35

## 2023-06-25 RX ADMIN — Medication 1 TABLET: at 08:24

## 2023-06-25 RX ADMIN — PANTOPRAZOLE SODIUM 40 MG: 40 TABLET, DELAYED RELEASE ORAL at 06:41

## 2023-06-25 RX ADMIN — ASPIRIN 81 MG 81 MG: 81 TABLET ORAL at 08:24

## 2023-06-25 RX ADMIN — ENOXAPARIN SODIUM 40 MG: 40 INJECTION SUBCUTANEOUS at 13:30

## 2023-06-25 RX ADMIN — POLYETHYLENE GLYCOL 3350 17 G: 17 POWDER, FOR SOLUTION ORAL at 09:37

## 2023-06-25 RX ADMIN — CYCLOBENZAPRINE 10 MG: 10 TABLET, FILM COATED ORAL at 11:24

## 2023-06-25 RX ADMIN — CITALOPRAM 10 MG: 10 TABLET, FILM COATED ORAL at 08:27

## 2023-06-25 RX ADMIN — VALSARTAN 80 MG: 80 TABLET, FILM COATED ORAL at 08:24

## 2023-06-25 RX ADMIN — ROPINIROLE HYDROCHLORIDE 0.5 MG: 0.25 TABLET, FILM COATED ORAL at 08:25

## 2023-06-25 RX ADMIN — CARVEDILOL 6.25 MG: 6.25 TABLET, FILM COATED ORAL at 08:34

## 2023-06-25 RX ADMIN — IRON SUCROSE 200 MG: 20 INJECTION, SOLUTION INTRAVENOUS at 10:00

## 2023-06-25 RX ADMIN — Medication 10 MG: at 08:28

## 2023-06-25 RX ADMIN — ACETAMINOPHEN 650 MG: 325 TABLET ORAL at 08:26

## 2023-06-25 RX ADMIN — SODIUM CHLORIDE, PRESERVATIVE FREE 10 ML: 5 INJECTION INTRAVENOUS at 08:31

## 2023-06-25 ASSESSMENT — PAIN DESCRIPTION - LOCATION
LOCATION: GROIN
LOCATION: GROIN

## 2023-06-25 ASSESSMENT — PAIN SCALES - GENERAL
PAINLEVEL_OUTOF10: 3
PAINLEVEL_OUTOF10: 2
PAINLEVEL_OUTOF10: 3
PAINLEVEL_OUTOF10: 7

## 2023-06-26 ENCOUNTER — HOSPITAL ENCOUNTER (EMERGENCY)
Age: 78
Discharge: HOME | End: 2023-06-26
Attending: EMERGENCY MEDICINE
Payer: MEDICARE

## 2023-06-26 ENCOUNTER — APPOINTMENT (OUTPATIENT)
Dept: CT IMAGING | Age: 78
End: 2023-06-26
Payer: MEDICARE

## 2023-06-26 ENCOUNTER — CLINICAL DOCUMENTATION ONLY (OUTPATIENT)
Facility: CLINIC | Age: 78
End: 2023-06-26

## 2023-06-26 VITALS
OXYGEN SATURATION: 98 % | RESPIRATION RATE: 20 BRPM | SYSTOLIC BLOOD PRESSURE: 107 MMHG | DIASTOLIC BLOOD PRESSURE: 48 MMHG | HEART RATE: 78 BPM | TEMPERATURE: 98.6 F

## 2023-06-26 DIAGNOSIS — N89.8 VAGINAL DISCHARGE, BLOODY: Primary | ICD-10-CM

## 2023-06-26 DIAGNOSIS — R06.02 SHORTNESS OF BREATH: ICD-10-CM

## 2023-06-26 LAB
ALBUMIN SERPL-MCNC: 3.6 G/DL (ref 3.5–5.2)
ALP SERPL-CCNC: 56 U/L (ref 35–104)
ALT SERPL-CCNC: 22 U/L (ref 5–33)
ANION GAP SERPL CALCULATED.3IONS-SCNC: 9 MMOL/L (ref 9–17)
AST SERPL-CCNC: 28 U/L
BASOPHILS # BLD: 0.06 K/UL (ref 0–0.2)
BASOPHILS NFR BLD: 1 % (ref 0–2)
BILIRUB SERPL-MCNC: 0.4 MG/DL (ref 0.3–1.2)
BILIRUB UR QL STRIP: NEGATIVE
BUN SERPL-MCNC: 15 MG/DL (ref 8–23)
BUN/CREAT SERPL: 13 (ref 9–20)
CALCIUM SERPL-MCNC: 8.4 MG/DL (ref 8.6–10.4)
CANDIDA SPECIES, DNA PROBE: NEGATIVE
CHLORIDE SERPL-SCNC: 104 MMOL/L (ref 98–107)
CLARITY UR: CLEAR
CO2 SERPL-SCNC: 26 MMOL/L (ref 20–31)
COLOR UR: YELLOW
CREAT SERPL-MCNC: 1.12 MG/DL (ref 0.5–0.9)
D DIMER PPP FEU-MCNC: 1.89 UG/ML FEU (ref 0–0.59)
EKG ATRIAL RATE: 106 BPM
EKG ATRIAL RATE: 75 BPM
EKG ATRIAL RATE: 81 BPM
EKG P AXIS: 64 DEGREES
EKG P AXIS: 65 DEGREES
EKG P AXIS: 70 DEGREES
EKG P-R INTERVAL: 170 MS
EKG P-R INTERVAL: 188 MS
EKG P-R INTERVAL: 188 MS
EKG Q-T INTERVAL: 388 MS
EKG Q-T INTERVAL: 438 MS
EKG Q-T INTERVAL: 454 MS
EKG QRS DURATION: 160 MS
EKG QRS DURATION: 160 MS
EKG QRS DURATION: 162 MS
EKG QTC CALCULATION (BAZETT): 506 MS
EKG QTC CALCULATION (BAZETT): 508 MS
EKG QTC CALCULATION (BAZETT): 515 MS
EKG R AXIS: -81 DEGREES
EKG R AXIS: -81 DEGREES
EKG R AXIS: -84 DEGREES
EKG T AXIS: 83 DEGREES
EKG T AXIS: 93 DEGREES
EKG T AXIS: 95 DEGREES
EKG VENTRICULAR RATE: 106 BPM
EKG VENTRICULAR RATE: 75 BPM
EKG VENTRICULAR RATE: 81 BPM
EOSINOPHIL # BLD: 0.44 K/UL (ref 0–0.44)
EOSINOPHILS RELATIVE PERCENT: 8 % (ref 1–4)
ERYTHROCYTE [DISTWIDTH] IN BLOOD BY AUTOMATED COUNT: 15.2 % (ref 11.8–14.4)
GARDNERELLA VAGINALIS, DNA PROBE: NEGATIVE
GFR SERPL CREATININE-BSD FRML MDRD: 50 ML/MIN/1.73M2
GLUCOSE SERPL-MCNC: 95 MG/DL (ref 70–99)
GLUCOSE UR STRIP-MCNC: NEGATIVE MG/DL
HCT VFR BLD AUTO: 30.3 % (ref 36.3–47.1)
HGB BLD-MCNC: 9.7 G/DL (ref 11.9–15.1)
HGB UR QL STRIP.AUTO: NEGATIVE
IMM GRANULOCYTES # BLD AUTO: 0.01 K/UL (ref 0–0.3)
IMM GRANULOCYTES NFR BLD: 0 %
KETONES UR STRIP-MCNC: NEGATIVE MG/DL
LEUKOCYTE ESTERASE UR QL STRIP: NEGATIVE
LYMPHOCYTES # BLD: 22 % (ref 24–43)
LYMPHOCYTES NFR BLD: 1.22 K/UL (ref 1.1–3.7)
MCH RBC QN AUTO: 27.6 PG (ref 25.2–33.5)
MCHC RBC AUTO-ENTMCNC: 32 G/DL (ref 28.4–34.8)
MCV RBC AUTO: 86.1 FL (ref 82.6–102.9)
MONOCYTES NFR BLD: 0.92 K/UL (ref 0.1–1.2)
MONOCYTES NFR BLD: 16 % (ref 3–12)
NEUTROPHILS NFR BLD: 53 % (ref 36–65)
NEUTS SEG NFR BLD: 3 K/UL (ref 1.5–8.1)
NITRITE UR QL STRIP: NEGATIVE
NRBC BLD-RTO: 0 PER 100 WBC
PH UR STRIP: 5 [PH] (ref 5–8)
PLATELET # BLD AUTO: 141 K/UL (ref 138–453)
PMV BLD AUTO: 10.1 FL (ref 8.1–13.5)
POTASSIUM SERPL-SCNC: 3.7 MMOL/L (ref 3.7–5.3)
PROT SERPL-MCNC: 6.2 G/DL (ref 6.4–8.3)
PROT UR STRIP-MCNC: NEGATIVE MG/DL
RBC # BLD AUTO: 3.52 M/UL (ref 3.95–5.11)
RBC # BLD: ABNORMAL 10*6/UL
SODIUM SERPL-SCNC: 139 MMOL/L (ref 135–144)
SOURCE: NORMAL
SP GR UR STRIP: 1.01 (ref 1–1.03)
TRICHOMONAS VAGINALIS DNA: NEGATIVE
TROPONIN I SERPL HS-MCNC: 56 NG/L (ref 0–14)
TROPONIN I SERPL HS-MCNC: 80 NG/L (ref 0–14)
UROBILINOGEN UR STRIP-ACNC: NORMAL
WBC OTHER # BLD: 5.7 K/UL (ref 3.5–11.3)

## 2023-06-26 PROCEDURE — 6360000004 HC RX CONTRAST MEDICATION: Performed by: EMERGENCY MEDICINE

## 2023-06-26 PROCEDURE — 71260 CT THORAX DX C+: CPT

## 2023-06-26 PROCEDURE — 80053 COMPREHEN METABOLIC PANEL: CPT

## 2023-06-26 PROCEDURE — 85027 COMPLETE CBC AUTOMATED: CPT

## 2023-06-26 PROCEDURE — 81003 URINALYSIS AUTO W/O SCOPE: CPT

## 2023-06-26 PROCEDURE — 93005 ELECTROCARDIOGRAM TRACING: CPT | Performed by: EMERGENCY MEDICINE

## 2023-06-26 PROCEDURE — 87510 GARDNER VAG DNA DIR PROBE: CPT

## 2023-06-26 PROCEDURE — 87491 CHLMYD TRACH DNA AMP PROBE: CPT

## 2023-06-26 PROCEDURE — 2580000003 HC RX 258: Performed by: EMERGENCY MEDICINE

## 2023-06-26 PROCEDURE — 99285 EMERGENCY DEPT VISIT HI MDM: CPT

## 2023-06-26 PROCEDURE — 87660 TRICHOMONAS VAGIN DIR PROBE: CPT

## 2023-06-26 PROCEDURE — 87591 N.GONORRHOEAE DNA AMP PROB: CPT

## 2023-06-26 PROCEDURE — 84484 ASSAY OF TROPONIN QUANT: CPT

## 2023-06-26 PROCEDURE — 85379 FIBRIN DEGRADATION QUANT: CPT

## 2023-06-26 PROCEDURE — 87480 CANDIDA DNA DIR PROBE: CPT

## 2023-06-26 RX ORDER — SODIUM CHLORIDE 0.9 % (FLUSH) 0.9 %
10 SYRINGE (ML) INJECTION ONCE
Status: COMPLETED | OUTPATIENT
Start: 2023-06-26 | End: 2023-06-26

## 2023-06-26 RX ORDER — 0.9 % SODIUM CHLORIDE 0.9 %
80 INTRAVENOUS SOLUTION INTRAVENOUS ONCE
Status: COMPLETED | OUTPATIENT
Start: 2023-06-26 | End: 2023-06-26

## 2023-06-26 RX ADMIN — IOPAMIDOL 75 ML: 755 INJECTION, SOLUTION INTRAVENOUS at 13:30

## 2023-06-26 RX ADMIN — SODIUM CHLORIDE, PRESERVATIVE FREE 10 ML: 5 INJECTION INTRAVENOUS at 13:30

## 2023-06-26 RX ADMIN — SODIUM CHLORIDE 80 ML: 9 INJECTION, SOLUTION INTRAVENOUS at 13:31

## 2023-06-27 ENCOUNTER — TELEPHONE (OUTPATIENT)
Dept: CARDIOLOGY | Age: 78
End: 2023-06-27

## 2023-06-27 LAB
C TRACH DNA SPEC QL PROBE+SIG AMP: NEGATIVE
N GONORRHOEA DNA SPEC QL PROBE+SIG AMP: NEGATIVE
SPECIMEN DESCRIPTION: NORMAL

## 2023-06-30 LAB
MICROORGANISM SPEC CULT: NORMAL
MICROORGANISM SPEC CULT: NORMAL
SERVICE CMNT-IMP: NORMAL
SERVICE CMNT-IMP: NORMAL
SPECIMEN DESCRIPTION: NORMAL
SPECIMEN DESCRIPTION: NORMAL

## 2023-07-10 PROBLEM — I50.22 CHRONIC SYSTOLIC (CONGESTIVE) HEART FAILURE (HCC): Status: ACTIVE | Noted: 2023-07-10

## 2023-07-10 PROBLEM — Z87.891 FORMER SMOKER: Status: RESOLVED | Noted: 2023-06-16 | Resolved: 2023-07-10

## 2023-07-10 PROBLEM — R55 SYNCOPE: Status: RESOLVED | Noted: 2023-06-20 | Resolved: 2023-07-10

## 2023-07-12 DIAGNOSIS — D47.2 MGUS (MONOCLONAL GAMMOPATHY OF UNKNOWN SIGNIFICANCE): Primary | ICD-10-CM

## 2023-09-18 DIAGNOSIS — L30.9 VULVAR DERMATITIS: ICD-10-CM

## 2023-09-18 RX ORDER — TRIAMCINOLONE ACETONIDE 0.25 MG/G
OINTMENT TOPICAL
Qty: 75 G | Refills: 2 | Status: SHIPPED | OUTPATIENT
Start: 2023-09-18

## 2023-09-21 ENCOUNTER — HOSPITAL ENCOUNTER (OUTPATIENT)
Dept: CARDIAC REHAB | Age: 78
Setting detail: THERAPIES SERIES
Discharge: HOME | End: 2023-09-21
Payer: MEDICARE

## 2023-09-21 VITALS — WEIGHT: 160.2 LBS | HEIGHT: 63 IN | BODY MASS INDEX: 28.39 KG/M2

## 2023-09-21 PROCEDURE — 93797 PHYS/QHP OP CAR RHAB WO ECG: CPT

## 2023-09-21 PROCEDURE — 93798 PHYS/QHP OP CAR RHAB W/ECG: CPT

## 2023-09-21 ASSESSMENT — EXERCISE STRESS TEST
PEAK_BP: 128/70
PEAK_METS: 2
PEAK_BP: 128/70
PEAK_HR: 90
PEAK_RPE: 13

## 2023-09-21 ASSESSMENT — PATIENT HEALTH QUESTIONNAIRE - PHQ9
SUM OF ALL RESPONSES TO PHQ QUESTIONS 1-9: 1
SUM OF ALL RESPONSES TO PHQ QUESTIONS 1-9: 1
2. FEELING DOWN, DEPRESSED OR HOPELESS: 1
SUM OF ALL RESPONSES TO PHQ QUESTIONS 1-9: 1
SUM OF ALL RESPONSES TO PHQ9 QUESTIONS 1 & 2: 1
SUM OF ALL RESPONSES TO PHQ QUESTIONS 1-9: 1
1. LITTLE INTEREST OR PLEASURE IN DOING THINGS: 0

## 2023-09-21 ASSESSMENT — EJECTION FRACTION: EF_VALUE: 55

## 2023-09-21 NOTE — CARDIO/PULMONARY
Patient informed staff she is to undergo procedure Monday 9/25 to have pacemaker battery changed. She will be off the schedule 9/25 and 9/27 with a tentative return on 9/29 if she is cleared for exercise. Patient informed to call staff and update them on any changes to activity restrictions following Monday appointment.

## 2023-09-21 NOTE — FLOWSHEET NOTE
09/21/23 1009   Treatment Diagnosis   Treatment Diagnosis 1 MARII   MARII Date 06/23/23   Treatment Diagnosis 2 PCI   PCI Date   (2010,2017,+2019)   Referral Date 08/03/23   Significant Cardiovascular History Pacemaker;Previous PCI   Co-morbidities Malignancy   Oxygen Intervention   Oxygen Use No   Individual Treatment Plan   ITP Visit Type Initial assessment   1st Date of Exercise  09/21/23   ITP Next Review Date 10/18/23   Visit #/Total Visits 1/36   EF% 55 %   Risk Stratification Low   ITP Exercise;Nutrition;Psychosocial;Education   Exercise    DASI Total Score 10.7   Porter Total Score 25   Stages of Change Pre-contemplation   Assisted Devices None   Exercise Prescription   Mode Treadmill;Bike;Stepper;Ergometer;Elliptical   Frequency per week 3 X WEEK   Duration Per Session 31 MIN   Intensity METS       2.0   RPE 13   Progression INCREASE EVERY 3 SESSIONS, INCREASE INTENSITY ACCORDING TO RPE   Symptoms with Exercise Other (comment)  (DENIES TODAY)   Resistance Training Yes   Exercise Blood Pressures   Resting /60   Peak /70   Is BP WDL Yes   Exercise Activity at Home   Type HOUSE WORK   Resistance Training No   Exercise Education   Education Self pulse;Exercise safety;Signs/symptoms to report;RPE scale;Equipment orientation; Warm up/cool down;Physically active   Exercise Target Goal   Target Goal(s) Individual exercise RX;BP < 140/90 or < 130/80, if DM or CKD; Aerobic activity 30 + minutes/day  5 days/week   Patient Stated Exercise Goals INCREASE TOLERANCE   Psychosocial   Stages of Change Maintenance   PHQ-9 Total Score 1   Psychosocial Intervention   Interventions Currently under treatment for depression   Currently Taking Psychotropic Meds Yes   Medication Changes No   Psychosocial Target Goals   Uses Stress Mgmt Techniques Yes   Nutrition   Stages of Change Other (comment)  (DOES TRY TO EAT MORE FRUITS AND VEGETABLES BUT HAS TO WATCH DUE TO IBS ISSUES)   Lipids   Date Lipids Drawn 06/24/22  (LIPID
ORDER FROM PCP GIVEN TO PT AND EXPLAINED)   Total 196   HDL 58   LDL 99   Triglycerides 195   Lipid Med(s) PRAVASTATIN 20MG   Lipid Med Change(s) No   Weight Management   Weight  160 lb 3.2 oz (72.7 kg)   Height  5' 3\" (1.6 m)   BMI 28.44   Nutrition Assessment Tool   (SENT HOME WITH RATE YOUR PLATE TOOL)   Education   Learning Barrier Ready to learn   Cardiac Knowledge Total Score 8   Patient Education    Hypertension Yes   Hypertension Controlled Yes   Is BP WDL Yes   Med(s) Change No   ACE/ARB Prescribed Yes   ACE/ARB Adherent Yes   ASA Prescribed Yes   ASA Adherent Yes   Antiplatelet Prescribed Yes   Antiplatelet Adherent Yes   BB Prescribed Yes   BB Adherent Yes   Statins Prescribed Yes   Statins Adherent Yes   Statin Intensity Low   Staff Treatment Goals   Exercise Goal INCREASE TOLERANCE   Exercise Goal Status Initial   Exercise Goal Comments \"HAVE TO SIT DOWN AND TAKE BRAKES WHEN DOING THINGS\"   Exercise Goal Assessment Frequency/duration; Recommendations;Treatment time frame   Functional Capacity Goal DECREASE SOB   Functional Capacity Goal Status Initial   Functional Capacity Goal Comments \"GET SOB AND HAVE TO TAKE BREAKS   Functional Capacity Goal Assessment Key functional changes; Recommendations;Treatment time frame;Frequency/duration

## 2023-09-22 ENCOUNTER — HOSPITAL ENCOUNTER (OUTPATIENT)
Dept: CARDIAC REHAB | Age: 78
Setting detail: THERAPIES SERIES
Discharge: HOME | End: 2023-09-22
Payer: MEDICARE

## 2023-09-22 VITALS — BODY MASS INDEX: 28.48 KG/M2 | WEIGHT: 160.8 LBS

## 2023-09-22 PROCEDURE — 93798 PHYS/QHP OP CAR RHAB W/ECG: CPT

## 2023-09-22 ASSESSMENT — EXERCISE STRESS TEST
PEAK_BP: 124/62
PEAK_RPE: 13
PEAK_HR: 83
PEAK_METS: 2

## 2023-09-25 ENCOUNTER — HOSPITAL ENCOUNTER (OUTPATIENT)
Age: 78
Setting detail: OUTPATIENT SURGERY
Discharge: HOME | End: 2023-09-25
Attending: INTERNAL MEDICINE | Admitting: INTERNAL MEDICINE
Payer: MEDICARE

## 2023-09-25 VITALS
BODY MASS INDEX: 28 KG/M2 | SYSTOLIC BLOOD PRESSURE: 141 MMHG | HEIGHT: 63 IN | OXYGEN SATURATION: 98 % | RESPIRATION RATE: 16 BRPM | TEMPERATURE: 98.4 F | DIASTOLIC BLOOD PRESSURE: 61 MMHG | HEART RATE: 60 BPM | WEIGHT: 158 LBS

## 2023-09-25 DIAGNOSIS — Z45.010 PACEMAKER BATTERY DEPLETION: ICD-10-CM

## 2023-09-25 LAB
BUN BLD-MCNC: 15 MG/DL (ref 8–26)
BUN BLD-MCNC: 17 MG/DL (ref 8–26)
CA-I BLD-SCNC: 1.18 MMOL/L (ref 1.15–1.33)
CHLORIDE BLD-SCNC: 108 MMOL/L (ref 98–107)
CHLORIDE BLD-SCNC: 108 MMOL/L (ref 98–107)
ECHO BSA: 1.78 M2
EGFR, POC: 58 ML/MIN/1.73M2
EGFR, POC: >60 ML/MIN/1.73M2
GLUCOSE BLD-MCNC: 99 MG/DL (ref 74–100)
GLUCOSE BLD-MCNC: 99 MG/DL (ref 74–100)
HCT VFR BLD AUTO: 38 % (ref 36–46)
HCT VFR BLD AUTO: 41 % (ref 36–46)
POC CREATININE: 0.8 MG/DL (ref 0.51–1.19)
POC CREATININE: 1 MG/DL (ref 0.51–1.19)
POC HEMOGLOBIN (CALC): 12.9 G/DL (ref 12–16)
POC HEMOGLOBIN (CALC): 14 G/DL (ref 12–16)
POC LACTIC ACID: 0.5 MMOL/L (ref 0.56–1.39)
POTASSIUM BLD-SCNC: 4.1 MMOL/L (ref 3.5–4.5)
POTASSIUM BLD-SCNC: 5.6 MMOL/L (ref 3.5–4.5)
SARS-COV-2 RDRP RESP QL NAA+PROBE: NOT DETECTED
SODIUM BLD-SCNC: 141 MMOL/L (ref 138–146)
SODIUM BLD-SCNC: 141 MMOL/L (ref 138–146)
SPECIMEN DESCRIPTION: NORMAL

## 2023-09-25 PROCEDURE — 84295 ASSAY OF SERUM SODIUM: CPT

## 2023-09-25 PROCEDURE — 84132 ASSAY OF SERUM POTASSIUM: CPT

## 2023-09-25 PROCEDURE — C1889 IMPLANT/INSERT DEVICE, NOC: HCPCS | Performed by: INTERNAL MEDICINE

## 2023-09-25 PROCEDURE — 33228 REMV&REPLC PM GEN DUAL LEAD: CPT | Performed by: INTERNAL MEDICINE

## 2023-09-25 PROCEDURE — 99152 MOD SED SAME PHYS/QHP 5/>YRS: CPT | Performed by: INTERNAL MEDICINE

## 2023-09-25 PROCEDURE — 84520 ASSAY OF UREA NITROGEN: CPT

## 2023-09-25 PROCEDURE — C1785 PMKR, DUAL, RATE-RESP: HCPCS | Performed by: INTERNAL MEDICINE

## 2023-09-25 PROCEDURE — 7100000010 HC PHASE II RECOVERY - FIRST 15 MIN: Performed by: INTERNAL MEDICINE

## 2023-09-25 PROCEDURE — 87635 SARS-COV-2 COVID-19 AMP PRB: CPT

## 2023-09-25 PROCEDURE — 6360000002 HC RX W HCPCS: Performed by: INTERNAL MEDICINE

## 2023-09-25 PROCEDURE — 2580000003 HC RX 258: Performed by: INTERNAL MEDICINE

## 2023-09-25 PROCEDURE — 6370000000 HC RX 637 (ALT 250 FOR IP): Performed by: INTERNAL MEDICINE

## 2023-09-25 PROCEDURE — 82565 ASSAY OF CREATININE: CPT

## 2023-09-25 PROCEDURE — 82435 ASSAY OF BLOOD CHLORIDE: CPT

## 2023-09-25 PROCEDURE — 2500000003 HC RX 250 WO HCPCS: Performed by: INTERNAL MEDICINE

## 2023-09-25 PROCEDURE — 85014 HEMATOCRIT: CPT

## 2023-09-25 PROCEDURE — 83605 ASSAY OF LACTIC ACID: CPT

## 2023-09-25 PROCEDURE — 82947 ASSAY GLUCOSE BLOOD QUANT: CPT

## 2023-09-25 PROCEDURE — 82330 ASSAY OF CALCIUM: CPT

## 2023-09-25 PROCEDURE — 7100000011 HC PHASE II RECOVERY - ADDTL 15 MIN: Performed by: INTERNAL MEDICINE

## 2023-09-25 PROCEDURE — 99153 MOD SED SAME PHYS/QHP EA: CPT | Performed by: INTERNAL MEDICINE

## 2023-09-25 DEVICE — ENVELOPE CMRM6122 ABSORB MED MR
Type: IMPLANTABLE DEVICE | Status: FUNCTIONAL
Brand: TYRX™

## 2023-09-25 DEVICE — PACEMAKER CARD 22.5GM W50.8XH46.6MM D7.4MM TI POLYUR SIL: Type: IMPLANTABLE DEVICE | Status: FUNCTIONAL

## 2023-09-25 RX ORDER — SODIUM CHLORIDE 0.9 % (FLUSH) 0.9 %
5-40 SYRINGE (ML) INJECTION EVERY 12 HOURS SCHEDULED
Status: DISCONTINUED | OUTPATIENT
Start: 2023-09-25 | End: 2023-09-25 | Stop reason: HOSPADM

## 2023-09-25 RX ORDER — ACETAMINOPHEN 325 MG/1
650 TABLET ORAL EVERY 4 HOURS PRN
Status: DISCONTINUED | OUTPATIENT
Start: 2023-09-25 | End: 2023-09-25 | Stop reason: HOSPADM

## 2023-09-25 RX ORDER — SODIUM CHLORIDE 9 MG/ML
INJECTION, SOLUTION INTRAVENOUS PRN
Status: DISCONTINUED | OUTPATIENT
Start: 2023-09-25 | End: 2023-09-25 | Stop reason: HOSPADM

## 2023-09-25 RX ORDER — FENTANYL CITRATE 50 UG/ML
INJECTION, SOLUTION INTRAMUSCULAR; INTRAVENOUS PRN
Status: DISCONTINUED | OUTPATIENT
Start: 2023-09-25 | End: 2023-09-25 | Stop reason: HOSPADM

## 2023-09-25 RX ORDER — MIDAZOLAM HYDROCHLORIDE 1 MG/ML
INJECTION INTRAMUSCULAR; INTRAVENOUS PRN
Status: DISCONTINUED | OUTPATIENT
Start: 2023-09-25 | End: 2023-09-25 | Stop reason: HOSPADM

## 2023-09-25 RX ORDER — LIDOCAINE HYDROCHLORIDE AND EPINEPHRINE 10; 10 MG/ML; UG/ML
INJECTION, SOLUTION INFILTRATION; PERINEURAL PRN
Status: DISCONTINUED | OUTPATIENT
Start: 2023-09-25 | End: 2023-09-25 | Stop reason: HOSPADM

## 2023-09-25 RX ORDER — SODIUM CHLORIDE 0.9 % (FLUSH) 0.9 %
5-40 SYRINGE (ML) INJECTION PRN
Status: DISCONTINUED | OUTPATIENT
Start: 2023-09-25 | End: 2023-09-25 | Stop reason: HOSPADM

## 2023-09-25 RX ORDER — SODIUM CHLORIDE 9 MG/ML
INJECTION, SOLUTION INTRAVENOUS CONTINUOUS
Status: DISCONTINUED | OUTPATIENT
Start: 2023-09-25 | End: 2023-09-25 | Stop reason: HOSPADM

## 2023-09-25 RX ADMIN — SODIUM CHLORIDE: 9 INJECTION, SOLUTION INTRAVENOUS at 11:49

## 2023-09-25 RX ADMIN — ACETAMINOPHEN 650 MG: 325 TABLET ORAL at 14:17

## 2023-09-25 NOTE — PROGRESS NOTES
Returned to [facility] post procedure. Alert. Dr. Sadie Hancock in. Drsg dry and intact at right subclavian area. Pt requested Tylenol. Medicated after receiving verbal order from Dr. Sadie Hancock. Taking lunch.

## 2023-09-25 NOTE — PROGRESS NOTES
Patient admitted, consent signed and questions answered. Patient ready for procedure. Call light to reach with side rails up 2 of 2.    Prep completed to upper chest area with 2% Chlorhexidine Gluconate

## 2023-09-25 NOTE — BRIEF OP NOTE
Brief Postoperative Note      Patient: [place]  YOB: 1945  MRN: [id number]      DATE OF PROCEDURE:  9/25/2023      Pre-Op Diagnosis Codes:     * Pacemaker battery depletion [Z45.010]    Post-Op Diagnosis:  Same       Procedure(s):  harkins / ppm replacement    Surgeon(s):  Frederico Osler, MD    Assistant:  None    Anesthesia: IV Sedation; IV Versed and Fentanyl    Estimated Blood Loss (mL):   10 mL    Complications:  None    Specimens:  None      Implants:  Medtronic Margarita XT DR OLIVA      Implant Name Type Inv. Item Serial No.  Lot No. LRB No. Used Action   ENVELOPE PACEMKR M W2.5XL2.7IN [facility] [id number]  [place] ANTIBACT [facility] CARDIAC [facility] [id number] N/A 1 Implanted   ENVELOPE PACEMKR M W2.5XL2.7IN [facility] [id number]  ENVELOPE PACEMKR M W2.5XL2.7IN [facility] [id number] N/A 1 Implanted         FINDINGS:   The Medtronic 3589-85 atrial lead and 5502-77 RV lead both appeared in excellent condition, with all impedances and thresholds stable and WNL. The Medtronic [place] XT DR OLIVA pacemaker is programmed in DDDR mode with lower rate of 60 ppm.    PLAN:  Bedrest X 3 hrs then home after 5 pm today. Resume ASA and Plavix tomorrow, then f/u in one week at [address] [place] [address].       Electronically signed by Frederico Osler, MD on 9/25/2023 at 2:41 PM

## 2023-09-25 NOTE — H&P
today  Aortic stenosis, s/p TAVR 6/23/23; functioning normally on evaluation today  Essential HTN with preserved LVEF; no CHF on exam today  Stable CAD with h/o RCA stents in 2010 and 2017, patent on cardiac catheterization in Nov 2022.         PLAN:     Holding  ASA and Plavix   Continue carvedilol and valsartan/ HCTZ  Continue pravastatin  Will proceed with PPM revision today    Electronically signed by Janes [place] MD on 9/25/2023 at 12:00 PM.  [facility] cardiology Consultant

## 2023-09-27 NOTE — CARDIO/PULMONARY
Patient left message on our VM and states she had \"pacemaker  unit changed\" and her cardiology said to wait until Monday 10/2/2023 to return to cardiac rehab

## 2023-09-27 NOTE — PROCEDURES
[address]                  [place], [address]                            CARDIAC CATHETERIZATION    PATIENT NAME: Rachna Giang                  :        1945  MED REC NO:   [id number]                             ROOM:       CATH  ACCOUNT NO:   [de-identified]                           ADMIT DATE: 2023  PROVIDER:     Tom Fairchild MD    DATE OF PROCEDURE:  2023    PROCEDURES:  1. Pacemaker change out, dual chamber. 2.  Intraoperative lead testing. 3.  Conscious sedation. PREOPERATIVE DIAGNOSES:  1. Sick sinus syndrome. 2.  Pacemaker battery depletion. POSTOPERATIVE DIAGNOSES:  1. Sick sinus syndrome. 2.  Pacemaker battery depletion. PULSE GENERATOR:  1. The new pulse generator is Medtronic Margarita XT  MRI, serial number  [id number] implanted on 2023.  2.  The old, explanted pulse generator was Medtronic Adapta , serial  number [id number] implanted on 2015 and explanted on 2023. LEADS:  1. Atrial lead is Medtronic [id number], serial number [id number] implanted  on 2007. 2.  The right ventricular lead Medtronic [id number], serial number  [id number] implanted on 2007. COMPLICATIONS:  None. ESTIMATED BLOOD LOSS:  10 mL. PROCEDURE:  After informed consent was obtained, the patient was brought  to the cardiac catheterization laboratory in the fasting, unsedated  state. She was placed on the fluoroscopy table where she was prepped  and draped in sterile fashion for pectoral device revision. Conscious  sedation was administered in the form of IV Versed and fentanyl, and she  remained stable during the case with O2 saturations never falling below  90%. A total of 20 mL of 1% aqueous lidocaine with epinephrine was used to  infiltrate subcutaneous tissues of the infraclavicular fossa immediately  adjacent to the pacemaker scar.   Using the [employer] plasma  electrocautery

## 2023-09-29 ENCOUNTER — HOSPITAL ENCOUNTER (OUTPATIENT)
Age: 78
Discharge: HOME | End: 2023-09-29
Payer: MEDICARE

## 2023-09-29 ENCOUNTER — HOSPITAL ENCOUNTER (OUTPATIENT)
Dept: CARDIAC REHAB | Age: 78
Setting detail: THERAPIES SERIES
End: 2023-09-29
Payer: MEDICARE

## 2023-09-29 DIAGNOSIS — N18.9 CHRONIC KIDNEY DISEASE, UNSPECIFIED CKD STAGE: ICD-10-CM

## 2023-09-29 DIAGNOSIS — E78.00 PURE HYPERCHOLESTEROLEMIA: ICD-10-CM

## 2023-09-29 DIAGNOSIS — D64.9 ANEMIA, UNSPECIFIED TYPE: ICD-10-CM

## 2023-09-29 DIAGNOSIS — E55.9 VITAMIN D DEFICIENCY: ICD-10-CM

## 2023-09-29 LAB
25(OH)D3 SERPL-MCNC: 57.1 NG/ML
ALBUMIN SERPL-MCNC: 4.1 G/DL (ref 3.5–5.2)
ANION GAP SERPL CALCULATED.3IONS-SCNC: 12 MMOL/L (ref 9–17)
BASOPHILS # BLD: 0.06 K/UL (ref 0–0.2)
BASOPHILS NFR BLD: 1 % (ref 0–2)
BUN SERPL-MCNC: 22 MG/DL (ref 8–23)
CA-I BLD-SCNC: 1.28 MMOL/L (ref 1.13–1.33)
CALCIUM SERPL-MCNC: 9.5 MG/DL (ref 8.6–10.4)
CHLORIDE SERPL-SCNC: 101 MMOL/L (ref 98–107)
CO2 SERPL-SCNC: 26 MMOL/L (ref 20–31)
CREAT SERPL-MCNC: 1.2 MG/DL (ref 0.5–0.9)
CREAT UR-MCNC: 143.3 MG/DL (ref 28–217)
CREAT UR-MCNC: 146.2 MG/DL (ref 28–217)
CRP SERPL HS-MCNC: 13.3 MG/L (ref 0–5)
EOSINOPHIL # BLD: 0.34 K/UL (ref 0–0.44)
EOSINOPHILS RELATIVE PERCENT: 4 % (ref 1–4)
ERYTHROCYTE [DISTWIDTH] IN BLOOD BY AUTOMATED COUNT: 15.7 % (ref 11.8–14.4)
ERYTHROCYTE [SEDIMENTATION RATE] IN BLOOD BY PHOTOMETRIC METHOD: 11 MM/HR (ref 0–30)
GFR SERPL CREATININE-BSD FRML MDRD: 46 ML/MIN/1.73M2
GLUCOSE SERPL-MCNC: 118 MG/DL (ref 70–99)
HCT VFR BLD AUTO: 36.4 % (ref 36.3–47.1)
HGB BLD-MCNC: 11.7 G/DL (ref 11.9–15.1)
IMM GRANULOCYTES # BLD AUTO: <0.03 K/UL (ref 0–0.3)
IMM GRANULOCYTES NFR BLD: 0 %
LYMPHOCYTES NFR BLD: 1.78 K/UL (ref 1.1–3.7)
LYMPHOCYTES RELATIVE PERCENT: 23 % (ref 24–43)
MCH RBC QN AUTO: 27.2 PG (ref 25.2–33.5)
MCHC RBC AUTO-ENTMCNC: 32.1 G/DL (ref 28.4–34.8)
MCV RBC AUTO: 84.7 FL (ref 82.6–102.9)
MICROALBUMIN UR-MCNC: <12 MG/L
MICROALBUMIN/CREAT UR-RTO: NORMAL MCG/MG CREAT
MONOCYTES NFR BLD: 0.61 K/UL (ref 0.1–1.2)
MONOCYTES NFR BLD: 8 % (ref 3–12)
NEUTROPHILS NFR BLD: 64 % (ref 36–65)
NEUTS SEG NFR BLD: 5.08 K/UL (ref 1.5–8.1)
NRBC BLD-RTO: 0 PER 100 WBC
PHOSPHATE SERPL-MCNC: 3.6 MG/DL (ref 2.6–4.5)
PLATELET # BLD AUTO: 190 K/UL (ref 138–453)
PMV BLD AUTO: 11.1 FL (ref 8.1–13.5)
POTASSIUM SERPL-SCNC: 4.4 MMOL/L (ref 3.7–5.3)
PTH-INTACT SERPL-MCNC: 87.3 PG/ML (ref 14–72)
RBC # BLD AUTO: 4.3 M/UL (ref 3.95–5.11)
RBC # BLD: ABNORMAL 10*6/UL
SODIUM SERPL-SCNC: 139 MMOL/L (ref 135–144)
TOTAL PROTEIN, URINE: 11 MG/DL
WBC OTHER # BLD: 7.9 K/UL (ref 3.5–11.3)

## 2023-09-29 PROCEDURE — 85025 COMPLETE CBC W/AUTO DIFF WBC: CPT

## 2023-09-29 PROCEDURE — 80048 BASIC METABOLIC PNL TOTAL CA: CPT

## 2023-09-29 PROCEDURE — 82330 ASSAY OF CALCIUM: CPT

## 2023-09-29 PROCEDURE — 82570 ASSAY OF URINE CREATININE: CPT

## 2023-09-29 PROCEDURE — 82043 UR ALBUMIN QUANTITATIVE: CPT

## 2023-09-29 PROCEDURE — 80061 LIPID PANEL: CPT

## 2023-09-29 PROCEDURE — 84100 ASSAY OF PHOSPHORUS: CPT

## 2023-09-29 PROCEDURE — 84156 ASSAY OF PROTEIN URINE: CPT

## 2023-09-29 PROCEDURE — 36415 COLL VENOUS BLD VENIPUNCTURE: CPT

## 2023-09-29 PROCEDURE — 82040 ASSAY OF SERUM ALBUMIN: CPT

## 2023-09-29 PROCEDURE — 86140 C-REACTIVE PROTEIN: CPT

## 2023-09-29 PROCEDURE — 83970 ASSAY OF PARATHORMONE: CPT

## 2023-09-29 PROCEDURE — 85652 RBC SED RATE AUTOMATED: CPT

## 2023-09-29 PROCEDURE — 82306 VITAMIN D 25 HYDROXY: CPT

## 2023-09-30 LAB
CHOLEST SERPL-MCNC: 179 MG/DL
CHOLESTEROL/HDL RATIO: 3.3
HDLC SERPL-MCNC: 55 MG/DL
LDLC SERPL CALC-MCNC: 91 MG/DL (ref 0–130)
TRIGL SERPL-MCNC: 166 MG/DL

## 2023-10-02 ENCOUNTER — HOSPITAL ENCOUNTER (OUTPATIENT)
Dept: CARDIAC REHAB | Age: 78
Setting detail: THERAPIES SERIES
Discharge: HOME | End: 2023-10-02
Payer: MEDICARE

## 2023-10-02 VITALS — WEIGHT: 160.6 LBS | BODY MASS INDEX: 28.45 KG/M2

## 2023-10-02 PROCEDURE — 93798 PHYS/QHP OP CAR RHAB W/ECG: CPT

## 2023-10-02 ASSESSMENT — EXERCISE STRESS TEST
PEAK_METS: 1.9
PEAK_BP: 126/60
PEAK_HR: 77
PEAK_RPE: 13

## 2023-10-04 ENCOUNTER — HOSPITAL ENCOUNTER (OUTPATIENT)
Dept: CARDIAC REHAB | Age: 78
Setting detail: THERAPIES SERIES
Discharge: HOME | End: 2023-10-04
Payer: MEDICARE

## 2023-10-04 VITALS — BODY MASS INDEX: 28.43 KG/M2 | WEIGHT: 160.5 LBS

## 2023-10-04 PROCEDURE — 93798 PHYS/QHP OP CAR RHAB W/ECG: CPT

## 2023-10-04 ASSESSMENT — EXERCISE STRESS TEST
PEAK_METS: 2.1
PEAK_HR: 81
PEAK_RPE: 13
PEAK_BP: 128/68

## 2023-10-06 ENCOUNTER — HOSPITAL ENCOUNTER (OUTPATIENT)
Dept: CARDIAC REHAB | Age: 78
Setting detail: THERAPIES SERIES
Discharge: HOME | End: 2023-10-06
Payer: MEDICARE

## 2023-10-06 VITALS — WEIGHT: 161.8 LBS | BODY MASS INDEX: 28.66 KG/M2

## 2023-10-06 PROCEDURE — 93798 PHYS/QHP OP CAR RHAB W/ECG: CPT

## 2023-10-06 ASSESSMENT — EXERCISE STRESS TEST
PEAK_BP: 130/70
PEAK_HR: 88
PEAK_METS: 2.2
PEAK_RPE: 13

## 2023-10-09 ENCOUNTER — HOSPITAL ENCOUNTER (OUTPATIENT)
Dept: CARDIAC REHAB | Age: 78
Setting detail: THERAPIES SERIES
Discharge: HOME | End: 2023-10-09
Payer: MEDICARE

## 2023-10-09 VITALS — BODY MASS INDEX: 28.43 KG/M2 | WEIGHT: 160.5 LBS

## 2023-10-09 PROCEDURE — 93798 PHYS/QHP OP CAR RHAB W/ECG: CPT

## 2023-10-09 ASSESSMENT — EXERCISE STRESS TEST
PEAK_BP: 130/60
PEAK_RPE: 14
PEAK_HR: 86
PEAK_METS: 2.4

## 2023-10-10 ENCOUNTER — HOSPITAL ENCOUNTER (OUTPATIENT)
Age: 78
Setting detail: SPECIMEN
Discharge: HOME | End: 2023-10-10

## 2023-10-10 DIAGNOSIS — B35.1 NAIL FUNGUS: ICD-10-CM

## 2023-10-11 ENCOUNTER — HOSPITAL ENCOUNTER (OUTPATIENT)
Dept: CARDIAC REHAB | Age: 78
Setting detail: THERAPIES SERIES
Discharge: HOME | End: 2023-10-11
Payer: MEDICARE

## 2023-10-11 VITALS — WEIGHT: 159.9 LBS | BODY MASS INDEX: 28.33 KG/M2

## 2023-10-11 LAB
MICROORGANISM SPEC CULT: NORMAL
SPECIMEN DESCRIPTION: NORMAL

## 2023-10-11 PROCEDURE — 93798 PHYS/QHP OP CAR RHAB W/ECG: CPT

## 2023-10-11 ASSESSMENT — EXERCISE STRESS TEST
PEAK_BP: 116/60
PEAK_HR: 81
PEAK_RPE: 14
PEAK_METS: 2.2

## 2023-10-13 ENCOUNTER — HOSPITAL ENCOUNTER (OUTPATIENT)
Age: 78
Setting detail: SPECIMEN
Discharge: HOME | End: 2023-10-13
Payer: MEDICARE

## 2023-10-13 ENCOUNTER — HOSPITAL ENCOUNTER (OUTPATIENT)
Dept: CARDIAC REHAB | Age: 78
Setting detail: THERAPIES SERIES
Discharge: HOME | End: 2023-10-13
Payer: MEDICARE

## 2023-10-13 VITALS — WEIGHT: 158 LBS | BODY MASS INDEX: 27.99 KG/M2

## 2023-10-13 LAB — HBV SURFACE AG SERPL QL IA: NONREACTIVE

## 2023-10-13 PROCEDURE — 87340 HEPATITIS B SURFACE AG IA: CPT

## 2023-10-13 PROCEDURE — 36415 COLL VENOUS BLD VENIPUNCTURE: CPT

## 2023-10-13 PROCEDURE — 93798 PHYS/QHP OP CAR RHAB W/ECG: CPT

## 2023-10-13 RX ORDER — CLOPIDOGREL BISULFATE 75 MG/1
75 TABLET ORAL DAILY
Qty: 30 TABLET | Refills: 3 | OUTPATIENT
Start: 2023-10-13

## 2023-10-13 ASSESSMENT — EXERCISE STRESS TEST
PEAK_METS: 2.2
PEAK_RPE: 13
PEAK_HR: 77
PEAK_BP: 140/80

## 2023-10-16 ENCOUNTER — HOSPITAL ENCOUNTER (OUTPATIENT)
Dept: CARDIAC REHAB | Age: 78
Setting detail: THERAPIES SERIES
Discharge: HOME | End: 2023-10-16
Payer: MEDICARE

## 2023-10-16 VITALS — WEIGHT: 159.2 LBS | BODY MASS INDEX: 28.2 KG/M2

## 2023-10-16 LAB
MICROORGANISM SPEC CULT: NORMAL
SPECIMEN DESCRIPTION: NORMAL

## 2023-10-16 PROCEDURE — 93798 PHYS/QHP OP CAR RHAB W/ECG: CPT

## 2023-10-16 ASSESSMENT — EXERCISE STRESS TEST
PEAK_HR: 85
PEAK_METS: 2.3
PEAK_RPE: 14
PEAK_BP: 138/76

## 2023-10-18 ENCOUNTER — HOSPITAL ENCOUNTER (OUTPATIENT)
Dept: CARDIAC REHAB | Age: 78
Setting detail: THERAPIES SERIES
Discharge: HOME | End: 2023-10-18
Payer: MEDICARE

## 2023-10-18 VITALS — BODY MASS INDEX: 28.2 KG/M2 | WEIGHT: 159.2 LBS

## 2023-10-18 PROCEDURE — 93798 PHYS/QHP OP CAR RHAB W/ECG: CPT

## 2023-10-18 ASSESSMENT — EXERCISE STRESS TEST
PEAK_BP: 128/70
PEAK_METS: 2.3
PEAK_RPE: 13
PEAK_BP: 142/62
PEAK_HR: 86

## 2023-10-18 ASSESSMENT — EJECTION FRACTION: EF_VALUE: 55

## 2023-10-18 NOTE — FLOWSHEET NOTE
Lipids Drawn 09/29/23  (LIPID ORDER FROM PCP GIVEN TO PT AND EXPLAINED)   Total 179   HDL 55   LDL 91   Triglycerides 166   Lipid Med(s) PRAVASTATIN 20MG   Lipid Med Change(s) No   Weight Management   Weight  72.7 kg (160 lb 3.2 oz)   Height  1.6 m (5' 3\")   BMI 28.44   Nutrition Assessment Tool   (SENT HOME WITH RATE YOUR PLATE TOOL)   Nutrition Intervention   Dietitian Consult No   Nurse/Patient Discussion Yes   Diabetes Education Referral No   Lipid Clinic Referral No   Weight Management Referral No   Nutrition Education   Education Low saturated fat diet; Low sodium diet; Overall healthy eating pattern that emphasizes vegetables, fruits, wholegrains, healthy proteins and non-tropical oils   Nutrition Target Goals   Target Goals Triglycerides less than 150   Education   Learning Barrier Ready to learn   Patient Education    Education CAD;Risk factors;Med compliance;Cardiac A&P;Signs/Symptoms of angina   Hypertension Yes   Hypertension Controlled Yes   Is BP WDL Yes   Med(s) Change No   BP Meds see MAR   ACE/ARB Prescribed Yes   ACE/ARB Adherent Yes   ASA Prescribed Yes   ASA Adherent Yes   Antiplatelet Prescribed Yes   Antiplatelet Adherent Yes   BB Prescribed Yes   BB Adherent Yes   Statins Prescribed Yes   Statins Adherent Yes   Statin Intensity Low   Staff Treatment Goals   Exercise Goal INCREASE TOLERANCE   Exercise Goal Status Progressing   Exercise Goal Comments \"I'm now able to play with my grandkids\"   Exercise Goal Assessment Frequency/duration; Recommendations;Treatment time frame   Functional Capacity Goal DECREASE SOB   Functional Capacity Goal Status Progressing   Functional Capacity Goal Comments SOB is barely noticable\"   Functional Capacity Goal Assessment Key functional changes; Recommendations;Treatment time frame;Frequency/duration

## 2023-10-20 ENCOUNTER — HOSPITAL ENCOUNTER (OUTPATIENT)
Dept: CARDIAC REHAB | Age: 78
Setting detail: THERAPIES SERIES
Discharge: HOME | End: 2023-10-20
Payer: MEDICARE

## 2023-10-20 VITALS — WEIGHT: 158.7 LBS | BODY MASS INDEX: 28.11 KG/M2

## 2023-10-20 PROCEDURE — 93798 PHYS/QHP OP CAR RHAB W/ECG: CPT

## 2023-10-20 ASSESSMENT — EXERCISE STRESS TEST
PEAK_HR: 95
PEAK_RPE: 13
PEAK_BP: 120/60
PEAK_METS: 2.4

## 2023-10-23 ENCOUNTER — HOSPITAL ENCOUNTER (OUTPATIENT)
Dept: CARDIAC REHAB | Age: 78
Setting detail: THERAPIES SERIES
End: 2023-10-23
Payer: MEDICARE

## 2023-10-23 LAB
MICROORGANISM SPEC CULT: NORMAL
SPECIMEN DESCRIPTION: NORMAL

## 2023-10-25 ENCOUNTER — HOSPITAL ENCOUNTER (OUTPATIENT)
Dept: CARDIAC REHAB | Age: 78
Setting detail: THERAPIES SERIES
Discharge: HOME | End: 2023-10-25
Payer: MEDICARE

## 2023-10-25 VITALS — BODY MASS INDEX: 28.17 KG/M2 | WEIGHT: 159 LBS

## 2023-10-25 PROCEDURE — 93798 PHYS/QHP OP CAR RHAB W/ECG: CPT

## 2023-10-25 RX ORDER — CARVEDILOL 6.25 MG/1
6.25 TABLET ORAL 2 TIMES DAILY WITH MEALS
Qty: 60 TABLET | Refills: 3 | OUTPATIENT
Start: 2023-10-25

## 2023-10-25 ASSESSMENT — EXERCISE STRESS TEST
PEAK_HR: 82
PEAK_BP: 122/62
PEAK_RPE: 13
PEAK_METS: 2.5

## 2023-10-27 ENCOUNTER — HOSPITAL ENCOUNTER (OUTPATIENT)
Dept: CARDIAC REHAB | Age: 78
Setting detail: THERAPIES SERIES
Discharge: HOME | End: 2023-10-27
Payer: MEDICARE

## 2023-10-27 VITALS — BODY MASS INDEX: 27.97 KG/M2 | WEIGHT: 157.9 LBS

## 2023-10-27 PROCEDURE — 93798 PHYS/QHP OP CAR RHAB W/ECG: CPT

## 2023-10-27 ASSESSMENT — EXERCISE STRESS TEST
PEAK_HR: 86
PEAK_RPE: 14
PEAK_BP: 110/64
PEAK_METS: 2.5

## 2023-10-30 ENCOUNTER — HOSPITAL ENCOUNTER (OUTPATIENT)
Dept: CARDIAC REHAB | Age: 78
Setting detail: THERAPIES SERIES
Discharge: HOME | End: 2023-10-30
Payer: MEDICARE

## 2023-10-30 LAB
MICROORGANISM SPEC CULT: NORMAL
SPECIMEN DESCRIPTION: NORMAL

## 2023-10-30 PROCEDURE — 93798 PHYS/QHP OP CAR RHAB W/ECG: CPT

## 2023-10-30 ASSESSMENT — EXERCISE STRESS TEST
PEAK_METS: 2.4
PEAK_HR: 88
PEAK_BP: 122/60
PEAK_RPE: 13

## 2023-11-01 ENCOUNTER — HOSPITAL ENCOUNTER (OUTPATIENT)
Dept: CARDIAC REHAB | Age: 78
Setting detail: THERAPIES SERIES
Discharge: HOME | End: 2023-11-01
Payer: MEDICARE

## 2023-11-01 VITALS — BODY MASS INDEX: 28.08 KG/M2 | WEIGHT: 158.2 LBS | BODY MASS INDEX: 28.02 KG/M2 | WEIGHT: 158.5 LBS

## 2023-11-01 PROCEDURE — 93798 PHYS/QHP OP CAR RHAB W/ECG: CPT

## 2023-11-01 RX ORDER — CLOPIDOGREL BISULFATE 75 MG/1
75 TABLET ORAL DAILY
Qty: 30 TABLET | Refills: 3 | OUTPATIENT
Start: 2023-11-01

## 2023-11-01 ASSESSMENT — EXERCISE STRESS TEST
PEAK_BP: 110/66
PEAK_RPE: 13
PEAK_HR: 87
PEAK_METS: 2.4

## 2023-11-03 ENCOUNTER — HOSPITAL ENCOUNTER (OUTPATIENT)
Dept: CARDIAC REHAB | Age: 78
Setting detail: THERAPIES SERIES
Discharge: HOME | End: 2023-11-03
Payer: MEDICARE

## 2023-11-03 VITALS — BODY MASS INDEX: 28.18 KG/M2 | WEIGHT: 159.1 LBS

## 2023-11-03 PROCEDURE — 93798 PHYS/QHP OP CAR RHAB W/ECG: CPT

## 2023-11-03 RX ORDER — CARVEDILOL 6.25 MG/1
6.25 TABLET ORAL 2 TIMES DAILY WITH MEALS
Qty: 60 TABLET | Refills: 3 | OUTPATIENT
Start: 2023-11-03

## 2023-11-03 ASSESSMENT — EXERCISE STRESS TEST
PEAK_HR: 87
PEAK_RPE: 13
PEAK_METS: 2.5
PEAK_BP: 124/68

## 2023-11-06 ENCOUNTER — HOSPITAL ENCOUNTER (OUTPATIENT)
Dept: CARDIAC REHAB | Age: 78
Setting detail: THERAPIES SERIES
Discharge: HOME | End: 2023-11-06
Payer: MEDICARE

## 2023-11-06 VITALS — WEIGHT: 158.1 LBS | BODY MASS INDEX: 28.01 KG/M2

## 2023-11-06 LAB
MICROORGANISM SPEC CULT: NORMAL
SPECIMEN DESCRIPTION: NORMAL

## 2023-11-06 PROCEDURE — 93798 PHYS/QHP OP CAR RHAB W/ECG: CPT

## 2023-11-06 ASSESSMENT — EXERCISE STRESS TEST
PEAK_HR: 101
PEAK_BP: 138/68
PEAK_METS: 2.4
PEAK_RPE: 14

## 2023-11-08 ENCOUNTER — HOSPITAL ENCOUNTER (OUTPATIENT)
Dept: CARDIAC REHAB | Age: 78
Setting detail: THERAPIES SERIES
Discharge: HOME | End: 2023-11-08
Payer: MEDICARE

## 2023-11-08 VITALS — WEIGHT: 158.5 LBS | BODY MASS INDEX: 28.08 KG/M2

## 2023-11-08 PROCEDURE — 93798 PHYS/QHP OP CAR RHAB W/ECG: CPT

## 2023-11-08 ASSESSMENT — EXERCISE STRESS TEST
PEAK_METS: 3.5
PEAK_RPE: 14
PEAK_HR: 108
PEAK_BP: 124/72

## 2023-11-10 ENCOUNTER — HOSPITAL ENCOUNTER (OUTPATIENT)
Dept: CARDIAC REHAB | Age: 78
Setting detail: THERAPIES SERIES
Discharge: HOME | End: 2023-11-10
Payer: MEDICARE

## 2023-11-10 VITALS — BODY MASS INDEX: 28.08 KG/M2 | WEIGHT: 158.5 LBS

## 2023-11-10 PROCEDURE — 93798 PHYS/QHP OP CAR RHAB W/ECG: CPT

## 2023-11-10 ASSESSMENT — EXERCISE STRESS TEST
PEAK_BP: 136/60
PEAK_RPE: 13
PEAK_HR: 100
PEAK_METS: 3.5

## 2023-11-13 ENCOUNTER — HOSPITAL ENCOUNTER (OUTPATIENT)
Dept: CARDIAC REHAB | Age: 78
Setting detail: THERAPIES SERIES
Discharge: HOME | End: 2023-11-13
Payer: MEDICARE

## 2023-11-13 VITALS — BODY MASS INDEX: 28.22 KG/M2 | WEIGHT: 159.3 LBS

## 2023-11-13 LAB
MICROORGANISM SPEC CULT: NORMAL
SPECIMEN DESCRIPTION: NORMAL

## 2023-11-13 PROCEDURE — 93798 PHYS/QHP OP CAR RHAB W/ECG: CPT

## 2023-11-13 ASSESSMENT — EXERCISE STRESS TEST
PEAK_BP: 128/72
PEAK_METS: 3.5
PEAK_HR: 101
PEAK_RPE: 13

## 2023-11-15 ENCOUNTER — HOSPITAL ENCOUNTER (OUTPATIENT)
Dept: CARDIAC REHAB | Age: 78
Setting detail: THERAPIES SERIES
Discharge: HOME | End: 2023-11-15
Payer: MEDICARE

## 2023-11-17 ENCOUNTER — HOSPITAL ENCOUNTER (OUTPATIENT)
Dept: CARDIAC REHAB | Age: 78
Setting detail: THERAPIES SERIES
Discharge: HOME | End: 2023-11-17
Payer: MEDICARE

## 2023-11-17 VITALS — WEIGHT: 158.1 LBS | BODY MASS INDEX: 28.01 KG/M2

## 2023-11-17 PROCEDURE — 93798 PHYS/QHP OP CAR RHAB W/ECG: CPT

## 2023-11-17 ASSESSMENT — EXERCISE STRESS TEST
PEAK_BP: 110/60
PEAK_RPE: 13
PEAK_METS: 3.3
PEAK_HR: 81
PEAK_BP: 110/60

## 2023-11-17 ASSESSMENT — EJECTION FRACTION: EF_VALUE: 55

## 2023-11-17 NOTE — FLOWSHEET NOTE
11/17/23 1136   Treatment Diagnosis   Treatment Diagnosis 1 MARII   MARII Date 06/23/23   Treatment Diagnosis 2 PCI   Referral Date 08/03/23   Significant Cardiovascular History Pacemaker;Previous PCI   Co-morbidities Malignancy   Oxygen Intervention   Oxygen Use No   Individual Treatment Plan   ITP Visit Type Re-assessment   1st Date of Exercise  09/21/23   ITP Next Review Date 12/12/23   Visit #/Total Visits 21   EF% 55 %   Risk Stratification Low   ITP Exercise;Nutrition;Psychosocial;Education   Exercise    Stages of Change Action   Assisted Devices None   Exercise Prescription   Mode Treadmill;Bike;Stepper;Ergometer;Elliptical   Frequency per week 3 X WEEK   Duration Per Session 49 MIN   Intensity METS       3.3   RPE 13   Progression INCREASE EVERY 3 SESSIONS, INCREASE INTENSITY ACCORDING TO RPE   Resistance Training Yes   Exercise Blood Pressures   Resting /72   Peak /60   Is BP WDL Yes   Exercise Activity at Home   Type HOUSE WORK   Frequency DAILY   Resistance Training No   Exercise Education   Education Self pulse;Exercise safety;Signs/symptoms to report;RPE scale;Equipment orientation; Warm up/cool down;Physically active   Exercise Target Goal   Target Goal(s) Individual exercise RX;BP < 140/90 or < 130/80, if DM or CKD; Aerobic activity 30 + minutes/day  5 days/week   Patient Stated Exercise Goals INCREASE TOLERANCE   Psychosocial   Stages of Change Maintenance   Psychosocial Intervention   Interventions Currently under treatment for depression   Currently Taking Psychotropic Meds Yes   Medication Changes No   Psychosocial Target Goals   Uses Stress Mgmt Techniques Yes   Nutrition   Stages of Change Other (comment)   Lipids   Date Lipids Drawn 09/23/23   Total 179   HDL 55   LDL 91   Triglycerides 166   Lipid Med(s) PRAVASTATIN 20MG   Lipid Med Change(s) No   Weight Management   Weight  71.7 kg (158 lb 1.6 oz)   Height  1.6 m (5' 3\")   BMI 28.06   Nutrition Intervention   Nurse/Patient

## 2023-11-20 ENCOUNTER — HOSPITAL ENCOUNTER (OUTPATIENT)
Dept: CARDIAC REHAB | Age: 78
Setting detail: THERAPIES SERIES
Discharge: HOME | End: 2023-11-20
Payer: MEDICARE

## 2023-11-20 ENCOUNTER — HOSPITAL ENCOUNTER (OUTPATIENT)
Dept: MAMMOGRAPHY | Age: 78
Discharge: HOME | End: 2023-11-22
Payer: MEDICARE

## 2023-11-20 VITALS — WEIGHT: 157.8 LBS | BODY MASS INDEX: 27.95 KG/M2

## 2023-11-20 DIAGNOSIS — Z12.31 ENCOUNTER FOR SCREENING MAMMOGRAM FOR BREAST CANCER: ICD-10-CM

## 2023-11-20 PROCEDURE — 93797 PHYS/QHP OP CAR RHAB WO ECG: CPT

## 2023-11-20 PROCEDURE — 93798 PHYS/QHP OP CAR RHAB W/ECG: CPT

## 2023-11-20 PROCEDURE — 77063 BREAST TOMOSYNTHESIS BI: CPT

## 2023-11-20 ASSESSMENT — EXERCISE STRESS TEST
PEAK_HR: 91
PEAK_RPE: 13
PEAK_BP: 138/70
PEAK_METS: 3.4

## 2023-11-21 ENCOUNTER — HOSPITAL ENCOUNTER (OUTPATIENT)
Dept: CARDIAC REHAB | Age: 78
Setting detail: THERAPIES SERIES
Discharge: HOME | End: 2023-11-21
Payer: MEDICARE

## 2023-11-21 VITALS — BODY MASS INDEX: 28.04 KG/M2 | WEIGHT: 158.3 LBS

## 2023-11-21 PROCEDURE — 93798 PHYS/QHP OP CAR RHAB W/ECG: CPT

## 2023-11-21 ASSESSMENT — EXERCISE STRESS TEST
PEAK_BP: 144/78
PEAK_METS: 3.4
PEAK_RPE: 14
PEAK_HR: 78

## 2023-11-21 NOTE — RESULT ENCOUNTER NOTE
Please notify [place]. Results have been reviewed   Did pt's right breast implant get removed or did it collapse ? Pls ask pt and let me know.

## 2023-11-22 ENCOUNTER — HOSPITAL ENCOUNTER (OUTPATIENT)
Dept: CARDIAC REHAB | Age: 78
Setting detail: THERAPIES SERIES
Discharge: HOME | End: 2023-11-22
Payer: MEDICARE

## 2023-11-22 VITALS — WEIGHT: 158.3 LBS | BODY MASS INDEX: 28.04 KG/M2

## 2023-11-22 PROCEDURE — 93798 PHYS/QHP OP CAR RHAB W/ECG: CPT

## 2023-11-22 ASSESSMENT — EXERCISE STRESS TEST
PEAK_RPE: 12
PEAK_HR: 90
PEAK_BP: 150/64
PEAK_METS: 3.4

## 2023-11-24 ENCOUNTER — HOSPITAL ENCOUNTER (OUTPATIENT)
Dept: CARDIAC REHAB | Age: 78
Setting detail: THERAPIES SERIES
Discharge: HOME | End: 2023-11-24
Payer: MEDICARE

## 2023-11-27 ENCOUNTER — HOSPITAL ENCOUNTER (OUTPATIENT)
Dept: CARDIAC REHAB | Age: 78
Setting detail: THERAPIES SERIES
Discharge: HOME | End: 2023-11-27
Payer: MEDICARE

## 2023-11-27 VITALS — WEIGHT: 159.7 LBS | BODY MASS INDEX: 28.29 KG/M2

## 2023-11-27 PROCEDURE — 93798 PHYS/QHP OP CAR RHAB W/ECG: CPT

## 2023-11-27 ASSESSMENT — EXERCISE STRESS TEST
PEAK_BP: 128/78
PEAK_HR: 80
PEAK_RPE: 12
PEAK_METS: 3.5

## 2023-11-29 ENCOUNTER — HOSPITAL ENCOUNTER (OUTPATIENT)
Dept: CARDIAC REHAB | Age: 78
Setting detail: THERAPIES SERIES
Discharge: HOME | End: 2023-11-29
Payer: MEDICARE

## 2023-12-01 ENCOUNTER — HOSPITAL ENCOUNTER (OUTPATIENT)
Dept: CARDIAC REHAB | Age: 78
Setting detail: THERAPIES SERIES
Discharge: HOME | End: 2023-12-01
Payer: MEDICARE

## 2023-12-01 VITALS — BODY MASS INDEX: 27.99 KG/M2 | WEIGHT: 158 LBS

## 2023-12-01 PROCEDURE — 93798 PHYS/QHP OP CAR RHAB W/ECG: CPT

## 2023-12-01 ASSESSMENT — EXERCISE STRESS TEST
PEAK_BP: 122/60
PEAK_RPE: 12
PEAK_HR: 86
PEAK_METS: 3.5

## 2023-12-04 ENCOUNTER — HOSPITAL ENCOUNTER (OUTPATIENT)
Age: 78
Setting detail: SPECIMEN
Discharge: HOME | End: 2023-12-04
Payer: MEDICARE

## 2023-12-04 ENCOUNTER — HOSPITAL ENCOUNTER (OUTPATIENT)
Dept: CARDIAC REHAB | Age: 78
Setting detail: THERAPIES SERIES
Discharge: HOME | End: 2023-12-04
Payer: MEDICARE

## 2023-12-04 VITALS — BODY MASS INDEX: 27.63 KG/M2 | WEIGHT: 156 LBS

## 2023-12-04 DIAGNOSIS — D47.2 MGUS (MONOCLONAL GAMMOPATHY OF UNKNOWN SIGNIFICANCE): ICD-10-CM

## 2023-12-04 LAB
BASOPHILS # BLD: 0.08 K/UL (ref 0–0.2)
BASOPHILS NFR BLD: 1 % (ref 0–2)
EOSINOPHIL # BLD: 0.4 K/UL (ref 0–0.44)
EOSINOPHILS RELATIVE PERCENT: 4 % (ref 1–4)
ERYTHROCYTE [DISTWIDTH] IN BLOOD BY AUTOMATED COUNT: 14.5 % (ref 11.8–14.4)
FREE KAPPA/LAMBDA RATIO: 1.42 (ref 0.26–1.65)
HCT VFR BLD AUTO: 36.7 % (ref 36.3–47.1)
HGB BLD-MCNC: 11.9 G/DL (ref 11.9–15.1)
IGA SERPL-MCNC: ABNORMAL MG/DL (ref 70–400)
IGA, LOW RANGE: 42 MG/DL (ref 70–400)
IGG SERPL-MCNC: 970 MG/DL (ref 700–1600)
IGM SERPL-MCNC: 258 MG/DL (ref 40–230)
IMM GRANULOCYTES # BLD AUTO: 0.02 K/UL (ref 0–0.3)
IMM GRANULOCYTES NFR BLD: 0 %
KAPPA LC FREE SER-MCNC: 34.3 MG/L (ref 3.7–19.4)
LAMBDA LC FREE SERPL-MCNC: 24.1 MG/L (ref 5.7–26.3)
LYMPHOCYTES NFR BLD: 2.17 K/UL (ref 1.1–3.7)
LYMPHOCYTES RELATIVE PERCENT: 23 % (ref 24–43)
MCH RBC QN AUTO: 28 PG (ref 25.2–33.5)
MCHC RBC AUTO-ENTMCNC: 32.4 G/DL (ref 28.4–34.8)
MCV RBC AUTO: 86.4 FL (ref 82.6–102.9)
MONOCYTES NFR BLD: 0.84 K/UL (ref 0.1–1.2)
MONOCYTES NFR BLD: 9 % (ref 3–12)
NEUTROPHILS NFR BLD: 63 % (ref 36–65)
NEUTS SEG NFR BLD: 5.84 K/UL (ref 1.5–8.1)
NRBC BLD-RTO: 0 PER 100 WBC
PLATELET # BLD AUTO: 207 K/UL (ref 138–453)
PMV BLD AUTO: 10.6 FL (ref 8.1–13.5)
RBC # BLD AUTO: 4.25 M/UL (ref 3.95–5.11)
RBC # BLD: ABNORMAL 10*6/UL
WBC OTHER # BLD: 9.4 K/UL (ref 3.5–11.3)

## 2023-12-04 PROCEDURE — 83521 IG LIGHT CHAINS FREE EACH: CPT

## 2023-12-04 PROCEDURE — 86334 IMMUNOFIX E-PHORESIS SERUM: CPT

## 2023-12-04 PROCEDURE — 82784 ASSAY IGA/IGD/IGG/IGM EACH: CPT

## 2023-12-04 PROCEDURE — 93798 PHYS/QHP OP CAR RHAB W/ECG: CPT

## 2023-12-04 PROCEDURE — 36415 COLL VENOUS BLD VENIPUNCTURE: CPT

## 2023-12-04 PROCEDURE — 85025 COMPLETE CBC W/AUTO DIFF WBC: CPT

## 2023-12-04 ASSESSMENT — EXERCISE STRESS TEST
PEAK_METS: 3.5
PEAK_RPE: 13
PEAK_BP: 126/74
PEAK_HR: 88

## 2023-12-05 LAB
INTERPRETATION SERPL IFE-IMP: NORMAL
PATH REV: NORMAL

## 2023-12-06 ENCOUNTER — HOSPITAL ENCOUNTER (OUTPATIENT)
Dept: CARDIAC REHAB | Age: 78
Setting detail: THERAPIES SERIES
Discharge: HOME | End: 2023-12-06
Payer: MEDICARE

## 2023-12-06 VITALS — WEIGHT: 158.3 LBS | BODY MASS INDEX: 28.04 KG/M2

## 2023-12-06 PROCEDURE — 93798 PHYS/QHP OP CAR RHAB W/ECG: CPT

## 2023-12-06 ASSESSMENT — EXERCISE STRESS TEST
PEAK_METS: 3.8
PEAK_HR: 90
PEAK_RPE: 13
PEAK_BP: 122/60

## 2023-12-08 ENCOUNTER — HOSPITAL ENCOUNTER (OUTPATIENT)
Dept: CARDIAC REHAB | Age: 78
Setting detail: THERAPIES SERIES
Discharge: HOME | End: 2023-12-08
Payer: MEDICARE

## 2023-12-08 VITALS — WEIGHT: 158.7 LBS | BODY MASS INDEX: 28.11 KG/M2

## 2023-12-08 PROCEDURE — 93798 PHYS/QHP OP CAR RHAB W/ECG: CPT

## 2023-12-08 ASSESSMENT — EXERCISE STRESS TEST
PEAK_HR: 86
PEAK_BP: 134/66
PEAK_RPE: 13
PEAK_METS: 5

## 2023-12-11 ENCOUNTER — HOSPITAL ENCOUNTER (OUTPATIENT)
Dept: CARDIAC REHAB | Age: 78
Setting detail: THERAPIES SERIES
Discharge: HOME | End: 2023-12-11
Payer: MEDICARE

## 2023-12-11 VITALS — WEIGHT: 157.7 LBS | BODY MASS INDEX: 27.94 KG/M2

## 2023-12-11 PROCEDURE — 93798 PHYS/QHP OP CAR RHAB W/ECG: CPT

## 2023-12-11 ASSESSMENT — EXERCISE STRESS TEST
PEAK_RPE: 14
PEAK_HR: 91
PEAK_METS: 3
PEAK_BP: 118/62

## 2023-12-12 ENCOUNTER — TELEPHONE (OUTPATIENT)
Dept: ONCOLOGY | Age: 78
End: 2023-12-12

## 2023-12-12 ENCOUNTER — OFFICE VISIT (OUTPATIENT)
Dept: ONCOLOGY | Age: 78
End: 2023-12-12
Payer: MEDICARE

## 2023-12-12 VITALS
RESPIRATION RATE: 16 BRPM | WEIGHT: 157.5 LBS | BODY MASS INDEX: 27.9 KG/M2 | TEMPERATURE: 97.8 F | SYSTOLIC BLOOD PRESSURE: 119 MMHG | DIASTOLIC BLOOD PRESSURE: 44 MMHG | HEART RATE: 77 BPM

## 2023-12-12 DIAGNOSIS — D47.2 MGUS (MONOCLONAL GAMMOPATHY OF UNKNOWN SIGNIFICANCE): Primary | ICD-10-CM

## 2023-12-12 PROCEDURE — 99211 OFF/OP EST MAY X REQ PHY/QHP: CPT

## 2023-12-12 PROCEDURE — G8427 DOCREV CUR MEDS BY ELIG CLIN: HCPCS | Performed by: INTERNAL MEDICINE

## 2023-12-12 PROCEDURE — 1036F TOBACCO NON-USER: CPT | Performed by: INTERNAL MEDICINE

## 2023-12-12 PROCEDURE — 1123F ACP DISCUSS/DSCN MKR DOCD: CPT | Performed by: INTERNAL MEDICINE

## 2023-12-12 PROCEDURE — 3078F DIAST BP <80 MM HG: CPT | Performed by: INTERNAL MEDICINE

## 2023-12-12 PROCEDURE — 1090F PRES/ABSN URINE INCON ASSESS: CPT | Performed by: INTERNAL MEDICINE

## 2023-12-12 PROCEDURE — G8484 FLU IMMUNIZE NO ADMIN: HCPCS | Performed by: INTERNAL MEDICINE

## 2023-12-12 PROCEDURE — G8417 CALC BMI ABV UP PARAM F/U: HCPCS | Performed by: INTERNAL MEDICINE

## 2023-12-12 PROCEDURE — G8399 PT W/DXA RESULTS DOCUMENT: HCPCS | Performed by: INTERNAL MEDICINE

## 2023-12-12 PROCEDURE — 3074F SYST BP LT 130 MM HG: CPT | Performed by: INTERNAL MEDICINE

## 2023-12-12 PROCEDURE — 99214 OFFICE O/P EST MOD 30 MIN: CPT | Performed by: INTERNAL MEDICINE

## 2023-12-12 NOTE — TELEPHONE ENCOUNTER
[place] ARRIVES AMBULATORY FOR MD VISIT  DR Scar Narayanan IN TO SEE PATIENT  ORDERS RECEIVED  RV 6 MONTHS WITH LABS BEFORE  LABS CDP KAPPA LAMBDA FREE LIGHT CHAINS SIFX SPEP IGG IGA IGM DUE 06/04/24, ORDERS GIVEN TO PT  MD VISIT 06/11/24 @1:45PM  AVS PRINTED AND GIVEN TO PATIENT WITH INSTRUCTIONS  PATIENT DISCHARGED AMBULATORY

## 2023-12-12 NOTE — PROGRESS NOTES
_     Chief Complaint   Patient presents with    Follow-up    Discuss Labs     DIAGNOSIS:       Paraproteinemia/monoclonal gammopathy  MGUS  Chronic renal insufficiency  Breast cancer in remission. Diagnosed more than 25 years ago. Multiple comorbidities as listed      CURRENT THERAPY:         Observation monitoring for MGUS    BRIEF CASE HISTORY:      Ms. Eloise Hermosillo is a very pleasant 66 y.o. female with history of multiple co morbidities as listed. Patient is referred for further management of abnormal kappa light chain immunoglobulin. Patient had recent evaluation by nephrology for chronic renal insufficiency. .  Work-up was done which included serum protein electrophoresis and immunofixation. Patient was noted to have elevated kappa light chain immunoglobulin and slightly limited M protein with the mild monoclonal gammopathy. Patient denies any fever or night sweats. No repeated infections. She had chronic body aches including chronic back pain. No recent changes. No chest pain or shortness of breath. No fever. No other complaints. Patient denies smoking or alcohol drinking. INTERIM HISTORY:   Patient seen for follow-up paraproteinemia. Doing well clinically. No chest pain or shortness of breath. No back pain or bone aches. No fever. No weight loss or decreased appetite. Will have valve surgery in July  No other complaints.           PAST MEDICAL HISTORY: has a past medical history of Anemia, unspecified, Aortic stenosis, Arthritis, AV block, BBB (bundle branch block), Breast cancer ([address]), Bundle branch block, CAD (coronary artery disease), Carcinoma in situ of breast, Esophageal reflux, Essential hypertension, benign, Insulin resistance, MGUS (monoclonal gammopathy of unknown significance), Nephrolithiasis, Osteoporosis, Patient in clinical research study, Pure hypercholesterolemia, Stage 3 chronic

## 2023-12-13 ENCOUNTER — HOSPITAL ENCOUNTER (OUTPATIENT)
Dept: CARDIAC REHAB | Age: 78
Setting detail: THERAPIES SERIES
Discharge: HOME | End: 2023-12-13
Payer: MEDICARE

## 2023-12-13 VITALS — WEIGHT: 157.9 LBS | BODY MASS INDEX: 27.97 KG/M2

## 2023-12-13 PROCEDURE — 93798 PHYS/QHP OP CAR RHAB W/ECG: CPT

## 2023-12-13 ASSESSMENT — EXERCISE STRESS TEST
PEAK_METS: 3.8
PEAK_BP: 134/66
PEAK_HR: 92
PEAK_RPE: 11

## 2023-12-14 RX ORDER — ROPINIROLE 0.5 MG/1
TABLET, FILM COATED ORAL
Qty: 90 TABLET | Refills: 3 | OUTPATIENT
Start: 2023-12-14

## 2023-12-15 ENCOUNTER — HOSPITAL ENCOUNTER (OUTPATIENT)
Dept: CARDIAC REHAB | Age: 78
Setting detail: THERAPIES SERIES
Discharge: HOME | End: 2023-12-15
Payer: MEDICARE

## 2023-12-15 VITALS — WEIGHT: 158.3 LBS | BODY MASS INDEX: 28.04 KG/M2

## 2023-12-15 PROCEDURE — 93798 PHYS/QHP OP CAR RHAB W/ECG: CPT

## 2023-12-15 ASSESSMENT — EXERCISE STRESS TEST
PEAK_RPE: 13
PEAK_BP: 130/80
PEAK_METS: 3.8
PEAK_HR: 95

## 2023-12-22 ENCOUNTER — APPOINTMENT (OUTPATIENT)
Dept: CARDIAC REHAB | Age: 78
End: 2023-12-22
Payer: MEDICARE

## 2023-12-26 RX ORDER — ROPINIROLE 0.5 MG/1
TABLET, FILM COATED ORAL
Qty: 90 TABLET | Refills: 3 | OUTPATIENT
Start: 2023-12-26

## 2023-12-27 ENCOUNTER — HOSPITAL ENCOUNTER (OUTPATIENT)
Dept: CARDIAC REHAB | Age: 78
Setting detail: THERAPIES SERIES
Discharge: HOME | End: 2023-12-27
Payer: MEDICARE

## 2023-12-28 ENCOUNTER — APPOINTMENT (OUTPATIENT)
Dept: CARDIAC REHAB | Age: 78
End: 2023-12-28
Payer: MEDICARE

## 2023-12-29 ENCOUNTER — HOSPITAL ENCOUNTER (OUTPATIENT)
Dept: CARDIAC REHAB | Age: 78
Setting detail: THERAPIES SERIES
Discharge: HOME | End: 2023-12-29
Payer: MEDICARE

## 2024-01-04 ENCOUNTER — HOSPITAL ENCOUNTER (OUTPATIENT)
Age: 79
Setting detail: SPECIMEN
Discharge: HOME | End: 2024-01-04

## 2024-01-04 ENCOUNTER — OFFICE VISIT (OUTPATIENT)
Dept: DERMATOLOGY | Age: 79
End: 2024-01-04
Payer: MEDICARE

## 2024-01-04 VITALS
TEMPERATURE: 97.3 F | DIASTOLIC BLOOD PRESSURE: 69 MMHG | BODY MASS INDEX: 27.57 KG/M2 | WEIGHT: 155.6 LBS | HEIGHT: 63 IN | HEART RATE: 88 BPM | OXYGEN SATURATION: 98 % | SYSTOLIC BLOOD PRESSURE: 127 MMHG

## 2024-01-04 DIAGNOSIS — A60.00 HERPESVIRAL INFECTION OF UROGENITAL SYSTEM, UNSPECIFIED: ICD-10-CM

## 2024-01-04 DIAGNOSIS — R23.8 VESICULAR ERUPTION: ICD-10-CM

## 2024-01-04 DIAGNOSIS — L90.0 LICHEN SCLEROSUS: ICD-10-CM

## 2024-01-04 DIAGNOSIS — L30.8 OTHER SPECIFIED DERMATITIS: ICD-10-CM

## 2024-01-04 DIAGNOSIS — L30.9 VULVAR DERMATITIS: Primary | ICD-10-CM

## 2024-01-04 DIAGNOSIS — A60.00 RECURRENT GENITAL HSV (HERPES SIMPLEX VIRUS) INFECTION: ICD-10-CM

## 2024-01-04 PROCEDURE — 1123F ACP DISCUSS/DSCN MKR DOCD: CPT | Performed by: DERMATOLOGY

## 2024-01-04 PROCEDURE — 99214 OFFICE O/P EST MOD 30 MIN: CPT | Performed by: DERMATOLOGY

## 2024-01-04 PROCEDURE — G8484 FLU IMMUNIZE NO ADMIN: HCPCS | Performed by: DERMATOLOGY

## 2024-01-04 PROCEDURE — 1036F TOBACCO NON-USER: CPT | Performed by: DERMATOLOGY

## 2024-01-04 PROCEDURE — 1090F PRES/ABSN URINE INCON ASSESS: CPT | Performed by: DERMATOLOGY

## 2024-01-04 PROCEDURE — 3074F SYST BP LT 130 MM HG: CPT | Performed by: DERMATOLOGY

## 2024-01-04 PROCEDURE — 3078F DIAST BP <80 MM HG: CPT | Performed by: DERMATOLOGY

## 2024-01-04 PROCEDURE — G8417 CALC BMI ABV UP PARAM F/U: HCPCS | Performed by: DERMATOLOGY

## 2024-01-04 PROCEDURE — G8399 PT W/DXA RESULTS DOCUMENT: HCPCS | Performed by: DERMATOLOGY

## 2024-01-04 PROCEDURE — G8427 DOCREV CUR MEDS BY ELIG CLIN: HCPCS | Performed by: DERMATOLOGY

## 2024-01-04 RX ORDER — CLOBETASOL PROPIONATE 0.5 MG/G
OINTMENT TOPICAL
Qty: 60 G | Refills: 2 | Status: SHIPPED | OUTPATIENT
Start: 2024-01-04

## 2024-01-04 RX ORDER — CLOBETASOL PROPIONATE 0.5 MG/G
OINTMENT TOPICAL
Qty: 60 G | Refills: 2 | Status: SHIPPED | OUTPATIENT
Start: 2024-01-04 | End: 2024-01-04

## 2024-01-04 NOTE — PROGRESS NOTES
Dermatology Patient Note  [facility] DERMATOLOGY  [address]  [address]  [facility] [address]  Dept: [phone]  Dept Fax: [phone]      VISITDATE: 1/4/2024   REFERRING PROVIDER: No ref. provider found      Estelle Gunderson is a 78 y.o. female  who presents today in the office for:    Other (Patient presents for 1 year follow up on vulvar dermatitis. Patient reports frequent nighttime urination due to illness. She was wearing incontinence pads which she believes irritated her vulvar dermatitis. She reports prior to this recent flare, the triamcinalone 0.1% cream controls the dermatitis. //Patient also complains of sores in her groin area which she believes may be related to her psoriatic arthritis. )      HISTORY OF PRESENT ILLNESS:  As above. Patient presents for a follow up for vulvar dermatitis. At the last visit, 9/19/22, she was continued and stable on triamcinolone 0.025% ointment as needed and estrogen (as prescribed by OBGYN). She was also recommended to use compression socks for capillatities of left dorsal foot.     She states when urine comes in contact with the vulva with the incontinence pads, it burns. Rash on the lower back presents with a deep itch, recurred 2 times. Used fluticasone cream which resolves in 3 days.     Patient recently overcame an illness. Denies any cold sores, but has had genital herpes. Has Valtrex at home and is using as needed for flares of genital HSV. She has had 4 episodes of shingles on her right inner thigh.     MEDICAL PROBLEMS:  Patient Active Problem List    Diagnosis Date Noted    Renal hypertension 06/30/2022     Priority: Medium    Chronic systolic (congestive) heart failure 07/10/2023    Restless legs 06/25/2023    H/O syncope 06/23/2023    S/P TAVR (transcatheter aortic valve replacement) 06/23/2023    Chronic bronchitis (HCC) 06/16/2023    Diastolic dysfunction 06/16/2023    CKD

## 2024-01-05 LAB
HSV1 DNA SPEC QL NAA+PROBE: NEGATIVE
HSV2 DNA SPEC QL NAA+PROBE: POSITIVE
SOURCE: ABNORMAL
SPECIMEN DESCRIPTION: ABNORMAL
VZV DNA SPEC QL NAA+PROBE: ABNORMAL
VZV DNA SPEC QL NAA+PROBE: NEGATIVE

## 2024-01-05 RX ORDER — VALACYCLOVIR HYDROCHLORIDE 500 MG/1
TABLET, FILM COATED ORAL
Qty: 36 TABLET | Refills: 5 | Status: SHIPPED | OUTPATIENT
Start: 2024-01-05

## 2024-01-08 ENCOUNTER — HOSPITAL ENCOUNTER (OUTPATIENT)
Dept: CARDIAC REHAB | Age: 79
Setting detail: THERAPIES SERIES
Discharge: HOME | End: 2024-01-08

## 2024-01-08 NOTE — CARDIO/PULMONARY
Pt canceled cardiac rehab session today and Wed due to continued illness, she will be in touch regarding Friday. Will reschedule appts.

## 2024-01-09 ENCOUNTER — HOSPITAL ENCOUNTER (OUTPATIENT)
Dept: GENERAL RADIOLOGY | Facility: CLINIC | Age: 79
Discharge: HOME | End: 2024-01-11
Payer: MEDICARE

## 2024-01-09 DIAGNOSIS — J40 BRONCHITIS: ICD-10-CM

## 2024-01-09 PROBLEM — J42 CHRONIC BRONCHITIS (HCC): Status: RESOLVED | Noted: 2023-06-16 | Resolved: 2024-01-09

## 2024-01-09 PROBLEM — J44.9 COPD (CHRONIC OBSTRUCTIVE PULMONARY DISEASE) (HCC): Status: RESOLVED | Noted: 2018-06-11 | Resolved: 2024-01-09

## 2024-01-09 PROBLEM — N18.30 STAGE 3 CHRONIC KIDNEY DISEASE (HCC): Status: RESOLVED | Noted: 2019-03-19 | Resolved: 2024-01-09

## 2024-01-09 PROCEDURE — 71046 X-RAY EXAM CHEST 2 VIEWS: CPT

## 2024-01-10 ENCOUNTER — APPOINTMENT (OUTPATIENT)
Dept: CARDIAC REHAB | Age: 79
End: 2024-01-10
Payer: MEDICARE

## 2024-01-12 ENCOUNTER — HOSPITAL ENCOUNTER (OUTPATIENT)
Dept: CARDIAC REHAB | Age: 79
Setting detail: THERAPIES SERIES
Discharge: HOME | End: 2024-01-12
Payer: MEDICARE

## 2024-01-12 NOTE — CARDIO/PULMONARY
CALLED PT TO SEE WHEN SHE WILL BE RETURNING TO REHAB. PT STATES SHE IS ALMOST OVER HER ILLNESS, AND WISHES TO RETURN ON WEDNESDAY, JANUARY 17TH. SHE ALSO STATED SHE WILL BE IN OUR PHASE III PROGRAM ONCE HER LAST 2 VISITS ARE COMPLETE.

## 2024-01-15 ENCOUNTER — APPOINTMENT (OUTPATIENT)
Dept: CARDIAC REHAB | Age: 79
End: 2024-01-15
Payer: MEDICARE

## 2024-01-17 ENCOUNTER — APPOINTMENT (OUTPATIENT)
Dept: CARDIAC REHAB | Age: 79
End: 2024-01-17
Payer: MEDICARE

## 2024-01-17 ENCOUNTER — HOSPITAL ENCOUNTER (OUTPATIENT)
Dept: CARDIAC REHAB | Age: 79
Setting detail: THERAPIES SERIES
Discharge: HOME | End: 2024-01-17
Payer: MEDICARE

## 2024-01-17 VITALS — BODY MASS INDEX: 27.5 KG/M2 | WEIGHT: 155.2 LBS

## 2024-01-17 PROCEDURE — 93798 PHYS/QHP OP CAR RHAB W/ECG: CPT

## 2024-01-17 ASSESSMENT — EXERCISE STRESS TEST
PEAK_RPE: 13
PEAK_METS: 3.6
PEAK_HR: 96
PEAK_BP: 108/60

## 2024-01-17 NOTE — DISCHARGE INSTRUCTIONS
Take your pulse.  Your resting heart rate = ____70-80______.                                   Warm up for 4 minutes with stretching and an easy walk or slow bike ride.      Next…                                                                                                                                               Perform an aerobic activity for 30 + minutes, 4-5 times every week.      Your ideal exercise heart rate = ___high 90's______.     *Remember the Rate of Perceived Exertion Scale - exercise at a 12-13.     Incorporate arm exercises into your routine before, during or after aerobic activity. Free weights or resistance bands are a great option.      Then…                                                                                                                                          Walk slowly for 5 minutes to cool down, then stretch out again.     Lastly…                                                                                                                                         Take your pulse to make sure it is within 10 beats of when you started.     Remember…                                                                                                                                Phase 3 cardiac rehab is available twice per week. This optional program allows you to come in for non-monitored exercise in addition to your home exercise routine. Phase 3 is available Tuesdays and Thursdays for $5 per visit. An appointment is required.

## 2024-01-19 ENCOUNTER — HOSPITAL ENCOUNTER (OUTPATIENT)
Dept: CARDIAC REHAB | Age: 79
Setting detail: THERAPIES SERIES
Discharge: HOME | End: 2024-01-19
Payer: MEDICARE

## 2024-01-22 ENCOUNTER — HOSPITAL ENCOUNTER (OUTPATIENT)
Dept: CARDIAC REHAB | Age: 79
Setting detail: THERAPIES SERIES
Discharge: HOME | End: 2024-01-22
Payer: MEDICARE

## 2024-01-22 VITALS — BODY MASS INDEX: 27.55 KG/M2 | WEIGHT: 155.5 LBS

## 2024-01-22 PROCEDURE — 93798 PHYS/QHP OP CAR RHAB W/ECG: CPT

## 2024-01-22 ASSESSMENT — EJECTION FRACTION: EF_VALUE: 55

## 2024-01-22 ASSESSMENT — EXERCISE STRESS TEST
PEAK_METS: 3.6
PEAK_HR: 99
PEAK_BP: 132/70
PEAK_BP: 132/70
PEAK_RPE: 13

## 2024-01-22 ASSESSMENT — PATIENT HEALTH QUESTIONNAIRE - PHQ9
SUM OF ALL RESPONSES TO PHQ QUESTIONS 1-9: 2
1. LITTLE INTEREST OR PLEASURE IN DOING THINGS: 1
SUM OF ALL RESPONSES TO PHQ QUESTIONS 1-9: 2
SUM OF ALL RESPONSES TO PHQ9 QUESTIONS 1 & 2: 2
SUM OF ALL RESPONSES TO PHQ QUESTIONS 1-9: 2
2. FEELING DOWN, DEPRESSED OR HOPELESS: 1
SUM OF ALL RESPONSES TO PHQ QUESTIONS 1-9: 2

## 2024-01-22 NOTE — FLOWSHEET NOTE
01/22/24 1130   Program Discharge   Enrollment Complete Yes   Rehab Completed Yes   # of Goals Completed 2   # of Sessions Completed 36   # of ECG Monitored Sessions 34     Pt completed  34 exercise visits, 1 classes and 1 admission appt. ITP updated, PHQ, DASI, and Cardiac Knowledge Test completed, medications reviewed and updated as needed. Patient provided with discharge AVS and confidential survey. Pt does plan to participate in Phase 3 cardiac rehab. Goal 2 met.

## 2024-01-22 NOTE — FLOWSHEET NOTE
01/22/24 1137   Treatment Diagnosis   Treatment Diagnosis 1 MARII   MARII Date 06/23/23   Treatment Diagnosis 2 PCI   Referral Date 08/03/23   Significant Cardiovascular History Pacemaker;Previous PCI   Co-morbidities Malignancy   Oxygen Intervention   Oxygen Use No   Individual Treatment Plan   ITP Visit Type Discharge, completed program   1st Date of Exercise  09/21/23   ITP Next Review Date 01/22/24   Visit #/Total Visits 21   EF% 55 %   Risk Stratification Low   ITP Exercise;Nutrition;Psychosocial;Education   Exercise    Stages of Change Action   DASI Total Score 45.2   Assisted Devices None   Exercise Prescription   Mode Treadmill;Bike;Stepper;Ergometer;Elliptical   Frequency per week 3 X WEEK   Duration Per Session 49 MIN   Intensity METS       3.8   RPE 13   Progression INCREASE EVERY 3 SESSIONS, INCREASE INTENSITY ACCORDING TO RPE   Resistance Training Yes   Exercise Blood Pressures   Resting /76   Peak /70   Is BP WDL Yes   Exercise Activity at Home   Type HOUSE WORK  (pt states \"she no longer experiences sob with activity\")   Frequency DAILY   Duration pt plans to attend PHASE III   Resistance Training No   Exercise Education   Education Self pulse;Exercise safety;Signs/symptoms to report;RPE scale;Equipment orientation;Warm up/cool down;Physically active   Exercise Target Goal   Target Goal(s) Individual exercise RX;BP < 140/90 or < 130/80, if DM or CKD;Aerobic activity 30 + minutes/day  5 days/week   Patient Stated Exercise Goals INCREASE TOLERANCE  (patient states \"exercise endurance has increased and more active\")   Psychosocial   Stages of Change Maintenance   PHQ-9 Total Score 2   Psychosocial Intervention   Interventions Currently under treatment for depression   Currently Taking Psychotropic Meds Yes   Medication Changes No   Psychosocial Target Goals   Uses Stress Mgmt Techniques Yes   Nutrition   Stages of Change Other (comment)   Lipids   Date Lipids Drawn 09/23/23   Total 179   HDL

## 2024-01-25 ENCOUNTER — HOSPITAL ENCOUNTER (OUTPATIENT)
Dept: CARDIAC REHAB | Age: 79
Discharge: HOME | End: 2024-01-25

## 2024-01-25 PROCEDURE — 9900000065 HC CARDIAC REHAB PHASE 3 - 1 VISIT

## 2024-01-30 ENCOUNTER — HOSPITAL ENCOUNTER (OUTPATIENT)
Dept: CARDIAC REHAB | Age: 79
Discharge: HOME | End: 2024-01-30

## 2024-01-30 PROCEDURE — 9900000065 HC CARDIAC REHAB PHASE 3 - 1 VISIT

## 2024-01-31 NOTE — TELEPHONE ENCOUNTER
A Refill Has Been Requested for Estelle Gunderson    Medication Requested  Requested Prescriptions     Pending Prescriptions Disp Refills    rOPINIRole (REQUIP) 0.5 MG tablet [Pharmacy Med Name: ROPINIROLE HCL 0.5 MG TABLET] 90 tablet 3     Sig: TAKE 1 TABLET BY MOUTH IN THE MORNING AND IN THE EVENING       Last Visit Date (If Applicable)  Visit date not found    Next Visit Date (If Applicable)  Visit date not found

## 2024-02-01 ENCOUNTER — HOSPITAL ENCOUNTER (OUTPATIENT)
Dept: CARDIAC REHAB | Age: 79
Discharge: HOME | End: 2024-02-01

## 2024-02-01 PROCEDURE — 9900000065 HC CARDIAC REHAB PHASE 3 - 1 VISIT

## 2024-02-01 RX ORDER — ROPINIROLE 0.5 MG/1
TABLET, FILM COATED ORAL
Qty: 90 TABLET | Refills: 3 | Status: SHIPPED | OUTPATIENT
Start: 2024-02-01

## 2024-02-01 NOTE — PROGRESS NOTES
Dermatology Patient Note  [facility], [facility] DERMATOLOGY  [address]  [address]  [place] [address]  Dept: [phone]  Dept Fax: [phone]      VISITDATE: 2/6/2024   REFERRING PROVIDER: No ref. provider found      Estelle Gunderson is a 79 y.o. female  who presents today in the office for:    Other (Patient presents today for a 1 month f/u for vulvar dermatitis. No more itching. She is using the clobetasol and valtrex and states they are working very well for her. Symptoms have almost completely cleared up. )      HISTORY OF PRESENT ILLNESS:  Patient presents for a 1 month follow up for lichen sclerosus and dermatitis. At , she was prescribed clobetasol ointment. 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 She was then prescribed Valtrex 500 mg daily.     Patient reports she is no longer experiencing itching on the vulva. She is using the clobetasol and Valtrex and states that they are both working very well for her. She explains that her symptoms have almost completely cleared up. Patient reports that she will use the clobetasol every few days.     Patient reports that on her right buttock, where she would normally get an HSV flare, she is still experiencing itching. It will not develop into a rash anymore, but the itching will not resolve.     Patient states that before she was sexually active, she would get a rash on the side of her buttocks that would itch.     Patient also reports a history of perianal warts that were removed.      MEDICAL PROBLEMS:  Patient Active Problem List    Diagnosis Date Noted    Renal hypertension 06/30/2022     Priority: Medium    Chronic systolic (congestive) heart failure 07/10/2023    Restless legs 06/25/2023    H/O syncope 06/23/2023    S/P TAVR (transcatheter aortic valve replacement) 06/23/2023    Diastolic dysfunction 06/16/2023    CKD (chronic kidney disease) 05/11/2023

## 2024-02-06 ENCOUNTER — OFFICE VISIT (OUTPATIENT)
Dept: DERMATOLOGY | Age: 79
End: 2024-02-06
Payer: MEDICARE

## 2024-02-06 ENCOUNTER — HOSPITAL ENCOUNTER (OUTPATIENT)
Dept: CARDIAC REHAB | Age: 79
Discharge: HOME | End: 2024-02-06

## 2024-02-06 VITALS
DIASTOLIC BLOOD PRESSURE: 74 MMHG | BODY MASS INDEX: 27.64 KG/M2 | WEIGHT: 156 LBS | SYSTOLIC BLOOD PRESSURE: 133 MMHG | TEMPERATURE: 97.7 F | OXYGEN SATURATION: 97 % | HEIGHT: 63 IN | HEART RATE: 84 BPM

## 2024-02-06 DIAGNOSIS — L90.0 LICHEN SCLEROSUS: ICD-10-CM

## 2024-02-06 DIAGNOSIS — A60.00 RECURRENT GENITAL HSV (HERPES SIMPLEX VIRUS) INFECTION: ICD-10-CM

## 2024-02-06 DIAGNOSIS — L30.9 VULVAR DERMATITIS: Primary | ICD-10-CM

## 2024-02-06 PROCEDURE — 1090F PRES/ABSN URINE INCON ASSESS: CPT | Performed by: DERMATOLOGY

## 2024-02-06 PROCEDURE — 99214 OFFICE O/P EST MOD 30 MIN: CPT | Performed by: DERMATOLOGY

## 2024-02-06 PROCEDURE — G8417 CALC BMI ABV UP PARAM F/U: HCPCS | Performed by: DERMATOLOGY

## 2024-02-06 PROCEDURE — 9900000065 HC CARDIAC REHAB PHASE 3 - 1 VISIT

## 2024-02-06 PROCEDURE — 1036F TOBACCO NON-USER: CPT | Performed by: DERMATOLOGY

## 2024-02-06 PROCEDURE — G8427 DOCREV CUR MEDS BY ELIG CLIN: HCPCS | Performed by: DERMATOLOGY

## 2024-02-06 PROCEDURE — 1123F ACP DISCUSS/DSCN MKR DOCD: CPT | Performed by: DERMATOLOGY

## 2024-02-06 PROCEDURE — G8484 FLU IMMUNIZE NO ADMIN: HCPCS | Performed by: DERMATOLOGY

## 2024-02-06 PROCEDURE — G8399 PT W/DXA RESULTS DOCUMENT: HCPCS | Performed by: DERMATOLOGY

## 2024-02-06 PROCEDURE — 3078F DIAST BP <80 MM HG: CPT | Performed by: DERMATOLOGY

## 2024-02-06 PROCEDURE — 3075F SYST BP GE 130 - 139MM HG: CPT | Performed by: DERMATOLOGY

## 2024-02-06 RX ORDER — VALACYCLOVIR HYDROCHLORIDE 1 G/1
1000 TABLET, FILM COATED ORAL DAILY
Qty: 30 TABLET | Refills: 5 | Status: SHIPPED | OUTPATIENT
Start: 2024-02-06 | End: 2024-08-04

## 2024-02-08 ENCOUNTER — HOSPITAL ENCOUNTER (OUTPATIENT)
Dept: CARDIAC REHAB | Age: 79
Discharge: HOME | End: 2024-02-08

## 2024-02-13 ENCOUNTER — HOSPITAL ENCOUNTER (OUTPATIENT)
Dept: CARDIAC REHAB | Age: 79
Discharge: HOME | End: 2024-02-13

## 2024-02-13 PROCEDURE — 9900000065 HC CARDIAC REHAB PHASE 3 - 1 VISIT

## 2024-02-15 ENCOUNTER — HOSPITAL ENCOUNTER (OUTPATIENT)
Dept: CARDIAC REHAB | Age: 79
Discharge: HOME | End: 2024-02-15

## 2024-02-15 PROCEDURE — 9900000065 HC CARDIAC REHAB PHASE 3 - 1 VISIT

## 2024-02-20 ENCOUNTER — HOSPITAL ENCOUNTER (OUTPATIENT)
Dept: CARDIAC REHAB | Age: 79
Discharge: HOME | End: 2024-02-20

## 2024-02-20 PROCEDURE — 9900000065 HC CARDIAC REHAB PHASE 3 - 1 VISIT

## 2024-02-22 ENCOUNTER — HOSPITAL ENCOUNTER (OUTPATIENT)
Dept: CARDIAC REHAB | Age: 79
Discharge: HOME | End: 2024-02-22

## 2024-02-22 PROCEDURE — 9900000065 HC CARDIAC REHAB PHASE 3 - 1 VISIT

## 2024-02-27 ENCOUNTER — HOSPITAL ENCOUNTER (OUTPATIENT)
Dept: CARDIAC REHAB | Age: 79
Discharge: HOME | End: 2024-02-27

## 2024-02-29 ENCOUNTER — HOSPITAL ENCOUNTER (OUTPATIENT)
Dept: CARDIAC REHAB | Age: 79
Discharge: HOME | End: 2024-02-29

## 2024-03-01 ENCOUNTER — HOSPITAL ENCOUNTER (INPATIENT)
Age: 79
LOS: 2 days | Discharge: HOME | DRG: 871 | End: 2024-03-03
Attending: EMERGENCY MEDICINE | Admitting: HOSPITALIST
Payer: MEDICARE

## 2024-03-01 ENCOUNTER — APPOINTMENT (OUTPATIENT)
Dept: CT IMAGING | Age: 79
DRG: 871 | End: 2024-03-01
Payer: MEDICARE

## 2024-03-01 ENCOUNTER — APPOINTMENT (OUTPATIENT)
Dept: GENERAL RADIOLOGY | Age: 79
DRG: 871 | End: 2024-03-01
Payer: MEDICARE

## 2024-03-01 DIAGNOSIS — A41.9 SEPSIS, DUE TO UNSPECIFIED ORGANISM, UNSPECIFIED WHETHER ACUTE ORGAN DYSFUNCTION PRESENT (HCC): Primary | ICD-10-CM

## 2024-03-01 DIAGNOSIS — N30.01 ACUTE CYSTITIS WITH HEMATURIA: ICD-10-CM

## 2024-03-01 PROBLEM — J10.1 INFLUENZA A: Status: ACTIVE | Noted: 2024-03-01

## 2024-03-01 PROBLEM — N39.0 UTI (URINARY TRACT INFECTION): Status: ACTIVE | Noted: 2024-03-01

## 2024-03-01 LAB
ALBUMIN SERPL-MCNC: 4.6 G/DL (ref 3.5–5.2)
ALBUMIN/GLOB SERPL: 1.3 {RATIO} (ref 1–2.5)
ALP SERPL-CCNC: 72 U/L (ref 35–104)
ALT SERPL-CCNC: 18 U/L (ref 5–33)
AMMONIA PLAS-SCNC: <10 UMOL/L (ref 11–51)
ANION GAP SERPL CALCULATED.3IONS-SCNC: 14 MMOL/L (ref 9–17)
AST SERPL-CCNC: 38 U/L
B PARAP IS1001 DNA NPH QL NAA+NON-PROBE: NOT DETECTED
B PERT DNA SPEC QL NAA+PROBE: NOT DETECTED
BACTERIA URNS QL MICRO: NORMAL
BASOPHILS # BLD: 0.05 K/UL (ref 0–0.2)
BASOPHILS NFR BLD: 1 % (ref 0–2)
BILIRUB SERPL-MCNC: 0.5 MG/DL (ref 0.3–1.2)
BILIRUB UR QL STRIP: NEGATIVE
BUN BLD-MCNC: 21 MG/DL (ref 8–26)
BUN SERPL-MCNC: 20 MG/DL (ref 8–23)
C PNEUM DNA NPH QL NAA+NON-PROBE: NOT DETECTED
CA-I BLD-SCNC: 1.14 MMOL/L (ref 1.15–1.33)
CALCIUM SERPL-MCNC: 9.1 MG/DL (ref 8.6–10.4)
CASTS #/AREA URNS LPF: NORMAL /LPF (ref 0–8)
CHLORIDE BLD-SCNC: 102 MMOL/L (ref 98–107)
CHLORIDE SERPL-SCNC: 94 MMOL/L (ref 98–107)
CLARITY UR: ABNORMAL
CO2 BLD CALC-SCNC: 27 MMOL/L (ref 22–30)
CO2 SERPL-SCNC: 24 MMOL/L (ref 20–31)
COLOR UR: YELLOW
CREAT SERPL-MCNC: 1.2 MG/DL (ref 0.5–0.9)
EGFR, POC: 46 ML/MIN/1.73M2
EOSINOPHIL # BLD: <0.03 K/UL (ref 0–0.44)
EOSINOPHILS RELATIVE PERCENT: 0 % (ref 1–4)
EPI CELLS #/AREA URNS HPF: NORMAL /HPF (ref 0–5)
ERYTHROCYTE [DISTWIDTH] IN BLOOD BY AUTOMATED COUNT: 17.7 % (ref 11.8–14.4)
FLUAV AG SPEC QL: NEGATIVE
FLUAV H3 RNA NPH QL NAA+NON-PROBE: DETECTED
FLUAV RNA NPH QL NAA+NON-PROBE: DETECTED
FLUBV AG SPEC QL: NEGATIVE
FLUBV RNA NPH QL NAA+NON-PROBE: NOT DETECTED
GFR SERPL CREATININE-BSD FRML MDRD: 46 ML/MIN/1.73M2
GLUCOSE BLD-MCNC: 141 MG/DL (ref 65–105)
GLUCOSE BLD-MCNC: 149 MG/DL (ref 74–100)
GLUCOSE SERPL-MCNC: 146 MG/DL (ref 70–99)
GLUCOSE UR STRIP-MCNC: NEGATIVE MG/DL
HADV DNA NPH QL NAA+NON-PROBE: NOT DETECTED
HCO3 VENOUS: 27.9 MMOL/L (ref 22–29)
HCOV 229E RNA NPH QL NAA+NON-PROBE: NOT DETECTED
HCOV HKU1 RNA NPH QL NAA+NON-PROBE: NOT DETECTED
HCOV NL63 RNA NPH QL NAA+NON-PROBE: NOT DETECTED
HCOV OC43 RNA NPH QL NAA+NON-PROBE: NOT DETECTED
HCT VFR BLD AUTO: 41.4 % (ref 36.3–47.1)
HCT VFR BLD AUTO: 42 % (ref 36–46)
HGB BLD-MCNC: 13.4 G/DL (ref 11.9–15.1)
HGB UR QL STRIP.AUTO: ABNORMAL
HMPV RNA NPH QL NAA+NON-PROBE: NOT DETECTED
HPIV1 RNA NPH QL NAA+NON-PROBE: NOT DETECTED
HPIV2 RNA NPH QL NAA+NON-PROBE: NOT DETECTED
HPIV3 RNA NPH QL NAA+NON-PROBE: NOT DETECTED
HPIV4 RNA NPH QL NAA+NON-PROBE: NOT DETECTED
IMM GRANULOCYTES # BLD AUTO: <0.03 K/UL (ref 0–0.3)
IMM GRANULOCYTES NFR BLD: 0 %
INR PPP: 1.1
INR PPP: NORMAL
KETONES UR STRIP-MCNC: NEGATIVE MG/DL
LACTIC ACID, SEPSIS WHOLE BLOOD: 1.4 MMOL/L (ref 0.5–1.9)
LEUKOCYTE ESTERASE UR QL STRIP: ABNORMAL
LYMPHOCYTES NFR BLD: 1.23 K/UL (ref 1.1–3.7)
LYMPHOCYTES RELATIVE PERCENT: 12 % (ref 24–43)
M PNEUMO DNA NPH QL NAA+NON-PROBE: NOT DETECTED
MCH RBC QN AUTO: 28.2 PG (ref 25.2–33.5)
MCHC RBC AUTO-ENTMCNC: 32.4 G/DL (ref 28.4–34.8)
MCV RBC AUTO: 87 FL (ref 82.6–102.9)
MONOCYTES NFR BLD: 1.43 K/UL (ref 0.1–1.2)
MONOCYTES NFR BLD: 14 % (ref 3–12)
NEUTROPHILS NFR BLD: 73 % (ref 36–65)
NEUTS SEG NFR BLD: 7.2 K/UL (ref 1.5–8.1)
NITRITE UR QL STRIP: NEGATIVE
NRBC BLD-RTO: 0 PER 100 WBC
O2 SAT, VEN: 22.7 % (ref 60–85)
PARTIAL THROMBOPLASTIN TIME: 26.3 SEC (ref 23–36.5)
PCO2, VEN: 48.7 MM HG (ref 41–51)
PH UR STRIP: 5.5 [PH] (ref 5–8)
PH VENOUS: 7.37 (ref 7.32–7.43)
PLATELET # BLD AUTO: 171 K/UL (ref 138–453)
PMV BLD AUTO: 10.5 FL (ref 8.1–13.5)
PO2, VEN: 17.2 MM HG (ref 30–50)
POC ANION GAP: 11 MMOL/L (ref 7–16)
POC CREATININE WHOLE BLOOD: 1.24
POC CREATININE: 1.2 MG/DL (ref 0.51–1.19)
POC HEMOGLOBIN (CALC): 14.3 G/DL (ref 12–16)
POC LACTIC ACID: 1.2 MMOL/L (ref 0.56–1.39)
POSITIVE BASE EXCESS, VEN: 1.7 MMOL/L (ref 0–3)
POTASSIUM BLD-SCNC: 3.6 MMOL/L (ref 3.5–4.5)
POTASSIUM SERPL-SCNC: 4.1 MMOL/L (ref 3.7–5.3)
PROT SERPL-MCNC: 8.1 G/DL (ref 6.4–8.3)
PROT UR STRIP-MCNC: ABNORMAL MG/DL
PROTHROMBIN TIME: 14.3 SEC (ref 11.7–14.9)
PROTHROMBIN TIME: NORMAL SEC (ref 11.7–14.9)
RBC # BLD AUTO: 4.76 M/UL (ref 3.95–5.11)
RBC # BLD: ABNORMAL 10*6/UL
RBC #/AREA URNS HPF: NORMAL /HPF (ref 0–4)
RSV RNA NPH QL NAA+NON-PROBE: NOT DETECTED
RV+EV RNA NPH QL NAA+NON-PROBE: NOT DETECTED
SARS-COV-2 RDRP RESP QL NAA+PROBE: NOT DETECTED
SARS-COV-2 RNA NPH QL NAA+NON-PROBE: NOT DETECTED
SODIUM BLD-SCNC: 139 MMOL/L (ref 138–146)
SODIUM SERPL-SCNC: 132 MMOL/L (ref 135–144)
SP GR UR STRIP: 1.07 (ref 1–1.03)
SPECIMEN DESCRIPTION: ABNORMAL
SPECIMEN DESCRIPTION: NORMAL
TROPONIN I SERPL HS-MCNC: 24 NG/L (ref 0–14)
TROPONIN I SERPL HS-MCNC: 25 NG/L (ref 0–14)
UROBILINOGEN UR STRIP-ACNC: NORMAL EU/DL (ref 0–1)
WBC #/AREA URNS HPF: NORMAL /HPF (ref 0–5)
WBC OTHER # BLD: 9.9 K/UL (ref 3.5–11.3)

## 2024-03-01 PROCEDURE — 6370000000 HC RX 637 (ALT 250 FOR IP): Performed by: HOSPITALIST

## 2024-03-01 PROCEDURE — 81001 URINALYSIS AUTO W/SCOPE: CPT

## 2024-03-01 PROCEDURE — 70498 CT ANGIOGRAPHY NECK: CPT

## 2024-03-01 PROCEDURE — 84520 ASSAY OF UREA NITROGEN: CPT

## 2024-03-01 PROCEDURE — 2580000003 HC RX 258

## 2024-03-01 PROCEDURE — 96361 HYDRATE IV INFUSION ADD-ON: CPT

## 2024-03-01 PROCEDURE — 94761 N-INVAS EAR/PLS OXIMETRY MLT: CPT

## 2024-03-01 PROCEDURE — 94640 AIRWAY INHALATION TREATMENT: CPT

## 2024-03-01 PROCEDURE — 85730 THROMBOPLASTIN TIME PARTIAL: CPT

## 2024-03-01 PROCEDURE — 99285 EMERGENCY DEPT VISIT HI MDM: CPT

## 2024-03-01 PROCEDURE — 85014 HEMATOCRIT: CPT

## 2024-03-01 PROCEDURE — 82330 ASSAY OF CALCIUM: CPT

## 2024-03-01 PROCEDURE — 85025 COMPLETE CBC W/AUTO DIFF WBC: CPT

## 2024-03-01 PROCEDURE — 84484 ASSAY OF TROPONIN QUANT: CPT

## 2024-03-01 PROCEDURE — 82140 ASSAY OF AMMONIA: CPT

## 2024-03-01 PROCEDURE — 80053 COMPREHEN METABOLIC PANEL: CPT

## 2024-03-01 PROCEDURE — 87804 INFLUENZA ASSAY W/OPTIC: CPT

## 2024-03-01 PROCEDURE — 99223 1ST HOSP IP/OBS HIGH 75: CPT | Performed by: HOSPITALIST

## 2024-03-01 PROCEDURE — 6370000000 HC RX 637 (ALT 250 FOR IP)

## 2024-03-01 PROCEDURE — 0202U NFCT DS 22 TRGT SARS-COV-2: CPT

## 2024-03-01 PROCEDURE — 96374 THER/PROPH/DIAG INJ IV PUSH: CPT

## 2024-03-01 PROCEDURE — 83605 ASSAY OF LACTIC ACID: CPT

## 2024-03-01 PROCEDURE — 82947 ASSAY GLUCOSE BLOOD QUANT: CPT

## 2024-03-01 PROCEDURE — 6360000004 HC RX CONTRAST MEDICATION: Performed by: STUDENT IN AN ORGANIZED HEALTH CARE EDUCATION/TRAINING PROGRAM

## 2024-03-01 PROCEDURE — 71045 X-RAY EXAM CHEST 1 VIEW: CPT

## 2024-03-01 PROCEDURE — 6360000002 HC RX W HCPCS: Performed by: STUDENT IN AN ORGANIZED HEALTH CARE EDUCATION/TRAINING PROGRAM

## 2024-03-01 PROCEDURE — 87040 BLOOD CULTURE FOR BACTERIA: CPT

## 2024-03-01 PROCEDURE — 2580000003 HC RX 258: Performed by: STUDENT IN AN ORGANIZED HEALTH CARE EDUCATION/TRAINING PROGRAM

## 2024-03-01 PROCEDURE — 82565 ASSAY OF CREATININE: CPT

## 2024-03-01 PROCEDURE — 93005 ELECTROCARDIOGRAM TRACING: CPT | Performed by: STUDENT IN AN ORGANIZED HEALTH CARE EDUCATION/TRAINING PROGRAM

## 2024-03-01 PROCEDURE — 6360000002 HC RX W HCPCS

## 2024-03-01 PROCEDURE — 80051 ELECTROLYTE PANEL: CPT

## 2024-03-01 PROCEDURE — 85610 PROTHROMBIN TIME: CPT

## 2024-03-01 PROCEDURE — 1200000000 HC SEMI PRIVATE

## 2024-03-01 PROCEDURE — 70450 CT HEAD/BRAIN W/O DYE: CPT

## 2024-03-01 PROCEDURE — 82803 BLOOD GASES ANY COMBINATION: CPT

## 2024-03-01 PROCEDURE — 96375 TX/PRO/DX INJ NEW DRUG ADDON: CPT

## 2024-03-01 PROCEDURE — 87635 SARS-COV-2 COVID-19 AMP PRB: CPT

## 2024-03-01 RX ORDER — SODIUM CHLORIDE 9 MG/ML
INJECTION, SOLUTION INTRAVENOUS CONTINUOUS
Status: DISCONTINUED | OUTPATIENT
Start: 2024-03-01 | End: 2024-03-02

## 2024-03-01 RX ORDER — OSELTAMIVIR PHOSPHATE 30 MG/1
30 CAPSULE ORAL 2 TIMES DAILY
Status: DISCONTINUED | OUTPATIENT
Start: 2024-03-01 | End: 2024-03-03 | Stop reason: HOSPADM

## 2024-03-01 RX ORDER — 0.9 % SODIUM CHLORIDE 0.9 %
500 INTRAVENOUS SOLUTION INTRAVENOUS ONCE
Status: COMPLETED | OUTPATIENT
Start: 2024-03-01 | End: 2024-03-01

## 2024-03-01 RX ORDER — ONDANSETRON 4 MG/1
4 TABLET, ORALLY DISINTEGRATING ORAL EVERY 8 HOURS PRN
Status: DISCONTINUED | OUTPATIENT
Start: 2024-03-01 | End: 2024-03-03 | Stop reason: HOSPADM

## 2024-03-01 RX ORDER — SODIUM CHLORIDE 0.9 % (FLUSH) 0.9 %
5-40 SYRINGE (ML) INJECTION EVERY 12 HOURS SCHEDULED
Status: DISCONTINUED | OUTPATIENT
Start: 2024-03-01 | End: 2024-03-03 | Stop reason: HOSPADM

## 2024-03-01 RX ORDER — OSELTAMIVIR PHOSPHATE 75 MG/1
75 CAPSULE ORAL ONCE
Status: COMPLETED | OUTPATIENT
Start: 2024-03-01 | End: 2024-03-01

## 2024-03-01 RX ORDER — ASPIRIN 81 MG/1
81 TABLET ORAL DAILY
Status: DISCONTINUED | OUTPATIENT
Start: 2024-03-02 | End: 2024-03-03 | Stop reason: HOSPADM

## 2024-03-01 RX ORDER — BUDESONIDE AND FORMOTEROL FUMARATE DIHYDRATE 160; 4.5 UG/1; UG/1
2 AEROSOL RESPIRATORY (INHALATION)
Status: DISCONTINUED | OUTPATIENT
Start: 2024-03-02 | End: 2024-03-03 | Stop reason: HOSPADM

## 2024-03-01 RX ORDER — SODIUM CHLORIDE 0.9 % (FLUSH) 0.9 %
5-40 SYRINGE (ML) INJECTION PRN
Status: DISCONTINUED | OUTPATIENT
Start: 2024-03-01 | End: 2024-03-03 | Stop reason: HOSPADM

## 2024-03-01 RX ORDER — ACETAMINOPHEN 650 MG/1
650 SUPPOSITORY RECTAL EVERY 6 HOURS PRN
Status: DISCONTINUED | OUTPATIENT
Start: 2024-03-01 | End: 2024-03-03 | Stop reason: HOSPADM

## 2024-03-01 RX ORDER — MONTELUKAST SODIUM 10 MG/1
10 TABLET ORAL EVERY EVENING
Status: DISCONTINUED | OUTPATIENT
Start: 2024-03-01 | End: 2024-03-03 | Stop reason: HOSPADM

## 2024-03-01 RX ORDER — PRAVASTATIN SODIUM 20 MG
20 TABLET ORAL EVERY EVENING
Status: DISCONTINUED | OUTPATIENT
Start: 2024-03-01 | End: 2024-03-03 | Stop reason: HOSPADM

## 2024-03-01 RX ORDER — ZOLPIDEM TARTRATE 5 MG/1
10 TABLET ORAL NIGHTLY PRN
Status: DISCONTINUED | OUTPATIENT
Start: 2024-03-01 | End: 2024-03-03 | Stop reason: HOSPADM

## 2024-03-01 RX ORDER — PANTOPRAZOLE SODIUM 40 MG/1
40 TABLET, DELAYED RELEASE ORAL
Status: DISCONTINUED | OUTPATIENT
Start: 2024-03-02 | End: 2024-03-03 | Stop reason: HOSPADM

## 2024-03-01 RX ORDER — ACETAMINOPHEN 325 MG/1
650 TABLET ORAL EVERY 6 HOURS PRN
Status: DISCONTINUED | OUTPATIENT
Start: 2024-03-01 | End: 2024-03-03 | Stop reason: HOSPADM

## 2024-03-01 RX ORDER — ASPIRIN 81 MG/1
81 TABLET ORAL EVERY OTHER DAY
Status: DISCONTINUED | OUTPATIENT
Start: 2024-03-02 | End: 2024-03-01

## 2024-03-01 RX ORDER — CARVEDILOL 6.25 MG/1
6.25 TABLET ORAL 2 TIMES DAILY WITH MEALS
Status: DISCONTINUED | OUTPATIENT
Start: 2024-03-02 | End: 2024-03-03 | Stop reason: HOSPADM

## 2024-03-01 RX ORDER — IPRATROPIUM BROMIDE AND ALBUTEROL SULFATE 2.5; .5 MG/3ML; MG/3ML
1 SOLUTION RESPIRATORY (INHALATION) EVERY 6 HOURS
Status: DISCONTINUED | OUTPATIENT
Start: 2024-03-01 | End: 2024-03-03 | Stop reason: HOSPADM

## 2024-03-01 RX ORDER — POLYETHYLENE GLYCOL 3350 17 G/17G
17 POWDER, FOR SOLUTION ORAL DAILY PRN
Status: DISCONTINUED | OUTPATIENT
Start: 2024-03-01 | End: 2024-03-03 | Stop reason: HOSPADM

## 2024-03-01 RX ORDER — ROPINIROLE 0.25 MG/1
0.5 TABLET, FILM COATED ORAL 2 TIMES DAILY
Status: DISCONTINUED | OUTPATIENT
Start: 2024-03-01 | End: 2024-03-03 | Stop reason: HOSPADM

## 2024-03-01 RX ORDER — ENOXAPARIN SODIUM 100 MG/ML
40 INJECTION SUBCUTANEOUS DAILY
Status: DISCONTINUED | OUTPATIENT
Start: 2024-03-01 | End: 2024-03-03 | Stop reason: HOSPADM

## 2024-03-01 RX ORDER — MAGNESIUM SULFATE IN WATER 40 MG/ML
2000 INJECTION, SOLUTION INTRAVENOUS PRN
Status: DISCONTINUED | OUTPATIENT
Start: 2024-03-01 | End: 2024-03-03 | Stop reason: HOSPADM

## 2024-03-01 RX ORDER — SODIUM CHLORIDE 9 MG/ML
INJECTION, SOLUTION INTRAVENOUS PRN
Status: DISCONTINUED | OUTPATIENT
Start: 2024-03-01 | End: 2024-03-03 | Stop reason: HOSPADM

## 2024-03-01 RX ORDER — ONDANSETRON 2 MG/ML
4 INJECTION INTRAMUSCULAR; INTRAVENOUS ONCE
Status: COMPLETED | OUTPATIENT
Start: 2024-03-01 | End: 2024-03-01

## 2024-03-01 RX ORDER — POTASSIUM CHLORIDE 20 MEQ/1
40 TABLET, EXTENDED RELEASE ORAL PRN
Status: DISCONTINUED | OUTPATIENT
Start: 2024-03-01 | End: 2024-03-03 | Stop reason: HOSPADM

## 2024-03-01 RX ORDER — CLOPIDOGREL BISULFATE 75 MG/1
75 TABLET ORAL DAILY
Status: DISCONTINUED | OUTPATIENT
Start: 2024-03-02 | End: 2024-03-01

## 2024-03-01 RX ORDER — ONDANSETRON 2 MG/ML
4 INJECTION INTRAMUSCULAR; INTRAVENOUS EVERY 6 HOURS PRN
Status: DISCONTINUED | OUTPATIENT
Start: 2024-03-01 | End: 2024-03-03 | Stop reason: HOSPADM

## 2024-03-01 RX ORDER — POTASSIUM CHLORIDE 7.45 MG/ML
10 INJECTION INTRAVENOUS PRN
Status: DISCONTINUED | OUTPATIENT
Start: 2024-03-01 | End: 2024-03-03 | Stop reason: HOSPADM

## 2024-03-01 RX ORDER — CITALOPRAM HYDROBROMIDE 10 MG/1
10 TABLET ORAL DAILY
Status: DISCONTINUED | OUTPATIENT
Start: 2024-03-02 | End: 2024-03-03 | Stop reason: HOSPADM

## 2024-03-01 RX ADMIN — OSELTAMIVIR PHOSPHATE 30 MG: 30 CAPSULE ORAL at 22:06

## 2024-03-01 RX ADMIN — ENOXAPARIN SODIUM 40 MG: 100 INJECTION SUBCUTANEOUS at 17:38

## 2024-03-01 RX ADMIN — IPRATROPIUM BROMIDE AND ALBUTEROL SULFATE 1 DOSE: 2.5; .5 SOLUTION RESPIRATORY (INHALATION) at 21:53

## 2024-03-01 RX ADMIN — MONTELUKAST SODIUM 10 MG: 10 TABLET, FILM COATED ORAL at 22:06

## 2024-03-01 RX ADMIN — OSELTAMIVIR PHOSPHATE 75 MG: 75 CAPSULE ORAL at 16:12

## 2024-03-01 RX ADMIN — IOPAMIDOL 90 ML: 755 INJECTION, SOLUTION INTRAVENOUS at 12:55

## 2024-03-01 RX ADMIN — PRAVASTATIN SODIUM 20 MG: 20 TABLET ORAL at 22:06

## 2024-03-01 RX ADMIN — ONDANSETRON 4 MG: 2 INJECTION INTRAMUSCULAR; INTRAVENOUS at 12:33

## 2024-03-01 RX ADMIN — ZOLPIDEM TARTRATE 10 MG: 5 TABLET ORAL at 23:09

## 2024-03-01 RX ADMIN — ROPINIROLE HYDROCHLORIDE 0.5 MG: 0.25 TABLET, FILM COATED ORAL at 22:06

## 2024-03-01 RX ADMIN — SODIUM CHLORIDE 500 ML: 9 INJECTION, SOLUTION INTRAVENOUS at 14:50

## 2024-03-01 RX ADMIN — SODIUM CHLORIDE, PRESERVATIVE FREE 10 ML: 5 INJECTION INTRAVENOUS at 22:07

## 2024-03-01 RX ADMIN — SODIUM CHLORIDE 500 ML: 0.9 INJECTION, SOLUTION INTRAVENOUS at 13:14

## 2024-03-01 RX ADMIN — CEFTRIAXONE SODIUM 1000 MG: 1 INJECTION, POWDER, FOR SOLUTION INTRAMUSCULAR; INTRAVENOUS at 14:50

## 2024-03-01 ASSESSMENT — PAIN - FUNCTIONAL ASSESSMENT: PAIN_FUNCTIONAL_ASSESSMENT: 0-10

## 2024-03-01 ASSESSMENT — PAIN DESCRIPTION - LOCATION: LOCATION: HEAD

## 2024-03-01 ASSESSMENT — PAIN SCALES - GENERAL: PAINLEVEL_OUTOF10: 5

## 2024-03-01 NOTE — H&P
PORTABLE    Result Date: 3/1/2024  No acute cardiopulmonary process.       ASSESSMENT & PLAN     ASSESSMENT / PLAN:     IMPRESSION  This is a 79 y.o. female who presented with chest discomfort, productive cough and altered mental status and found to have urinary tract infection, influenza A infection and altered mental status. Patient admitted to inpatient status for further evaluation and management.    Principal Problem:    Sepsis (HCC)  Active Problems:    UTI (urinary tract infection)    Influenza A  Resolved Problems:    * No resolved hospital problems. *        Plan:    Altered mental status  -Concern for AMS at the time of presentation  -CT head negative for any acute abnormality  -Metabolic workup negative  -Frequent mentation checks  -If AMS continues, consult neurology for possible MRI and EEG.    #UTI:  -Urinalysis concerning for UTI  -Follow-up on urine cultures  -Start patient on Rocephin  -Encourage oral fluid intake    #Influenza A infection  -Monitor respiratory status via pulse oximetry  -Observe droplet precautions  -Start patient on Tamiflu 75 mg 1 dose and then 30 mg twice daily for 5 days.  -Follow-up on respiratory culture    Asthma:  -Uses Breo Ellipta and montelukast at home  -Respiratory therapy with DuoNeb on as-needed basis  -Albuterol as needed  -Start Symbicort 2 puffs twice daily    Essential hypertension:  -Patient takes valsartan-hydrochlorothiazide 80-12.5 mg once daily and Coreg 6.25 mg twice daily for her blood pressure  -Resume home blood pressure medication as tolerated evaluation  -Continue aspirin, Plavix 75 mg and pravastatin 20 mg.    Mood disorder:  -Continue Celexa 10 mg once daily      DVT ppx: Lovenox  GI ppx: Not indicated  Diet: ADULT DIET; Regular        PT/OT/SW: Consulted  Discharge Planning: As per     Mingo Johnson MD  Internal Medicine Resident, PGY-1  [facility] [place]; [place], [place]  3/1/2024, 6:15 PM

## 2024-03-01 NOTE — ED NOTES
Pt placed on bed pan to obtain a urine sample  
Pt provided with meal tray  
Pt to CT on stretcher via tech  
Pt to ED via triage a/o x4 with c/o family concerned about pt mentation. Per daughter she last spoke with her mother on Friday 2/23 and she was acting normal. Per daughter unknown LKW. Pt daughter went and checked on her today and she was cover in her stool and was not able to finish her sentences. Pt stated she was recently diagnosed with bronchitis. Pt reports SOB and chest pain. Pt denies any hx pf stroke. Pt reports trouble walking due to pain. Pt reports hx of HTN but known if she has been taking her meds. Pt stated she does not know if she has had anything to eat or drink in the last few days. Pt placed on monitor, call light is in reach   
RVP added on to flu swab   
Urine labeled and sent to lab  
components:    POC Glucose 149 (*)     All other components within normal limits   POCT CREATININE - Normal   COVID-19, RAPID   RAPID INFLUENZA A/B ANTIGENS   CULTURE, BLOOD 1   CULTURE, BLOOD 1   ELECTROLYTES PLUS   HGB/HCT   MICROSCOPIC URINALYSIS   LACTATE, SEPSIS   PROTIME-INR   LACTATE, SEPSIS   APTT   UREA N (POC)   LACTIC ACID,POINT OF CARE       Electronically signed by Ashlee Moses RN on 3/1/2024 at 3:40 PM

## 2024-03-01 NOTE — ED PROVIDER NOTES
[facility] ED     Emergency Department     Faculty Attestation        I performed a history and physical examination of the patient and discussed management with the resident. I reviewed the resident’s note and agree with the documented findings and plan of care. Any areas of disagreement are noted on the chart. I was personally present for the key portions of any procedures. I have documented in the chart those procedures where I was not present during the key portions. I have reviewed the emergency nurses triage note. I agree with the chief complaint, past medical history, past surgical history, allergies, medications, social and family history as documented unless otherwise noted below.  For Physician Assistant/ Nurse Practitioner cases/documentation I have personally evaluated this patient and have completed at least one if not all key elements of the E/M (history, physical exam, and MDM). Additional findings are as noted.      Vital Signs: BP: (!) 177/86  Pulse: 99  Respirations: 22  Temp: (!) 100.7 °F (38.2 °C) SpO2: 99 %  PCP:  Donald Gatica MD  Note Started: 3/1/24, 12:25 PM EST    Pertinent Comments:     Patient is a 79-year-old female who was last seen normal 24 to 48 hours ago.   Yesterday began having nausea and vomiting as well as some diarrhea.   Per the daughter she has been intermittently confused as well.   There was \"headache\" listed in her chief complaint however she denies this.    No chest pain or shortness of breath but may have had a cough.    No overt abdominal pain and currently abdomen is soft/nontender.    No calf tenderness and strong DP pulses palpated and equal bilaterally.   Denies any weakness and currently strength is 5/5 bilateral upper and lower extremities as well as sensation light touch intact and cranial nerves symmetric with no pupillary abnormality either.   Moving head and neck normally with no 
High Sensitivity(!): 25  Below baseline. Trending [GC]   1330 CMP with BUN of 20 and creatinine of 1.2.  Which is around baseline.  Otherwise mild hyponatremia at 132.  Otherwise unremarkable [GC]   1331 CBC with differential unremarkable.  No leukocytosis or anemia [GC]   1331 AMMONIA, PLASMA, 023088(!): <10 [GC]   1422 CT HEAD WO CONTRAST  IMPRESSION:  No acute intracranial abnormality. [GC]   1422 CTA HEAD NECK W CONTRAST  IMPRESSION:  80% stenosis in the proximal left internal carotid artery.     Otherwise, no acute abnormality or flow limiting stenosis in the remainder of  the major arteries of the head and neck. [GC]   1422 XR CHEST PORTABLE  IMPRESSION:  No acute cardiopulmonary process. [GC]   1430 Concerns for sepsis.  Heart rate 95 with respiratory rate 22 and febrile with a temperature of 100.7 °F.  Urinary source.  Will pursue septic workup [GC]   1439 Internal medicine consulted for admission [GC]   1440 Troponin, High Sensitivity(!): 24  stable [GC]   1455 Updated patient on CT imaging [GC]   1515 Lactic Acid, Sepsis, Whole Blood: 1.4 [GC]   1516 Discussed case with internal medicine.  They agreed to admit patient [GC]      ED Course User Index  [GC] [place], DO     Sepsis Times and Checklist  Vital Signs: BP: (!) 154/58  Pulse: 87  Respirations: 20  Temp: 99.7 °F (37.6 °C) SpO2: 92 %  SIRS (>2) Non Pregnant       Temp > 38.3C or < 36C   HR > 90   RR > 20   WBC > 12 or < 4 or >10% bands  SIRS (>2) Pregnant 20 weeks until 3 days postpartum   Temp > 38C or <36C   HR >110   RR > 24   WBC >15 or < 4 or >10% bands   SIRS (>2) and confirmed or suspected source of infection = Sepsis  Is Sepsis due to likely bacterial infection?: Yes    Sepsis Identified:  Date: 3/1/2024   Time: 13:40  Sepsis Orders:   CBC(required): Yes   CMP: Yes   PT/PTT: Yes   Blood Cultures x2(required): Yes   Urinalysis and Urine Culture: Yes   Lactate(required): Yes   Antibiotics Given (within 3 hours of sepsis

## 2024-03-02 LAB
ANION GAP SERPL CALCULATED.3IONS-SCNC: 10 MMOL/L (ref 9–17)
BASOPHILS # BLD: 0.03 K/UL (ref 0–0.2)
BASOPHILS NFR BLD: 1 % (ref 0–2)
BUN SERPL-MCNC: 18 MG/DL (ref 8–23)
CALCIUM SERPL-MCNC: 7.7 MG/DL (ref 8.6–10.4)
CHLORIDE SERPL-SCNC: 107 MMOL/L (ref 98–107)
CO2 SERPL-SCNC: 21 MMOL/L (ref 20–31)
CREAT SERPL-MCNC: 1.1 MG/DL (ref 0.5–0.9)
EOSINOPHIL # BLD: <0.03 K/UL (ref 0–0.44)
EOSINOPHILS RELATIVE PERCENT: 0 % (ref 1–4)
ERYTHROCYTE [DISTWIDTH] IN BLOOD BY AUTOMATED COUNT: 17.9 % (ref 11.8–14.4)
GFR SERPL CREATININE-BSD FRML MDRD: 51 ML/MIN/1.73M2
GLUCOSE BLD-MCNC: 108 MG/DL (ref 65–105)
GLUCOSE SERPL-MCNC: 117 MG/DL (ref 70–99)
HCT VFR BLD AUTO: 35.7 % (ref 36.3–47.1)
HGB BLD-MCNC: 10.9 G/DL (ref 11.9–15.1)
IMM GRANULOCYTES # BLD AUTO: <0.03 K/UL (ref 0–0.3)
IMM GRANULOCYTES NFR BLD: 0 %
LYMPHOCYTES NFR BLD: 1.47 K/UL (ref 1.1–3.7)
LYMPHOCYTES RELATIVE PERCENT: 29 % (ref 24–43)
MAGNESIUM SERPL-MCNC: 2 MG/DL (ref 1.6–2.6)
MCH RBC QN AUTO: 28.2 PG (ref 25.2–33.5)
MCHC RBC AUTO-ENTMCNC: 30.5 G/DL (ref 28.4–34.8)
MCV RBC AUTO: 92.5 FL (ref 82.6–102.9)
MONOCYTES NFR BLD: 0.98 K/UL (ref 0.1–1.2)
MONOCYTES NFR BLD: 20 % (ref 3–12)
NEUTROPHILS NFR BLD: 50 % (ref 36–65)
NEUTS SEG NFR BLD: 2.52 K/UL (ref 1.5–8.1)
NRBC BLD-RTO: 0 PER 100 WBC
PLATELET # BLD AUTO: 134 K/UL (ref 138–453)
PMV BLD AUTO: 10.2 FL (ref 8.1–13.5)
POTASSIUM SERPL-SCNC: 3.6 MMOL/L (ref 3.7–5.3)
RBC # BLD AUTO: 3.86 M/UL (ref 3.95–5.11)
RBC # BLD: ABNORMAL 10*6/UL
SODIUM SERPL-SCNC: 138 MMOL/L (ref 135–144)
WBC OTHER # BLD: 5 K/UL (ref 3.5–11.3)

## 2024-03-02 PROCEDURE — 97535 SELF CARE MNGMENT TRAINING: CPT

## 2024-03-02 PROCEDURE — 2580000003 HC RX 258

## 2024-03-02 PROCEDURE — 97166 OT EVAL MOD COMPLEX 45 MIN: CPT

## 2024-03-02 PROCEDURE — 99233 SBSQ HOSP IP/OBS HIGH 50: CPT | Performed by: INTERNAL MEDICINE

## 2024-03-02 PROCEDURE — 94640 AIRWAY INHALATION TREATMENT: CPT

## 2024-03-02 PROCEDURE — 85025 COMPLETE CBC W/AUTO DIFF WBC: CPT

## 2024-03-02 PROCEDURE — 1200000000 HC SEMI PRIVATE

## 2024-03-02 PROCEDURE — 97530 THERAPEUTIC ACTIVITIES: CPT

## 2024-03-02 PROCEDURE — 6370000000 HC RX 637 (ALT 250 FOR IP)

## 2024-03-02 PROCEDURE — 80048 BASIC METABOLIC PNL TOTAL CA: CPT

## 2024-03-02 PROCEDURE — 6370000000 HC RX 637 (ALT 250 FOR IP): Performed by: HOSPITALIST

## 2024-03-02 PROCEDURE — 36415 COLL VENOUS BLD VENIPUNCTURE: CPT

## 2024-03-02 PROCEDURE — 97116 GAIT TRAINING THERAPY: CPT

## 2024-03-02 PROCEDURE — 97110 THERAPEUTIC EXERCISES: CPT

## 2024-03-02 PROCEDURE — 82947 ASSAY GLUCOSE BLOOD QUANT: CPT

## 2024-03-02 PROCEDURE — 97161 PT EVAL LOW COMPLEX 20 MIN: CPT

## 2024-03-02 PROCEDURE — 83735 ASSAY OF MAGNESIUM: CPT

## 2024-03-02 PROCEDURE — 6360000002 HC RX W HCPCS

## 2024-03-02 RX ADMIN — ROPINIROLE HYDROCHLORIDE 0.5 MG: 0.25 TABLET, FILM COATED ORAL at 09:33

## 2024-03-02 RX ADMIN — OSELTAMIVIR PHOSPHATE 30 MG: 30 CAPSULE ORAL at 22:06

## 2024-03-02 RX ADMIN — ENOXAPARIN SODIUM 40 MG: 100 INJECTION SUBCUTANEOUS at 09:32

## 2024-03-02 RX ADMIN — PANTOPRAZOLE SODIUM 40 MG: 40 TABLET, DELAYED RELEASE ORAL at 06:13

## 2024-03-02 RX ADMIN — OSELTAMIVIR PHOSPHATE 30 MG: 30 CAPSULE ORAL at 09:32

## 2024-03-02 RX ADMIN — ZOLPIDEM TARTRATE 10 MG: 5 TABLET ORAL at 22:05

## 2024-03-02 RX ADMIN — MONTELUKAST SODIUM 10 MG: 10 TABLET, FILM COATED ORAL at 16:27

## 2024-03-02 RX ADMIN — CARVEDILOL 6.25 MG: 6.25 TABLET, FILM COATED ORAL at 16:27

## 2024-03-02 RX ADMIN — SODIUM CHLORIDE, PRESERVATIVE FREE 10 ML: 5 INJECTION INTRAVENOUS at 09:32

## 2024-03-02 RX ADMIN — SODIUM CHLORIDE, PRESERVATIVE FREE 10 ML: 5 INJECTION INTRAVENOUS at 22:08

## 2024-03-02 RX ADMIN — IPRATROPIUM BROMIDE AND ALBUTEROL SULFATE 1 DOSE: 2.5; .5 SOLUTION RESPIRATORY (INHALATION) at 15:28

## 2024-03-02 RX ADMIN — PRAVASTATIN SODIUM 20 MG: 20 TABLET ORAL at 16:27

## 2024-03-02 RX ADMIN — CARVEDILOL 6.25 MG: 6.25 TABLET, FILM COATED ORAL at 09:32

## 2024-03-02 RX ADMIN — IPRATROPIUM BROMIDE AND ALBUTEROL SULFATE 1 DOSE: 2.5; .5 SOLUTION RESPIRATORY (INHALATION) at 02:55

## 2024-03-02 RX ADMIN — ROPINIROLE HYDROCHLORIDE 0.5 MG: 0.25 TABLET, FILM COATED ORAL at 22:05

## 2024-03-02 RX ADMIN — CITALOPRAM 10 MG: 10 TABLET, FILM COATED ORAL at 09:33

## 2024-03-02 RX ADMIN — IPRATROPIUM BROMIDE AND ALBUTEROL SULFATE 1 DOSE: 2.5; .5 SOLUTION RESPIRATORY (INHALATION) at 20:09

## 2024-03-02 RX ADMIN — ASPIRIN 81 MG: 81 TABLET, COATED ORAL at 09:34

## 2024-03-02 RX ADMIN — ACETAMINOPHEN 650 MG: 325 TABLET ORAL at 09:46

## 2024-03-02 ASSESSMENT — PAIN - FUNCTIONAL ASSESSMENT: PAIN_FUNCTIONAL_ASSESSMENT: ACTIVITIES ARE NOT PREVENTED

## 2024-03-02 ASSESSMENT — PAIN DESCRIPTION - DESCRIPTORS: DESCRIPTORS: ACHING

## 2024-03-02 ASSESSMENT — PAIN DESCRIPTION - LOCATION: LOCATION: HEAD

## 2024-03-02 ASSESSMENT — PAIN DESCRIPTION - ORIENTATION: ORIENTATION: OTHER (COMMENT)

## 2024-03-02 ASSESSMENT — PAIN SCALES - GENERAL: PAINLEVEL_OUTOF10: 5

## 2024-03-02 NOTE — CARE COORDINATION
so, who? (P) No  Plans to Return to Present Housing: (P) Yes  Other Identified Issues/Barriers to RETURNING to current housing: medical condition   Potential Assistance needed at discharge: (P) Outpatient PT/OT            Potential DME:    Patient expects to discharge to: (P) House  Plan for transportation at discharge: (P) Family    Financial    Payor: MEDICARE / Plan: MEDICARE PART A AND B / Product Type: *No Product type* /     Does insurance require precert for SNF: No    Potential assistance Purchasing Medications: (P) No  Meds-to-Beds request: Yes      [id number] [place], [place] - [address] -  [phone] - F [phone]  [address] [place] [address]  Phone: [phone] Fax: [phone]      Notes:    Factors facilitating achievement of predicted outcomes: Family support, Motivated, Cooperative, Pleasant, and Good insight into deficits    Barriers to discharge: No caregiver support and Impulsivity    Additional Case Management Notes: Spoke with pt, role explained. Pt lives home alone but has family support/ daughter to assist PRN. Pt states goal is to return home, will do outpatient therapy if needed. Has transport     The Plan for Transition of Care is related to the following treatment goals of UTI (urinary tract infection) [N39.0]  Acute cystitis with hematuria [N30.01]  Sepsis (HCC) [A41.9]  Sepsis, due to unspecified organism, unspecified whether acute organ dysfunction present (HCC) [A41.9]    IF APPLICABLE: The Patient and/or patient representative [place] and her family were provided with a choice of provider and agrees with the discharge plan. Freedom of choice list with basic dialogue that supports the patient's individualized plan of care/goals and shares the quality data associated with the providers was provided to: (P) Patient   Patient Representative Name:       The Patient and/or Patient Representative Agree with the Discharge Plan? (P) Yes    Rosalie Davis

## 2024-03-02 NOTE — PLAN OF CARE
Internal medicine Senior note:      79-year-old female with past medical history of CAD s/p PCI, sick sinus syndrome s/p pacemaker, aortic stenosis s/p TAVR on aspirin and Plavix?,  MGUS, COPD, CKD stage III, hypertension presented to the ED with chief complaints of altered mental status.    Patient's daughter reported that patient was not making any sense this morning.  She was taking longer than usual to find the appropriate words and was also describing a large amount of feces all over the house.  She was brought in to the ER for evaluation of altered mental status.  On arrival to the ER, patient was initially confused.  Lab workup showed sodium of 132, creatinine of 1.2, blood sugar 146, troponins 25 and 24, WBC 9.9, hemoglobin 13, respiratory panel was positive for influenza A.  UA was suggestive of mild UTI.  CT head Negative, CTA showed 80% stenosis in the left ICA.    On my evaluation, patient alert oriented x 3, does report cough with production of phlegm.  Denies any chest pain, shortness of breath, nausea or vomiting.  Patient admitted to internal medicine for altered mental status.      Assessment and plan:    Acute metabolic encephalopathy: Secondary to influenza a versus UTI.  On Rocephin and Tamiflu.  Mentation currently improving.  CT head negative.  Continue to monitor mentation.  Influenza type a: Continue Tamiflu.  UTI: UA suggestive of UTI.  Follow-up urine cultures.  Continue Rocephin.  CAD s/p PCI, aortic stenosis s/p TAVR: Continue aspirin, Plavix.  Patient was supposed to be on aspirin and Plavix for 6 months followed by aspirin.  COPD: Continue Symbicort and DuoNeb.  Hypertension: Resume home medications indicated.        Diogo Iniguez MD  Internal Medicine Resident, PGY-2  [facility], [place], [place]  3/1/2024, 9:07 PM

## 2024-03-03 VITALS
HEART RATE: 71 BPM | RESPIRATION RATE: 20 BRPM | OXYGEN SATURATION: 99 % | SYSTOLIC BLOOD PRESSURE: 98 MMHG | BODY MASS INDEX: 29.8 KG/M2 | WEIGHT: 168.21 LBS | HEIGHT: 63 IN | TEMPERATURE: 98 F | DIASTOLIC BLOOD PRESSURE: 80 MMHG

## 2024-03-03 LAB
ANION GAP SERPL CALCULATED.3IONS-SCNC: 11 MMOL/L (ref 9–17)
BASOPHILS # BLD: 0.03 K/UL (ref 0–0.2)
BASOPHILS NFR BLD: 1 % (ref 0–2)
BUN SERPL-MCNC: 18 MG/DL (ref 8–23)
CALCIUM SERPL-MCNC: 7.8 MG/DL (ref 8.6–10.4)
CHLORIDE SERPL-SCNC: 108 MMOL/L (ref 98–107)
CO2 SERPL-SCNC: 21 MMOL/L (ref 20–31)
CREAT SERPL-MCNC: 1 MG/DL (ref 0.5–0.9)
EOSINOPHIL # BLD: 0.12 K/UL (ref 0–0.44)
EOSINOPHILS RELATIVE PERCENT: 3 % (ref 1–4)
ERYTHROCYTE [DISTWIDTH] IN BLOOD BY AUTOMATED COUNT: 17.6 % (ref 11.8–14.4)
GFR SERPL CREATININE-BSD FRML MDRD: 57 ML/MIN/1.73M2
GLUCOSE SERPL-MCNC: 102 MG/DL (ref 70–99)
HCT VFR BLD AUTO: 32.2 % (ref 36.3–47.1)
HGB BLD-MCNC: 10.2 G/DL (ref 11.9–15.1)
IMM GRANULOCYTES # BLD AUTO: <0.03 K/UL (ref 0–0.3)
IMM GRANULOCYTES NFR BLD: 0 %
LYMPHOCYTES NFR BLD: 1.5 K/UL (ref 1.1–3.7)
LYMPHOCYTES RELATIVE PERCENT: 39 % (ref 24–43)
MCH RBC QN AUTO: 28.7 PG (ref 25.2–33.5)
MCHC RBC AUTO-ENTMCNC: 31.7 G/DL (ref 28.4–34.8)
MCV RBC AUTO: 90.4 FL (ref 82.6–102.9)
MONOCYTES NFR BLD: 0.56 K/UL (ref 0.1–1.2)
MONOCYTES NFR BLD: 15 % (ref 3–12)
NEUTROPHILS NFR BLD: 42 % (ref 36–65)
NEUTS SEG NFR BLD: 1.6 K/UL (ref 1.5–8.1)
NRBC BLD-RTO: 0 PER 100 WBC
PLATELET # BLD AUTO: 116 K/UL (ref 138–453)
PMV BLD AUTO: 10.4 FL (ref 8.1–13.5)
POTASSIUM SERPL-SCNC: 3.9 MMOL/L (ref 3.7–5.3)
RBC # BLD AUTO: 3.56 M/UL (ref 3.95–5.11)
RBC # BLD: ABNORMAL 10*6/UL
SODIUM SERPL-SCNC: 140 MMOL/L (ref 135–144)
WBC OTHER # BLD: 3.8 K/UL (ref 3.5–11.3)

## 2024-03-03 PROCEDURE — 6360000002 HC RX W HCPCS

## 2024-03-03 PROCEDURE — 6370000000 HC RX 637 (ALT 250 FOR IP)

## 2024-03-03 PROCEDURE — 6370000000 HC RX 637 (ALT 250 FOR IP): Performed by: HOSPITALIST

## 2024-03-03 PROCEDURE — NBSRV NON-BILLABLE SERVICE: Performed by: INTERNAL MEDICINE

## 2024-03-03 PROCEDURE — 2580000003 HC RX 258

## 2024-03-03 PROCEDURE — 36415 COLL VENOUS BLD VENIPUNCTURE: CPT

## 2024-03-03 PROCEDURE — 94640 AIRWAY INHALATION TREATMENT: CPT

## 2024-03-03 PROCEDURE — 94761 N-INVAS EAR/PLS OXIMETRY MLT: CPT

## 2024-03-03 PROCEDURE — 80048 BASIC METABOLIC PNL TOTAL CA: CPT

## 2024-03-03 PROCEDURE — 85025 COMPLETE CBC W/AUTO DIFF WBC: CPT

## 2024-03-03 RX ORDER — CEPHALEXIN 500 MG/1
500 CAPSULE ORAL 4 TIMES DAILY
Qty: 24 CAPSULE | Refills: 0 | Status: SHIPPED | OUTPATIENT
Start: 2024-03-03

## 2024-03-03 RX ORDER — CIPROFLOXACIN 500 MG/1
500 TABLET, FILM COATED ORAL EVERY 12 HOURS SCHEDULED
Status: DISCONTINUED | OUTPATIENT
Start: 2024-03-03 | End: 2024-03-03 | Stop reason: HOSPADM

## 2024-03-03 RX ORDER — OSELTAMIVIR PHOSPHATE 30 MG/1
30 CAPSULE ORAL 2 TIMES DAILY
Qty: 6 CAPSULE | Refills: 0 | Status: SHIPPED | OUTPATIENT
Start: 2024-03-03 | End: 2024-03-06

## 2024-03-03 RX ADMIN — SODIUM CHLORIDE, PRESERVATIVE FREE 10 ML: 5 INJECTION INTRAVENOUS at 09:13

## 2024-03-03 RX ADMIN — IPRATROPIUM BROMIDE AND ALBUTEROL SULFATE 1 DOSE: 2.5; .5 SOLUTION RESPIRATORY (INHALATION) at 09:53

## 2024-03-03 RX ADMIN — ACETAMINOPHEN 650 MG: 325 TABLET ORAL at 06:01

## 2024-03-03 RX ADMIN — IPRATROPIUM BROMIDE AND ALBUTEROL SULFATE 1 DOSE: 2.5; .5 SOLUTION RESPIRATORY (INHALATION) at 13:33

## 2024-03-03 RX ADMIN — ENOXAPARIN SODIUM 40 MG: 100 INJECTION SUBCUTANEOUS at 09:11

## 2024-03-03 RX ADMIN — ASPIRIN 81 MG: 81 TABLET, COATED ORAL at 09:11

## 2024-03-03 RX ADMIN — IPRATROPIUM BROMIDE AND ALBUTEROL SULFATE 1 DOSE: 2.5; .5 SOLUTION RESPIRATORY (INHALATION) at 02:32

## 2024-03-03 RX ADMIN — OSELTAMIVIR PHOSPHATE 30 MG: 30 CAPSULE ORAL at 09:11

## 2024-03-03 RX ADMIN — CIPROFLOXACIN 500 MG: 500 TABLET ORAL at 09:12

## 2024-03-03 RX ADMIN — BUDESONIDE AND FORMOTEROL FUMARATE DIHYDRATE 2 PUFF: 160; 4.5 AEROSOL RESPIRATORY (INHALATION) at 09:53

## 2024-03-03 RX ADMIN — CITALOPRAM 10 MG: 10 TABLET, FILM COATED ORAL at 09:12

## 2024-03-03 RX ADMIN — CARVEDILOL 6.25 MG: 6.25 TABLET, FILM COATED ORAL at 09:13

## 2024-03-03 RX ADMIN — ROPINIROLE HYDROCHLORIDE 0.5 MG: 0.25 TABLET, FILM COATED ORAL at 09:11

## 2024-03-03 RX ADMIN — PANTOPRAZOLE SODIUM 40 MG: 40 TABLET, DELAYED RELEASE ORAL at 06:00

## 2024-03-03 ASSESSMENT — PAIN SCALES - GENERAL: PAINLEVEL_OUTOF10: 4

## 2024-03-03 NOTE — DISCHARGE INSTR - COC
Impairments/Disabilities:096908537}    Nutrition Therapy:  Current Nutrition Therapy:   { GREY Diet List:447142138}    Routes of Feeding: {[facility] DME Other Feedings:868471502}  Liquids: {Slp liquid thickness:12417}  Daily Fluid Restriction: {[facility]P DME Yes amt example:822851296}  Last Modified Barium Swallow with Video (Video Swallowing Test): {Done Not Done Date:}    Treatments at the Time of Hospital Discharge:   Respiratory Treatments: ***  Oxygen Therapy:  {Therapy; copd oxygen:22200}  Ventilator:    { CC Vent List:554069495}    Rehab Therapies: {THERAPEUTIC INTERVENTION:2492377559}  Weight Bearing Status/Restrictions: { CC Weight Bearin}  Other Medical Equipment (for information only, NOT a DME order):  {EQUIPMENT:386584625}  Other Treatments: ***    Patient's personal belongings (please select all that are sent with patient):  {[facility] DME Belongings:365587956}    RN SIGNATURE:  {Esignature:453569019}    CASE MANAGEMENT/SOCIAL WORK SECTION    Inpatient Status Date: ***    Readmission Risk Assessment Score:  Readmission Risk              Risk of Unplanned Readmission:  20           Discharging to Facility/ Agency   Name:   Address:  Phone:  Fax:    Dialysis Facility (if applicable)   Name:  Address:  Dialysis Schedule:  Phone:  Fax:    / signature: {Esignature:526337320}    PHYSICIAN SECTION    Prognosis: {Prognosis:0033660400}    Condition at Discharge: { Patient Condition:923965770}    Rehab Potential (if transferring to Rehab): {Prognosis:1133515167}    Recommended Labs or Other Treatments After Discharge: ***    Physician Certification: I certify the above information and transfer of Estelle Gunderson  is necessary for the continuing treatment of the diagnosis listed and that she requires {Admit to Appropriate Level of Care:30831} for {GREATER/LESS:561127954} 30 days.     Update Admission H&P: {CHP DME Changes in HandP:061196542}    PHYSICIAN SIGNATURE:

## 2024-03-03 NOTE — PROGRESS NOTES
Occupational Therapy  Facility/Department: [address] ORTHO/MED SURG  Occupational Therapy Initial Assessment    Name: Estelle Gunderson  : 1945  MRN: [id number]  Date of Service: 3/2/2024    Discharge Recommendations: No therapy recommended at discharge.       OT Equipment Recommendations  Equipment Needed: Yes  Mobility Devices: ADL Assistive Devices  ADL Assistive Devices: Shower Chair with back       Patient Diagnosis(es): The primary encounter diagnosis was Sepsis, due to unspecified organism, unspecified whether acute organ dysfunction present (HCC). A diagnosis of Acute cystitis with hematuria was also pertinent to this visit.  Past Medical History:  has a past medical history of Anemia, unspecified, Aortic stenosis, Arthritis, AV block, BBB (bundle branch block), Breast cancer (HCC), Bundle branch block, CAD (coronary artery disease), Carcinoma in situ of breast, Esophageal reflux, Essential hypertension, benign, Insulin resistance, MGUS (monoclonal gammopathy of unknown significance), Nephrolithiasis, Osteoporosis, Patient in clinical research study, Pure hypercholesterolemia, Stage 3 chronic kidney disease (HCC), Status post transcatheter aortic valve replacement (TAVR) using bioprosthesis, Syncope, and Unspecified asthma(493.90).  Past Surgical History:  has a past surgical history that includes Tubal ligation; Mastectomy (Left); Colon surgery (2000); Colonoscopy (2006); Pacemaker insertion (2007); Breast reconstruction (Right); eye surgery (Right); Foot neuroma surgery (Right); Wrist fracture surgery; Upper gastrointestinal endoscopy (2007); Upper gastrointestinal endoscopy (2006); Upper gastrointestinal endoscopy (2005); Dilation and curettage of uterus (); pacemaker placement (2015); Cataract removal (Left, 2019); Lithotripsy (); Cystoscopy (2012); Tooth Extraction (2021); Cardiac surgery; Cardiac catheterization (2021); Cardiac 
Patient complains of headache. Tylenol given as ordered.  
Patient given discharge instructions.  Waiting for ride.  
Physical Therapy  Facility/Department: [address] ORTHO/MED SURG  Physical Therapy Initial Assessment    Name: Estelle Gunderson  : 1945  MRN: [id number]  Date of Service: 3/2/2024    Chief Complaint   Patient presents with    Fever    Headache        Discharge Recommendations:  Patient would benefit from continued therapy after discharge   PT Equipment Recommendations  Equipment Needed: No      Patient Diagnosis(es): The primary encounter diagnosis was Sepsis, due to unspecified organism, unspecified whether acute organ dysfunction present (HCC). A diagnosis of Acute cystitis with hematuria was also pertinent to this visit.  Past Medical History:  has a past medical history of Anemia, unspecified, Aortic stenosis, Arthritis, AV block, BBB (bundle branch block), Breast cancer (HCC), Bundle branch block, CAD (coronary artery disease), Carcinoma in situ of breast, Esophageal reflux, Essential hypertension, benign, Insulin resistance, MGUS (monoclonal gammopathy of unknown significance), Nephrolithiasis, Osteoporosis, Patient in clinical research study, Pure hypercholesterolemia, Stage 3 chronic kidney disease (HCC), Status post transcatheter aortic valve replacement (TAVR) using bioprosthesis, Syncope, and Unspecified asthma(493.90).  Past Surgical History:  has a past surgical history that includes Tubal ligation; Mastectomy (Left); Colon surgery (2000); Colonoscopy (2006); Pacemaker insertion (2007); Breast reconstruction (Right); eye surgery (Right); Foot neuroma surgery (Right); Wrist fracture surgery; Upper gastrointestinal endoscopy (2007); Upper gastrointestinal endoscopy (2006); Upper gastrointestinal endoscopy (2005); Dilation and curettage of uterus (); pacemaker placement (2015); Cataract removal (Left, 2019); Lithotripsy (); Cystoscopy (2012); Tooth Extraction (2021); Cardiac surgery; Cardiac catheterization (2021); Cardiac catheterization 
Pt unsure of medication. [facility] was on [facility] for 15 minutes then they hung up on me when tried to recall it said pharmacy was closed left a voice mail to fax med list over.   
SPECIMEN CLOTTED. 14.3   INR DISREGARD RESULT.  SPECIMEN CLOTTED. 1.1     APTT:   Recent Labs     03/01/24  1551   APTT 26.3     CARDIAC ENZYMES: No results for input(s): \"CKMB\", \"CKMBINDEX\", \"TROPONINI\" in the last 72 hours.    Invalid input(s): \"CKTOTAL;3\"  FASTING LIPID PANEL:  Lab Results   Component Value Date    CHOL 179 09/29/2023    HDL 55 09/29/2023    TRIG 166 (H) 09/29/2023     LIVER PROFILE:   Recent Labs     03/01/24  1224   AST 38*   ALT 18   BILITOT 0.5   ALKPHOS 72      MICROBIOLOGY:   Lab Results   Component Value Date/Time    CULTURE NO GROWTH 12 HOURS 03/01/2024 02:45 PM       Imaging:    CTA HEAD NECK W CONTRAST    Result Date: 3/1/2024  80% stenosis in the proximal left internal carotid artery. Otherwise, no acute abnormality or flow limiting stenosis in the remainder of the major arteries of the head and neck.     CT HEAD WO CONTRAST    Result Date: 3/1/2024  No acute intracranial abnormality.     XR CHEST PORTABLE    Result Date: 3/1/2024  No acute cardiopulmonary process.       ASSESSMENT & PLAN     ASSESSMENT / PLAN:     IMPRESSION  This is a 79 y.o. female who presented with chest discomfort, productive cough and altered mental status and found to have urinary tract infection, influenza A infection and altered mental status. Patient admitted to inpatient status for further evaluation and management.     Principal Problem:    Sepsis (HCC)  Active Problems:    UTI (urinary tract infection)    Influenza A  Resolved Problems:    * No resolved hospital problems. *              Plan:     Altered mental status- improved  -Concern for AMS at the time of presentation  -CT head negative for any acute abnormality  -Metabolic workup negative  -Frequent mentation checks  -If AMS continues, consult neurology for possible MRI and EEG.     #UTI:  -Urinalysis concerning for UTI  -Follow-up on urine cultures  -Continue Rocephin pending culture results  -Encourage oral fluid intake     #Influenza A 
endovascular for 90% ICA STENOSIS     #UTI:  -Urinalysis concerning for UTI  -Follow-up on urine cultures    -DC on macrobid      #Influenza A infection  -Monitor respiratory status via pulse oximetry  -Observe droplet precautions  -Start patient on Tamiflu 75 mg 1 dose and then 30 mg twice daily for 5 days.  -DC on tamiflu     Asthma:  -Uses Breo Ellipta and montelukast at home  -Respiratory therapy with DuoNeb on as-needed basis  -Albuterol as needed  -Start Symbicort 2 puffs twice daily and resume Singulair     Essential hypertension:  -Patient takes valsartan-hydrochlorothiazide 80-12.5 mg once daily and Coreg 6.25 mg twice daily for her blood pressure  -Resume home blood pressure medication as tolerated evaluation  -Continue aspirin, Plavix 75 mg and pravastatin 20 mg.     Mood disorder:  -Continue Celexa 10 mg once daily  -Ambien at home for sleep        DVT ppx: Lovenox  GI ppx: Not indicated  Diet: ADULT DIET; Regular            PT/OT/SW: Consulted  Discharge Planning: ready for dc  Jose Gilman MD  Internal Medicine Resident, PGY-2  [facility], [place], [place].  3/3/2024, 10:50 AM

## 2024-03-03 NOTE — DISCHARGE INSTRUCTIONS
You were here for altered mental status which progressively improved subsequently    Found to be flu positive and now we are discharging you on Tamiflu  You were also found to have mild UTI, take Keflex  Take medication as prescribed  Follow-up outpatient with neurology for your internal carotid artery disease  Follow-up with your primary care doctor

## 2024-03-04 ENCOUNTER — CLINICAL DOCUMENTATION ONLY (OUTPATIENT)
Facility: CLINIC | Age: 79
End: 2024-03-04

## 2024-03-04 LAB
EKG ATRIAL RATE: 98 BPM
EKG P AXIS: 77 DEGREES
EKG P-R INTERVAL: 190 MS
EKG Q-T INTERVAL: 402 MS
EKG QRS DURATION: 154 MS
EKG QTC CALCULATION (BAZETT): 513 MS
EKG R AXIS: -83 DEGREES
EKG T AXIS: 75 DEGREES
EKG VENTRICULAR RATE: 98 BPM

## 2024-03-04 PROCEDURE — 93010 ELECTROCARDIOGRAM REPORT: CPT | Performed by: INTERNAL MEDICINE

## 2024-03-04 NOTE — CARDIO/PULMONARY
Called off for the next 3 weeks due to being hospitalized for \"influenza A.\" Will be taken off schedule until 3/26/2024.

## 2024-03-05 ENCOUNTER — HOSPITAL ENCOUNTER (OUTPATIENT)
Dept: CARDIAC REHAB | Age: 79
Discharge: HOME | End: 2024-03-05

## 2024-03-07 ENCOUNTER — HOSPITAL ENCOUNTER (OUTPATIENT)
Dept: CARDIAC REHAB | Age: 79
Discharge: HOME | End: 2024-03-07

## 2024-03-07 ENCOUNTER — HOSPITAL ENCOUNTER (OUTPATIENT)
Age: 79
Setting detail: SPECIMEN
Discharge: HOME | End: 2024-03-07

## 2024-03-07 DIAGNOSIS — I12.9 RENAL HYPERTENSION: ICD-10-CM

## 2024-03-07 DIAGNOSIS — R30.0 DYSURIA: ICD-10-CM

## 2024-03-07 DIAGNOSIS — D64.9 ANEMIA, UNSPECIFIED TYPE: ICD-10-CM

## 2024-03-07 LAB
HGB BLD-MCNC: 11.7 G/DL (ref 11.9–15.1)
MCH RBC QN AUTO: 28.5 PG (ref 25.2–33.5)
MCHC RBC AUTO-ENTMCNC: 32.1 G/DL (ref 28.4–34.8)
MCV RBC AUTO: 88.8 FL (ref 82.6–102.9)
NRBC BLD-RTO: 0 PER 100 WBC
PLATELET # BLD AUTO: 233 K/UL (ref 138–453)
RBC # BLD AUTO: 4.11 M/UL (ref 3.95–5.11)
WBC OTHER # BLD: 8 K/UL (ref 3.5–11.3)

## 2024-03-08 LAB
ANION GAP SERPL CALCULATED.3IONS-SCNC: 12 MMOL/L (ref 9–16)
BUN SERPL-MCNC: 16 MG/DL (ref 8–23)
CALCIUM SERPL-MCNC: 10.1 MG/DL (ref 8.6–10.4)
CHLORIDE SERPL-SCNC: 100 MMOL/L (ref 98–107)
CO2 SERPL-SCNC: 28 MMOL/L (ref 20–31)
CREAT SERPL-MCNC: 1.2 MG/DL (ref 0.5–0.9)
FERRITIN SERPL-MCNC: 110 NG/ML (ref 13–150)
FOLATE SERPL-MCNC: >20 NG/ML (ref 4.8–24.2)
GFR SERPL CREATININE-BSD FRML MDRD: 48 ML/MIN/1.73M2
GLUCOSE SERPL-MCNC: 91 MG/DL (ref 74–99)
IRON SATN MFR SERPL: 15 % (ref 20–55)
IRON SERPL-MCNC: 45 UG/DL (ref 37–145)
MICROORGANISM SPEC CULT: NO GROWTH
SPECIMEN DESCRIPTION: NORMAL
TIBC SERPL-MCNC: 305 UG/DL (ref 250–450)
UNSATURATED IRON BINDING CAPACITY: 260 UG/DL (ref 112–347)
VIT B12 SERPL-MCNC: 513 PG/ML (ref 232–1245)

## 2024-03-09 NOTE — DISCHARGE SUMMARY
dailyHistorical Med      Probiotic Product (PROBIOTIC DAILY PO) Take by mouth daily Historical Med      lansoprazole (PREVACID) 15 MG delayed release capsule Take 1 capsule by mouth dailyHistorical Med       !! - Potential duplicate medications found. Please discuss with provider.          Activity: activity as tolerated    Diet: regular diet    Follow-up:    Ellen Barrientos MD  [address]  [facility] [address] [phone]    Call in 1 week(s)      Donald Gatica MD  [address]  [facility] [address] [phone]            Patient Instructions: You were here for altered mental status which progressively improved subsequently    Found to be flu positive and now we are discharging you on Tamiflu  You were also found to have mild UTI, take Keflex  Take medication as prescribed  Follow-up outpatient with neurology for your internal carotid artery disease  Follow-up with your primary care doctor  Follow up labs: None  Follow up imaging: None    Note that over 30 minutes was spent in preparing discharge papers, discussing discharge with patient, medication review, etc.      Mingo Johnson MD,   Internal Medicine Resident, PGY-1  [facility],  [place], [place].  3/8/2024, 8:17 PM

## 2024-03-12 ENCOUNTER — HOSPITAL ENCOUNTER (OUTPATIENT)
Dept: CARDIAC REHAB | Age: 79
Discharge: HOME | End: 2024-03-12

## 2024-03-26 ENCOUNTER — HOSPITAL ENCOUNTER (OUTPATIENT)
Dept: CARDIAC REHAB | Age: 79
Discharge: HOME | End: 2024-03-26

## 2024-03-28 ENCOUNTER — HOSPITAL ENCOUNTER (OUTPATIENT)
Dept: CARDIAC REHAB | Age: 79
Discharge: HOME | End: 2024-03-28

## 2024-03-31 PROBLEM — J10.1 INFLUENZA A: Status: RESOLVED | Noted: 2024-03-01 | Resolved: 2024-03-31

## 2024-03-31 PROBLEM — N39.0 UTI (URINARY TRACT INFECTION): Status: RESOLVED | Noted: 2024-03-01 | Resolved: 2024-03-31

## 2024-04-02 ENCOUNTER — COMMUNITY CARE MANAGEMENT (OUTPATIENT)
Facility: CLINIC | Age: 79
End: 2024-04-02

## 2024-04-02 ENCOUNTER — HOSPITAL ENCOUNTER (OUTPATIENT)
Dept: CARDIAC REHAB | Age: 79
Discharge: HOME | End: 2024-04-02

## 2024-04-04 ENCOUNTER — HOSPITAL ENCOUNTER (OUTPATIENT)
Dept: CARDIAC REHAB | Age: 79
Discharge: HOME | End: 2024-04-04

## 2024-04-09 ENCOUNTER — HOSPITAL ENCOUNTER (OUTPATIENT)
Dept: GENERAL RADIOLOGY | Facility: CLINIC | Age: 79
Discharge: HOME | End: 2024-04-11
Payer: MEDICARE

## 2024-04-09 DIAGNOSIS — M25.562 CHRONIC PAIN OF LEFT KNEE: ICD-10-CM

## 2024-04-09 DIAGNOSIS — G89.29 CHRONIC PAIN OF LEFT KNEE: ICD-10-CM

## 2024-04-09 PROCEDURE — 73560 X-RAY EXAM OF KNEE 1 OR 2: CPT

## 2024-04-11 ENCOUNTER — HOSPITAL ENCOUNTER (OUTPATIENT)
Dept: CARDIAC REHAB | Age: 79
Discharge: HOME | End: 2024-04-11

## 2024-04-17 ENCOUNTER — TELEPHONE (OUTPATIENT)
Dept: DERMATOLOGY | Age: 79
End: 2024-04-17

## 2024-04-17 NOTE — TELEPHONE ENCOUNTER
Pt called and stated her PCP wanted her to let us know that she discontinued using clobetasol 0.5% ointment due to it was burning. She is now using triamcinolone 0.025% ointment on valva as prescribed.

## 2024-04-18 ENCOUNTER — HOSPITAL ENCOUNTER (OUTPATIENT)
Dept: CARDIAC REHAB | Age: 79
Discharge: HOME | End: 2024-04-18

## 2024-04-26 ENCOUNTER — APPOINTMENT (OUTPATIENT)
Dept: CT IMAGING | Age: 79
End: 2024-04-26
Payer: MEDICARE

## 2024-04-26 ENCOUNTER — HOSPITAL ENCOUNTER (EMERGENCY)
Age: 79
Discharge: HOME | End: 2024-04-27
Attending: EMERGENCY MEDICINE
Payer: MEDICARE

## 2024-04-26 DIAGNOSIS — R91.1 PULMONARY NODULE: ICD-10-CM

## 2024-04-26 DIAGNOSIS — R06.02 SHORTNESS OF BREATH: ICD-10-CM
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
## 2024-04-26 DIAGNOSIS — M54.50 ACUTE RIGHT-SIDED LOW BACK PAIN WITHOUT SCIATICA: ICD-10-CM

## 2024-04-26 LAB
ALBUMIN SERPL-MCNC: 4.1 G/DL (ref 3.5–5.2)
ALBUMIN/GLOB SERPL: 2 {RATIO} (ref 1–2.5)
ALP SERPL-CCNC: 73 U/L (ref 35–104)
ALT SERPL-CCNC: 18 U/L (ref 10–35)
ANION GAP SERPL CALCULATED.3IONS-SCNC: 13 MMOL/L (ref 9–16)
AST SERPL-CCNC: 23 U/L (ref 10–35)
BASOPHILS # BLD: 0.07 K/UL (ref 0–0.2)
BASOPHILS NFR BLD: 1 % (ref 0–2)
BILIRUB SERPL-MCNC: 0.2 MG/DL (ref 0–1.2)
BNP SERPL-MCNC: 1147 PG/ML (ref 0–300)
BUN SERPL-MCNC: 26 MG/DL (ref 8–23)
CALCIUM SERPL-MCNC: 9.2 MG/DL (ref 8.6–10.4)
CHLORIDE SERPL-SCNC: 102 MMOL/L (ref 98–107)
CO2 SERPL-SCNC: 24 MMOL/L (ref 20–31)
CREAT SERPL-MCNC: 1.4 MG/DL (ref 0.5–0.9)
EOSINOPHIL # BLD: 0.33 K/UL (ref 0–0.44)
EOSINOPHILS RELATIVE PERCENT: 4 % (ref 1–4)
ERYTHROCYTE [DISTWIDTH] IN BLOOD BY AUTOMATED COUNT: 17.1 % (ref 11.8–14.4)
GFR SERPL CREATININE-BSD FRML MDRD: 37 ML/MIN/1.73M2
GLUCOSE SERPL-MCNC: 122 MG/DL (ref 74–99)
HCT VFR BLD AUTO: 32.6 % (ref 36.3–47.1)
HGB BLD-MCNC: 10.5 G/DL (ref 11.9–15.1)
IMM GRANULOCYTES # BLD AUTO: <0.03 K/UL (ref 0–0.3)
IMM GRANULOCYTES NFR BLD: 0 %
INR PPP: 0.9
LYMPHOCYTES NFR BLD: 2.53 K/UL (ref 1.1–3.7)
LYMPHOCYTES RELATIVE PERCENT: 27 % (ref 24–43)
MAGNESIUM SERPL-MCNC: 1.9 MG/DL (ref 1.6–2.4)
MCH RBC QN AUTO: 29.6 PG (ref 25.2–33.5)
MCHC RBC AUTO-ENTMCNC: 32.2 G/DL (ref 28.4–34.8)
MCV RBC AUTO: 91.8 FL (ref 82.6–102.9)
MONOCYTES NFR BLD: 1.02 K/UL (ref 0.1–1.2)
MONOCYTES NFR BLD: 11 % (ref 3–12)
NEUTROPHILS NFR BLD: 57 % (ref 36–65)
NEUTS SEG NFR BLD: 5.28 K/UL (ref 1.5–8.1)
NRBC BLD-RTO: 0 PER 100 WBC
PLATELET # BLD AUTO: 241 K/UL (ref 138–453)
PMV BLD AUTO: 10.3 FL (ref 8.1–13.5)
POTASSIUM SERPL-SCNC: 4 MMOL/L (ref 3.7–5.3)
PROT SERPL-MCNC: 6.8 G/DL (ref 6.6–8.7)
PROTHROMBIN TIME: 12.3 SEC (ref 11.7–14.9)
RBC # BLD AUTO: 3.55 M/UL (ref 3.95–5.11)
RBC # BLD: ABNORMAL 10*6/UL
SODIUM SERPL-SCNC: 139 MMOL/L (ref 136–145)
TROPONIN I SERPL HS-MCNC: 19 NG/L (ref 0–14)
WBC OTHER # BLD: 9.3 K/UL (ref 3.5–11.3)

## 2024-04-26 PROCEDURE — 83880 ASSAY OF NATRIURETIC PEPTIDE: CPT

## 2024-04-26 PROCEDURE — 80053 COMPREHEN METABOLIC PANEL: CPT

## 2024-04-26 PROCEDURE — 72125 CT NECK SPINE W/O DYE: CPT

## 2024-04-26 PROCEDURE — 6360000004 HC RX CONTRAST MEDICATION: Performed by: HEALTH CARE PROVIDER

## 2024-04-26 PROCEDURE — 6370000000 HC RX 637 (ALT 250 FOR IP): Performed by: HEALTH CARE PROVIDER

## 2024-04-26 PROCEDURE — 71260 CT THORAX DX C+: CPT

## 2024-04-26 PROCEDURE — 96360 HYDRATION IV INFUSION INIT: CPT | Performed by: EMERGENCY MEDICINE

## 2024-04-26 PROCEDURE — 85025 COMPLETE CBC W/AUTO DIFF WBC: CPT

## 2024-04-26 PROCEDURE — 70450 CT HEAD/BRAIN W/O DYE: CPT

## 2024-04-26 PROCEDURE — 85610 PROTHROMBIN TIME: CPT

## 2024-04-26 PROCEDURE — 83735 ASSAY OF MAGNESIUM: CPT

## 2024-04-26 PROCEDURE — 84484 ASSAY OF TROPONIN QUANT: CPT

## 2024-04-26 PROCEDURE — 93005 ELECTROCARDIOGRAM TRACING: CPT | Performed by: HEALTH CARE PROVIDER

## 2024-04-26 PROCEDURE — 99285 EMERGENCY DEPT VISIT HI MDM: CPT | Performed by: EMERGENCY MEDICINE

## 2024-04-26 RX ORDER — HYDROCODONE BITARTRATE AND ACETAMINOPHEN 5; 325 MG/1; MG/1
1 TABLET ORAL ONCE
Status: COMPLETED | OUTPATIENT
Start: 2024-04-26 | End: 2024-04-26

## 2024-04-26 RX ORDER — ACETAMINOPHEN 325 MG/1
650 TABLET ORAL ONCE
Status: COMPLETED | OUTPATIENT
Start: 2024-04-26 | End: 2024-04-26

## 2024-04-26 RX ORDER — FLUTICASONE PROPIONATE AND SALMETEROL 113; 14 UG/1; UG/1
POWDER, METERED RESPIRATORY (INHALATION)
COMMUNITY
Start: 2024-04-12 | End: 2024-05-01 | Stop reason: DRUGHIGH

## 2024-04-26 RX ADMIN — ACETAMINOPHEN 650 MG: 325 TABLET ORAL at 23:10

## 2024-04-26 RX ADMIN — IOPAMIDOL 75 ML: 755 INJECTION, SOLUTION INTRAVENOUS at 23:45

## 2024-04-26 RX ADMIN — HYDROCODONE BITARTRATE AND ACETAMINOPHEN 1 TABLET: 5; 325 TABLET ORAL at 23:10

## 2024-04-26 ASSESSMENT — PAIN DESCRIPTION - LOCATION: LOCATION: BACK

## 2024-04-26 ASSESSMENT — PAIN SCALES - GENERAL: PAINLEVEL_OUTOF10: 8

## 2024-04-26 ASSESSMENT — PAIN DESCRIPTION - ONSET: ONSET: ON-GOING

## 2024-04-26 ASSESSMENT — PAIN - FUNCTIONAL ASSESSMENT: PAIN_FUNCTIONAL_ASSESSMENT: 0-10

## 2024-04-26 ASSESSMENT — ENCOUNTER SYMPTOMS
VOMITING: 0
SORE THROAT: 0
CONSTIPATION: 0
SHORTNESS OF BREATH: 1
BACK PAIN: 1
DIARRHEA: 0
ABDOMINAL PAIN: 0
NAUSEA: 0

## 2024-04-26 ASSESSMENT — PAIN DESCRIPTION - DESCRIPTORS: DESCRIPTORS: BURNING

## 2024-04-26 ASSESSMENT — LIFESTYLE VARIABLES
HOW MANY STANDARD DRINKS CONTAINING ALCOHOL DO YOU HAVE ON A TYPICAL DAY: PATIENT DOES NOT DRINK
HOW OFTEN DO YOU HAVE A DRINK CONTAINING ALCOHOL: NEVER

## 2024-04-26 ASSESSMENT — PAIN DESCRIPTION - PAIN TYPE: TYPE: ACUTE PAIN

## 2024-04-26 ASSESSMENT — PAIN DESCRIPTION - FREQUENCY: FREQUENCY: INTERMITTENT

## 2024-04-26 ASSESSMENT — PAIN DESCRIPTION - ORIENTATION: ORIENTATION: LOWER

## 2024-04-27 VITALS
SYSTOLIC BLOOD PRESSURE: 163 MMHG | HEART RATE: 71 BPM | RESPIRATION RATE: 18 BRPM | TEMPERATURE: 98.3 F | DIASTOLIC BLOOD PRESSURE: 63 MMHG | OXYGEN SATURATION: 96 %

## 2024-04-27 LAB
EKG ATRIAL RATE: 77 BPM
EKG P AXIS: 65 DEGREES
EKG P-R INTERVAL: 172 MS
EKG Q-T INTERVAL: 408 MS
EKG QRS DURATION: 148 MS
EKG QTC CALCULATION (BAZETT): 461 MS
EKG R AXIS: -80 DEGREES
EKG T AXIS: 90 DEGREES
EKG VENTRICULAR RATE: 77 BPM
TROPONIN I SERPL HS-MCNC: 16 NG/L (ref 0–14)

## 2024-04-27 PROCEDURE — 2580000003 HC RX 258: Performed by: STUDENT IN AN ORGANIZED HEALTH CARE EDUCATION/TRAINING PROGRAM

## 2024-04-27 PROCEDURE — 84484 ASSAY OF TROPONIN QUANT: CPT

## 2024-04-27 RX ORDER — LIDOCAINE 50 MG/G
1 PATCH TOPICAL DAILY
Qty: 10 PATCH | Refills: 0 | Status: SHIPPED | OUTPATIENT
Start: 2024-04-27 | End: 2024-05-01

## 2024-04-27 RX ORDER — ACETAMINOPHEN 500 MG
1000 TABLET ORAL EVERY 8 HOURS PRN
Qty: 21 TABLET | Refills: 0 | Status: SHIPPED | OUTPATIENT
Start: 2024-04-27 | End: 2024-05-04

## 2024-04-27 RX ORDER — IBUPROFEN 400 MG/1
400 TABLET ORAL EVERY 6 HOURS PRN
Qty: 16 TABLET | Refills: 0 | Status: SHIPPED | OUTPATIENT
Start: 2024-04-27 | End: 2024-05-01

## 2024-04-27 RX ORDER — 0.9 % SODIUM CHLORIDE 0.9 %
500 INTRAVENOUS SOLUTION INTRAVENOUS ONCE
Status: COMPLETED | OUTPATIENT
Start: 2024-04-27 | End: 2024-04-27

## 2024-04-27 RX ADMIN — SODIUM CHLORIDE 500 ML: 9 INJECTION, SOLUTION INTRAVENOUS at 00:34

## 2024-04-27 NOTE — DISCHARGE INSTRUCTIONS
Please tell your cardiologist when you follow-up with or shortness of breath they may want to do an ultrasound or heart to look your valve.  Also call your pulmonologist and see if you have your inhaler switch back to the 1 that you prefer.  Use the pain medicines prescribed to you for pain control.  If you have any worsening pain or shortness of breath or symptoms in any way return to the emergency department

## 2024-04-27 NOTE — ED PROVIDER NOTES
[facility] ED  Emergency Department  Emergency Medicine Resident Turn-Over     Note Started: 11:29 PM EDT    Care of Estelle Gunderson was assumed from Dr. matthews and is being seen for Hypertension, Fall (2 weeks ago (-) LOC), Back Pain, and Shortness of Breath  .  The patient's initial evaluation and plan have been discussed with the prior provider who initially evaluated the patient.     EMERGENCY DEPARTMENT COURSE / MEDICAL DECISION MAKING:       MEDICATIONS GIVEN:  Orders Placed This Encounter   Medications    acetaminophen (TYLENOL) tablet 650 mg    HYDROcodone-acetaminophen (NORCO) 5-325 MG per tablet 1 tablet    iopamidol (ISOVUE-370) 76 % injection 75 mL    sodium chloride 0.9 % bolus 500 mL    lidocaine (LIDODERM) 5 %     Sig: Place 1 patch onto the skin daily for 10 days 12 hours on, 12 hours off.     Dispense:  10 patch     Refill:  0    acetaminophen (TYLENOL) 500 MG tablet     Sig: Take 2 tablets by mouth every 8 hours as needed for Pain     Dispense:  21 tablet     Refill:  0    ibuprofen (IBU) 400 MG tablet     Sig: Take 1 tablet by mouth every 6 hours as needed for Pain     Dispense:  16 tablet     Refill:  0       LABS / RADIOLOGY:     Labs Reviewed   CBC WITH AUTO DIFFERENTIAL - Abnormal; Notable for the following components:       Result Value    RBC 3.55 (*)     Hemoglobin 10.5 (*)     Hematocrit 32.6 (*)     RDW 17.1 (*)     All other components within normal limits   COMPREHENSIVE METABOLIC PANEL - Abnormal; Notable for the following components:    Glucose 122 (*)     BUN 26 (*)     Creatinine 1.4 (*)     Est, Glom Filt Rate 37 (*)     All other components within normal limits   TROPONIN - Abnormal; Notable for the following components:    Troponin, High Sensitivity 19 (*)     All other components within normal limits   TROPONIN - Abnormal; Notable for the following components:    Troponin, High Sensitivity 16 (*)     All other components within normal limits   BRAIN

## 2024-04-27 NOTE — ED PROVIDER NOTES
[facility] ED  Emergency Department Encounter  Emergency Medicine Resident     Pt Name:Estelle Gunderson  MRN: [id number]  Birthdate 1945  Date of evaluation: 4/26/24  PCP:  Donald Gatica MD  Note Started: 10:42 PM EDT      CHIEF COMPLAINT       Chief Complaint   Patient presents with    Hypertension    Fall     2 weeks ago (-) LOC    Back Pain    Shortness of Breath       HISTORY OF PRESENT ILLNESS  (Location/Symptom, Timing/Onset, Context/Setting, Quality, Duration, Modifying Factors, Severity.)      Estelle Gunderson is a 79 y.o. female with history of anemia, breast cancer, coronary artery disease, aortic stenosis status post TAVR in June 2023, MGUS, hypertension, esophageal reflux, stage III chronic kidney disease, osteoporosis who presents with concerns for elevated blood pressure at home for the last week.    Also concern for fall and back pain. Patient was seen mechanical fall approximately 2 weeks ago after she was trying to put on her shoes and fell forward.  States she has been having some increasing right sided lower back pain.  Normally ambulatory without any difficulties at baseline.  Still ambulatory just has been walking little slower.    Patient also reports worsening shortness of breath for the last several weeks.  Does have some significant dyspnea on exertion per patient's daughter. No chest pain, abdominal pain, nausea or vomiting.    Recent admission in early March for cough and AMS. Treated for UTI, mild hyponatremia and ENZO. Also found to be positive for influenza A.  At this time denies any dysuria or hematuria.  Has been eating and drinking okay.    Last echo was 6/24/2023 shows EF of 55%, normally functioning TAVR.    PAST MEDICAL / SURGICAL / SOCIAL / FAMILY HISTORY      has a past medical history of Anemia, unspecified, Aortic stenosis, Arthritis, AV block, BBB (bundle branch block), Breast cancer (HCC), Bundle branch block, CAD (coronary artery disease),  ear normal.      Left Ear: External ear normal.      Nose: Nose normal.   Eyes:      Conjunctiva/sclera: Conjunctivae normal.   Neck:      Trachea: No tracheal deviation.   Cardiovascular:      Rate and Rhythm: Normal rate and regular rhythm.      Heart sounds: Normal heart sounds. No murmur heard.     No friction rub. No gallop.   Pulmonary:      Effort: Pulmonary effort is normal. No respiratory distress.      Breath sounds: Normal breath sounds.   Abdominal:      General: Bowel sounds are normal.      Palpations: Abdomen is soft.      Tenderness: There is no abdominal tenderness.   Musculoskeletal:         General: No tenderness. Normal range of motion.      Cervical back: Neck supple.      Comments: No obvious deformities, lacerations, abrasions to bilateral upper and lower extremities, chest, abdomen, or back   Lower lumbar spine TTP as well as perispinal TTP  No other CTLS spine midline tenderness, stepoff, or deformities  Strength 5/5 bilateral upper and lower extremity flexors/extensors  Sensation equal and intact bilateral upper and lower extremities  Radial/ulnar/DP/PT pulses equal and +2 bilaterally  Cap refill <2     Skin:     General: Skin is warm and dry.      Capillary Refill: Capillary refill takes less than 2 seconds.   Neurological:      Mental Status: She is alert and oriented to person, place, and time.      Motor: No abnormal muscle tone.           DDX/DIAGNOSTIC RESULTS / EMERGENCY DEPARTMENT COURSE / MDM     Medical Decision Making  Patient is a 79-year-old female presents this time with concerns for a recent fall 2 weeks ago, back pain, as well as some worsening shortness of breath.  At time initial evaluation patient was negative strep, signs of this time.  Currently afebrile.  Blood pressure is with a systolic in the 160s.  Normal heart rate of 75.  Saturating 96% room air.  Speaking in full sentences and no concern for any respiratory compromise or collapse.  Physical exam findings as

## 2024-04-27 NOTE — ED PROVIDER NOTES
[facility] ED     Emergency Department     Faculty Attestation        I performed a history and physical examination of the patient and discussed management with the resident. I reviewed the resident’s note and agree with the documented findings and plan of care. Any areas of disagreement are noted on the chart. I was personally present for the key portions of any procedures. I have documented in the chart those procedures where I was not present during the key portions. I have reviewed the emergency nurses triage note. I agree with the chief complaint, past medical history, past surgical history, allergies, medications, social and family history as documented unless otherwise noted below.  For Physician Assistant/ Nurse Practitioner cases/documentation I have personally evaluated this patient and have completed at least one if not all key elements of the E/M (history, physical exam, and MDM). Additional findings are as noted.      Vital Signs: BP: (!) 164/88  Pulse: 75  Respirations: 18  Temp: 98.3 °F (36.8 °C) SpO2: 96 %  PCP:  Donald Gatica MD  Note Started: 4/26/24, 11:09 PM EDT    Pertinent Comments:     Patient is a 79-year-old female with history of hypertension as well as kidney disease and TAVR procedure just under a year ago.   She is here for 2 complaints with the first being a mechanical fall 2 weeks ago with hitting her head but no loss of consciousness however is on aspirin and Plavix.   No obvious neck pain but has lower thoracic and lumbar pain however no loss of bowel or bladder function or any weakness or numbness/tingling in the extremities.   Secondly the daughter brings up that she has been more short of breath over the last few weeks even on walking around the house apparently.   Denies any chest pain associated and there is been no diaphoresis or vomiting.    No other recent illnesses of a viral nature either and no  diarrhea or blood in the stool.      Physical examination: Clear to station bilateral with midline trachea heart regular rate and rhythm.   Abdomen is soft/nontender with no pulsatile masses palpated.  Extremities are warm and dry at this time with no obvious edema or calf pain and strong DP pulse palpated and equal bilaterally.   Neurologically patient is intact with sensation light touch intact bilaterally as well as strength 5/5 in all 4 extremities and cranial nerves II through XII intact with no focal deficit.     Assessment/plan: Patient with mechanical fall with midline lumbar pain and will obtain pan CTs given aspirin Plavix use as well as symptoms worsening over the last 2 weeks.   Also increasing shortness of breath over the last few weeks as well will perform cardiac workup but likely will need admission for echo given TAVR procedure 1 year ago.          EKG Interpretation    Interpreted by emergency department physician    Rhythm: paced  Rate: normal at 77 bpm  Axis: normal  Ectopy: none  Conduction: paced  ST Segments: nonspecific changes  T Waves: nonspecific changes  Clinical Impression: paced rhythm, No other abnormalities discernible through this rhythm     Critical Care  None      (Please note that portions of this note were completed with a voice recognition program. Efforts were made to edit the dictations but occasionally words are mis-transcribed. Whenever words are used in this note in any gender, they shall be construed as though they were used in the gender appropriate to the circumstances; and whenever words are used in this note in the singular or plural form, they shall be construed as though they were used in the form appropriate to the circumstances.)    Jevon Dao MD [facility]  Attending Emergency Medicine Physician           Jevon Dao MD  04/26/24 3596       Jevon Dao MD  04/26/24 4721

## 2024-04-27 NOTE — ED NOTES
The following labs were labeled with patient stickers & tubed to lab;    [x]Lavender   []On Ice  [x]Blue  [x]Green/ Yellow  [x]Green/ Black []On Ice  []Pink  []Red  []Yellow    []COVID-19 Swab []Rapid    []Urine Sample  []Pelvic Cultures    []Blood Cultures

## 2024-04-27 NOTE — ED TRIAGE NOTES
Pt presents to ED from home with c/o BP elevation about SBP 180s started few hours ago before coming in to ED accompanied by SOB. Pt claims she is compliant with her medications. Pt states that he has pacemaker (medtronix), artificail valves and cardiac stent. Pt states she had history of fall 2 weeks ago, did not hit her head and never lost consciousness. Pt took Tylenol at 3PM today for the pain.     Pt is alert and oriented x4. NAD. VSS except HTN. Pt placed on continuous cardiac monitoring, BP, Pulse ox. EKG obtained. IV established and labs drawn, labeled and sent to labs. Changed clothes into gown. Call light within reach. Family at bedside.

## 2024-04-29 ENCOUNTER — COMMUNITY CARE MANAGEMENT (OUTPATIENT)
Facility: CLINIC | Age: 79
End: 2024-04-29

## 2024-05-07 ENCOUNTER — HOSPITAL ENCOUNTER (OUTPATIENT)
Dept: CARDIAC REHAB | Age: 79
Discharge: HOME | End: 2024-05-07

## 2024-05-07 NOTE — PROGRESS NOTES
into the skin Every 6 months      aspirin 81 MG tablet Take 1 tablet by mouth every other day      Cholecalciferol (VITAMIN D3) 2000 UNITS CAPS Take by mouth daily      Probiotic Product (PROBIOTIC DAILY PO) Take by mouth daily       lansoprazole (PREVACID) 15 MG delayed release capsule Take 1 capsule by mouth daily      acetaminophen (TYLENOL) 500 MG tablet Take 2 tablets by mouth every 8 hours as needed for Pain 21 tablet 0    ibuprofen (IBU) 400 MG tablet Take 1 tablet by mouth every 6 hours as needed for Pain 16 tablet 0     No current facility-administered medications for this visit.       ALLERGIES:   Allergies   Allergen Reactions    Azithromycin Diarrhea    Benadryl [Diphenhydramine]     Codeine Nausea Only    Hctz [Hydrochlorothiazide]     Lisinopril     Phenergan [Promethazine Hcl] Other (See Comments)     Dystonia    Tape [Adhesive Tape]     Amoxicillin Diarrhea       SOCIAL HISTORY:  Social History     Tobacco Use    Smoking status: Former     Current packs/day: 0.00     Average packs/day: 1 pack/day for 30.0 years (30.0 ttl pk-yrs)     Types: Cigarettes     Start date: 6/10/1979     Quit date: 6/10/2009     Years since quittin.9    Smokeless tobacco: Never   Substance Use Topics    Alcohol use: No       Pertinent ROS:  Review of Systems  Skin: Denies any new changing, growing or bleeding lesions or rashes except as described in the HPI   Constitutional: Denies fevers, chills, and malaise.    PHYSICAL EXAM:   BP (!) 158/80   Pulse 74   Temp 98.8 °F (37.1 °C)   Ht 1.524 m (5')   Wt 73 kg (161 lb)   LMP  (LMP Unknown)   SpO2 96%   BMI 31.44 kg/m²     The patient is generally well appearing, well nourished, alert and conversational. Affect is normal.    Cutaneous Exam:  Physical Exam  Focused exam of buttocks, groin was performed    Facial covering was removed during examination.    Diagnoses/exam findings/medical history pertinent to this visit are listed below:    Assessment:   Diagnosis

## 2024-05-09 ENCOUNTER — HOSPITAL ENCOUNTER (OUTPATIENT)
Dept: CARDIAC REHAB | Age: 79
Discharge: HOME | End: 2024-05-09

## 2024-05-09 ENCOUNTER — OFFICE VISIT (OUTPATIENT)
Dept: DERMATOLOGY | Age: 79
End: 2024-05-09
Payer: MEDICARE

## 2024-05-09 VITALS
TEMPERATURE: 98.8 F | WEIGHT: 161 LBS | DIASTOLIC BLOOD PRESSURE: 80 MMHG | HEIGHT: 60 IN | HEART RATE: 74 BPM | SYSTOLIC BLOOD PRESSURE: 158 MMHG | OXYGEN SATURATION: 96 % | BODY MASS INDEX: 31.61 KG/M2

## 2024-05-09 DIAGNOSIS — L90.0 LICHEN SCLEROSUS: Primary | ICD-10-CM

## 2024-05-09 DIAGNOSIS — A60.00 RECURRENT GENITAL HSV (HERPES SIMPLEX VIRUS) INFECTION: ICD-10-CM

## 2024-05-09 PROCEDURE — G8427 DOCREV CUR MEDS BY ELIG CLIN: HCPCS | Performed by: DERMATOLOGY

## 2024-05-09 PROCEDURE — 1036F TOBACCO NON-USER: CPT | Performed by: DERMATOLOGY

## 2024-05-09 PROCEDURE — 3077F SYST BP >= 140 MM HG: CPT | Performed by: DERMATOLOGY

## 2024-05-09 PROCEDURE — G8399 PT W/DXA RESULTS DOCUMENT: HCPCS | Performed by: DERMATOLOGY

## 2024-05-09 PROCEDURE — 1090F PRES/ABSN URINE INCON ASSESS: CPT | Performed by: DERMATOLOGY

## 2024-05-09 PROCEDURE — G8417 CALC BMI ABV UP PARAM F/U: HCPCS | Performed by: DERMATOLOGY

## 2024-05-09 PROCEDURE — 3079F DIAST BP 80-89 MM HG: CPT | Performed by: DERMATOLOGY

## 2024-05-09 PROCEDURE — 99214 OFFICE O/P EST MOD 30 MIN: CPT | Performed by: DERMATOLOGY

## 2024-05-09 PROCEDURE — 1124F ACP DISCUSS-NO DSCNMKR DOCD: CPT | Performed by: DERMATOLOGY

## 2024-05-09 NOTE — PATIENT INSTRUCTIONS
Continue clobetasol 0.05% ointment daily until rash has improved, and switch to three days per week on vulvar and perianal rash.   Discussed postponing biopsy if she is not feeling well around the day of the appointment (5/28/24). If she can make it to the appointment, I would like to see her that day.

## 2024-05-14 ENCOUNTER — HOSPITAL ENCOUNTER (OUTPATIENT)
Dept: CARDIAC REHAB | Age: 79
Discharge: HOME | End: 2024-05-14

## 2024-05-21 ENCOUNTER — HOSPITAL ENCOUNTER (OUTPATIENT)
Dept: PHYSICAL THERAPY | Facility: CLINIC | Age: 79
Setting detail: THERAPIES SERIES
Discharge: HOME | End: 2024-05-21
Payer: MEDICARE

## 2024-05-21 PROCEDURE — 97110 THERAPEUTIC EXERCISES: CPT

## 2024-05-21 PROCEDURE — 97161 PT EVAL LOW COMPLEX 20 MIN: CPT

## 2024-05-21 NOTE — CONSULTS
Decision Making [x] Low [] Moderate [] High    [x] Low Complexity [] Moderate Complexity [] High Complexity         Treatment Charges: Mins Units   Evaluation  [x] Low  [] Mod  [] High 33 1   []  Modalities     [x]  Ther Exercise 5 1   [x]  Manual Therapy 3 0   []  Ther Activities     []  Aquatics     []  Vasocompression     []  Other       TOTAL TREATMENT TIME: 41 minutes    Time in: 1220 p      Time out: 101 p    Electronically signed by: Nadia Leger PT        Physician Signature:________________________________Date:__________________  By signing above or cosigning this note, I have reviewed this plan of care and certify a need for medically necessary rehabilitation services.     *PLEASE SIGN ABOVE AND FAX BACK ALL PAGES*

## 2024-05-21 NOTE — PLAN OF CARE
Ultrasound        25653   [x] Neuromuscular Re-education  34368 [] Electrical Stimulation Unattended  64496   [x] Manual Therapy  80618 [] Electrical Stimulation Attended  24035   [x] Instruction in HEP       [] Lumbar/Cervical Traction  54811   [] Aquatic Therapy           30212 [x] Cold/hotpack     [] Massage   68363      [] Dry Needling, 1 or 2 muscles  71251   [] Biofeedback, first 15 minutes   90912  [] Biofeedback, additional 15 minutes   90913 [] Dry Needling, 3 or more muscles  20561      []  Medication allergies reviewed for use of               Dexamethasone Sodium Phosphate 4mg/ml                with iontophoresis treatments.              Pt is not allergic.     Frequency:  2 x/week for 12 visits       Electronically signed by: Nadia Leger PT        Physician Signature:________________________________Date:__________________  By signing above or cosigning this note, I have reviewed this plan of care and certify a need for medically necessary rehabilitation services.     *PLEASE SIGN ABOVE AND FAX BACK ALL PAGES*

## 2024-05-28 ENCOUNTER — PROCEDURE VISIT (OUTPATIENT)
Dept: DERMATOLOGY | Age: 79
End: 2024-05-28
Payer: MEDICARE

## 2024-05-28 ENCOUNTER — HOSPITAL ENCOUNTER (OUTPATIENT)
Dept: CARDIAC REHAB | Age: 79
Discharge: HOME | End: 2024-05-28

## 2024-05-28 VITALS
OXYGEN SATURATION: 93 % | DIASTOLIC BLOOD PRESSURE: 68 MMHG | SYSTOLIC BLOOD PRESSURE: 137 MMHG | WEIGHT: 161 LBS | TEMPERATURE: 97.3 F | BODY MASS INDEX: 31.44 KG/M2 | HEART RATE: 73 BPM

## 2024-05-28 DIAGNOSIS — L90.0 LICHEN SCLEROSUS: Primary | ICD-10-CM

## 2024-05-28 PROCEDURE — 11102 TANGNTL BX SKIN SINGLE LES: CPT | Performed by: DERMATOLOGY

## 2024-05-28 RX ORDER — LIDOCAINE HYDROCHLORIDE AND EPINEPHRINE 10; 10 MG/ML; UG/ML
0.3 INJECTION, SOLUTION INFILTRATION; PERINEURAL ONCE
Status: SHIPPED | OUTPATIENT
Start: 2024-05-28

## 2024-05-28 NOTE — PROGRESS NOTES
Dermatology Procedure Note   [facility] DERMATOLOGY  [address]  [address]  [facility] [address]  Dept: [phone]  Dept Fax: [phone]      Procedure Date: 5/28/2024  Procedure Time: 11:22 AM    Procedure Practitioner: Myranda Rodriguez MD    Procedure: Skin Biopsy    Pre-Procedure Diagnosis:  lichen sclerosus, r/o SCC - right perianal skin    Post-Procedure Diagnosis: Same as Pre-Procedure Diagnosis    Informed Consent: The procedure and its risks were explained including but not limited to pain, bleeding, infection, permanent scar, permanent pigment alteration and recurrence. Consent to proceed with the procedure was obtained from the patient or the parent by the practitioner    Time Out:  A time out was conducted immediately before starting the procedure that confirmed a final verification of the correct patient, correct procedure, and correct site.    Procedure Details:  Shave Biopsy: The procedure and its risks were explained including but not limited to pain, bleeding, infection, permanent scar, permanent pigment alteration and need for an additional procedure. Consent to proceed with the procedure was obtained from the patient or the parent. After cleaning with alcohol the lesion was anesthetized with buffered 1% lidocaine with epinephrine with epinephrine and was removed with a iris scissors after tenting the skin with a stitch of 5-0 gut. Hemostasis was achieved with suture closure of the wound with 5-0 gut.    Procedure Performed By: Myranda Rodriguez MD    Estimated Blood Loss: Minimal    Pathologic Specimen: H&E    Procedure Tolerance: Good    Complication(s): None    Electronically signed by Myranda Rodriguez MD on 5/28/24 at 11:22 AM EDT

## 2024-05-29 ENCOUNTER — NURSE ONLY (OUTPATIENT)
Dept: LAB | Age: 79
End: 2024-05-29

## 2024-06-03 ENCOUNTER — HOSPITAL ENCOUNTER (OUTPATIENT)
Dept: PHYSICAL THERAPY | Facility: CLINIC | Age: 79
Setting detail: THERAPIES SERIES
Discharge: HOME | End: 2024-06-03
Payer: MEDICARE

## 2024-06-03 PROCEDURE — 97112 NEUROMUSCULAR REEDUCATION: CPT

## 2024-06-03 PROCEDURE — 97110 THERAPEUTIC EXERCISES: CPT

## 2024-06-03 NOTE — FLOWSHEET NOTE
[] [facility]  Outpatient Rehabilitation &  Therapy  [address]  [phone]  [phone] [x] [facility]  Outpatient Rehabilitation &  Therapy  [address]  P: [phone]  F: [phone] [] [facility]  Outpatient Rehabilitation &  Therapy  [address]  P: [phone]  F: [phone] [] [facility]  Outpatient Rehabilitation &  Therapy  [address]  [phone]  [phone] [] [facility]  Outpatient Rehabilitation &  Therapy  [address]   P: [phone]  F: [phone]  [] [facility]  Outpatient Rehabilitation &  Therapy  [address]  P: [phone]  F: [phone] [] [facility]  Outpatient Rehabilitation &  Therapy  [address]  P: [phone]  F: [phone] [] [facility]  Outpatient Rehabilitation &  Therapy  [address]  P: [phone]  F: [phone] [] [facility]  Outpatient Rehabilitation &  Therapy  [address]  P: [phone]  F: [phone]  [] [facility] Outpatient Rehabilitation &  Therapy  [address]  P: [phone]  F: [phone]     Physical Therapy Daily Treatment Note    Date:  6/3/2024  Patient Name:  Estelle Gunderson    :  1945  MRN: [id number]  Physician: Donald Gatica MD                                Insurance: Medicare/AAR ([facility])    Medical Diagnosis: M54.42 (ICD-10-CM) - Acute left-sided low back pain with left-sided sciatica                         Rehab Codes: M54.59,R26.89, M62.81, Z73.6  Onset Date: 24                 Next 's appt.: TBD    Visit# / total visits: ; Progress note for Medicare patient due at visit 10     Cancels/No Shows: 0/0    Subjective:    Pain:  [] Yes  [x] No Location: B Hips, LBP, L LE Pain Rating: (0-10 scale)

## 2024-06-10 ENCOUNTER — HOSPITAL ENCOUNTER (OUTPATIENT)
Dept: PHYSICAL THERAPY | Facility: CLINIC | Age: 79
Setting detail: THERAPIES SERIES
Discharge: HOME | End: 2024-06-10
Payer: MEDICARE

## 2024-06-10 PROCEDURE — 97112 NEUROMUSCULAR REEDUCATION: CPT

## 2024-06-10 PROCEDURE — 97110 THERAPEUTIC EXERCISES: CPT

## 2024-06-10 NOTE — FLOWSHEET NOTE
tolerance. Added weight to standing 3 way hip and marching to promote improved global hip strengthening. Added foam to static balance interventions with out incident and minimal sway present. Added in wisam play this date with cueing provided to maintain neutral foot alignment. Progressions made this date with added sidelying hip strengthening. Pt reports feeling good after treatment session. Educated on doms as well.     [] No change.     [] Other:  [x] Patient would continue to benefit from skilled physical therapy services in order to: improve pain and enhance the patient’s functional capabilities, foster self-reliance,and enhance their overall quality of life.     STG: (to be met in 6 treatments)  ? Pain: Pt will report a max of 4-5/10 low back and leg pain when standing to wash the dishes, carry her laundry basket to the washer, and when walking over uneven surfaces.  ? ROM: Pt will be able to perform lumbar extension and side bend with 0-1/10 increase in low back pain to improve tolerance to prolonged standing.  ? Strength: Pt will be able to demonstrate transverse abdominis activation 5x with minimal rectus abdominis activation in order to improve spinal stability when standing and bending.   Gait: Pt will be able to ambulate at a self-selected gait speed that of a community ambulator for 30 feet to normalize gait mechanics.   ? Function: Pt will score less than or equal to 40% on the EL to demonstrate an improvement in function.  Independent with Home Exercise Programs  Demonstrate Knowledge of fall prevention- MET 5/21/24  LTG: (to be met in 12 treatments)  ? Pain: Pt will report a max of 2-3/10 low back and leg pain when standing to wash the dishes, carry her laundry basket to the washer, and when walking over uneven surfaces.  ? ROM: Pt will be able to perform lumbar AROM in all planes with 0/10 increase in low back pain to assist with overall movement.  ? Strength: Pt will demonstrate ability to

## 2024-06-13 ENCOUNTER — HOSPITAL ENCOUNTER (OUTPATIENT)
Dept: PHYSICAL THERAPY | Facility: CLINIC | Age: 79
Setting detail: THERAPIES SERIES
Discharge: HOME | End: 2024-06-13
Payer: MEDICARE

## 2024-06-13 NOTE — FLOWSHEET NOTE
[] [facility]  Outpatient Rehabilitation &  Therapy  [address]  [phone]  [phone] [] [facility]  Outpatient Rehabilitation &  Therapy  [address]  P: [phone]  F: [phone] [] [facility]  Outpatient Rehabilitation &  Therapy  [address]  P: [phone]  F: [phone] [] [facility]  Outpatient Rehabilitation &  Therapy  [address]  [phone]  [phone] [] [facility]  Outpatient Rehabilitation &  Therapy  [address]   P: [phone]  F: [phone]  [] [facility]  Outpatient Rehabilitation &  Therapy  [address]  P: [phone]  F: [phone] [] [facility]  Outpatient Rehabilitation &  Therapy  [address]  P: [phone]  F: [phone] [] [facility]  Outpatient Rehabilitation &  Therapy  [address]  P: [phone]  F: [phone] [] [facility]  Outpatient Rehabilitation &  Therapy  [address]  P: [phone]  F: [phone]  [] [facility] Outpatient Rehabilitation &  Therapy  [address]  P: [phone]  F: [phone]     Therapy Cancel/No Show note    Date: 2024  Patient: Estelle Gunderson  : 1945  MRN: [id number]    Cancels/No Shows to date:     For today's appointment patient:    [x]  Cancelled    [] Rescheduled appointment    [] No-show     Reason given by patient:    []  Patient ill    []  Conflicting appointment    [] No transportation      [] Conflict with work    [x] No reason given    [] Weather related    [] COVID-19    [x] Other:      Comments:  Patient called into the  this morning to CX today's afternoon appointment, but did not detail a reason.  was able to confirm next appointment date and time.      [x] Next

## 2024-06-17 ENCOUNTER — HOSPITAL ENCOUNTER (OUTPATIENT)
Age: 79
Setting detail: SPECIMEN
Discharge: HOME | End: 2024-06-17
Payer: MEDICARE

## 2024-06-17 DIAGNOSIS — D47.2 MGUS (MONOCLONAL GAMMOPATHY OF UNKNOWN SIGNIFICANCE): ICD-10-CM

## 2024-06-17 LAB
BASOPHILS # BLD: 0.07 K/UL (ref 0–0.2)
BASOPHILS NFR BLD: 1 % (ref 0–2)
EOSINOPHIL # BLD: 0.55 K/UL (ref 0–0.44)
EOSINOPHILS RELATIVE PERCENT: 6 % (ref 1–4)
ERYTHROCYTE [DISTWIDTH] IN BLOOD BY AUTOMATED COUNT: 14.5 % (ref 11.8–14.4)
FREE KAPPA/LAMBDA RATIO: 1.65 (ref 0.22–1.74)
HCT VFR BLD AUTO: 36.1 % (ref 36.3–47.1)
HGB BLD-MCNC: 11.8 G/DL (ref 11.9–15.1)
IGA SERPL-MCNC: <50 MG/DL (ref 70–400)
IGA, LOW RANGE: 36 MG/DL (ref 70–400)
IGG SERPL-MCNC: 925 MG/DL (ref 700–1600)
IGM SERPL-MCNC: 261 MG/DL (ref 40–230)
IMM GRANULOCYTES # BLD AUTO: 0.03 K/UL (ref 0–0.3)
IMM GRANULOCYTES NFR BLD: 0 %
KAPPA LC FREE SER-MCNC: 34.4 MG/L
LAMBDA LC FREE SERPL-MCNC: 20.9 MG/L (ref 4.2–27.7)
LYMPHOCYTES NFR BLD: 2.32 K/UL (ref 1.1–3.7)
LYMPHOCYTES RELATIVE PERCENT: 24 % (ref 24–43)
MCH RBC QN AUTO: 29.1 PG (ref 25.2–33.5)
MCHC RBC AUTO-ENTMCNC: 32.7 G/DL (ref 28.4–34.8)
MCV RBC AUTO: 88.9 FL (ref 82.6–102.9)
MONOCYTES NFR BLD: 1.24 K/UL (ref 0.1–1.2)
MONOCYTES NFR BLD: 13 % (ref 3–12)
NEUTROPHILS NFR BLD: 56 % (ref 36–65)
NEUTS SEG NFR BLD: 5.33 K/UL (ref 1.5–8.1)
NRBC BLD-RTO: 0 PER 100 WBC
PLATELET # BLD AUTO: 199 K/UL (ref 138–453)
PMV BLD AUTO: 10.8 FL (ref 8.1–13.5)
RBC # BLD AUTO: 4.06 M/UL (ref 3.95–5.11)
RBC # BLD: ABNORMAL 10*6/UL
WBC OTHER # BLD: 9.5 K/UL (ref 3.5–11.3)

## 2024-06-17 PROCEDURE — 82784 ASSAY IGA/IGD/IGG/IGM EACH: CPT

## 2024-06-17 PROCEDURE — 83521 IG LIGHT CHAINS FREE EACH: CPT

## 2024-06-17 PROCEDURE — 85025 COMPLETE CBC W/AUTO DIFF WBC: CPT

## 2024-06-17 PROCEDURE — 84165 PROTEIN E-PHORESIS SERUM: CPT

## 2024-06-17 PROCEDURE — 36415 COLL VENOUS BLD VENIPUNCTURE: CPT

## 2024-06-17 PROCEDURE — 84155 ASSAY OF PROTEIN SERUM: CPT

## 2024-06-17 PROCEDURE — 86334 IMMUNOFIX E-PHORESIS SERUM: CPT

## 2024-06-18 LAB
ALBUMIN PERCENT: 62 % (ref 45–65)
ALBUMIN SERPL-MCNC: 4.2 G/DL (ref 3.2–5.2)
ALPHA 2 PERCENT: 9 % (ref 6–13)
ALPHA1 GLOB SERPL ELPH-MCNC: 0.3 G/DL (ref 0.1–0.4)
ALPHA1 GLOB SERPL ELPH-MCNC: 5 % (ref 3–6)
ALPHA2 GLOB SERPL ELPH-MCNC: 0.6 G/DL (ref 0.5–0.9)
B-GLOBULIN SERPL ELPH-MCNC: 0.7 G/DL (ref 0.5–1.1)
B-GLOBULIN SERPL ELPH-MCNC: 11 % (ref 11–19)
GAMMA GLOB SERPL ELPH-MCNC: 0.9 G/DL (ref 0.5–1.5)
GAMMA GLOBULIN %: 13 % (ref 9–20)
ITYP INTERPRETATION: NORMAL
PATH REV: NORMAL
PATHOLOGIST: NORMAL
PROT PATTERN SERPL ELPH-IMP: NORMAL
PROT SERPL-MCNC: 6.7 G/DL (ref 6.6–8.7)
TOTAL PROT. SUM,%: 100 % (ref 98–102)
TOTAL PROT. SUM: 6.7 G/DL (ref 6.3–8.2)

## 2024-06-20 ENCOUNTER — HOSPITAL ENCOUNTER (OUTPATIENT)
Dept: PHYSICAL THERAPY | Facility: CLINIC | Age: 79
Setting detail: THERAPIES SERIES
Discharge: HOME | End: 2024-06-20
Payer: MEDICARE

## 2024-06-20 PROCEDURE — 97110 THERAPEUTIC EXERCISES: CPT

## 2024-06-20 PROCEDURE — 97112 NEUROMUSCULAR REEDUCATION: CPT

## 2024-06-20 NOTE — FLOWSHEET NOTE
[] [facility]  Outpatient Rehabilitation &  Therapy  [address]  [phone]  [phone] [x] [facility]  Outpatient Rehabilitation &  Therapy  [address]  P: [phone]  F: [phone] [] [facility]  Outpatient Rehabilitation &  Therapy  [address]  P: [phone]  F: [phone] [] [facility]  Outpatient Rehabilitation &  Therapy  [address]  [phone]  [phone] [] [facility]  Outpatient Rehabilitation &  Therapy  [address]   P: [phone]  F: [phone]  [] [facility]  Outpatient Rehabilitation &  Therapy  [address]  P: [phone]  F: [phone] [] [facility]  Outpatient Rehabilitation &  Therapy  [address]  P: [phone]  F: [phone] [] [facility]  Outpatient Rehabilitation &  Therapy  [address]  P: [phone]  F: [phone] [] [facility]  Outpatient Rehabilitation &  Therapy  [address]  P: [phone]  F: [phone]  [] [facility] Outpatient Rehabilitation &  Therapy  [address]  P: [phone]  F: [phone]     Physical Therapy Daily Treatment Note    Date:  2024  Patient Name:  Estelle Gunderson    :  1945  MRN: [id number]  Physician: Donald Gatica MD                                Insurance: Medicare/AAR ([facility])    Medical Diagnosis: M54.42 (ICD-10-CM) - Acute left-sided low back pain with left-sided sciatica                         Rehab Codes: M54.59,R26.89, M62.81, Z73.6  Onset Date: 24                 Next 's appt.: TBD    Visit# / total visits: ; Progress note for Medicare patient due at visit 10     Cancels/No Shows: 2/0    Subjective:    Pain:  [] Yes  [x] No Location: B Hips, LBP, L LE Pain Rating: (0-10 scale)

## 2024-06-24 ENCOUNTER — HOSPITAL ENCOUNTER (OUTPATIENT)
Dept: PHYSICAL THERAPY | Facility: CLINIC | Age: 79
Setting detail: THERAPIES SERIES
Discharge: HOME | End: 2024-06-24
Payer: MEDICARE

## 2024-06-24 PROCEDURE — 97110 THERAPEUTIC EXERCISES: CPT

## 2024-06-24 PROCEDURE — 97112 NEUROMUSCULAR REEDUCATION: CPT

## 2024-06-24 NOTE — FLOWSHEET NOTE
[] [facility]  Outpatient Rehabilitation &  Therapy  [address]  [phone]  [phone] [x] [facility]  Outpatient Rehabilitation &  Therapy  [address]  P: [phone]  F: [phone] [] [facility]  Outpatient Rehabilitation &  Therapy  [address]  P: [phone]  F: [phone] [] [facility]  Outpatient Rehabilitation &  Therapy  [address]  [phone]  [phone] [] [facility]  Outpatient Rehabilitation &  Therapy  [address]   P: [phone]  F: [phone]  [] [facility]  Outpatient Rehabilitation &  Therapy  [address]  P: [phone]  F: [phone] [] [facility]  Outpatient Rehabilitation &  Therapy  [address]  P: [phone]  F: [phone] [] [facility]  Outpatient Rehabilitation &  Therapy  [address]  P: [phone]  F: [phone] [] [facility]  Outpatient Rehabilitation &  Therapy  [address]  P: [phone]  F: [phone]  [] [facility] Outpatient Rehabilitation &  Therapy  [address]  P: [phone]  F: [phone]     Physical Therapy Daily Treatment Note    Date:  2024  Patient Name:  Estelle Gunderson    :  1945  MRN: [id number]  Physician: Donald Gatica MD                                Insurance: Medicare/AAR ([facility])    Medical Diagnosis: M54.42 (ICD-10-CM) - Acute left-sided low back pain with left-sided sciatica                         Rehab Codes: M54.59,R26.89, M62.81, Z73.6  Onset Date: 24                 Next 's appt.: TBD    Visit# / total visits: ; Progress note for Medicare patient due at visit 10     Cancels/No Shows: 2/0    Subjective:    Pain:  [] Yes  [x] No Location: B Hips, LBP, L LE Pain Rating: (0-10 scale)

## 2024-06-25 ENCOUNTER — TELEPHONE (OUTPATIENT)
Dept: ONCOLOGY | Age: 79
End: 2024-06-25

## 2024-06-25 ENCOUNTER — OFFICE VISIT (OUTPATIENT)
Dept: ONCOLOGY | Age: 79
End: 2024-06-25
Payer: MEDICARE

## 2024-06-25 VITALS
BODY MASS INDEX: 30.95 KG/M2 | RESPIRATION RATE: 16 BRPM | WEIGHT: 158.5 LBS | SYSTOLIC BLOOD PRESSURE: 121 MMHG | DIASTOLIC BLOOD PRESSURE: 52 MMHG | HEART RATE: 62 BPM | TEMPERATURE: 97.8 F

## 2024-06-25 DIAGNOSIS — D47.2 MGUS (MONOCLONAL GAMMOPATHY OF UNKNOWN SIGNIFICANCE): Primary | ICD-10-CM

## 2024-06-25 PROCEDURE — 3078F DIAST BP <80 MM HG: CPT | Performed by: INTERNAL MEDICINE

## 2024-06-25 PROCEDURE — 1090F PRES/ABSN URINE INCON ASSESS: CPT | Performed by: INTERNAL MEDICINE

## 2024-06-25 PROCEDURE — 99211 OFF/OP EST MAY X REQ PHY/QHP: CPT | Performed by: INTERNAL MEDICINE

## 2024-06-25 PROCEDURE — G8399 PT W/DXA RESULTS DOCUMENT: HCPCS | Performed by: INTERNAL MEDICINE

## 2024-06-25 PROCEDURE — G8427 DOCREV CUR MEDS BY ELIG CLIN: HCPCS | Performed by: INTERNAL MEDICINE

## 2024-06-25 PROCEDURE — 1036F TOBACCO NON-USER: CPT | Performed by: INTERNAL MEDICINE

## 2024-06-25 PROCEDURE — 3074F SYST BP LT 130 MM HG: CPT | Performed by: INTERNAL MEDICINE

## 2024-06-25 PROCEDURE — G8417 CALC BMI ABV UP PARAM F/U: HCPCS | Performed by: INTERNAL MEDICINE

## 2024-06-25 PROCEDURE — 1124F ACP DISCUSS-NO DSCNMKR DOCD: CPT | Performed by: INTERNAL MEDICINE

## 2024-06-25 PROCEDURE — 99214 OFFICE O/P EST MOD 30 MIN: CPT | Performed by: INTERNAL MEDICINE

## 2024-06-25 NOTE — PROGRESS NOTES
_     Chief Complaint   Patient presents with    Follow-up    Discuss Labs     DIAGNOSIS:       Paraproteinemia/monoclonal gammopathy  MGUS  Chronic renal insufficiency  Breast cancer in remission.  Diagnosed more than 25 years ago.  Multiple comorbidities as listed      CURRENT THERAPY:         Observation monitoring for MGUS    BRIEF CASE HISTORY:      Ms. Estelle Gunderson is a very pleasant 79 y.o. female with history of multiple co morbidities as listed.  Patient is referred for further management of abnormal kappa light chain immunoglobulin.  Patient had recent evaluation by nephrology for chronic renal insufficiency..  Work-up was done which included serum protein electrophoresis and immunofixation.  Patient was noted to have elevated kappa light chain immunoglobulin and slightly limited M protein with the mild monoclonal gammopathy.  Patient denies any fever or night sweats.  No repeated infections.  She had chronic body aches including chronic back pain.  No recent changes.  No chest pain or shortness of breath.  No fever.  No other complaints.  Patient denies smoking or alcohol drinking.    INTERIM HISTORY:   Patient seen for follow-up paraproteinemia.  Doing well clinically.  No chest pain or shortness of breath.  No back pain or bone aches.  No fever.  No weight loss or decreased appetite.    Will have valve surgery in July  No other complaints.          PAST MEDICAL HISTORY: has a past medical history of Anemia, unspecified, Aortic stenosis, Arthritis, AV block, BBB (bundle branch block), Breast cancer (HCC), Bundle branch block, CAD (coronary artery disease), Carcinoma in situ of breast, Esophageal reflux, Essential hypertension, benign, Insulin resistance, MGUS (monoclonal gammopathy of unknown significance), Nephrolithiasis, Osteoporosis, Patient in clinical research study, Pure hypercholesterolemia, Stage 3 chronic

## 2024-06-25 NOTE — TELEPHONE ENCOUNTER
GUILLERMO HERE FOR FOLLOW UP   RV 6 months  Labs before RV  LABS ORDERED: IMMUNOTYPING, ELECTROPHORESIS, IGA, CBC   MD VISIT: 12/3/24 @ 2PM   LABS PRINTED AND GIVEN ON EXIT   AVS PRINTED AND GIVEN ON EXIT

## 2024-06-27 ENCOUNTER — HOSPITAL ENCOUNTER (OUTPATIENT)
Dept: PHYSICAL THERAPY | Facility: CLINIC | Age: 79
Setting detail: THERAPIES SERIES
Discharge: HOME | End: 2024-06-27
Payer: MEDICARE

## 2024-06-27 PROCEDURE — 97110 THERAPEUTIC EXERCISES: CPT

## 2024-06-27 NOTE — FLOWSHEET NOTE
correction    Easily corrected for L on R innominate                Other:    Specific Instructions for next treatment: functional stair training, dynamic balance training, update [facility]    Functional Test: EL    EVAL 50% impairment   6/2/24 30%  impairment      Lumbar 6/27   Flexion ---   Extension WFL- no pain   Rotation  --- ---   Sidebend- pain on both sides    Mid thigh R knee  Pain on R side       Treatment Charges: Mins Units   []  Modalities     [x]  Ther Exercise 45 3   []  Manual Therapy     []  Ther Activities     []  Neuro Re-ed     []  Vasocompression     [] Gait     []  Other     Total Billable time 45 3       Assessment: [x] Progressing toward goals: Re-assessed pt's goals this date with an overall improvement in low back and L sided back pain. Pt also demonstrates improved tolerance to spinal extension, however, L sided back pain is noted with R sidebend. Pt continues to demo RA compensations with TA activation. No antalgic gait is appreciated and self-selected gait speed is a 0.70 m/s. Pt also demonstrates a 20% improvement in the EL. Verbal complaints of continued difficulty and pain with increased activity which then results in difficulty negotiating stairs. Due to complaints with stair negotiation and knee pain, focus of treatment shifted to LE and core strengthening. Attempted LAQ, however, R knee was painful and limited movement is present. Introduced repeated step ups to improve LE strength to improve efficiency into her home. Due to R knee pain, pt was not able to complete step ups onto the 6\" step but was able to complete on the 4\" step. Pain is also reported across the low back with bridges, therefore introduced bent over hip extension. Pt was able to complete with good tolerance with notable glute fatigue. Pt without complaints of increased pain at the end of the session.    [] No change.     [] Other:  [x] Patient would continue to benefit from skilled physical therapy services in order

## 2024-07-01 ENCOUNTER — HOSPITAL ENCOUNTER (OUTPATIENT)
Dept: PHYSICAL THERAPY | Facility: CLINIC | Age: 79
Setting detail: THERAPIES SERIES
Discharge: HOME | End: 2024-07-01
Payer: MEDICARE

## 2024-07-01 PROCEDURE — 97110 THERAPEUTIC EXERCISES: CPT

## 2024-07-01 NOTE — FLOWSHEET NOTE
[] [facility]  Outpatient Rehabilitation &  Therapy  [address]  [phone]  [phone] [x] [facility]  Outpatient Rehabilitation &  Therapy  [address]  P: [phone]  F: [phone] [] [facility]  Outpatient Rehabilitation &  Therapy  [address]  P: [phone]  F: [phone] [] [facility]  Outpatient Rehabilitation &  Therapy  [address]  [phone]  [phone] [] [facility]  Outpatient Rehabilitation &  Therapy  [address]   P: [phone]  F: [phone]  [] [facility]  Outpatient Rehabilitation &  Therapy  [address]  P: [phone]  F: [phone] [] [facility]  Outpatient Rehabilitation &  Therapy  [address]  P: [phone]  F: [phone] [] [facility]  Outpatient Rehabilitation &  Therapy  [address]  P: [phone]  F: [phone] [] [facility]  Outpatient Rehabilitation &  Therapy  [address]  P: [phone]  F: [phone]  [] [facility] Outpatient Rehabilitation &  Therapy  [address]  P: [phone]  F: [phone]     Physical Therapy Daily Treatment Note    Date:  2024  Patient Name:  Estelle Gunderson    :  1945  MRN: [id number]  Physician: Donald Gatica MD                                Insurance: Medicare/AAR ([facility])    Medical Diagnosis: M54.42 (ICD-10-CM) - Acute left-sided low back pain with left-sided sciatica                         Rehab Codes: M54.59,R26.89, M62.81, Z73.6  Onset Date: 24                 Next 's appt.: TBD    Visit# / total visits: ; Progress note for Medicare patient due at visit 12     Cancels/No Shows:     Subjective:    Pain:  [x] Yes  [] No Location: B Hips, LBP, L LE Pain Rating: (0-10 scale)

## 2024-07-02 ENCOUNTER — HOSPITAL ENCOUNTER (OUTPATIENT)
Dept: CARDIAC REHAB | Age: 79
Discharge: HOME | End: 2024-07-02

## 2024-07-02 PROCEDURE — 9900000065 HC CARDIAC REHAB PHASE 3 - 1 VISIT

## 2024-07-04 ENCOUNTER — APPOINTMENT (OUTPATIENT)
Dept: PHYSICAL THERAPY | Facility: CLINIC | Age: 79
End: 2024-07-04
Payer: MEDICARE

## 2024-07-09 ENCOUNTER — HOSPITAL ENCOUNTER (OUTPATIENT)
Dept: CARDIAC REHAB | Age: 79
Discharge: HOME | End: 2024-07-09

## 2024-07-09 PROCEDURE — 9900000065 HC CARDIAC REHAB PHASE 3 - 1 VISIT

## 2024-07-11 NOTE — PROGRESS NOTES
Dermatology Patient Note  [facility] DERMATOLOGY  [address]  [address]  [place] [address]  Dept: [phone]  Dept Fax: [phone]      VISITDATE: 7/17/2024   REFERRING PROVIDER: No ref. provider found      Estelle Gunderson is a 79 y.o. female  who presents today in the office for:    Other (Patient presents today for her finger right index finger lifting and toe nails that are lifting. She first noticed that finger nail about two weeks ago, the toe nails for about two months. )      HISTORY OF PRESENT ILLNESS:  Patient presents in follow up for a nail issue. At  on 5/9/24, she was to continue clobetasol 0.05% ointment daily and then switch to 3x weekly for probable lichen sclerosus of the vulva and perianal region. Biopsy was scheduled and performed on 5/28/24. For recurrent HSV2 infection, she was continued on Valtrex 1 g daily.     She reports that her right index fingernail is lifting, and she is also having lifting of her toe nails. She notes that her fingernail started 2 weeks ago, and her toenails began 2 months ago. Prior to appreciating lifting of the middle fingernail, she had pain at the nailbed.  She admits to a fall earlier this spring which caused her to hit her right hand on the washing machine. She has significant pain associated with the right great toe.     She states that she is having infusions Q3 months for psoriatic arthritis (simponi).  Recently her rheumatology conveyed that she may not have PsA.    MEDICAL PROBLEMS:  Patient Active Problem List    Diagnosis Date Noted    Renal hypertension 06/30/2022     Priority: Medium    Sepsis (HCC) 03/01/2024    Chronic systolic (congestive) heart failure 07/10/2023    Restless legs 06/25/2023    H/O syncope 06/23/2023    S/P TAVR (transcatheter aortic valve replacement) 06/23/2023    Diastolic dysfunction 06/16/2023    CKD (chronic kidney disease) 05/11/2023    Family

## 2024-07-15 ENCOUNTER — HOSPITAL ENCOUNTER (OUTPATIENT)
Dept: PHYSICAL THERAPY | Facility: CLINIC | Age: 79
Setting detail: THERAPIES SERIES
Discharge: HOME | End: 2024-07-15
Payer: MEDICARE

## 2024-07-15 PROCEDURE — 97110 THERAPEUTIC EXERCISES: CPT

## 2024-07-15 NOTE — FLOWSHEET NOTE
Function: Pt will score less than or equal to 40% on the EL to demonstrate an improvement in function. MET 6/27/24  Independent with Home Exercise Programs  Demonstrate Knowledge of fall prevention- MET 5/21/24  LTG: (to be met in 12 treatments)  ? Pain: Pt will report a max of 2-3/10 low back and leg pain when standing to wash the dishes, carry her laundry basket to the washer, and when walking over uneven surfaces.  ? ROM: Pt will be able to perform lumbar AROM in all planes with 0/10 increase in low back pain to assist with overall movement.  ? Strength: Pt will demonstrate ability to complete 10 bridges with 0/10 sacral pain to improve tolerance to siting and performing sit to stands.   Pt will demonstrate 5/5 B hip flexor strength to assist with negotiating stairs and rolling over in bed.   Gait: Pt will be able to complete a 6MWT with 0-1/10 increase in back pain to improve overall walking tolerance at home and in the community.   ? Function: Pt will score less than or equal to 30% on the EL to demonstrate an improvement in function. MET 6/27/24        Patient goals: \"pain-free\"    Pt. Education:  [x] Yes  [] No  [x] Reviewed Prior HEP/Ed  Method of Education: [x] Verbal pain free  [x] Demo new ex's [x] Written  Access Code: [email or web address]  Exercises  - Seated Flexion Stretch with Swiss Ball  - 2-3 x daily - 10 reps  - Seated Hip Adduction Isometrics with Ball  - 1 x daily - 2 sets - 10 reps  - Seated Hip Abduction with Resistance  - 1 x daily - 2 sets - 10 reps  - Seated Gluteal Sets  - 2-3 x daily - 2 sets - 10 reps  7/1/24  - Supine Lower Trunk Rotation  - 1 x daily - 7 x weekly - 1 sets - 10 reps  - Supine Bridge  - 1 x daily - 5 x weekly - 1 sets - 10 reps  - Clamshell with Resistance  - 1 x daily - 5 x weekly - 2 sets - 10 reps  - Supine Figure 4 Piriformis Stretch  - 1 x daily - 7 x weekly - 2 sets - 30 seconds hold  - Supine Piriformis Stretch with Foot on Ground  - 1 x daily - 7 x weekly - 2 sets - 30

## 2024-07-16 ENCOUNTER — HOSPITAL ENCOUNTER (OUTPATIENT)
Dept: CARDIAC REHAB | Age: 79
Discharge: HOME | End: 2024-07-16

## 2024-07-17 ENCOUNTER — OFFICE VISIT (OUTPATIENT)
Dept: DERMATOLOGY | Age: 79
End: 2024-07-17
Payer: MEDICARE

## 2024-07-17 VITALS
TEMPERATURE: 97.1 F | BODY MASS INDEX: 31.02 KG/M2 | HEIGHT: 60 IN | DIASTOLIC BLOOD PRESSURE: 66 MMHG | OXYGEN SATURATION: 93 % | WEIGHT: 158 LBS | SYSTOLIC BLOOD PRESSURE: 110 MMHG | HEART RATE: 91 BPM

## 2024-07-17 DIAGNOSIS — L60.1 ONYCHOLYSIS: ICD-10-CM

## 2024-07-17 DIAGNOSIS — L60.8 ONYCHOMADESIS: Primary | ICD-10-CM

## 2024-07-17 DIAGNOSIS — L90.0 LICHEN SCLEROSUS: ICD-10-CM

## 2024-07-17 DIAGNOSIS — L40.9 PSORIASIS OF NAIL: ICD-10-CM

## 2024-07-17 DIAGNOSIS — A60.00 RECURRENT GENITAL HSV (HERPES SIMPLEX VIRUS) INFECTION: ICD-10-CM

## 2024-07-17 DIAGNOSIS — L60.0 ONYCHOCRYPTOSIS: ICD-10-CM

## 2024-07-17 PROCEDURE — 3078F DIAST BP <80 MM HG: CPT | Performed by: DERMATOLOGY

## 2024-07-17 PROCEDURE — 3074F SYST BP LT 130 MM HG: CPT | Performed by: DERMATOLOGY

## 2024-07-17 PROCEDURE — 1036F TOBACCO NON-USER: CPT | Performed by: DERMATOLOGY

## 2024-07-17 PROCEDURE — 99213 OFFICE O/P EST LOW 20 MIN: CPT | Performed by: DERMATOLOGY

## 2024-07-17 PROCEDURE — G8417 CALC BMI ABV UP PARAM F/U: HCPCS | Performed by: DERMATOLOGY

## 2024-07-17 PROCEDURE — G8427 DOCREV CUR MEDS BY ELIG CLIN: HCPCS | Performed by: DERMATOLOGY

## 2024-07-17 PROCEDURE — 1124F ACP DISCUSS-NO DSCNMKR DOCD: CPT | Performed by: DERMATOLOGY

## 2024-07-17 PROCEDURE — 1090F PRES/ABSN URINE INCON ASSESS: CPT | Performed by: DERMATOLOGY

## 2024-07-17 PROCEDURE — G8399 PT W/DXA RESULTS DOCUMENT: HCPCS | Performed by: DERMATOLOGY

## 2024-07-17 NOTE — PATIENT INSTRUCTIONS
Recommended that she seek treatment with podiatry to treat the ingrown right great toenail  - will contact her with results of pathology from nail clipping     For right middle finger nail:  - start clobetasol solution on affected nail twice daily

## 2024-07-18 ENCOUNTER — HOSPITAL ENCOUNTER (OUTPATIENT)
Dept: PHYSICAL THERAPY | Facility: CLINIC | Age: 79
Setting detail: THERAPIES SERIES
Discharge: HOME | End: 2024-07-18
Payer: MEDICARE

## 2024-07-18 PROCEDURE — 97110 THERAPEUTIC EXERCISES: CPT

## 2024-07-18 RX ORDER — CLOBETASOL PROPIONATE 0.46 MG/ML
SOLUTION TOPICAL
Qty: 50 ML | Refills: 2 | Status: SHIPPED | OUTPATIENT
Start: 2024-07-18

## 2024-07-18 NOTE — PROGRESS NOTES
[] [facility]  Outpatient Rehabilitation &  Therapy  [address]  [phone]  [phone] [x] [facility]  Outpatient Rehabilitation &  Therapy  [address]  P: [phone]  F: [phone] [] [facility]  Outpatient Rehabilitation &  Therapy  [address]  P: [phone]  F: [phone] [] [facility]  Outpatient Rehabilitation &  Therapy  [address]  [phone]  [phone] [] [facility]  Outpatient Rehabilitation &  Therapy  [address]   P: [phone]  F: [phone]  [] [facility]  Outpatient Rehabilitation &  Therapy  [address]  P: [phone]  F: [phone] [] [facility]  Outpatient Rehabilitation &  Therapy  [address]  P: [phone]  F: [phone] [] [facility]  Outpatient Rehabilitation &  Therapy  [address]  P: [phone]  F: [phone] [] [facility]  Outpatient Rehabilitation &  Therapy  [address]  P: [phone]  F: [phone]  [] [facility] Outpatient Rehabilitation &  Therapy  [address]  P: [phone]  F: [phone]     Physical Therapy Daily Treatment Note/Progress Note    Date:  2024  Patient Name:  Estelle Gunderson    :  1945  MRN: [id number]  Physician: Donald Gatica MD                                Insurance: Medicare/AAR ([facility])    Medical Diagnosis: M54.42 (ICD-10-CM) - Acute left-sided low back pain with left-sided sciatica                         Rehab Codes: M54.59,R26.89, M62.81, Z73.6  Onset Date: 24                 Next 's appt.: TBD    Visit# / total visits: ; Progress note for Medicare patient due at visit 12     Cancels/No Shows:     Subjective:    Pain:  [x] Yes  [] No Location: B Hips, LBP, L LE Pain

## 2024-07-19 ENCOUNTER — LAB (OUTPATIENT)
Dept: LAB | Age: 79
End: 2024-07-19

## 2024-07-19 DIAGNOSIS — L60.0 ONYCHOCRYPTOSIS: Primary | ICD-10-CM

## 2024-07-22 ENCOUNTER — HOSPITAL ENCOUNTER (OUTPATIENT)
Dept: PHYSICAL THERAPY | Facility: CLINIC | Age: 79
Setting detail: THERAPIES SERIES
Discharge: HOME | End: 2024-07-22
Payer: MEDICARE

## 2024-07-22 NOTE — FLOWSHEET NOTE
[] [facility]  Outpatient Rehabilitation &  Therapy  [address]  [phone]  [phone] [x] [facility]  Outpatient Rehabilitation &  Therapy  [address]  P: [phone]  F: [phone] [] [facility]  Outpatient Rehabilitation &  Therapy  [address]  P: [phone]  F: [phone] [] [facility]  Outpatient Rehabilitation &  Therapy  [address]  [phone]  [phone] [] [facility]  Outpatient Rehabilitation &  Therapy  [address]   P: [phone]  F: [phone]  [] [facility]  Outpatient Rehabilitation &  Therapy  [address]  P: [phone]  F: [phone] [] [facility]  Outpatient Rehabilitation &  Therapy  [address]  P: [phone]  F: [phone] [] [facility]  Outpatient Rehabilitation &  Therapy  [address]  P: [phone]  F: [phone] [] [facility]  Outpatient Rehabilitation &  Therapy  [address]  P: [phone]  F: [phone]  [] [facility] Outpatient Rehabilitation &  Therapy  [address]  P: [phone]  F: [phone]     Therapy Cancel/No Show note    Date: 2024  Patient: Estelle Gunderson  : 1945  MRN: [id number]    Cancels/No Shows to date:     For today's appointment patient:    [x]  Cancelled    [] Rescheduled appointment    [] No-show     Reason given by patient:    []  Patient ill    []  Conflicting appointment    [] No transportation      [] Conflict with work    [x] No reason given    [] Weather related    [] COVID-19    [x] Other:      Comments:  Pt. Called into  to CX appointment without providing a reason.  confirmed next appointment date and time.      [x] Next appointment was confirmed    Electronically signed

## 2024-07-23 ENCOUNTER — HOSPITAL ENCOUNTER (OUTPATIENT)
Dept: CARDIAC REHAB | Age: 79
Discharge: HOME | End: 2024-07-23

## 2024-07-25 ENCOUNTER — HOSPITAL ENCOUNTER (OUTPATIENT)
Dept: PHYSICAL THERAPY | Facility: CLINIC | Age: 79
Setting detail: THERAPIES SERIES
Discharge: HOME | End: 2024-07-25
Payer: MEDICARE

## 2024-07-25 PROCEDURE — 97112 NEUROMUSCULAR REEDUCATION: CPT

## 2024-07-25 PROCEDURE — 97110 THERAPEUTIC EXERCISES: CPT

## 2024-07-25 NOTE — PROGRESS NOTES
[] [facility]  Outpatient Rehabilitation &  Therapy  [address]  [phone]  [phone] [x] [facility]  Outpatient Rehabilitation &  Therapy  [address]  P: [phone]  F: [phone] [] [facility]  Outpatient Rehabilitation &  Therapy  [address]  P: [phone]  F: [phone] [] [facility]  Outpatient Rehabilitation &  Therapy  [address]  [phone]  [phone] [] [facility]  Outpatient Rehabilitation &  Therapy  [address]   P: [phone]  F: [phone]  [] [facility]  Outpatient Rehabilitation &  Therapy  [address]  P: [phone]  F: [phone] [] [facility]  Outpatient Rehabilitation &  Therapy  [address]  P: [phone]  F: [phone] [] [facility]  Outpatient Rehabilitation &  Therapy  [address]  P: [phone]  F: [phone] [] [facility]  Outpatient Rehabilitation &  Therapy  [address]  P: [phone]  F: [phone]  [] [facility] Outpatient Rehabilitation &  Therapy  [address]  P: [phone]  F: [phone]     Physical Therapy Daily Treatment Note/Progress Note    Date:  2024  Patient Name:  Estelle Gunderson    :  1945  MRN: [id number]  Physician: Donald Gatica MD                                Insurance: Medicare/AAR ([facility])    Medical Diagnosis: M54.42 (ICD-10-CM) - Acute left-sided low back pain with left-sided sciatica                         Rehab Codes: M54.59,R26.89, M62.81, Z73.6  Onset Date: 24                 Next 's appt.: TBD    Visit# / total visits: 10/12; Progress note for Medicare patient due at visit 12     Cancels/No Shows:     Subjective:    Pain:  [x] Yes  [] No Location: B Hips, LBP, L LE Pain

## 2024-07-29 ENCOUNTER — HOSPITAL ENCOUNTER (OUTPATIENT)
Dept: PHYSICAL THERAPY | Facility: CLINIC | Age: 79
Setting detail: THERAPIES SERIES
Discharge: HOME | End: 2024-07-29
Payer: MEDICARE

## 2024-07-29 PROCEDURE — 97110 THERAPEUTIC EXERCISES: CPT

## 2024-07-29 PROCEDURE — 97140 MANUAL THERAPY 1/> REGIONS: CPT

## 2024-07-29 NOTE — FLOWSHEET NOTE
[x] [facility]  Outpatient Rehabilitation &  Therapy  [address]  P: [phone]  F: [phone]     Physical Therapy Daily Treatment Note/Progress Note    Date:  2024  Patient Name:  Estelle Gunderson    :  1945  MRN: [id number]  Physician: Donald Gatica MD                                Insurance: Medicare/[facility] ([facility])    Medical Diagnosis: M54.42 (ICD-10-CM) - Acute left-sided low back pain with left-sided sciatica                         Rehab Codes: M54.59,R26.89, M62.81, Z73.6  Onset Date: 24                 Next 's appt.: TBD    Visit# / total visits: ; Progress note for Medicare patient due at visit 12     Cancels/No Shows:     Subjective:    Pain:  [x] Yes  [] No Location:  Hips, LBP, L LE Pain Rating: (0-10 scale) 0/10 R knee; 8/10 L LBP & L Hip  Pain altered Tx:  [x] No  [] Yes  Action: no change in pain at end of session    Comments: Pt reported it is hard to get out of bed in the mornings d/t L hip/back pain. Pt reports trunk rotation to the L while performing household chores increases her hip and LBP. Pt reports still having difficulty doing laundry, household chores, sweeping, and LB dressing. Expresses desire to continue PT services and will be reaching out to her PCP for a follow-up appointment.    Objective:  Modalities: Pt denied ice/vaso today  Precautions: pacemaker, cardiac valve replacement, asthma, hx of breast cancer   Exercises:  Exercise Reps/ Time Weight/ Level Comments   Warm Up      SciFit/Nustep 5' L3          Standing Ex's      SB Gastroc 2x30\"     Hamstring 2x30\" ea     Heel Raises x10     Step ups 2x5 6\" L  4\" R KATHY UE use; added weight    Leg Curls X10 ea 2# Added weight    3-way Hip X10 ea 2# ea Added weigth 6/10   Mini Squats x10 // New   x10  New    HS curls  x10ea  New    Bentover hip extension x10 ea lime Added   Raised mat table- pt lays over to support trunk

## 2024-07-29 NOTE — PROGRESS NOTES
Biofeedback, additional 15 minutes   95245 [] Dry Needling, 3 or more muscles  [id number]       Patient Status:     [x] Continue per initial plan of care.    [x] Additional visits necessary.    [] Other:     Requested Frequency/Duration: 2 times per week for 8 additional treatments. (20 total visits)        Electronically signed by Nadia Leger PT on 7/29/2024 at 1:19 PM      If you have any questions or concerns, please don't hesitate to call.  Thank you for your referral.    Physician Signature:________________________________Date:__________________  By signing above or cosigning this note, I have reviewed this plan of care and certify a need for medically necessary rehabilitation services.     *PLEASE SIGN ABOVE AND FAX BACK ALL PAGES*

## 2024-07-30 ENCOUNTER — FOLLOWUP TELEPHONE ENCOUNTER (OUTPATIENT)
Age: 79
End: 2024-07-30

## 2024-07-30 ENCOUNTER — HOSPITAL ENCOUNTER (OUTPATIENT)
Dept: CARDIAC REHAB | Age: 79
Discharge: HOME | End: 2024-07-30

## 2024-07-30 NOTE — TELEPHONE ENCOUNTER
RN called pt for 1 year S/P TAVR follow-up [facility] (results in Media). NYHA Classification is I.  Pt reminded to call PCP for antibiotic order prior to any dental work.  Pt verbalized understanding.

## 2024-08-05 ENCOUNTER — HOSPITAL ENCOUNTER (OUTPATIENT)
Dept: PHYSICAL THERAPY | Facility: CLINIC | Age: 79
Setting detail: THERAPIES SERIES
Discharge: HOME | End: 2024-08-05
Payer: MEDICARE

## 2024-08-05 PROCEDURE — 97110 THERAPEUTIC EXERCISES: CPT

## 2024-08-05 NOTE — FLOWSHEET NOTE
[x] [facility]  Outpatient Rehabilitation &  Therapy  [address]  P: [phone]  F: [phone]     Physical Therapy Daily Treatment Note/Progress Note    Date:  2024  Patient Name:  Estelle Gunderson    :  1945  MRN: [id number]  Physician: Donald Gatica MD                                Insurance: Medicare/[facility] ([facility])    Medical Diagnosis: M54.42 (ICD-10-CM) - Acute left-sided low back pain with left-sided sciatica                         Rehab Codes: M54.59,R26.89, M62.81, Z73.6  Onset Date: 24                 Next 's appt.: TBD    Visit# / total visits: ; Progress note for Medicare patient due at visit 20    Cancels/No Shows:     Subjective:    Pain:  [x] Yes  [] No Location:  Hips, LBP, L LE Pain Rating: (0-10 scale)  0/10 L LBP & L Hip  Pain altered Tx:  [x] No  [] Yes  Action: no change in pain at end of session    Comments: Pt notes she felt good over the weekend.  Pt states when she woke up she was a little sore on the side but once she got up and moving around it went away.      Objective:  Modalities: Pt denied ice/vaso today  Precautions: pacemaker, cardiac valve replacement, asthma, hx of breast cancer   Exercises:  Exercise Reps/ Time Weight/ Level Comments   Warm Up      SciFit/Nustep 5' L3          Standing Ex's      Side steps 1x25' Lime  Band above knees  Added 8/5   Standing hip abd 10x ea  Min UE support   Standing hip ext 10x ea  Min UE support   marches 2x10 ea  Min UE use for balance   SB Gastroc 2x30\"     Step ups + knee drive 15x ea 4\" Unable to complete 6\"secondary to R knee pain   Bentover hip extension x10 ea lime Added   Raised mat table- pt lays over to support trunk                     Balance Ex's      NBOS-EO w/head movements  x30\"ea Foam Added foam 6/10; head movements nods, rot    NBOS on foam with ball movements X30\"ea 1.1# ball New  press, raises, rot   NBOS-EC 2x30\" Foam Added foam 6/10   WBOS -

## 2024-08-06 RX ORDER — ROPINIROLE 0.5 MG/1
TABLET, FILM COATED ORAL
Qty: 90 TABLET | Refills: 3 | OUTPATIENT
Start: 2024-08-06

## 2024-08-09 ENCOUNTER — HOSPITAL ENCOUNTER (OUTPATIENT)
Dept: PHYSICAL THERAPY | Facility: CLINIC | Age: 79
Setting detail: THERAPIES SERIES
Discharge: HOME | End: 2024-08-09
Payer: MEDICARE

## 2024-08-09 PROCEDURE — 97140 MANUAL THERAPY 1/> REGIONS: CPT

## 2024-08-09 PROCEDURE — 97112 NEUROMUSCULAR REEDUCATION: CPT

## 2024-08-09 NOTE — FLOWSHEET NOTE
[x] [facility]  Outpatient Rehabilitation & Therapy  [address]  P: [phone]  F: [phone]     Physical Therapy Daily Treatment Note/Progress Note    Date:  2024  Patient Name:  Estelle Gunderson    :  1945  MRN: [id number]  Physician: Donald Gatica MD                                Insurance: Medicare/[facility] ([facility])    Medical Diagnosis: M54.42 (ICD-10-CM) - Acute left-sided low back pain with left-sided sciatica                         Rehab Codes: M54.59,R26.89, M62.81, Z73.6  Onset Date: 24                 Next 's appt.: TBD    Visit# / total visits: ; Progress note for Medicare patient due at visit 20    Cancels/No Shows:     Subjective:    Pain:  [x] Yes  [] No Location:  Hips, LBP, L LE Pain Rating: (0-10 scale)  not given/10 L LBP & L Hip  Pain altered Tx:  [x] No  [] Yes  Action: no change in pain at end of session    Comments: Reports she slept bad overnight and kept waking up throughout the night. States she feels stiff and her R knee is bothering her more than anything. She has no complaints from previous session, however says she has difficulty completing HEP because she is unable to get onto her floor and her bed is too soft.     Objective:  Modalities:   Precautions: pacemaker, cardiac valve replacement, asthma, hx of breast cancer   Exercises:  Exercise Reps/ Time Weight/ Level Comments   Warm Up      SciFit/Nustep 5' L3          Standing Ex's      Side steps 1x25' Lime  Band above knees  Added 8/5   Standing hip abd 10x ea  Min UE support   Standing hip ext 10x ea  Min UE support   marches 2x10 ea  Min UE use for balance   SB Gastroc 2x30\"     Step ups + knee drive 15x ea 4\" Unable to complete 6\"secondary to R knee pain   Bentover hip extension x10 ea lime Added   Raised mat table- pt lays over to support trunk                     Balance Ex's      NBOS-EO w/head movements  x30\"ea Foam Added foam /10; head movements

## 2024-08-11 DIAGNOSIS — A60.00 HERPESVIRAL INFECTION OF UROGENITAL SYSTEM, UNSPECIFIED: ICD-10-CM

## 2024-08-12 ENCOUNTER — HOSPITAL ENCOUNTER (OUTPATIENT)
Dept: PHYSICAL THERAPY | Facility: CLINIC | Age: 79
Setting detail: THERAPIES SERIES
Discharge: HOME | End: 2024-08-12
Payer: MEDICARE

## 2024-08-12 PROCEDURE — 97112 NEUROMUSCULAR REEDUCATION: CPT

## 2024-08-12 PROCEDURE — 97110 THERAPEUTIC EXERCISES: CPT

## 2024-08-12 RX ORDER — VALACYCLOVIR HYDROCHLORIDE 1 G/1
1000 TABLET, FILM COATED ORAL DAILY
Qty: 30 TABLET | Refills: 5 | Status: SHIPPED | OUTPATIENT
Start: 2024-08-12

## 2024-08-12 NOTE — FLOWSHEET NOTE
[x] [facility]  Outpatient Rehabilitation & Therapy  [address]  P: [phone]  F: [phone]     Physical Therapy Daily Treatment Note/Progress Note    Date:  2024  Patient Name:  Estelle Gunderson    :  1945  MRN: [id number]  Physician: Donald Gatica MD                                Insurance: Medicare/[facility] ([facility])    Medical Diagnosis: M54.42 (ICD-10-CM) - Acute left-sided low back pain with left-sided sciatica                         Rehab Codes: M54.59,R26.89, M62.81, Z73.6  Onset Date: 24                 Next 's appt.: TBD    Visit# / total visits: ; Progress note for Medicare patient due at visit 20    Cancels/No Shows:     Subjective:    Pain:  [] Yes  [x] No Location:  Hips, LBP, L LE Pain Rating: (0-10 scale)  0/10 L LBP & L Hip  Pain altered Tx:  [x] No  [] Yes  Action:     Comments: Pt reports chest tightness and nausea after eating a lot of carbs yesterday. Says she overall feels \"bleh\" today. Reports no pain in hip or low back. Pt was overall feeling better at conclusion of session. Pt wishes to continue working on her low back strength to prevent future falls.    Objective:  Modalities:   Precautions: pacemaker, cardiac valve replacement, asthma, hx of breast cancer   Exercises:  Exercise Reps/ Time Weight/ Level Comments   Warm Up      SciFit/Nustep 5' L3          Standing Ex's      Side steps 1x25' Lime  Band above knees  Added 8/5   Standing hip abd 10x ea  Min UE support   Standing hip ext 10x ea  Min UE support   Marches 2x10     SB Gastroc 3x30\"     Step ups + knee drive 15x ea 4\" Unable to complete 6\"secondary to R knee pain   Bentover hip extension x10 ea lime Added   Raised mat table- pt lays over to support trunk   Fwd step-ups X15 ea 4\" Added 8/12 - BUE support, reports mild discomfort when leading with R knee   Lat step-up X10 ea  Added 8/12 - Discomfort when leading with R knee   Trunk rotation w/ MB

## 2024-08-15 ENCOUNTER — HOSPITAL ENCOUNTER (OUTPATIENT)
Dept: PHYSICAL THERAPY | Facility: CLINIC | Age: 79
Setting detail: THERAPIES SERIES
Discharge: HOME | End: 2024-08-15
Payer: MEDICARE

## 2024-08-15 PROCEDURE — 97110 THERAPEUTIC EXERCISES: CPT

## 2024-08-15 NOTE — FLOWSHEET NOTE
Foam // bars Progressed onto foam 8/12; pt able to maintain balance w/o UE support 90% of time               Supine/Hooklying Ex's      IR piriformis 2x30\" ea     Figure-4 piriformis stretch 2x30\" L     Bridges  10x lime    TrA + Marches 2x10  2#    DKTC with SB x10  Notes some lateral hip pain   Lumbar rotation stretch 2x1'  pt with knees flexed, let them fall L/R while kept together. Playball between knees   Pelvic tilts 2x30\"  Added 8/9 - tactile cueing to show pt how to perform tilt         Sidelying       Clams X10 ea Lime  Only could tolerate 10 on the R hip   Reverse clams 2x10 A Notes some lateral hip pain   Hip abd 10x A  Added 8/5  Faitgue in the glute post reps  Did require cuing to prevent excessive hip flexion         Seated Ex's      LAQ X10 L  Attempted, unable to tolerate due to R knee pain   Hip ADD + IR (simultaneous) 10x ea playball Reported mild pain in L hip.   Hamstring curls 2x10 ea lime Added 6/27- R x17 reps on 7/1   SB lumbar stretch 10x10\" Red SB    SB hip hinge x10 Red SB    Upper spine ext stretch x10  Added 8/15 - pt instructed to bend back over chair, retract UE to assist.    Hip abd 2x15 lime Added 8/15         Manual      Posterior innominate torsion correction  X  Easily corrected for L on R innominate:  Pt. In supine with resisted hip flexion x3   Hypervolt 10'  pt in R side-lying, used over L piriformis/glute/ITB/low back.  Heated attachment    L hip distraction x1  Added 8/8 - to correct L upslip; mild improvement    Other:    Specific Instructions for next treatment: STS from low surface, floor <> standing transfers, functional stair training, dynamic balance training, hypervolt, log rolling technique    Functional Test: EL    EVAL 50% impairment   6/2/24 30%  impairment    7/29/24 56% impairment     Lumbar 7/18 Lumbar 7/29   Flexion WFL-no pain WFL- muscle pain \"all over\" but not reproduction of pain   Extension WFL- no pain WFL- same as flexion   Rotation  L  WFL-no pain

## 2024-08-19 ENCOUNTER — HOSPITAL ENCOUNTER (OUTPATIENT)
Dept: PHYSICAL THERAPY | Facility: CLINIC | Age: 79
Setting detail: THERAPIES SERIES
Discharge: HOME | End: 2024-08-19
Payer: MEDICARE

## 2024-08-19 NOTE — FLOWSHEET NOTE
[] [facility]  Outpatient Rehabilitation &  Therapy  [address]  [phone]  [phone] [x] [facility]  Outpatient Rehabilitation &  Therapy  [address]  P: [phone]  F: [phone] [] [facility]  Outpatient Rehabilitation &  Therapy  [address]  P: [phone]  F: [phone] [] [facility]  Outpatient Rehabilitation &  Therapy  [address]  [phone]  [phone] [] [facility]  Outpatient Rehabilitation &  Therapy  [address]   P: [phone]  F: [phone]  [] [facility]  Outpatient Rehabilitation &  Therapy  [address]  P: [phone]  F: [phone] [] [facility]  Outpatient Rehabilitation &  Therapy  [address]  P: [phone]  F: [phone] [] [facility]  Outpatient Rehabilitation &  Therapy  [address]  P: [phone]  F: [phone] [] [facility]  Outpatient Rehabilitation &  Therapy  [address]  P: [phone]  F: [phone]  [] [facility] Outpatient Rehabilitation &  Therapy  [address]  P: [phone]  F: [phone]     Therapy Cancel/No Show note    Date: 2024  Patient: Estelle Gunderson  : 1945  MRN: [id number]    Cancels/No Shows to date:     For today's appointment patient:    [x]  Cancelled    [] Rescheduled appointment    [] No-show     Reason given by patient:    []  Patient ill    []  Conflicting appointment    [] No transportation      [] Conflict with work    [] No reason given    [] Weather related    [] COVID-19    [x] Other:   \"Something came up\"   Comments:        [x] Next appointment was confirmed    Electronically signed by: Nadia Leger PT

## 2024-08-22 ENCOUNTER — HOSPITAL ENCOUNTER (OUTPATIENT)
Dept: PHYSICAL THERAPY | Facility: CLINIC | Age: 79
Setting detail: THERAPIES SERIES
Discharge: HOME | End: 2024-08-22
Payer: MEDICARE

## 2024-08-22 NOTE — FLOWSHEET NOTE
[] [facility]  Outpatient Rehabilitation &  Therapy  [address]  [phone]  [phone] [x] [facility]  Outpatient Rehabilitation &  Therapy  [address]  P: [phone]  F: [phone] [] [facility]  Outpatient Rehabilitation &  Therapy  [address]  P: [phone]  F: [phone] [] [facility]  Outpatient Rehabilitation &  Therapy  [address]  [phone]  [phone] [] [facility]  Outpatient Rehabilitation &  Therapy  [address]   P: [phone]  F: [phone]  [] [facility]  Outpatient Rehabilitation &  Therapy  [address]  P: [phone]  F: [phone] [] [facility]  Outpatient Rehabilitation &  Therapy  [address]  P: [phone]  F: [phone] [] [facility]  Outpatient Rehabilitation &  Therapy  [address]  P: [phone]  F: [phone] [] [facility]  Outpatient Rehabilitation &  Therapy  [address]  P: [phone]  F: [phone]  [] [facility] Outpatient Rehabilitation &  Therapy  [address]  P: [phone]  F: [phone]     Therapy Cancel/No Show note    Date: 2024  Patient: Estelle Gunderson  : 1945  MRN: [id number]    Cancels/No Shows to date: 3/1    For today's appointment patient:    [x]  Cancelled    [] Rescheduled appointment    [] No-show     Reason given by patient:    []  Patient ill    []  Conflicting appointment    [] No transportation      [] Conflict with work    [x] No reason given    [] Weather related    [] COVID-19    [] Other:      Comments:  Will call back to reschedule      [] Next appointment was confirmed    Electronically signed by: Nadia Leger PT

## 2024-08-28 ENCOUNTER — OFFICE VISIT (OUTPATIENT)
Dept: DERMATOLOGY | Age: 79
End: 2024-08-28
Payer: MEDICARE

## 2024-08-28 VITALS
DIASTOLIC BLOOD PRESSURE: 61 MMHG | RESPIRATION RATE: 16 BRPM | HEIGHT: 62 IN | HEART RATE: 78 BPM | SYSTOLIC BLOOD PRESSURE: 95 MMHG | OXYGEN SATURATION: 95 % | BODY MASS INDEX: 29.26 KG/M2 | WEIGHT: 159 LBS

## 2024-08-28 DIAGNOSIS — L30.8 OTHER SPECIFIED DERMATITIS: ICD-10-CM

## 2024-08-28 DIAGNOSIS — L90.0 LICHEN SCLEROSUS: ICD-10-CM

## 2024-08-28 DIAGNOSIS — A60.00 RECURRENT GENITAL HSV (HERPES SIMPLEX VIRUS) INFECTION: Primary | ICD-10-CM

## 2024-08-28 DIAGNOSIS — L60.8 ONYCHOMADESIS: ICD-10-CM

## 2024-08-28 PROCEDURE — 3074F SYST BP LT 130 MM HG: CPT | Performed by: DERMATOLOGY

## 2024-08-28 PROCEDURE — 1036F TOBACCO NON-USER: CPT | Performed by: DERMATOLOGY

## 2024-08-28 PROCEDURE — 99214 OFFICE O/P EST MOD 30 MIN: CPT | Performed by: DERMATOLOGY

## 2024-08-28 PROCEDURE — 1124F ACP DISCUSS-NO DSCNMKR DOCD: CPT | Performed by: DERMATOLOGY

## 2024-08-28 PROCEDURE — G8399 PT W/DXA RESULTS DOCUMENT: HCPCS | Performed by: DERMATOLOGY

## 2024-08-28 PROCEDURE — 1090F PRES/ABSN URINE INCON ASSESS: CPT | Performed by: DERMATOLOGY

## 2024-08-28 PROCEDURE — 3078F DIAST BP <80 MM HG: CPT | Performed by: DERMATOLOGY

## 2024-08-28 PROCEDURE — G8417 CALC BMI ABV UP PARAM F/U: HCPCS | Performed by: DERMATOLOGY

## 2024-08-28 PROCEDURE — G8427 DOCREV CUR MEDS BY ELIG CLIN: HCPCS | Performed by: DERMATOLOGY

## 2024-08-28 NOTE — PATIENT INSTRUCTIONS
- recommend use of triple paste (from drug store) to groin and buttocks to avoid further irritation. When applying, use after the clobetasol and apply in a thick paste such as you are \"frosting a cake\"   - continue clobetasol 0.05% ointment, start use twice daily for 3 weeks, then continue use 3x weekly thereafter   - if snagging of right nail continues to bother you, you may be able to cover with a band-aid   - continue clobetasol solution to affected nail as needed

## 2024-08-28 NOTE — PROGRESS NOTES
Dermatology Patient Note  [facility] DERMATOLOGY  [address]  [address]  [place] [address]  Dept: [phone]  Dept Fax: [phone]      VISITDATE: 8/28/2024   REFERRING PROVIDER: No ref. provider found      Estelle Gunderson is a 79 y.o. female  who presents today in the office for:    Follow-up (3 month follow up S/P Shave BX Anal area/Medication question with how often///)      HISTORY OF PRESENT ILLNESS:  Patient presents for 1 month nail complications and LS follow up. At last visit she was started on clobetasol solution and a nail clipping was sent to evaluate for fungus. She was recommended to seek treatment with podiatry as well. Patient is maintained on clobetasol 0.05% ointment for the LS and Valtrex 1g for recurrent HSV2 infection.    Today, the patient reports that she is stable. She states that she uses the clobetasol on the groin when it is significantly pruritic, roughly 3 times weekly. It is currently inflamed and raw. She believes that this is inflamed by wearing incontinence pads. She reports that the folds of the labia are most affected by the incontinence.     She notes requiring help to stand up on the bed as she has recently been struggling with her stability. She followed with physical therapy for this but continue to struggle with balance.     Virginia notes that the clobetasol solution was only used for about 3 weeks for her nail. It continues to snag on items and be mildly irritating. She states that she will be following with podiatry in a few weeks.     MEDICAL PROBLEMS:  Patient Active Problem List    Diagnosis Date Noted    Renal hypertension 06/30/2022     Priority: Medium    Sepsis (HCC) 03/01/2024    Chronic systolic (congestive) heart failure 07/10/2023    Restless legs 06/25/2023    H/O syncope 06/23/2023    S/P TAVR (transcatheter aortic valve replacement) 06/23/2023    Diastolic dysfunction 06/16/2023  continue clobetasol solution to affected nail as needed       I, Ashley Moran, personally scribed the services dictated to me by Dr. Rodriguez in this documentation.     I, Dr. Rodriguez, personally performed the services described in this documentation, as scribed by Ashley Moran in my presence, and it is both accurate and complete.

## 2024-09-03 ENCOUNTER — HOSPITAL ENCOUNTER (OUTPATIENT)
Dept: CARDIAC REHAB | Age: 79
Discharge: HOME | End: 2024-09-03

## 2024-09-03 DIAGNOSIS — L90.0 LICHEN SCLEROSUS: ICD-10-CM

## 2024-09-03 RX ORDER — CLOBETASOL PROPIONATE 0.5 MG/G
OINTMENT TOPICAL
Qty: 60 G | Refills: 2 | Status: SHIPPED | OUTPATIENT
Start: 2024-09-03

## 2024-09-09 ENCOUNTER — OFFICE VISIT (OUTPATIENT)
Dept: PODIATRY | Age: 79
End: 2024-09-09
Payer: MEDICARE

## 2024-09-09 VITALS — WEIGHT: 159 LBS | BODY MASS INDEX: 29.26 KG/M2 | HEIGHT: 62 IN

## 2024-09-09 DIAGNOSIS — I73.9 PVD (PERIPHERAL VASCULAR DISEASE) (HCC): ICD-10-CM

## 2024-09-09 DIAGNOSIS — M20.42 HAMMER TOES OF BOTH FEET: ICD-10-CM

## 2024-09-09 DIAGNOSIS — M20.41 HAMMER TOES OF BOTH FEET: ICD-10-CM

## 2024-09-09 DIAGNOSIS — M79.605 PAIN OF LEFT LOWER EXTREMITY: ICD-10-CM

## 2024-09-09 DIAGNOSIS — L60.0 INGROWN NAIL OF GREAT TOE OF RIGHT FOOT: Primary | ICD-10-CM

## 2024-09-09 DIAGNOSIS — B35.1 DERMATOPHYTOSIS OF NAIL: ICD-10-CM

## 2024-09-09 DIAGNOSIS — M21.619 BUNION: ICD-10-CM

## 2024-09-09 PROCEDURE — 11721 DEBRIDE NAIL 6 OR MORE: CPT | Performed by: PODIATRIST

## 2024-09-09 PROCEDURE — 99203 OFFICE O/P NEW LOW 30 MIN: CPT | Performed by: PODIATRIST

## 2024-09-09 PROCEDURE — G8417 CALC BMI ABV UP PARAM F/U: HCPCS | Performed by: PODIATRIST

## 2024-09-09 PROCEDURE — 1124F ACP DISCUSS-NO DSCNMKR DOCD: CPT | Performed by: PODIATRIST

## 2024-09-09 PROCEDURE — 1036F TOBACCO NON-USER: CPT | Performed by: PODIATRIST

## 2024-09-09 PROCEDURE — G8399 PT W/DXA RESULTS DOCUMENT: HCPCS | Performed by: PODIATRIST

## 2024-09-09 PROCEDURE — 1090F PRES/ABSN URINE INCON ASSESS: CPT | Performed by: PODIATRIST

## 2024-09-09 PROCEDURE — G8427 DOCREV CUR MEDS BY ELIG CLIN: HCPCS | Performed by: PODIATRIST

## 2024-10-02 ENCOUNTER — PROCEDURE VISIT (OUTPATIENT)
Dept: PODIATRY | Age: 79
End: 2024-10-02
Payer: MEDICARE

## 2024-10-02 VITALS — BODY MASS INDEX: 29.26 KG/M2 | HEIGHT: 62 IN | WEIGHT: 159 LBS

## 2024-10-02 DIAGNOSIS — L60.0 INGROWN NAIL OF GREAT TOE OF RIGHT FOOT: Primary | ICD-10-CM

## 2024-10-02 DIAGNOSIS — M79.605 PAIN OF LEFT LOWER EXTREMITY: ICD-10-CM

## 2024-10-02 PROCEDURE — G8399 PT W/DXA RESULTS DOCUMENT: HCPCS | Performed by: PODIATRIST

## 2024-10-02 PROCEDURE — 1090F PRES/ABSN URINE INCON ASSESS: CPT | Performed by: PODIATRIST

## 2024-10-02 PROCEDURE — G8427 DOCREV CUR MEDS BY ELIG CLIN: HCPCS | Performed by: PODIATRIST

## 2024-10-02 PROCEDURE — G8484 FLU IMMUNIZE NO ADMIN: HCPCS | Performed by: PODIATRIST

## 2024-10-02 PROCEDURE — 1036F TOBACCO NON-USER: CPT | Performed by: PODIATRIST

## 2024-10-02 PROCEDURE — G8417 CALC BMI ABV UP PARAM F/U: HCPCS | Performed by: PODIATRIST

## 2024-10-02 PROCEDURE — 1124F ACP DISCUSS-NO DSCNMKR DOCD: CPT | Performed by: PODIATRIST

## 2024-10-02 PROCEDURE — 99214 OFFICE O/P EST MOD 30 MIN: CPT | Performed by: PODIATRIST

## 2024-10-02 PROCEDURE — 11750 EXCISION NAIL&NAIL MATRIX: CPT | Performed by: PODIATRIST

## 2024-10-02 NOTE — PROGRESS NOTES
1 TABLET BY MOUTH NIGHTLY 7/10/24 8/9/24  Donald Gatica MD   acetaminophen (TYLENOL) 500 MG tablet Take 2 tablets by mouth every 8 hours as needed for Pain 4/27/24 7/26/24  Jose Fajardo DO   ibuprofen (IBU) 400 MG tablet Take 1 tablet by mouth every 6 hours as needed for Pain  Patient not taking: Reported on 7/26/2024 4/27/24 6/25/24  Jose Fajardo DO       Review of Systems    Review of Systems:  History obtained from chart review and the patient  General ROS: negative for - chills, fatigue, fever, night sweats or weight gain  Constitutional: Negative for chills, diaphoresis, fatigue, fever and unexpected weight change.  Musculoskeletal: Positive for arthralgias, gait problem and joint swelling.  Neurological ROS: negative for - behavioral changes, confusion, headaches or seizures. Negative for weakness and numbness.   Dermatological ROS: negative for - mole changes, rash  Cardiovascular: Negative for leg swelling.   Gastrointestinal: Negative for constipation, diarrhea, nausea and vomiting.         Lower Extremity Physical Examination:     Vitals: There were no vitals filed for this visit.  General: AAO x 3 in NAD.     Dermatologic Exam:  Right medial hallux nail is incurvated with edema and erythema.    Musculoskeletal:     1st MPJ ROM decreased, Bilateral.  Muscle strength 5/5, Bilateral.  Pain present upon palpation of right hallux.  Medial longitudinal arch, Bilateral WNL.  Ankle ROM WNL,Bilateral.    Dorsally contracted digits absent digits 1-5 Bilateral.     Vascular: DP and PT pulses palpable 2/4, Bilateral.  CFT <3 seconds, Bilateral.  Hair growth present to the level of the digits, Bilateral.  Edema absent, Bilateral.  Varicosities absent, Bilateral. Erythema absent, Bilateral    Neurological: Sensation intact to light touch to level of digits, Bilateral.  Protective sensation intact 10/10 sites via 5.07/10g [place] Monofilament, Bilateral.  negative Tinel's, Bilateral.

## 2024-10-16 ENCOUNTER — OFFICE VISIT (OUTPATIENT)
Dept: PODIATRY | Age: 79
End: 2024-10-16

## 2024-10-16 VITALS — BODY MASS INDEX: 28.89 KG/M2 | WEIGHT: 157 LBS | HEIGHT: 62 IN

## 2024-10-16 DIAGNOSIS — Z98.890 POSTOPERATIVE STATE: Primary | ICD-10-CM

## 2024-10-16 PROCEDURE — 99024 POSTOP FOLLOW-UP VISIT: CPT | Performed by: PODIATRIST

## 2024-10-23 NOTE — PROGRESS NOTES
[facility] PODIATRY [address]  [address]  [facility] [address]  Dept: [phone]  Dept Fax: [phone]    POST-OP PROGRESS NOTE  Date of patient's visit: 10/23/2024  Patient's Name:  Estelle Gunderson YOB: 1945            Patient Care Team:  Donald Gatica MD as PCP - General (Family Medicine)  Donald [facility]  Jh Gotti MD as Consulting Physician (Cardiology)  Zachery Camp MD as Consulting Physician (Pulmonology)  Saeed Hudson MD (Obstetrics & Gynecology)  Eboni Tran DPM as Consulting Physician (Podiatry)  Kal Muñoz MD as Consulting Physician (Urology)  Jevon Braga DO (Rheumatology)  Myranda Rodriguez MD as Consulting Physician (Dermatology)  Antonio Martinez MD as Surgeon (Ophthalmology)  Ba Reed DPM as Consulting Physician (Podiatry)  Janiya Francois MD as Consulting Physician (Nephrology)  Sommer Mccollum MD as Consulting Physician (Cardiology)  Myranda Rodriguez MD as Consulting Physician (Dermatology)  Naina Watts DPM as Physician (Podiatry)        Chief Complaint   Patient presents with    Post-Op Check     2 week post op right great toenail ingrown        Pt's primary care physician is Donald Gatica MD     Subjective: Estelle Gunderson is a 79 y.o. female who presents to the office today 2week(s)  S/P right great toe P&A for correction of painful ingrown nail  Problem List Items Addressed This Visit    None  Visit Diagnoses       Postoperative state    -  Primary        . Patient relates pain is Present and improved.  Pain is rated 1 out of 10 and is described as mild. Currently denies F/C/N/V.    Physical Examination:   Minimal bleeding post operatively. Edema present. No erythema. No Pus.   Operative correction is satisfactory.      Assessment: Estelle Gunderson is status post as above  Normal post

## 2024-11-12 ENCOUNTER — HOSPITAL ENCOUNTER (OUTPATIENT)
Dept: CARDIAC REHAB | Age: 79
Setting detail: THERAPIES SERIES
Discharge: HOME | End: 2024-11-12
Payer: MEDICARE

## 2024-11-12 PROCEDURE — 9900000065 HC CARDIAC REHAB PHASE 3 - 1 VISIT

## 2024-11-19 ENCOUNTER — HOSPITAL ENCOUNTER (OUTPATIENT)
Dept: CARDIAC REHAB | Age: 79
Discharge: HOME | End: 2024-11-19

## 2024-11-19 PROCEDURE — 9900000065 HC CARDIAC REHAB PHASE 3 - 1 VISIT

## 2024-11-21 ENCOUNTER — HOSPITAL ENCOUNTER (OUTPATIENT)
Dept: CARDIAC REHAB | Age: 79
Discharge: HOME | End: 2024-11-21

## 2024-11-21 PROCEDURE — 9900000065 HC CARDIAC REHAB PHASE 3 - 1 VISIT

## 2024-11-22 ENCOUNTER — HOSPITAL ENCOUNTER (OUTPATIENT)
Age: 79
Discharge: HOME | End: 2024-11-22
Payer: MEDICARE

## 2024-11-22 ENCOUNTER — HOSPITAL ENCOUNTER (OUTPATIENT)
Dept: MAMMOGRAPHY | Age: 79
Discharge: HOME | End: 2024-11-22
Payer: MEDICARE

## 2024-11-22 DIAGNOSIS — Z12.31 VISIT FOR SCREENING MAMMOGRAM: ICD-10-CM

## 2024-11-22 DIAGNOSIS — D47.2 MGUS (MONOCLONAL GAMMOPATHY OF UNKNOWN SIGNIFICANCE): ICD-10-CM

## 2024-11-22 LAB
BASOPHILS # BLD: 0.07 K/UL (ref 0–0.2)
BASOPHILS NFR BLD: 1 % (ref 0–2)
EOSINOPHIL # BLD: 0.35 K/UL (ref 0–0.44)
EOSINOPHILS RELATIVE PERCENT: 4 % (ref 1–4)
ERYTHROCYTE [DISTWIDTH] IN BLOOD BY AUTOMATED COUNT: 15.1 % (ref 11.8–14.4)
FREE KAPPA/LAMBDA RATIO: 1.58 (ref 0.22–1.74)
HCT VFR BLD AUTO: 34 % (ref 36.3–47.1)
HGB BLD-MCNC: 11 G/DL (ref 11.9–15.1)
IMM GRANULOCYTES # BLD AUTO: <0.03 K/UL (ref 0–0.3)
IMM GRANULOCYTES NFR BLD: 0 %
KAPPA LC FREE SER-MCNC: 42 MG/L
LAMBDA LC FREE SERPL-MCNC: 26.5 MG/L (ref 4.2–27.7)
LYMPHOCYTES NFR BLD: 2.63 K/UL (ref 1.1–3.7)
LYMPHOCYTES RELATIVE PERCENT: 32 % (ref 24–43)
MCH RBC QN AUTO: 28.6 PG (ref 25.2–33.5)
MCHC RBC AUTO-ENTMCNC: 32.4 G/DL (ref 28.4–34.8)
MCV RBC AUTO: 88.5 FL (ref 82.6–102.9)
MONOCYTES NFR BLD: 0.84 K/UL (ref 0.1–1.2)
MONOCYTES NFR BLD: 10 % (ref 3–12)
NEUTROPHILS NFR BLD: 53 % (ref 36–65)
NEUTS SEG NFR BLD: 4.25 K/UL (ref 1.5–8.1)
NRBC BLD-RTO: 0 PER 100 WBC
PLATELET # BLD AUTO: 220 K/UL (ref 138–453)
PMV BLD AUTO: 10.7 FL (ref 8.1–13.5)
RBC # BLD AUTO: 3.84 M/UL (ref 3.95–5.11)
RBC # BLD: ABNORMAL 10*6/UL
WBC OTHER # BLD: 8.2 K/UL (ref 3.5–11.3)

## 2024-11-22 PROCEDURE — 83521 IG LIGHT CHAINS FREE EACH: CPT

## 2024-11-22 PROCEDURE — 85025 COMPLETE CBC W/AUTO DIFF WBC: CPT

## 2024-11-22 PROCEDURE — 77063 BREAST TOMOSYNTHESIS BI: CPT

## 2024-11-22 PROCEDURE — 82784 ASSAY IGA/IGD/IGG/IGM EACH: CPT

## 2024-11-22 PROCEDURE — 86334 IMMUNOFIX E-PHORESIS SERUM: CPT

## 2024-11-22 PROCEDURE — 36415 COLL VENOUS BLD VENIPUNCTURE: CPT

## 2024-11-23 LAB
IGA SERPL-MCNC: ABNORMAL MG/DL (ref 70–400)
IGA, LOW RANGE: 36 MG/DL (ref 70–400)
IGG SERPL-MCNC: 913 MG/DL (ref 700–1600)
IGM SERPL-MCNC: 266 MG/DL (ref 40–230)

## 2024-11-25 LAB
ITYP INTERPRETATION: NORMAL
PATH REV: NORMAL

## 2024-11-26 ENCOUNTER — HOSPITAL ENCOUNTER (OUTPATIENT)
Dept: CARDIAC REHAB | Age: 79
Discharge: HOME | End: 2024-11-26

## 2024-11-26 PROCEDURE — 9900000065 HC CARDIAC REHAB PHASE 3 - 1 VISIT

## 2024-12-03 ENCOUNTER — OFFICE VISIT (OUTPATIENT)
Dept: ONCOLOGY | Age: 79
End: 2024-12-03
Payer: MEDICARE

## 2024-12-03 ENCOUNTER — TELEPHONE (OUTPATIENT)
Dept: ONCOLOGY | Age: 79
End: 2024-12-03

## 2024-12-03 ENCOUNTER — HOSPITAL ENCOUNTER (OUTPATIENT)
Dept: CARDIAC REHAB | Age: 79
Discharge: HOME | End: 2024-12-03

## 2024-12-03 VITALS
DIASTOLIC BLOOD PRESSURE: 66 MMHG | WEIGHT: 163 LBS | HEART RATE: 81 BPM | SYSTOLIC BLOOD PRESSURE: 126 MMHG | BODY MASS INDEX: 29.81 KG/M2 | TEMPERATURE: 97.2 F | RESPIRATION RATE: 16 BRPM

## 2024-12-03 DIAGNOSIS — D47.2 MGUS (MONOCLONAL GAMMOPATHY OF UNKNOWN SIGNIFICANCE): Primary | ICD-10-CM

## 2024-12-03 PROCEDURE — 99211 OFF/OP EST MAY X REQ PHY/QHP: CPT | Performed by: INTERNAL MEDICINE

## 2024-12-03 PROCEDURE — 1125F AMNT PAIN NOTED PAIN PRSNT: CPT | Performed by: INTERNAL MEDICINE

## 2024-12-03 PROCEDURE — G8482 FLU IMMUNIZE ORDER/ADMIN: HCPCS | Performed by: INTERNAL MEDICINE

## 2024-12-03 PROCEDURE — 3074F SYST BP LT 130 MM HG: CPT | Performed by: INTERNAL MEDICINE

## 2024-12-03 PROCEDURE — 3078F DIAST BP <80 MM HG: CPT | Performed by: INTERNAL MEDICINE

## 2024-12-03 PROCEDURE — 99214 OFFICE O/P EST MOD 30 MIN: CPT | Performed by: INTERNAL MEDICINE

## 2024-12-03 PROCEDURE — 9900000065 HC CARDIAC REHAB PHASE 3 - 1 VISIT

## 2024-12-03 PROCEDURE — G8399 PT W/DXA RESULTS DOCUMENT: HCPCS | Performed by: INTERNAL MEDICINE

## 2024-12-03 PROCEDURE — 1036F TOBACCO NON-USER: CPT | Performed by: INTERNAL MEDICINE

## 2024-12-03 PROCEDURE — 1159F MED LIST DOCD IN RCRD: CPT | Performed by: INTERNAL MEDICINE

## 2024-12-03 PROCEDURE — G8417 CALC BMI ABV UP PARAM F/U: HCPCS | Performed by: INTERNAL MEDICINE

## 2024-12-03 PROCEDURE — 1124F ACP DISCUSS-NO DSCNMKR DOCD: CPT | Performed by: INTERNAL MEDICINE

## 2024-12-03 PROCEDURE — G8427 DOCREV CUR MEDS BY ELIG CLIN: HCPCS | Performed by: INTERNAL MEDICINE

## 2024-12-03 PROCEDURE — 1090F PRES/ABSN URINE INCON ASSESS: CPT | Performed by: INTERNAL MEDICINE

## 2024-12-03 NOTE — TELEPHONE ENCOUNTER
VIRGINIA HERE FOR FOLLOW UP   RV 6 months  Labs before RV  LABS ORDERED: FREE LIGHT CHAISN, IMMUNOGLOBULIN PANEL, CBC, IGA LOW RANGE, IMMUNOTYPING SERUM   MD VISIT: 6/3/25 @ 1:30PM   LABS WILL BE MAILED TO PT   AVS PRINTED AND GIVEN ON EXIT

## 2024-12-03 NOTE — PROGRESS NOTES
breastfeeding.     General appearance - well appearing, not in pain or distress  Mental status - good mood, alert and oriented  Eyes - pupils equal and reactive, extraocular eye movements intact  Ears - bilateral TM's and external ear canals normal  Nose - normal and patent, no erythema, discharge or polyps  Mouth - mucous membranes moist, pharynx normal without lesions  Neck - supple, no significant adenopathy  Lymphatics - no palpable lymphadenopathy, no hepatosplenomegaly  Chest - clear to auscultation, no wheezes, rales or rhonchi, symmetric air entry  Heart - normal rate, regular rhythm, normal S1, S2, no murmurs, rubs, clicks or gallops  Abdomen - soft, nontender, nondistended, no masses or organomegaly  Neurological - alert, oriented, normal speech, no focal findings or movement disorder noted  Musculoskeletal - no joint tenderness, deformity or swelling  Extremities - peripheral pulses normal, no pedal edema, no clubbing or cyanosis  Skin - normal coloration and turgor, no rashes, no suspicious skin lesions noted     Review of Diagnostic data:   Lab Results   Component Value Date    WBC 8.2 11/22/2024    HGB 11.0 (L) 11/22/2024    HCT 34.0 (L) 11/22/2024    MCV 88.5 11/22/2024     11/22/2024       Chemistry        Component Value Date/Time     04/26/2024 2257    K 4.0 04/26/2024 2257     04/26/2024 2257    CO2 24 04/26/2024 2257    BUN 26 (H) 04/26/2024 2257    CREATININE 1.4 (H) 04/26/2024 2257        Component Value Date/Time    CALCIUM 9.2 04/26/2024 2257    ALKPHOS 73 04/26/2024 2257    AST 23 04/26/2024 2257    ALT 18 04/26/2024 2257    BILITOT 0.2 04/26/2024 2257            IMPRESSION:   Paraproteinemia/monoclonal gammopathy  MGUS  Chronic renal insufficiency  Breast cancer in remission.  Diagnosed more than 25 years ago.  Multiple comorbidities as listed    PLAN: I again explained to the patient the nature of his problem with paraproteinemia and slightly repeated kappa light chain

## 2024-12-05 ENCOUNTER — OFFICE VISIT (OUTPATIENT)
Dept: DERMATOLOGY | Age: 79
End: 2024-12-05
Payer: MEDICARE

## 2024-12-05 ENCOUNTER — HOSPITAL ENCOUNTER (OUTPATIENT)
Dept: CARDIAC REHAB | Age: 79
Discharge: HOME | End: 2024-12-05

## 2024-12-05 VITALS
HEART RATE: 68 BPM | WEIGHT: 163 LBS | TEMPERATURE: 98.6 F | BODY MASS INDEX: 29.81 KG/M2 | DIASTOLIC BLOOD PRESSURE: 60 MMHG | SYSTOLIC BLOOD PRESSURE: 144 MMHG | OXYGEN SATURATION: 98 %

## 2024-12-05 DIAGNOSIS — L60.8 ONYCHOMADESIS: ICD-10-CM

## 2024-12-05 DIAGNOSIS — D47.2 MGUS (MONOCLONAL GAMMOPATHY OF UNKNOWN SIGNIFICANCE): Primary | ICD-10-CM

## 2024-12-05 DIAGNOSIS — L40.9 PSORIASIS OF NAIL: ICD-10-CM

## 2024-12-05 DIAGNOSIS — L90.0 LICHEN SCLEROSUS: Primary | ICD-10-CM

## 2024-12-05 DIAGNOSIS — L40.50 PSORIATIC ARTHRITIS (HCC): ICD-10-CM

## 2024-12-05 PROCEDURE — 3078F DIAST BP <80 MM HG: CPT | Performed by: DERMATOLOGY

## 2024-12-05 PROCEDURE — G8427 DOCREV CUR MEDS BY ELIG CLIN: HCPCS | Performed by: DERMATOLOGY

## 2024-12-05 PROCEDURE — 1090F PRES/ABSN URINE INCON ASSESS: CPT | Performed by: DERMATOLOGY

## 2024-12-05 PROCEDURE — 9900000065 HC CARDIAC REHAB PHASE 3 - 1 VISIT

## 2024-12-05 PROCEDURE — 3077F SYST BP >= 140 MM HG: CPT | Performed by: DERMATOLOGY

## 2024-12-05 PROCEDURE — 1036F TOBACCO NON-USER: CPT | Performed by: DERMATOLOGY

## 2024-12-05 PROCEDURE — G8482 FLU IMMUNIZE ORDER/ADMIN: HCPCS | Performed by: DERMATOLOGY

## 2024-12-05 PROCEDURE — G8399 PT W/DXA RESULTS DOCUMENT: HCPCS | Performed by: DERMATOLOGY

## 2024-12-05 PROCEDURE — 1159F MED LIST DOCD IN RCRD: CPT | Performed by: DERMATOLOGY

## 2024-12-05 PROCEDURE — G8417 CALC BMI ABV UP PARAM F/U: HCPCS | Performed by: DERMATOLOGY

## 2024-12-05 PROCEDURE — 1124F ACP DISCUSS-NO DSCNMKR DOCD: CPT | Performed by: DERMATOLOGY

## 2024-12-05 PROCEDURE — 99213 OFFICE O/P EST LOW 20 MIN: CPT | Performed by: DERMATOLOGY

## 2024-12-05 NOTE — PATIENT INSTRUCTIONS
- I do appreciate some psoriasis in the fingernails, I recommend speaking to rheumatologist about possibly changing treatment of psoriatic arthritis  - you may see Dr. Goodwin, rheumatology, if you would like a second opinion    - continue clobetasol regiment for lichen sclerosus, I do not see any active disease today

## 2024-12-05 NOTE — PROGRESS NOTES
(TEMOVATE) 0.05 % ointment APPLY TOPICALLY TO VULVA AND PERIANAL AREA TWICE DAILY 60 g 2    pravastatin (PRAVACHOL) 20 MG tablet TAKE 1 TABLET BY MOUTH EVERY DAY IN THE EVENING 90 tablet 0    montelukast (SINGULAIR) 10 MG tablet TAKE 1 TABLET BY MOUTH EVERY DAY IN THE EVENING 90 tablet 0    valACYclovir (VALTREX) 1 g tablet TAKE 1 TABLET BY MOUTH EVERY DAY 30 tablet 5    Estriol 10 % CREA by Does not apply route as needed      clopidogrel (PLAVIX) 75 MG tablet TAKE 1 TABLET BY MOUTH EVERY DAY 30 tablet 11    carvedilol (COREG) 6.25 MG tablet TAKE 1 TABLET BY MOUTH TWICE A DAY WITH FOOD 60 tablet 6    clobetasol (TEMOVATE) 0.05 % external solution Apply to affected nail twice daily 50 mL 2    citalopram (CELEXA) 10 MG tablet Take 1 tablet by mouth daily 30 tablet 5    amLODIPine (NORVASC) 5 MG tablet Take 1 tablet by mouth daily 90 tablet 3    albuterol sulfate HFA (VENTOLIN HFA) 108 (90 Base) MCG/ACT inhaler Inhale 2 puffs into the lungs 4 times daily as needed for Wheezing 54 g 1    valsartan-hydroCHLOROthiazide (DIOVAN-HCT) 80-12.5 MG per tablet TAKE 1 TABLET BY MOUTH EVERY DAY 90 tablet 3    fluticasone (CUTIVATE) 0.05 % cream APPLY TO AFFECTED AREA TWICE A DAY 60 g 2    triamcinolone (KENALOG) 0.025 % ointment APPLY TO VULVA TWICE DAILY 75 g 2    Blood Pressure Monitoring (BLOOD PRESSURE CUFF) MISC Take bp daily 1 each 0    calcium carbonate (OSCAL) 500 MG TABS tablet Take 600 mg by mouth daily      Multiple Vitamins-Minerals (THERAPEUTIC MULTIVITAMIN-MINERALS) tablet Take 1 tablet by mouth daily      Vitamin E 400 units TABS Take by mouth daily      Golimumab (SIMPONI ARIA IV) Infuse intravenously Every 8 weeks      Denosumab (PROLIA SC) Inject into the skin Every 6 months      aspirin 81 MG tablet Take 1 tablet by mouth every other day      Cholecalciferol (VITAMIN D3) 2000 UNITS CAPS Take by mouth daily      Probiotic Product (PROBIOTIC DAILY PO) Take by mouth daily       lansoprazole (PREVACID) 15 MG delayed

## 2024-12-13 DIAGNOSIS — L90.0 LICHEN SCLEROSUS: ICD-10-CM

## 2024-12-13 RX ORDER — CLOBETASOL PROPIONATE 0.5 MG/G
OINTMENT TOPICAL
Qty: 60 G | Refills: 2 | Status: SHIPPED | OUTPATIENT
Start: 2024-12-13

## 2024-12-19 ENCOUNTER — TRANSCRIBE ORDERS (OUTPATIENT)
Dept: ADMINISTRATIVE | Age: 79
End: 2024-12-19

## 2024-12-19 DIAGNOSIS — R91.1 LUNG NODULE: Primary | ICD-10-CM

## 2024-12-26 ENCOUNTER — HOSPITAL ENCOUNTER (OUTPATIENT)
Dept: CT IMAGING | Age: 79
Discharge: HOME | End: 2024-12-28
Payer: MEDICARE

## 2024-12-26 DIAGNOSIS — R91.1 LUNG NODULE: ICD-10-CM

## 2024-12-26 PROCEDURE — 71250 CT THORAX DX C-: CPT

## 2024-12-28 SDOH — HEALTH STABILITY: PHYSICAL HEALTH: ON AVERAGE, HOW MANY DAYS PER WEEK DO YOU ENGAGE IN MODERATE TO STRENUOUS EXERCISE (LIKE A BRISK WALK)?: 0 DAYS

## 2024-12-28 SDOH — HEALTH STABILITY: PHYSICAL HEALTH: ON AVERAGE, HOW MANY MINUTES DO YOU ENGAGE IN EXERCISE AT THIS LEVEL?: 0 MIN

## 2024-12-31 ENCOUNTER — OFFICE VISIT (OUTPATIENT)
Age: 79
End: 2024-12-31

## 2024-12-31 VITALS
OXYGEN SATURATION: 93 % | DIASTOLIC BLOOD PRESSURE: 60 MMHG | HEART RATE: 71 BPM | WEIGHT: 158 LBS | BODY MASS INDEX: 28.9 KG/M2 | SYSTOLIC BLOOD PRESSURE: 100 MMHG | TEMPERATURE: 97.1 F

## 2024-12-31 DIAGNOSIS — D47.2 MGUS (MONOCLONAL GAMMOPATHY OF UNKNOWN SIGNIFICANCE): ICD-10-CM

## 2024-12-31 DIAGNOSIS — Z91.81 AT HIGH RISK FOR FALLS: ICD-10-CM

## 2024-12-31 DIAGNOSIS — A60.00 GENITAL HERPES SIMPLEX TYPE 2: ICD-10-CM

## 2024-12-31 DIAGNOSIS — R91.1 LUNG NODULE: ICD-10-CM

## 2024-12-31 DIAGNOSIS — J45.31 MILD PERSISTENT ASTHMA WITH ACUTE EXACERBATION: Primary | ICD-10-CM

## 2024-12-31 DIAGNOSIS — G25.81 RESTLESS LEG SYNDROME: ICD-10-CM

## 2024-12-31 DIAGNOSIS — I10 ESSENTIAL HYPERTENSION: Chronic | ICD-10-CM

## 2024-12-31 DIAGNOSIS — F39 MOOD DISORDER (HCC): ICD-10-CM

## 2024-12-31 DIAGNOSIS — H61.21 IMPACTED CERUMEN OF RIGHT EAR: ICD-10-CM

## 2024-12-31 DIAGNOSIS — N18.32 STAGE 3B CHRONIC KIDNEY DISEASE (HCC): ICD-10-CM

## 2024-12-31 DIAGNOSIS — G47.09 OTHER INSOMNIA: ICD-10-CM

## 2024-12-31 DIAGNOSIS — E78.00 PURE HYPERCHOLESTEROLEMIA: ICD-10-CM

## 2024-12-31 DIAGNOSIS — M79.671 RIGHT FOOT PAIN: ICD-10-CM

## 2024-12-31 PROBLEM — D80.2 SELECTIVE DEFICIENCY OF IMMUNOGLOBULIN A (IGA) (HCC): Status: ACTIVE | Noted: 2024-12-31

## 2024-12-31 PROBLEM — D80.9 IMMUNODEFICIENCY WITH PREDOMINANTLY ANTIBODY DEFECTS, UNSPECIFIED (HCC): Status: ACTIVE | Noted: 2024-12-31

## 2024-12-31 RX ORDER — PREDNISONE 20 MG/1
20 TABLET ORAL DAILY
Qty: 5 TABLET | Refills: 0 | Status: ON HOLD | OUTPATIENT
Start: 2024-12-31 | End: 2025-01-05

## 2024-12-31 RX ORDER — ZOLPIDEM TARTRATE 10 MG/1
TABLET ORAL
Qty: 20 TABLET | Refills: 0 | Status: SHIPPED | OUTPATIENT
Start: 2024-12-31 | End: 2024-12-31 | Stop reason: CLARIF

## 2024-12-31 RX ORDER — ADHESIVE BANDAGE 3/4"
5 BANDAGE TOPICAL 2 TIMES DAILY
Qty: 1 EACH | Refills: 0 | Status: ON HOLD | OUTPATIENT
Start: 2024-12-31 | End: 2025-01-07

## 2024-12-31 RX ORDER — PRAVASTATIN SODIUM 20 MG
TABLET ORAL
Qty: 90 TABLET | Refills: 0 | Status: ON HOLD | OUTPATIENT
Start: 2024-12-31

## 2024-12-31 RX ORDER — FLUTICASONE PROPIONATE AND SALMETEROL 250; 50 UG/1; UG/1
POWDER RESPIRATORY (INHALATION)
Status: ON HOLD | COMMUNITY
Start: 2024-12-04

## 2024-12-31 RX ORDER — ZOLPIDEM TARTRATE 10 MG/1
TABLET ORAL
Qty: 30 TABLET | Refills: 2 | Status: CANCELLED | OUTPATIENT
Start: 2024-12-31 | End: 2025-01-30

## 2024-12-31 RX ORDER — ROPINIROLE 0.5 MG/1
0.5 TABLET, FILM COATED ORAL 2 TIMES DAILY
Qty: 180 TABLET | Refills: 1 | Status: ON HOLD | OUTPATIENT
Start: 2024-12-31

## 2024-12-31 RX ORDER — CITALOPRAM HYDROBROMIDE 10 MG/1
10 TABLET ORAL DAILY
Qty: 30 TABLET | Refills: 5 | Status: SHIPPED | OUTPATIENT
Start: 2024-12-31 | End: 2024-12-31 | Stop reason: ALTCHOICE

## 2024-12-31 RX ORDER — TRAZODONE HYDROCHLORIDE 50 MG/1
50 TABLET, FILM COATED ORAL NIGHTLY
Qty: 90 TABLET | Refills: 1 | Status: ON HOLD | OUTPATIENT
Start: 2024-12-31

## 2024-12-31 RX ORDER — DOXYCYCLINE HYCLATE 100 MG
100 TABLET ORAL 2 TIMES DAILY
Qty: 14 TABLET | Refills: 0 | Status: ON HOLD | OUTPATIENT
Start: 2024-12-31 | End: 2025-01-07

## 2024-12-31 ASSESSMENT — ENCOUNTER SYMPTOMS
CONSTIPATION: 0
NAUSEA: 0
DIARRHEA: 0
VOMITING: 0
BACK PAIN: 1
ABDOMINAL PAIN: 0

## 2024-12-31 NOTE — ASSESSMENT & PLAN NOTE
Chronic, at goal (stable), Her blood pressure is currently low, likely due to her illness. She is advised to discontinue Norvasc (amlodipine) while she is ill and resume it once her condition improves. She is also advised to monitor her blood pressure at home, particularly in the morning, and to report if the systolic reading remains around 100 or lower. She will continue taking Coreg and valsartan hydrochlorothiazide as prescribed

## 2024-12-31 NOTE — PROGRESS NOTES
Estelle Gunderson (:  1945) is a 79 y.o. female,Established patient, here for evaluation of the following chief complaint(s):  Establish Care and Chest Congestion (Nasal Congestion,SOB started on Thursday last week)    History of Present Illness  The patient presents for evaluation of asthma, ear blockage, sleep issues, depression, hypertension, dermatitis, lung nodule, balance issues, right hand numbness, and foot pain.    She has been experiencing symptoms since Thursday, which she suspects may be due to bronchitis. She reports difficulty in expectorating clear sputum and a sensation of something lodged in her throat, particularly noticeable during coughing episodes. She also experiences dyspnea when foreign bodies get caught in her throat. She has a history of recurrent bronchitis. She has been using her albuterol rescue inhaler twice daily over the past few days but still experiences occasional shortness of breath. She is under the care of a pulmonologist for her asthma and recently underwent a follow-up CT scan of her lungs due to a previously identified spot. She is scheduled to see her pulmonologist in approximately 3 months. Her current asthma management includes Advair twice daily and Singulair.    She reports no ear pain, sore throat, or chest pain. She also reports no ear drainage or ocular pruritus. She has been using Coricidin and Robitussin for her cough and illness, and although she feels slightly improved today, she reports fatigue. She reports no fever or chills but admits to decreased oral intake. She has been experiencing dizziness since the onset of her illness.    She has been using a cane for balance issues that have been present for 1 to 2 months. She experiences severe lower back or hip pain upon rising from bed, necessitating the use of a cane for mobility. Her pain improves after ambulation and bowel movement. She reports no gastrointestinal symptoms such as nausea, vomiting, or

## 2024-12-31 NOTE — ASSESSMENT & PLAN NOTE
Chronic, at goal (stable), changes made today: She will be prescribed 20 tablets of Ambien 10 mg starting from next month as she is already have recent refill from her previous provider, with a plan to gradually taper off this medication She occasionally experiences sleepwalking episodes with Ambien and with her imbalance issues she is high risk for falling. She will also be started on trazodone for sleep. She is advised to stop the antidepressant Celexa once trazodone is initiated.,  Controlled substance agreement signed, PDMP reviewed, medication adherence emphasized, and lifestyle modifications recommended

## 2024-12-31 NOTE — PATIENT INSTRUCTIONS
Stop Norvasc or Amlodipine when we are sick currently and check your Blood pressure if the upper number still 100 or less you let us know       You will start on Trazodone for sleep daily and will stop Celexa. Will provide few tablets of Ambien next month for taper     Start Ear Drops for wax    Speak with your lung doctor for earlier appointment given enlarged lung nodule and complete the PET-SCAN    Start on Prednisone and Doxycyline for current infection

## 2024-12-31 NOTE — ASSESSMENT & PLAN NOTE
Chronic, at goal (stable), changes made today: She is advised to stop taking Celexa 10 mg once trazodone is initiated for sleep and help for depression and anxiety as well, as patient  depression was related to previous situations that is now resolved , medication adherence emphasized, and lifestyle modifications recommended

## 2024-12-31 NOTE — ASSESSMENT & PLAN NOTE
Chronic, at goal (stable), continue current medications with the pravastatin and will repeat lipid panel, medication adherence emphasized, and lifestyle modifications recommended    Orders:    pravastatin (PRAVACHOL) 20 MG tablet; TAKE 1 TABLET BY MOUTH EVERY DAY IN THE EVENING    Lipid Panel; Future

## 2025-01-01 NOTE — ASSESSMENT & PLAN NOTE
Chronic, not at goal (unstable), The patient's respiratory distress could be attributed to her asthma or potentially related to her lung nodule. However, the primary concern is an underlying infection. She will be prescribed a 5-day course of steroids and a 7-day course of antibiotics. She is advised to continue using her rescue inhaler every 4 hours as needed when sick, unless she is sleeping or comfortable. She is also advised to schedule an earlier appointment with her pulmonologist to discuss the possibility of a biopsy of the lung nodule., medication adherence emphasized, and lifestyle modifications recommended    Orders:    predniSONE (DELTASONE) 20 MG tablet; Take 1 tablet by mouth daily for 5 days    doxycycline hyclate (VIBRA-TABS) 100 MG tablet; Take 1 tablet by mouth 2 times daily for 7 days

## 2025-01-01 NOTE — ASSESSMENT & PLAN NOTE
Chronic, at goal (stable), She is advised to use a cane for balance and to follow up if her symptoms worsen, fall precautions discussed and given, medication adherence emphasized, and lifestyle modifications recommended  On the basis of positive falls risk screening, assessment and plan is as follows: home safety tips provided.

## 2025-01-01 NOTE — ASSESSMENT & PLAN NOTE
Chronic, at goal (stable), She is advised to follow up with her hematology/oncology specialist, and to complete any pending blood work, medication adherence emphasized, and lifestyle modifications recommended

## 2025-01-01 NOTE — ASSESSMENT & PLAN NOTE
Acute condition, new, unsuccessful removal by curette,She will be prescribed ear drops to be used twice daily for a duration of 7 days. If her symptoms do not improve, she is advised to return in 1 week for further evaluation    Orders:    REMOVAL OF IMPACTED WAX MD    Ear Cerumen Removal

## 2025-01-01 NOTE — ASSESSMENT & PLAN NOTE
Chronic, worsening (exacerbation),  She is under the care of a pulmonologist for her asthma and recently underwent a follow-up CT scan of her lungs due to a previously identified spot which identified an increased size of the lung nodules with recommendation to do PET scan. she is scheduled to see her pulmonologist in approximately 3 months, patient was advised to be seen by her pulmonologist earlier than 3 months for transfer manage chronic nodules and need for further management as per specialist recommendation for possible biopsy    Orders:    PET CT LIMITED IMAGING INITIAL; Future    Basic Metabolic Panel; Future

## 2025-01-01 NOTE — ASSESSMENT & PLAN NOTE
Chronic, at goal (stable), he is on clobetasol for dermatitis of the vulva, prescribed by Dr. Lino, a dermatologist. She is also taking Valtrex daily for pimples around the vulva. She has not seen her dermatologist for 6 months., medication adherence emphasized, and lifestyle modifications recommended    Orders:    Estriol 10 % CREA; 1 g by Does not apply route Twice a Week

## 2025-01-01 NOTE — ASSESSMENT & PLAN NOTE
Acute condition, new, She is advised to use shoe pads or cotton pads to alleviate the pain

## 2025-01-01 NOTE — PROGRESS NOTES
Ear Cerumen Removal    Date/Time: 12/31/2024 8:46 PM    Performed by: Kamryn Briseno MD  Authorized by: Kamryn Briseno MD    Consent:     Consent obtained:  Verbal    Consent given by:  Patient    Risks, benefits, and alternatives were discussed: yes      Risks discussed:  Bleeding, infection, pain, TM perforation, incomplete removal and dizziness    Alternatives discussed:  No treatment, delayed treatment, alternative treatment and observation  Universal protocol:     Procedure explained and questions answered to patient or proxy's satisfaction: yes      Relevant documents present and verified: yes      Patient identity confirmed:  Verbally with patient  Procedure details:     Location:  R ear    Procedure type: curette      Procedure outcomes: unable to remove cerumen    Post-procedure details:     Inspection:  Some cerumen remaining    Post-procedure hearing quality: Same with reduced hearing.    Procedure completion:  Tolerated

## 2025-01-01 NOTE — ASSESSMENT & PLAN NOTE
Chronic, not at goal (unstable), advised to follow-up with her nephrologist and to do BMP as she missed the last 1 that she was supposed to do in April, medication adherence emphasized, and lifestyle modifications recommended       This visit took 65 minutes including precharting, charting, counseling and discussions, medications management and teach back.  All questions answered to the best of my knowledge.  Patient aware about his responsibility to let us know when he is out of his medication or if there is insurance coverage.  Patient aware to let us know about other specialist recommendations if any.

## 2025-01-02 ENCOUNTER — APPOINTMENT (OUTPATIENT)
Dept: GENERAL RADIOLOGY | Age: 80
DRG: 184 | End: 2025-01-02
Payer: MEDICARE

## 2025-01-02 ENCOUNTER — APPOINTMENT (OUTPATIENT)
Dept: CT IMAGING | Age: 80
DRG: 184 | End: 2025-01-02
Payer: MEDICARE

## 2025-01-02 ENCOUNTER — HOSPITAL ENCOUNTER (INPATIENT)
Age: 80
LOS: 5 days | Discharge: HOME HEALTH | DRG: 184 | End: 2025-01-07
Attending: EMERGENCY MEDICINE | Admitting: SURGERY
Payer: MEDICARE

## 2025-01-02 DIAGNOSIS — S22.31XA TRAUMATIC CLOSED DISPLACED FRACTURE OF RIB, RIGHT, INITIAL ENCOUNTER: ICD-10-CM
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
## 2025-01-02 DIAGNOSIS — S22.5XXA CLOSED FRACTURE OF MULTIPLE RIBS WITH FLAIL CHEST, INITIAL ENCOUNTER: Primary | ICD-10-CM

## 2025-01-02 DIAGNOSIS — J45.31 MILD PERSISTENT ASTHMA WITH ACUTE EXACERBATION: ICD-10-CM

## 2025-01-02 LAB
ALBUMIN SERPL-MCNC: 4.5 G/DL (ref 3.5–5.2)
ANION GAP SERPL CALCULATED.3IONS-SCNC: 11 MMOL/L (ref 9–16)
BASOPHILS # BLD: <0.03 K/UL (ref 0–0.2)
BASOPHILS NFR BLD: 0 % (ref 0–2)
BUN SERPL-MCNC: 27 MG/DL (ref 8–23)
CALCIUM SERPL-MCNC: 9 MG/DL (ref 8.6–10.4)
CHLORIDE SERPL-SCNC: 99 MMOL/L (ref 98–107)
CK SERPL-CCNC: 301 U/L (ref 26–192)
CO2 SERPL-SCNC: 27 MMOL/L (ref 20–31)
CREAT SERPL-MCNC: 1.3 MG/DL (ref 0.6–0.9)
EOSINOPHIL # BLD: <0.03 K/UL (ref 0–0.44)
EOSINOPHILS RELATIVE PERCENT: 0 % (ref 1–4)
ERYTHROCYTE [DISTWIDTH] IN BLOOD BY AUTOMATED COUNT: 15.6 % (ref 11.8–14.4)
GFR, ESTIMATED: 42 ML/MIN/1.73M2
GLUCOSE SERPL-MCNC: 136 MG/DL (ref 74–99)
HCT VFR BLD AUTO: 31.5 % (ref 36.3–47.1)
HGB BLD-MCNC: 10.3 G/DL (ref 11.9–15.1)
IMM GRANULOCYTES # BLD AUTO: 0.03 K/UL (ref 0–0.3)
IMM GRANULOCYTES NFR BLD: 0 %
LYMPHOCYTES NFR BLD: 1.7 K/UL (ref 1.1–3.7)
LYMPHOCYTES RELATIVE PERCENT: 17 % (ref 24–43)
MCH RBC QN AUTO: 28.3 PG (ref 25.2–33.5)
MCHC RBC AUTO-ENTMCNC: 32.7 G/DL (ref 28.4–34.8)
MCV RBC AUTO: 86.5 FL (ref 82.6–102.9)
MONOCYTES NFR BLD: 1.17 K/UL (ref 0.1–1.2)
MONOCYTES NFR BLD: 11 % (ref 3–12)
MYOGLOBIN SERPL-MCNC: 183 NG/ML (ref 25–58)
NEUTROPHILS NFR BLD: 72 % (ref 36–65)
NEUTS SEG NFR BLD: 7.36 K/UL (ref 1.5–8.1)
NRBC BLD-RTO: 0 PER 100 WBC
PLATELET # BLD AUTO: 174 K/UL (ref 138–453)
PMV BLD AUTO: 10.3 FL (ref 8.1–13.5)
POTASSIUM SERPL-SCNC: 4 MMOL/L (ref 3.7–5.3)
RBC # BLD AUTO: 3.64 M/UL (ref 3.95–5.11)
RBC # BLD: ABNORMAL 10*6/UL
SODIUM SERPL-SCNC: 137 MMOL/L (ref 136–145)
WBC OTHER # BLD: 10.3 K/UL (ref 3.5–11.3)

## 2025-01-02 PROCEDURE — 6360000002 HC RX W HCPCS

## 2025-01-02 PROCEDURE — 72125 CT NECK SPINE W/O DYE: CPT

## 2025-01-02 PROCEDURE — 93005 ELECTROCARDIOGRAM TRACING: CPT

## 2025-01-02 PROCEDURE — 96374 THER/PROPH/DIAG INJ IV PUSH: CPT

## 2025-01-02 PROCEDURE — 6370000000 HC RX 637 (ALT 250 FOR IP): Performed by: STUDENT IN AN ORGANIZED HEALTH CARE EDUCATION/TRAINING PROGRAM

## 2025-01-02 PROCEDURE — 85025 COMPLETE CBC W/AUTO DIFF WBC: CPT

## 2025-01-02 PROCEDURE — 96375 TX/PRO/DX INJ NEW DRUG ADDON: CPT

## 2025-01-02 PROCEDURE — 80048 BASIC METABOLIC PNL TOTAL CA: CPT

## 2025-01-02 PROCEDURE — 76376 3D RENDER W/INTRP POSTPROCES: CPT

## 2025-01-02 PROCEDURE — 73030 X-RAY EXAM OF SHOULDER: CPT

## 2025-01-02 PROCEDURE — 96376 TX/PRO/DX INJ SAME DRUG ADON: CPT

## 2025-01-02 PROCEDURE — 82550 ASSAY OF CK (CPK): CPT

## 2025-01-02 PROCEDURE — 6370000000 HC RX 637 (ALT 250 FOR IP)

## 2025-01-02 PROCEDURE — 71260 CT THORAX DX C+: CPT

## 2025-01-02 PROCEDURE — 6360000004 HC RX CONTRAST MEDICATION

## 2025-01-02 PROCEDURE — 2060000000 HC ICU INTERMEDIATE R&B

## 2025-01-02 PROCEDURE — 2500000003 HC RX 250 WO HCPCS: Performed by: STUDENT IN AN ORGANIZED HEALTH CARE EDUCATION/TRAINING PROGRAM

## 2025-01-02 PROCEDURE — 82040 ASSAY OF SERUM ALBUMIN: CPT

## 2025-01-02 PROCEDURE — 99222 1ST HOSP IP/OBS MODERATE 55: CPT | Performed by: SURGERY

## 2025-01-02 PROCEDURE — 70450 CT HEAD/BRAIN W/O DYE: CPT

## 2025-01-02 PROCEDURE — 83874 ASSAY OF MYOGLOBIN: CPT

## 2025-01-02 PROCEDURE — 99285 EMERGENCY DEPT VISIT HI MDM: CPT

## 2025-01-02 RX ORDER — TRAZODONE HYDROCHLORIDE 50 MG/1
50 TABLET, FILM COATED ORAL NIGHTLY
Status: DISCONTINUED | OUTPATIENT
Start: 2025-01-02 | End: 2025-01-05

## 2025-01-02 RX ORDER — VITAMIN B COMPLEX
2000 TABLET ORAL DAILY
Status: DISCONTINUED | OUTPATIENT
Start: 2025-01-03 | End: 2025-01-07 | Stop reason: HOSPADM

## 2025-01-02 RX ORDER — VALSARTAN 80 MG/1
80 TABLET ORAL DAILY
Status: DISCONTINUED | OUTPATIENT
Start: 2025-01-03 | End: 2025-01-07 | Stop reason: HOSPADM

## 2025-01-02 RX ORDER — ACETAMINOPHEN 500 MG
1000 TABLET ORAL EVERY 8 HOURS SCHEDULED
Status: DISCONTINUED | OUTPATIENT
Start: 2025-01-02 | End: 2025-01-07 | Stop reason: HOSPADM

## 2025-01-02 RX ORDER — EPINEPHRINE 1 MG/ML
1 INJECTION, SOLUTION INTRAMUSCULAR; SUBCUTANEOUS ONCE
Status: DISCONTINUED | OUTPATIENT
Start: 2025-01-02 | End: 2025-01-03

## 2025-01-02 RX ORDER — HEPARIN SODIUM 5000 [USP'U]/ML
5000 INJECTION, SOLUTION INTRAVENOUS; SUBCUTANEOUS EVERY 8 HOURS SCHEDULED
Status: DISCONTINUED | OUTPATIENT
Start: 2025-01-02 | End: 2025-01-07 | Stop reason: HOSPADM

## 2025-01-02 RX ORDER — HYDROCHLOROTHIAZIDE 25 MG/1
12.5 TABLET ORAL DAILY
Status: DISCONTINUED | OUTPATIENT
Start: 2025-01-03 | End: 2025-01-07 | Stop reason: HOSPADM

## 2025-01-02 RX ORDER — BUDESONIDE AND FORMOTEROL FUMARATE DIHYDRATE 160; 4.5 UG/1; UG/1
2 AEROSOL RESPIRATORY (INHALATION)
Status: DISCONTINUED | OUTPATIENT
Start: 2025-01-02 | End: 2025-01-07 | Stop reason: HOSPADM

## 2025-01-02 RX ORDER — CARVEDILOL 12.5 MG/1
6.25 TABLET ORAL 2 TIMES DAILY WITH MEALS
Status: DISCONTINUED | OUTPATIENT
Start: 2025-01-02 | End: 2025-01-07 | Stop reason: HOSPADM

## 2025-01-02 RX ORDER — GABAPENTIN 100 MG/1
100 CAPSULE ORAL 3 TIMES DAILY
Status: DISCONTINUED | OUTPATIENT
Start: 2025-01-02 | End: 2025-01-07 | Stop reason: HOSPADM

## 2025-01-02 RX ORDER — ONDANSETRON 2 MG/ML
4 INJECTION INTRAMUSCULAR; INTRAVENOUS EVERY 6 HOURS PRN
Status: DISCONTINUED | OUTPATIENT
Start: 2025-01-02 | End: 2025-01-07 | Stop reason: HOSPADM

## 2025-01-02 RX ORDER — DEXAMETHASONE SODIUM PHOSPHATE 4 MG/ML
4 INJECTION, SOLUTION INTRA-ARTICULAR; INTRALESIONAL; INTRAMUSCULAR; INTRAVENOUS; SOFT TISSUE ONCE
Status: DISCONTINUED | OUTPATIENT
Start: 2025-01-02 | End: 2025-01-03

## 2025-01-02 RX ORDER — VALACYCLOVIR HYDROCHLORIDE 500 MG/1
1000 TABLET, FILM COATED ORAL DAILY
Status: DISCONTINUED | OUTPATIENT
Start: 2025-01-03 | End: 2025-01-07 | Stop reason: HOSPADM

## 2025-01-02 RX ORDER — ROPINIROLE 0.25 MG/1
0.5 TABLET, FILM COATED ORAL 2 TIMES DAILY
Status: DISCONTINUED | OUTPATIENT
Start: 2025-01-02 | End: 2025-01-07 | Stop reason: HOSPADM

## 2025-01-02 RX ORDER — SODIUM CHLORIDE 0.9 % (FLUSH) 0.9 %
5-40 SYRINGE (ML) INJECTION EVERY 12 HOURS SCHEDULED
Status: DISCONTINUED | OUTPATIENT
Start: 2025-01-02 | End: 2025-01-07 | Stop reason: HOSPADM

## 2025-01-02 RX ORDER — BUPIVACAINE HYDROCHLORIDE 5 MG/ML
30 INJECTION, SOLUTION PERINEURAL ONCE
Status: DISCONTINUED | OUTPATIENT
Start: 2025-01-02 | End: 2025-01-05

## 2025-01-02 RX ORDER — ALBUTEROL SULFATE 90 UG/1
2 INHALANT RESPIRATORY (INHALATION) 4 TIMES DAILY PRN
Status: DISCONTINUED | OUTPATIENT
Start: 2025-01-02 | End: 2025-01-07 | Stop reason: HOSPADM

## 2025-01-02 RX ORDER — CLOPIDOGREL BISULFATE 75 MG/1
75 TABLET ORAL DAILY
Status: DISCONTINUED | OUTPATIENT
Start: 2025-01-02 | End: 2025-01-07 | Stop reason: HOSPADM

## 2025-01-02 RX ORDER — PREDNISONE 20 MG/1
20 TABLET ORAL DAILY
Status: COMPLETED | OUTPATIENT
Start: 2025-01-02 | End: 2025-01-04

## 2025-01-02 RX ORDER — PRAVASTATIN SODIUM 20 MG
20 TABLET ORAL NIGHTLY
Status: DISCONTINUED | OUTPATIENT
Start: 2025-01-02 | End: 2025-01-07 | Stop reason: HOSPADM

## 2025-01-02 RX ORDER — SODIUM CHLORIDE 9 MG/ML
INJECTION, SOLUTION INTRAVENOUS PRN
Status: DISCONTINUED | OUTPATIENT
Start: 2025-01-02 | End: 2025-01-03

## 2025-01-02 RX ORDER — VITAMIN E 268 MG
400 CAPSULE ORAL DAILY
Status: DISCONTINUED | OUTPATIENT
Start: 2025-01-03 | End: 2025-01-07 | Stop reason: HOSPADM

## 2025-01-02 RX ORDER — OXYCODONE HYDROCHLORIDE 5 MG/1
2.5 TABLET ORAL EVERY 6 HOURS PRN
Status: DISCONTINUED | OUTPATIENT
Start: 2025-01-02 | End: 2025-01-07 | Stop reason: HOSPADM

## 2025-01-02 RX ORDER — AMLODIPINE BESYLATE 10 MG/1
5 TABLET ORAL DAILY
Status: DISCONTINUED | OUTPATIENT
Start: 2025-01-02 | End: 2025-01-07 | Stop reason: HOSPADM

## 2025-01-02 RX ORDER — FENTANYL CITRATE 50 UG/ML
25 INJECTION, SOLUTION INTRAMUSCULAR; INTRAVENOUS
Status: COMPLETED | OUTPATIENT
Start: 2025-01-02 | End: 2025-01-03

## 2025-01-02 RX ORDER — ONDANSETRON 2 MG/ML
4 INJECTION INTRAMUSCULAR; INTRAVENOUS ONCE
Status: COMPLETED | OUTPATIENT
Start: 2025-01-02 | End: 2025-01-02

## 2025-01-02 RX ORDER — MORPHINE SULFATE 4 MG/ML
2 INJECTION INTRAVENOUS ONCE
Status: COMPLETED | OUTPATIENT
Start: 2025-01-02 | End: 2025-01-02

## 2025-01-02 RX ORDER — MORPHINE SULFATE 4 MG/ML
4 INJECTION INTRAVENOUS ONCE
Status: COMPLETED | OUTPATIENT
Start: 2025-01-02 | End: 2025-01-02

## 2025-01-02 RX ORDER — DOXYCYCLINE HYCLATE 100 MG
100 TABLET ORAL 2 TIMES DAILY
Status: COMPLETED | OUTPATIENT
Start: 2025-01-02 | End: 2025-01-07

## 2025-01-02 RX ORDER — ONDANSETRON 2 MG/ML
INJECTION INTRAMUSCULAR; INTRAVENOUS
Status: COMPLETED
Start: 2025-01-02 | End: 2025-01-02

## 2025-01-02 RX ORDER — ONDANSETRON 4 MG/1
4 TABLET, ORALLY DISINTEGRATING ORAL EVERY 8 HOURS PRN
Status: DISCONTINUED | OUTPATIENT
Start: 2025-01-02 | End: 2025-01-07 | Stop reason: HOSPADM

## 2025-01-02 RX ORDER — DEXMEDETOMIDINE HYDROCHLORIDE 100 UG/ML
200 INJECTION, SOLUTION INTRAVENOUS ONCE
Status: DISCONTINUED | OUTPATIENT
Start: 2025-01-02 | End: 2025-01-03

## 2025-01-02 RX ORDER — VALSARTAN AND HYDROCHLOROTHIAZIDE 80; 12.5 MG/1; MG/1
1 TABLET, FILM COATED ORAL DAILY
Status: DISCONTINUED | OUTPATIENT
Start: 2025-01-03 | End: 2025-01-02

## 2025-01-02 RX ORDER — IOPAMIDOL 755 MG/ML
75 INJECTION, SOLUTION INTRAVASCULAR
Status: COMPLETED | OUTPATIENT
Start: 2025-01-02 | End: 2025-01-02

## 2025-01-02 RX ORDER — SODIUM CHLORIDE 0.9 % (FLUSH) 0.9 %
5-40 SYRINGE (ML) INJECTION PRN
Status: DISCONTINUED | OUTPATIENT
Start: 2025-01-02 | End: 2025-01-07 | Stop reason: HOSPADM

## 2025-01-02 RX ORDER — PANTOPRAZOLE SODIUM 20 MG/1
20 TABLET, DELAYED RELEASE ORAL
Status: DISCONTINUED | OUTPATIENT
Start: 2025-01-03 | End: 2025-01-03

## 2025-01-02 RX ORDER — MONTELUKAST SODIUM 10 MG/1
10 TABLET ORAL NIGHTLY
Status: DISCONTINUED | OUTPATIENT
Start: 2025-01-02 | End: 2025-01-07 | Stop reason: HOSPADM

## 2025-01-02 RX ORDER — POLYETHYLENE GLYCOL 3350 17 G/17G
17 POWDER, FOR SOLUTION ORAL DAILY
Status: DISCONTINUED | OUTPATIENT
Start: 2025-01-02 | End: 2025-01-07 | Stop reason: HOSPADM

## 2025-01-02 RX ADMIN — PREDNISONE 20 MG: 20 TABLET ORAL at 22:14

## 2025-01-02 RX ADMIN — ROPINIROLE HYDROCHLORIDE 0.5 MG: 0.25 TABLET, FILM COATED ORAL at 21:14

## 2025-01-02 RX ADMIN — MONTELUKAST 10 MG: 10 TABLET, FILM COATED ORAL at 22:14

## 2025-01-02 RX ADMIN — SODIUM CHLORIDE, PRESERVATIVE FREE 10 ML: 5 INJECTION INTRAVENOUS at 21:11

## 2025-01-02 RX ADMIN — IOPAMIDOL 75 ML: 755 INJECTION, SOLUTION INTRAVENOUS at 13:35

## 2025-01-02 RX ADMIN — ONDANSETRON 4 MG: 2 INJECTION INTRAMUSCULAR; INTRAVENOUS at 16:02

## 2025-01-02 RX ADMIN — GABAPENTIN 100 MG: 100 CAPSULE ORAL at 21:10

## 2025-01-02 RX ADMIN — MORPHINE SULFATE 2 MG: 4 INJECTION INTRAVENOUS at 11:57

## 2025-01-02 RX ADMIN — MORPHINE SULFATE 4 MG: 4 INJECTION INTRAVENOUS at 15:28

## 2025-01-02 RX ADMIN — DOXYCYCLINE HYCLATE 100 MG: 100 TABLET, COATED ORAL at 21:11

## 2025-01-02 RX ADMIN — ACETAMINOPHEN 1000 MG: 500 TABLET ORAL at 21:10

## 2025-01-02 RX ADMIN — PRAVASTATIN SODIUM 20 MG: 20 TABLET ORAL at 21:19

## 2025-01-02 RX ADMIN — FENTANYL CITRATE 25 MCG: 50 INJECTION, SOLUTION INTRAMUSCULAR; INTRAVENOUS at 22:00

## 2025-01-02 ASSESSMENT — PAIN SCALES - GENERAL
PAINLEVEL_OUTOF10: 9
PAINLEVEL_OUTOF10: 6
PAINLEVEL_OUTOF10: 5
PAINLEVEL_OUTOF10: 10
PAINLEVEL_OUTOF10: 8

## 2025-01-02 ASSESSMENT — PAIN DESCRIPTION - ORIENTATION
ORIENTATION: RIGHT
ORIENTATION: RIGHT

## 2025-01-02 ASSESSMENT — PAIN DESCRIPTION - LOCATION: LOCATION: RIB CAGE

## 2025-01-02 ASSESSMENT — LIFESTYLE VARIABLES: HOW OFTEN DO YOU HAVE A DRINK CONTAINING ALCOHOL: NEVER

## 2025-01-02 ASSESSMENT — PAIN - FUNCTIONAL ASSESSMENT: PAIN_FUNCTIONAL_ASSESSMENT: 0-10

## 2025-01-02 NOTE — H&P
TRAUMA H&P/CONSULT    PATIENT NAME: Estelle Gunderson  YOB: 1945  MEDICAL RECORD NO. [id number]   DATE: 1/2/2025  PRIMARY CARE PHYSICIAN: Kamryn Briseno MD  PATIENT EVALUATED AT THE REQUEST OF DR.:  Dr. Begum    ACTIVATION   Trauma Consult-Time at bedside 400pm      IMPRESSION AND PLAN:       Diagnosis: R rib fx 5-7, flail chest, Rib score 6,    Plan: Daily rib score, pain management, F/u 3D recon, Possible anesthesia for block, Doxy and prednisone recently started for bronchitis        If intracranial hemorrhage is present, is it a:  [] BIG 1  [] BIG 2  [] BIG 3  If chest wall injury: Rib score___    CONSULT SERVICES  None      HISTORY:     Chief Complaint:  \"My ribs hurt\"    GENERAL DATA  Patient information was obtained from patient.  History/Exam limitations: none.  Injury Date: 1/2/2025   Approximate Injury Time: 4 PM       Transport mode: Ambulance  Referring Hospital: None    SETTING OF TRAUMATIC EVENT   Location : Home  Specific Details of Location: Kitchen    MECHANISM OF INJURY    Fall From standing      HISTORY:     Estelle Gunderson is a 79 y.o. female who presents with right-sided chest wall pain after a mechanical fall.  Patient states she was recently switched from trazodone to Ambien for her nighttime sleeping medication.  She took her trazodone yesterday, had no relief of her insomnia symptoms so she proceeded to also take an Ambien.  She awoke at 0500 to go to the bathroom and subsequently fell onto her right side.  She denies any loss of consciousness, is not on anticoagulation.  She immediately felt pain to her right chest wall.  She was able to get into a standing position ambulate with some difficulty secondary to pain.  She also has generalized right shoulder pain, right upper quadrant abdominal pain and right hip pain.     Traumatic loss of Consciousness: No    Total Fluids Given Prior To Arrival none mL    MEDICATIONS:   []  None     []  Information not available due to

## 2025-01-02 NOTE — ED TRIAGE NOTES
Pt to ed c/o right sided shoulder, arm and hip pain. Per pt she fell this morning around 0500 when walking to the bathroom. Pt denies being dizzy, unsure how she fell. Pt denies hitting her head. Pt states she took a trazadone before bed and she had restless legs all night, then around 0400 she took an ambien. Pt denies hitting head or loc. Pt states she does take blood thinners.     Pt is alert and oriented x4. To room via wheelchair. Vitals stable. Call light in reach. Will continue with plan of care.

## 2025-01-02 NOTE — ED PROVIDER NOTES
[facility]     Emergency Department     Faculty Attestation    I performed a history and physical examination of the patient and discussed management with the resident. I have reviewed and agree with the resident’s findings including all diagnostic interpretations, and treatment plans as written at the time of my review. Any areas of disagreement are noted on the chart. I was personally present for the key portions of any procedures. I have documented in the chart those procedures where I was not present during the key portions. For Physician Assistant/ Nurse Practitioner cases/documentation I have personally evaluated this patient and have completed at least one if not all key elements of the E/M (history, physical exam, and MDM). Additional findings are as noted.    PtName: Estelle Gunderson  MRN: [id number]  Birthdate 1945  Date of evaluation: 1/2/25  Note Started: 11:51 AM EST    Primary Care Physician: Kamryn Briseno MD        History: This is a 79 y.o. female who presents to the Emergency Department with complaint of chest and abdominal pain.  Patient states she fell earlier this morning.  She denies hitting her head.  She complained of pain to the right ribs and abdomen and hip area.  She has not taken analgesia for the pain at home.    Physical:   height is 1.575 m (5' 2\") and weight is 71.7 kg (158 lb 1.1 oz). Her temperature is 97.9 °F (36.6 °C). Her blood pressure is 113/60 and her pulse is 70. Her respiration is 18 and oxygen saturation is 94%.  Awake alert appears to be uncomfortable tenderness to palpation along the right lateral chest wall no crepitus is palpated abdomen is soft she is tender in the right upper lower abdominal area.  Patient has some tenderness in the hip.    Impression: Fall    Plan: CT, analgesia, x-ray, CBC, BMP, CK, Igou      EKG Interpretation    Interpreted by me  Atrial sensed ventricular paced rhythm at a 
Subjective   Patient ID: Palmer is a 35 year old male.    Chief Complaint   Patient presents with   • Physical     fasting labs, declines flu vaccine     Pt presents for PE. Feeling well overall.       History reviewed. No pertinent past medical history.    Patient Active Problem List   Diagnosis   • Encounter for general adult medical examination w/o abnormal findings   • Class 1 obesity due to excess calories without serious comorbidity with body mass index (BMI) of 32.0 to 32.9 in adult       History reviewed. No pertinent surgical history.    Family History   Problem Relation Age of Onset   • Patient is unaware of any medical problems Mother    • Patient is unaware of any medical problems Father        Social History     Tobacco Use   • Smoking status: Former Smoker     Types: Cigarettes   • Smokeless tobacco: Never Used   • Tobacco comment: smoked in college, light smoker   Vaping Use   • Vaping Use: never used   Substance Use Topics   • Alcohol use: Yes   • Drug use: Yes     Types: Marijuana       No current outpatient medications on file.     No current facility-administered medications for this visit.       ALLERGIES:   Allergen Reactions   • Crab   (Food) Other (See Comments)         Review of Systems   Constitutional: Negative.    HENT: Negative.    Eyes: Negative.    Respiratory: Negative.    Cardiovascular: Negative.    Gastrointestinal: Negative.    Endocrine: Negative.    Genitourinary: Negative.    Musculoskeletal: Negative.    Skin: Negative.    Neurological: Negative.    Psychiatric/Behavioral: Negative.        Objective   Physical Exam  Vitals and nursing note reviewed.   Constitutional:       Appearance: He is well-developed.   HENT:      Head: Normocephalic and atraumatic.   Eyes:      Conjunctiva/sclera: Conjunctivae normal.      Pupils: Pupils are equal, round, and reactive to light.   Cardiovascular:      Rate and Rhythm: Normal rate and regular rhythm.      Heart sounds: Normal heart sounds. 
  Pulmonary:      Effort: Pulmonary effort is normal.      Breath sounds: Normal breath sounds.   Abdominal:      General: Bowel sounds are normal.      Palpations: Abdomen is soft.   Musculoskeletal:         General: Normal range of motion.      Cervical back: Normal range of motion and neck supple.   Skin:     General: Skin is warm and dry.   Neurological:      Mental Status: He is alert and oriented to person, place, and time.      Deep Tendon Reflexes: Reflexes are normal and symmetric.   Psychiatric:         Behavior: Behavior normal.         Thought Content: Thought content normal.         Judgment: Judgment normal.           ASSESSMENT AND PLAN  Encounter for general adult medical examination w/o abnormal findings  PE w/o concerns  Histories reviewed  Labs ordered  Discussed exercise on a regular basis 3-5 times weekly  Recommend healthy diet that is low on carbs      Class 1 obesity due to excess calories without serious comorbidity with body mass index (BMI) of 32.0 to 32.9 in adult  Will check A1c  Discussed diet and exercise    
same time as   this study.         Final   CT HEAD:      No CT evidence for acute intracranial abnormality..      Cerebral atrophy.      CT CERVICAL SPINE:      No acute fracture or subluxation of the cervical spine.      Severe diffuse degenerative changes.         CT CHEST ABDOMEN PELVIS W CONTRAST Additional Contrast? None   Final Result   1. Two part fractures involving the right 5th-7th ribs, consistent with flail   chest, grade 3 chest wall injury.  Subtle contour abnormality of the   anterolateral right 4th rib may also represent a nondisplaced fracture. No   pneumothorax or effusion.   2. No acute traumatic injury identified in the abdomen or pelvis.   3. Mild apparent long segment thickening of the sigmoid colon without   adjacent fat stranding. This may be accentuated by contracted state.   Correlate clinically for colitis.   4. Moderate size hiatal hernia.   5. No acute osseous abnormality identified in the thoracic or lumbar spine.         CT HEAD WO CONTRAST   Final Result   Addendum (preliminary) 1 of 1   ADDENDUM:   There is partially imaged 6 mm nodular density in the left lung apex.     Please   correlate with dedicated CT chest abdomen pelvis performed at same time as   this study.         Final   CT HEAD:      No CT evidence for acute intracranial abnormality..      Cerebral atrophy.      CT CERVICAL SPINE:      No acute fracture or subluxation of the cervical spine.      Severe diffuse degenerative changes.         CT LUMBAR SPINE BONY RECONSTRUCTION   Final Result   1. Two part fractures involving the right 5th-7th ribs, consistent with flail   chest, grade 3 chest wall injury.  Subtle contour abnormality of the   anterolateral right 4th rib may also represent a nondisplaced fracture. No   pneumothorax or effusion.   2. No acute traumatic injury identified in the abdomen or pelvis.   3. Mild apparent long segment thickening of the sigmoid colon without   adjacent fat stranding. This may be 
Capillary refill takes less than 2 seconds.   Neurological:      General: No focal deficit present.      Mental Status: She is alert.       DDX/DIAGNOSTIC RESULTS / EMERGENCY DEPARTMENT COURSE / MDM     Medical Decision Making  Patient presents with mechanical fall, right chest wall pain as well as right shoulder pain.  She is not on anticoagulation, no loss of consciousness.  Plan for pan scans, basic labs, likely trauma consult pending results of imaging and labs.    Amount and/or Complexity of Data Reviewed  Labs: ordered.  Radiology: ordered. Decision-making details documented in ED Course.  ECG/medicine tests: ordered.    Risk  Prescription drug management.  Decision regarding hospitalization.        EKG  EKG Interpretation    Interpreted by emergency department physician    Rhythm: paced  Rate: normal  Axis: left  Ectopy: none  Conduction: normal  ST Segments: no acute change  T Waves: no acute change  Q Waves: none    Clinical Impression: paced rhythm    Chris Bustos MD      All EKG's are interpreted by the Emergency Department Physician who either signs or Co-signs this chart in the absence of a cardiologist.    EMERGENCY DEPARTMENT COURSE:    ED Course as of 01/02/25 1643   Thu Jan 02, 2025   1241 XR SHOULDER RIGHT (MIN 2 VIEWS)  IMPRESSION:  No acute fracture   [BG]   1459 CT CHEST ABDOMEN PELVIS W CONTRAST Additional Contrast? None [BG]   1500 CT CERVICAL SPINE WO CONTRAST [BG]   1500 CT THORACIC SPINE BONY RECONSTRUCTION [BG]   1500 CT LUMBAR SPINE BONY RECONSTRUCTION [BG]   1500 CT HEAD WO CONTRAST [BG]   1500 IMPRESSION:  1. Two part fractures involving the right 5th-7th ribs, consistent with flail  chest, grade 3 chest wall injury.  Subtle contour abnormality of the  anterolateral right 4th rib may also represent a nondisplaced fracture. No  pneumothorax or effusion.  2. No acute traumatic injury identified in the abdomen or pelvis.  3. Mild apparent long segment thickening of the sigmoid colon

## 2025-01-03 ENCOUNTER — APPOINTMENT (OUTPATIENT)
Dept: GENERAL RADIOLOGY | Age: 80
DRG: 184 | End: 2025-01-03
Payer: MEDICARE

## 2025-01-03 LAB
ANION GAP SERPL CALCULATED.3IONS-SCNC: 10 MMOL/L (ref 9–16)
BASOPHILS # BLD: <0.03 K/UL (ref 0–0.2)
BASOPHILS NFR BLD: 0 % (ref 0–2)
BUN SERPL-MCNC: 24 MG/DL (ref 8–23)
CALCIUM SERPL-MCNC: 8.9 MG/DL (ref 8.6–10.4)
CHLORIDE SERPL-SCNC: 101 MMOL/L (ref 98–107)
CO2 SERPL-SCNC: 25 MMOL/L (ref 20–31)
CREAT SERPL-MCNC: 1.1 MG/DL (ref 0.6–0.9)
EKG ATRIAL RATE: 69 BPM
EKG P AXIS: 88 DEGREES
EKG P-R INTERVAL: 196 MS
EKG Q-T INTERVAL: 462 MS
EKG QRS DURATION: 166 MS
EKG QTC CALCULATION (BAZETT): 495 MS
EKG R AXIS: -79 DEGREES
EKG T AXIS: 85 DEGREES
EKG VENTRICULAR RATE: 69 BPM
EOSINOPHIL # BLD: <0.03 K/UL (ref 0–0.44)
EOSINOPHILS RELATIVE PERCENT: 0 % (ref 1–4)
ERYTHROCYTE [DISTWIDTH] IN BLOOD BY AUTOMATED COUNT: 15.6 % (ref 11.8–14.4)
GFR, ESTIMATED: 51 ML/MIN/1.73M2
GLUCOSE SERPL-MCNC: 134 MG/DL (ref 74–99)
HCT VFR BLD AUTO: 35.9 % (ref 36.3–47.1)
HGB BLD-MCNC: 10.7 G/DL (ref 11.9–15.1)
IMM GRANULOCYTES # BLD AUTO: <0.03 K/UL (ref 0–0.3)
IMM GRANULOCYTES NFR BLD: 0 %
LYMPHOCYTES NFR BLD: 1.22 K/UL (ref 1.1–3.7)
LYMPHOCYTES RELATIVE PERCENT: 19 % (ref 24–43)
MCH RBC QN AUTO: 27.9 PG (ref 25.2–33.5)
MCHC RBC AUTO-ENTMCNC: 29.8 G/DL (ref 28.4–34.8)
MCV RBC AUTO: 93.7 FL (ref 82.6–102.9)
MONOCYTES NFR BLD: 0.47 K/UL (ref 0.1–1.2)
MONOCYTES NFR BLD: 7 % (ref 3–12)
NEUTROPHILS NFR BLD: 73 % (ref 36–65)
NEUTS SEG NFR BLD: 4.7 K/UL (ref 1.5–8.1)
NRBC BLD-RTO: 0 PER 100 WBC
PLATELET # BLD AUTO: 167 K/UL (ref 138–453)
PMV BLD AUTO: 10.6 FL (ref 8.1–13.5)
POTASSIUM SERPL-SCNC: 4.2 MMOL/L (ref 3.7–5.3)
RBC # BLD AUTO: 3.83 M/UL (ref 3.95–5.11)
RBC # BLD: ABNORMAL 10*6/UL
SODIUM SERPL-SCNC: 136 MMOL/L (ref 136–145)
WBC OTHER # BLD: 6.4 K/UL (ref 3.5–11.3)

## 2025-01-03 PROCEDURE — 36415 COLL VENOUS BLD VENIPUNCTURE: CPT

## 2025-01-03 PROCEDURE — 71045 X-RAY EXAM CHEST 1 VIEW: CPT

## 2025-01-03 PROCEDURE — 99231 SBSQ HOSP IP/OBS SF/LOW 25: CPT | Performed by: NURSE PRACTITIONER

## 2025-01-03 PROCEDURE — 2060000000 HC ICU INTERMEDIATE R&B

## 2025-01-03 PROCEDURE — 85025 COMPLETE CBC W/AUTO DIFF WBC: CPT

## 2025-01-03 PROCEDURE — 97161 PT EVAL LOW COMPLEX 20 MIN: CPT

## 2025-01-03 PROCEDURE — 97116 GAIT TRAINING THERAPY: CPT

## 2025-01-03 PROCEDURE — 96127 BRIEF EMOTIONAL/BEHAV ASSMT: CPT | Performed by: SOCIAL WORKER

## 2025-01-03 PROCEDURE — 80048 BASIC METABOLIC PNL TOTAL CA: CPT

## 2025-01-03 PROCEDURE — 2500000003 HC RX 250 WO HCPCS: Performed by: STUDENT IN AN ORGANIZED HEALTH CARE EDUCATION/TRAINING PROGRAM

## 2025-01-03 PROCEDURE — 6360000002 HC RX W HCPCS

## 2025-01-03 PROCEDURE — 6370000000 HC RX 637 (ALT 250 FOR IP)

## 2025-01-03 PROCEDURE — 94664 DEMO&/EVAL PT USE INHALER: CPT

## 2025-01-03 PROCEDURE — 6370000000 HC RX 637 (ALT 250 FOR IP): Performed by: STUDENT IN AN ORGANIZED HEALTH CARE EDUCATION/TRAINING PROGRAM

## 2025-01-03 PROCEDURE — 6360000002 HC RX W HCPCS: Performed by: STUDENT IN AN ORGANIZED HEALTH CARE EDUCATION/TRAINING PROGRAM

## 2025-01-03 PROCEDURE — 97530 THERAPEUTIC ACTIVITIES: CPT

## 2025-01-03 PROCEDURE — 94640 AIRWAY INHALATION TREATMENT: CPT

## 2025-01-03 RX ORDER — LANSOPRAZOLE 30 MG/1
15 TABLET, ORALLY DISINTEGRATING, DELAYED RELEASE ORAL
Status: DISCONTINUED | OUTPATIENT
Start: 2025-01-04 | End: 2025-01-07 | Stop reason: HOSPADM

## 2025-01-03 RX ORDER — CALCIUM CARBONATE 500 MG/1
500 TABLET, CHEWABLE ORAL 3 TIMES DAILY PRN
Status: DISCONTINUED | OUTPATIENT
Start: 2025-01-03 | End: 2025-01-07 | Stop reason: HOSPADM

## 2025-01-03 RX ADMIN — MONTELUKAST 10 MG: 10 TABLET, FILM COATED ORAL at 20:38

## 2025-01-03 RX ADMIN — PRAVASTATIN SODIUM 20 MG: 20 TABLET ORAL at 20:38

## 2025-01-03 RX ADMIN — DOXYCYCLINE HYCLATE 100 MG: 100 TABLET, COATED ORAL at 08:57

## 2025-01-03 RX ADMIN — ANTACID TABLETS 500 MG: 500 TABLET, CHEWABLE ORAL at 20:41

## 2025-01-03 RX ADMIN — OXYCODONE 2.5 MG: 5 TABLET ORAL at 08:57

## 2025-01-03 RX ADMIN — Medication 400 UNITS: at 08:56

## 2025-01-03 RX ADMIN — HEPARIN SODIUM 5000 UNITS: 5000 INJECTION INTRAVENOUS; SUBCUTANEOUS at 17:13

## 2025-01-03 RX ADMIN — HEPARIN SODIUM 5000 UNITS: 5000 INJECTION INTRAVENOUS; SUBCUTANEOUS at 05:42

## 2025-01-03 RX ADMIN — GABAPENTIN 100 MG: 100 CAPSULE ORAL at 08:57

## 2025-01-03 RX ADMIN — TRAZODONE HYDROCHLORIDE 50 MG: 50 TABLET ORAL at 20:39

## 2025-01-03 RX ADMIN — ROPINIROLE HYDROCHLORIDE 0.5 MG: 0.25 TABLET, FILM COATED ORAL at 20:38

## 2025-01-03 RX ADMIN — Medication 2000 UNITS: at 08:56

## 2025-01-03 RX ADMIN — ACETAMINOPHEN 1000 MG: 500 TABLET ORAL at 20:38

## 2025-01-03 RX ADMIN — ACETAMINOPHEN 1000 MG: 500 TABLET ORAL at 17:12

## 2025-01-03 RX ADMIN — ONDANSETRON 4 MG: 4 TABLET, ORALLY DISINTEGRATING ORAL at 20:41

## 2025-01-03 RX ADMIN — VALACYCLOVIR HYDROCHLORIDE 1000 MG: 500 TABLET, FILM COATED ORAL at 08:56

## 2025-01-03 RX ADMIN — BUDESONIDE AND FORMOTEROL FUMARATE DIHYDRATE 2 PUFF: 160; 4.5 AEROSOL RESPIRATORY (INHALATION) at 19:41

## 2025-01-03 RX ADMIN — CARVEDILOL 6.25 MG: 12.5 TABLET, FILM COATED ORAL at 08:56

## 2025-01-03 RX ADMIN — BENZOCAINE AND MENTHOL 1 LOZENGE: 15; 3.6 LOZENGE ORAL at 20:41

## 2025-01-03 RX ADMIN — Medication 600 MG: at 08:56

## 2025-01-03 RX ADMIN — VALSARTAN 80 MG: 80 TABLET, FILM COATED ORAL at 08:56

## 2025-01-03 RX ADMIN — ACETAMINOPHEN 1000 MG: 500 TABLET ORAL at 05:42

## 2025-01-03 RX ADMIN — SODIUM CHLORIDE, PRESERVATIVE FREE 10 ML: 5 INJECTION INTRAVENOUS at 20:39

## 2025-01-03 RX ADMIN — FENTANYL CITRATE 25 MCG: 50 INJECTION, SOLUTION INTRAMUSCULAR; INTRAVENOUS at 02:00

## 2025-01-03 RX ADMIN — CARVEDILOL 6.25 MG: 12.5 TABLET, FILM COATED ORAL at 17:12

## 2025-01-03 RX ADMIN — FENTANYL CITRATE 25 MCG: 50 INJECTION, SOLUTION INTRAMUSCULAR; INTRAVENOUS at 05:50

## 2025-01-03 RX ADMIN — GABAPENTIN 100 MG: 100 CAPSULE ORAL at 20:38

## 2025-01-03 RX ADMIN — HYDROCHLOROTHIAZIDE 12.5 MG: 25 TABLET ORAL at 08:56

## 2025-01-03 RX ADMIN — HEPARIN SODIUM 5000 UNITS: 5000 INJECTION INTRAVENOUS; SUBCUTANEOUS at 20:49

## 2025-01-03 RX ADMIN — POLYETHYLENE GLYCOL 3350 17 G: 17 POWDER, FOR SOLUTION ORAL at 08:56

## 2025-01-03 RX ADMIN — PREDNISONE 20 MG: 20 TABLET ORAL at 08:56

## 2025-01-03 RX ADMIN — ROPINIROLE HYDROCHLORIDE 0.5 MG: 0.25 TABLET, FILM COATED ORAL at 08:56

## 2025-01-03 RX ADMIN — SODIUM CHLORIDE, PRESERVATIVE FREE 10 ML: 5 INJECTION INTRAVENOUS at 08:57

## 2025-01-03 RX ADMIN — BUDESONIDE AND FORMOTEROL FUMARATE DIHYDRATE 2 PUFF: 160; 4.5 AEROSOL RESPIRATORY (INHALATION) at 02:08

## 2025-01-03 RX ADMIN — GABAPENTIN 100 MG: 100 CAPSULE ORAL at 17:12

## 2025-01-03 RX ADMIN — DOXYCYCLINE HYCLATE 100 MG: 100 TABLET, COATED ORAL at 20:38

## 2025-01-03 RX ADMIN — AMLODIPINE BESYLATE 5 MG: 10 TABLET ORAL at 08:56

## 2025-01-03 ASSESSMENT — PAIN DESCRIPTION - ORIENTATION: ORIENTATION: RIGHT

## 2025-01-03 ASSESSMENT — PAIN - FUNCTIONAL ASSESSMENT: PAIN_FUNCTIONAL_ASSESSMENT: PREVENTS OR INTERFERES WITH MANY ACTIVE NOT PASSIVE ACTIVITIES

## 2025-01-03 ASSESSMENT — PAIN DESCRIPTION - FREQUENCY: FREQUENCY: CONTINUOUS

## 2025-01-03 ASSESSMENT — PAIN DESCRIPTION - LOCATION: LOCATION: RIB CAGE

## 2025-01-03 ASSESSMENT — PAIN SCALES - GENERAL
PAINLEVEL_OUTOF10: 3
PAINLEVEL_OUTOF10: 7
PAINLEVEL_OUTOF10: 2
PAINLEVEL_OUTOF10: 2
PAINLEVEL_OUTOF10: 6
PAINLEVEL_OUTOF10: 7
PAINLEVEL_OUTOF10: 7
PAINLEVEL_OUTOF10: 3
PAINLEVEL_OUTOF10: 4

## 2025-01-03 ASSESSMENT — PAIN DESCRIPTION - DESCRIPTORS: DESCRIPTORS: ACHING;SHARP

## 2025-01-03 ASSESSMENT — PAIN SCALES - WONG BAKER: WONGBAKER_NUMERICALRESPONSE: NO HURT

## 2025-01-03 ASSESSMENT — PAIN DESCRIPTION - PAIN TYPE: TYPE: ACUTE PAIN

## 2025-01-03 ASSESSMENT — PAIN DESCRIPTION - ONSET: ONSET: ON-GOING

## 2025-01-03 NOTE — CARE COORDINATION
Trauma Coordinator Rounding Note  Daily check in:  I met with Estelle Gunderson  at bedside to review their plan of care. I allowed opportunity for Estelle Gunderson to ask questions regarding their injuries, expected length of stay and disposition plan. All questions answered to verbal satisfaction.  Pt was under the impression that rib block would only relieve pain for one day. Pt also had specific questions about the procedure which I attempted to relay to the anesthesiologist on call. I sent a perfect serve that has not been read yet. I asked bedside RN to make a phone call to anesthesia to request someone come to bedside to speak with patient as pain is not well controlled and pt otherwise stable for DC per trauma team. I also called anesthesiology MD and CRNA leaving 4A RN station number as callback. Bedside RN informed. Usha SOLANO informed and may put new consult in for anesthesiology.   Update: Anesthesiologist responded via Meetapp[employer] and will try to see patient this afternoon or evening.    []Wounds  [x]PT/OT/speech  [x]Incentive spirometer  [x]Diet  [x]Activity  [x]Preferred pharmacy ([facility])  [x] PCP Confirmed  [x] Insurance Confirmed    DC Expectation:  Patient expects to be discharged to Home  Bedside Nurse:  I spoke with the patient's assigned nurse to review the plan of care for today and provide an opportunity to ask questions or relay any concerns to the Trauma service.    Discharge Info:  I confirmed follow up information was placed in the discharge instructions for  trauma surgery .   Discharge instructions reviewed and updated in patient's chart.   :  I provided a clinical update to the unit  and confirmed the disposition plan. Current barriers to discharge include:  Pain control.  PIC Score  IS Goal Volume (15ml/kg IBW): 751.5   Pain  Patient reported 1-10 scale Inspiration  Incentive Spirometer    Volume obtained: 1250 ml Cough  Assessed by nurse     3  Mild  Pain

## 2025-01-03 NOTE — CARE COORDINATION
Case Management Assessment  Initial Evaluation    Date/Time of Evaluation: 1/3/2025 9:52 AM  Assessment Completed by: ZBIGNIEW GONZALEZ    If patient is discharged prior to next notation, then this note serves as note for discharge by case management.    Patient Name: Estelle Gunderson                   YOB: 1945  Diagnosis: Fall, initial encounter [W19.XXXA]  Traumatic closed displaced fracture of rib, right, initial encounter [S22.31XA]  Closed fracture of multiple ribs with flail chest, initial encounter [S22.5XXA]                   Date / Time: 1/2/2025 11:10 AM    Patient Admission Status: Inpatient   Readmission Risk (Low < 19, Mod (19-27), High > 27): Readmission Risk Score: 17.1    Current PCP: Kamryn Briseno MD  PCP verified by CM? (P) No    Chart Reviewed: Yes      History Provided by: (P) Patient  Patient Orientation: (P) Alert and Oriented, Person, Place, Situation, Self    Patient Cognition: (P) Alert    Hospitalization in the last 30 days (Readmission):  No    If yes, Readmission Assessment in  Navigator will be completed.    Advance Directives:      Code Status: Full Code   Patient's Primary Decision Maker is: (P) Legal Next of Kin    Primary Decision Maker: Roselyn Nascimento - Child - [phone]    Discharge Planning:    Patient lives with: (P) Alone Type of Home: (P) House  Primary Care Giver: (P) Self  Patient Support Systems include: (P) None   Current Financial resources:    Current community resources: (P) None  Current services prior to admission: (P) Durable Medical Equipment            Current DME: (P) Cane            Type of Home Care services:       ADLS  Prior functional level: (P) Independent in ADLs/IADLs  Current functional level: (P) Assistance with the following:, Bathing, Dressing, Toileting, Cooking, Housework, Mobility    PT AM-PAC:   /24  OT AM-PAC:   /24    Family can provide assistance at DC: (P) Yes  Would you like Case Management to discuss the

## 2025-01-03 NOTE — ED NOTES
Dr campa at bedside to update pt   
Per pharmacy, hold on giving all meds ordered by dr bynum until OR.  
Pt to ct   
Report given to mirna almonte. All questions answered.   
Writer gave 4mg of zofran per Dr. Hernandez  
Xray at bedside   
Bundle branch block     CAD (coronary artery disease) 07/16/2014    Carcinoma in situ of breast 07/16/2014    Chronic back pain 2024    Congenital heart disease     Depression 2023    Esophageal reflux 07/16/2014    Essential hypertension, benign 07/16/2014    Fibromyalgia     Insulin resistance 07/16/2014    Irritable bowel syndrome ?    MGUS (monoclonal gammopathy of unknown significance) 10/07/2020    IgM kappa monoclonal gammopathy quantified at 0.06 g/dL    Nephrolithiasis 09/09/2020    Lithotripsy in 2012    Osteoporosis     Patient in clinical research study 05/12/2022    [facility] [id number]    Pure hypercholesterolemia 07/16/2014    Restless legs syndrome 2023    Stage 3 chronic kidney disease (HCC) 03/19/2019    Status post transcatheter aortic valve replacement (TAVR) using bioprosthesis 06/23/2023    Dr Veda Burroughs, medtronic 26 mm    Syncope     Unspecified asthma(493.90) 07/16/2014    Urinary incontinence 2023    Night time       Labs:  Labs Reviewed   CBC WITH AUTO DIFFERENTIAL - Abnormal; Notable for the following components:       Result Value    RBC 3.64 (*)     Hemoglobin 10.3 (*)     Hematocrit 31.5 (*)     RDW 15.6 (*)     Neutrophils % 72 (*)     Lymphocytes % 17 (*)     Eosinophils % 0 (*)     All other components within normal limits   BASIC METABOLIC PANEL - Abnormal; Notable for the following components:    Glucose 136 (*)     BUN 27 (*)     Creatinine 1.3 (*)     Est, Glom Filt Rate 42 (*)     All other components within normal limits   CK - Abnormal; Notable for the following components:    Total  (*)     All other components within normal limits   MYOGLOBIN, BLOOD - Abnormal; Notable for the following components:    Myoglobin 183 (*)     All other components within normal limits   ALBUMIN   URINALYSIS WITH REFLEX TO CULTURE   BASIC METABOLIC PANEL W/ REFLEX TO MG FOR LOW K   CBC WITH AUTO DIFFERENTIAL       Electronically signed by Ba Vidales RN on 1/2/2025 at 8:15 PM

## 2025-01-03 NOTE — CARE COORDINATION
SBIRT completed with pt. Pt admitted after a fall.  Pt reports she does not drink alcohol or use any drugs. When asked about depression, pt stated \"not really\". She denied any SI. Pt denied any changes in eating, sleeping, concentration.   SBIRT is negative.            Alcohol Screening and Brief Intervention        No results for input(s): \"ALC\" in the last 72 hours.    Alcohol Pre-screening     (WOMEN ONLY) How many times in the past year have you had 4 or more drinks in a day?: None       Drug Pre-Screening  -none       Drug Screening DAST       Mood Pre-Screening (PHQ-2)  During the past 2 weeks, have you been bothered by, feeling down, depressed or hopeless?  No        I have interviewed Estelle Gunderson, [id number] regarding  Her alcohol consumption/drug use and risk for excessive use. Screenings were negative.  Patient  N/A intervention at this time.   Deferred []    Completed on: 1/3/2025   GAURAV MALDONADO

## 2025-01-03 NOTE — DISCHARGE INSTRUCTIONS
Discharge Instructions for Trauma       What to do after you leave the hospital:    Please follow up with Trauma surgery as needed    You were found to have incidental findings on your CT imaging, they are as follows:  Final Result  There is partially imaged 6 mm nodular density in the left lung apex.    Please correlate with dedicated CT chest abdomen pelvis performed at same time as  this study.     CT CHEST ABDOMEN PELVIS W CONTRAST Additional Contrast? None/ CT LUMBAR SPINE BONY RECONSTRUCTION/ CT THORACIC SPINE BONY RECONSTRUCTION  Final Result  1. Two part fractures involving the right 5th-7th ribs, consistent with flail  chest, grade 3 chest wall injury.  Subtle contour abnormality of the  anterolateral right 4th rib may also represent a nondisplaced fracture. No  pneumothorax or effusion.  2. No acute traumatic injury identified in the abdomen or pelvis.  3. Mild apparent long segment thickening of the sigmoid colon without  adjacent fat stranding. This may be accentuated by contracted state.  Correlate clinically for colitis.  4. Moderate size hiatal hernia.  5. No acute osseous abnormality identified in the thoracic or lumbar spine.      Please follow up with your PCP at your earliest convenience    General questions or concerns please call the [facility] at [phone].  If needed, the clinic fax number is [phone].    Trauma is a life-threatening condition. Your doctor will want to closely monitor you. Be sure to go to all of your appointments.     Additional Information    For resources transitioning back to the community following your trauma, visit [email or web address]    [place] an account and begin to take advantage of the many resources to assist you.  Learn about injuries and how they are treated, connect with your peers who understand the challenges you are facing.  Discover additional programs and services which may be available at your

## 2025-01-04 ENCOUNTER — ANESTHESIA (OUTPATIENT)
Dept: INPATIENT UNIT | Age: 80
End: 2025-01-04
Payer: MEDICARE

## 2025-01-04 ENCOUNTER — ANESTHESIA EVENT (OUTPATIENT)
Dept: INPATIENT UNIT | Age: 80
End: 2025-01-04
Payer: MEDICARE

## 2025-01-04 LAB
ANION GAP SERPL CALCULATED.3IONS-SCNC: 10 MMOL/L (ref 9–16)
BASOPHILS # BLD: 0.03 K/UL (ref 0–0.2)
BASOPHILS NFR BLD: 0 % (ref 0–2)
BUN SERPL-MCNC: 31 MG/DL (ref 8–23)
CALCIUM SERPL-MCNC: 9.6 MG/DL (ref 8.6–10.4)
CHLORIDE SERPL-SCNC: 98 MMOL/L (ref 98–107)
CO2 SERPL-SCNC: 27 MMOL/L (ref 20–31)
CREAT SERPL-MCNC: 1.3 MG/DL (ref 0.6–0.9)
EOSINOPHIL # BLD: <0.03 K/UL (ref 0–0.44)
EOSINOPHILS RELATIVE PERCENT: 0 % (ref 1–4)
ERYTHROCYTE [DISTWIDTH] IN BLOOD BY AUTOMATED COUNT: 15.4 % (ref 11.8–14.4)
GFR, ESTIMATED: 42 ML/MIN/1.73M2
GLUCOSE SERPL-MCNC: 129 MG/DL (ref 74–99)
HCT VFR BLD AUTO: 35.7 % (ref 36.3–47.1)
HGB BLD-MCNC: 10.7 G/DL (ref 11.9–15.1)
IMM GRANULOCYTES # BLD AUTO: 0.05 K/UL (ref 0–0.3)
IMM GRANULOCYTES NFR BLD: 1 %
LYMPHOCYTES NFR BLD: 2.98 K/UL (ref 1.1–3.7)
LYMPHOCYTES RELATIVE PERCENT: 27 % (ref 24–43)
MAGNESIUM SERPL-MCNC: 2.2 MG/DL (ref 1.6–2.4)
MCH RBC QN AUTO: 27.7 PG (ref 25.2–33.5)
MCHC RBC AUTO-ENTMCNC: 30 G/DL (ref 28.4–34.8)
MCV RBC AUTO: 92.5 FL (ref 82.6–102.9)
MONOCYTES NFR BLD: 1.31 K/UL (ref 0.1–1.2)
MONOCYTES NFR BLD: 12 % (ref 3–12)
NEUTROPHILS NFR BLD: 60 % (ref 36–65)
NEUTS SEG NFR BLD: 6.58 K/UL (ref 1.5–8.1)
NRBC BLD-RTO: 0 PER 100 WBC
PLATELET # BLD AUTO: 200 K/UL (ref 138–453)
PMV BLD AUTO: 10.5 FL (ref 8.1–13.5)
POTASSIUM SERPL-SCNC: 3.5 MMOL/L (ref 3.7–5.3)
RBC # BLD AUTO: 3.86 M/UL (ref 3.95–5.11)
RBC # BLD: ABNORMAL 10*6/UL
SODIUM SERPL-SCNC: 135 MMOL/L (ref 136–145)
WBC OTHER # BLD: 11 K/UL (ref 3.5–11.3)

## 2025-01-04 PROCEDURE — 6370000000 HC RX 637 (ALT 250 FOR IP): Performed by: STUDENT IN AN ORGANIZED HEALTH CARE EDUCATION/TRAINING PROGRAM

## 2025-01-04 PROCEDURE — 6370000000 HC RX 637 (ALT 250 FOR IP)

## 2025-01-04 PROCEDURE — 97166 OT EVAL MOD COMPLEX 45 MIN: CPT

## 2025-01-04 PROCEDURE — 76942 ECHO GUIDE FOR BIOPSY: CPT | Performed by: STUDENT IN AN ORGANIZED HEALTH CARE EDUCATION/TRAINING PROGRAM

## 2025-01-04 PROCEDURE — 2500000003 HC RX 250 WO HCPCS: Performed by: STUDENT IN AN ORGANIZED HEALTH CARE EDUCATION/TRAINING PROGRAM

## 2025-01-04 PROCEDURE — 85025 COMPLETE CBC W/AUTO DIFF WBC: CPT

## 2025-01-04 PROCEDURE — 6360000002 HC RX W HCPCS: Performed by: STUDENT IN AN ORGANIZED HEALTH CARE EDUCATION/TRAINING PROGRAM

## 2025-01-04 PROCEDURE — 2700000000 HC OXYGEN THERAPY PER DAY

## 2025-01-04 PROCEDURE — 2060000000 HC ICU INTERMEDIATE R&B

## 2025-01-04 PROCEDURE — 99231 SBSQ HOSP IP/OBS SF/LOW 25: CPT | Performed by: SURGERY

## 2025-01-04 PROCEDURE — 3E0T3BZ INTRODUCTION OF ANESTHETIC AGENT INTO PERIPHERAL NERVES AND PLEXI, PERCUTANEOUS APPROACH: ICD-10-PCS | Performed by: STUDENT IN AN ORGANIZED HEALTH CARE EDUCATION/TRAINING PROGRAM

## 2025-01-04 PROCEDURE — 97535 SELF CARE MNGMENT TRAINING: CPT

## 2025-01-04 PROCEDURE — 94640 AIRWAY INHALATION TREATMENT: CPT

## 2025-01-04 PROCEDURE — 6360000002 HC RX W HCPCS

## 2025-01-04 PROCEDURE — 83735 ASSAY OF MAGNESIUM: CPT

## 2025-01-04 PROCEDURE — 36415 COLL VENOUS BLD VENIPUNCTURE: CPT

## 2025-01-04 PROCEDURE — 94761 N-INVAS EAR/PLS OXIMETRY MLT: CPT

## 2025-01-04 PROCEDURE — 2580000003 HC RX 258

## 2025-01-04 PROCEDURE — 80048 BASIC METABOLIC PNL TOTAL CA: CPT

## 2025-01-04 RX ORDER — SODIUM CHLORIDE, SODIUM LACTATE, POTASSIUM CHLORIDE, AND CALCIUM CHLORIDE .6; .31; .03; .02 G/100ML; G/100ML; G/100ML; G/100ML
500 INJECTION, SOLUTION INTRAVENOUS ONCE
Status: COMPLETED | OUTPATIENT
Start: 2025-01-04 | End: 2025-01-04

## 2025-01-04 RX ORDER — BUPIVACAINE HYDROCHLORIDE 5 MG/ML
INJECTION, SOLUTION EPIDURAL; INTRACAUDAL
Status: COMPLETED | OUTPATIENT
Start: 2025-01-04 | End: 2025-01-04

## 2025-01-04 RX ADMIN — Medication 600 MG: at 10:00

## 2025-01-04 RX ADMIN — OXYCODONE 2.5 MG: 5 TABLET ORAL at 04:17

## 2025-01-04 RX ADMIN — BUDESONIDE AND FORMOTEROL FUMARATE DIHYDRATE 2 PUFF: 160; 4.5 AEROSOL RESPIRATORY (INHALATION) at 08:02

## 2025-01-04 RX ADMIN — SODIUM CHLORIDE, PRESERVATIVE FREE 10 ML: 5 INJECTION INTRAVENOUS at 19:38

## 2025-01-04 RX ADMIN — HEPARIN SODIUM 5000 UNITS: 5000 INJECTION INTRAVENOUS; SUBCUTANEOUS at 04:26

## 2025-01-04 RX ADMIN — ACETAMINOPHEN 1000 MG: 500 TABLET ORAL at 19:42

## 2025-01-04 RX ADMIN — SODIUM CHLORIDE, SODIUM LACTATE, POTASSIUM CHLORIDE, AND CALCIUM CHLORIDE 500 ML: .6; .31; .03; .02 INJECTION, SOLUTION INTRAVENOUS at 15:47

## 2025-01-04 RX ADMIN — VALACYCLOVIR HYDROCHLORIDE 1000 MG: 500 TABLET, FILM COATED ORAL at 10:00

## 2025-01-04 RX ADMIN — TRAZODONE HYDROCHLORIDE 50 MG: 50 TABLET ORAL at 19:38

## 2025-01-04 RX ADMIN — BUDESONIDE AND FORMOTEROL FUMARATE DIHYDRATE 2 PUFF: 160; 4.5 AEROSOL RESPIRATORY (INHALATION) at 19:37

## 2025-01-04 RX ADMIN — DOXYCYCLINE HYCLATE 100 MG: 100 TABLET, COATED ORAL at 19:37

## 2025-01-04 RX ADMIN — SODIUM CHLORIDE, PRESERVATIVE FREE 10 ML: 5 INJECTION INTRAVENOUS at 10:26

## 2025-01-04 RX ADMIN — ROPINIROLE HYDROCHLORIDE 0.5 MG: 0.25 TABLET, FILM COATED ORAL at 19:38

## 2025-01-04 RX ADMIN — ROPINIROLE HYDROCHLORIDE 0.5 MG: 0.25 TABLET, FILM COATED ORAL at 10:00

## 2025-01-04 RX ADMIN — Medication 10 ML: at 12:43

## 2025-01-04 RX ADMIN — GABAPENTIN 100 MG: 100 CAPSULE ORAL at 14:30

## 2025-01-04 RX ADMIN — DOXYCYCLINE HYCLATE 100 MG: 100 TABLET, COATED ORAL at 10:00

## 2025-01-04 RX ADMIN — ONDANSETRON 4 MG: 2 INJECTION INTRAMUSCULAR; INTRAVENOUS at 19:34

## 2025-01-04 RX ADMIN — GABAPENTIN 100 MG: 100 CAPSULE ORAL at 19:37

## 2025-01-04 RX ADMIN — OXYCODONE 2.5 MG: 5 TABLET ORAL at 23:22

## 2025-01-04 RX ADMIN — Medication 2000 UNITS: at 10:00

## 2025-01-04 RX ADMIN — CARVEDILOL 6.25 MG: 12.5 TABLET, FILM COATED ORAL at 10:00

## 2025-01-04 RX ADMIN — POLYETHYLENE GLYCOL 3350 17 G: 17 POWDER, FOR SOLUTION ORAL at 10:00

## 2025-01-04 RX ADMIN — HEPARIN SODIUM 5000 UNITS: 5000 INJECTION INTRAVENOUS; SUBCUTANEOUS at 19:42

## 2025-01-04 RX ADMIN — ACETAMINOPHEN 1000 MG: 500 TABLET ORAL at 15:15

## 2025-01-04 RX ADMIN — VALSARTAN 80 MG: 80 TABLET, FILM COATED ORAL at 10:23

## 2025-01-04 RX ADMIN — PRAVASTATIN SODIUM 20 MG: 20 TABLET ORAL at 19:37

## 2025-01-04 RX ADMIN — Medication 400 UNITS: at 10:26

## 2025-01-04 RX ADMIN — POTASSIUM BICARBONATE 40 MEQ: 782 TABLET, EFFERVESCENT ORAL at 15:12

## 2025-01-04 RX ADMIN — MONTELUKAST 10 MG: 10 TABLET, FILM COATED ORAL at 19:37

## 2025-01-04 RX ADMIN — BUPIVACAINE 20 ML: 13.3 INJECTION, SUSPENSION, LIPOSOMAL INFILTRATION at 12:43

## 2025-01-04 RX ADMIN — ACETAMINOPHEN 1000 MG: 500 TABLET ORAL at 04:26

## 2025-01-04 RX ADMIN — GABAPENTIN 100 MG: 100 CAPSULE ORAL at 09:00

## 2025-01-04 RX ADMIN — Medication 5 MG: at 19:37

## 2025-01-04 RX ADMIN — LANSOPRAZOLE 15 MG: 30 TABLET, ORALLY DISINTEGRATING, DELAYED RELEASE ORAL at 04:26

## 2025-01-04 RX ADMIN — AMLODIPINE BESYLATE 5 MG: 10 TABLET ORAL at 10:22

## 2025-01-04 RX ADMIN — PREDNISONE 20 MG: 20 TABLET ORAL at 10:00

## 2025-01-04 RX ADMIN — HYDROCHLOROTHIAZIDE 12.5 MG: 25 TABLET ORAL at 10:23

## 2025-01-04 ASSESSMENT — PAIN SCALES - GENERAL
PAINLEVEL_OUTOF10: 6
PAINLEVEL_OUTOF10: 8
PAINLEVEL_OUTOF10: 5
PAINLEVEL_OUTOF10: 4
PAINLEVEL_OUTOF10: 6

## 2025-01-04 ASSESSMENT — COPD QUESTIONNAIRES: CAT_SEVERITY: MODERATE

## 2025-01-04 ASSESSMENT — PAIN SCALES - WONG BAKER
WONGBAKER_NUMERICALRESPONSE: HURTS A LITTLE BIT
WONGBAKER_NUMERICALRESPONSE: HURTS LITTLE MORE

## 2025-01-04 ASSESSMENT — PAIN DESCRIPTION - LOCATION
LOCATION: RIB CAGE
LOCATION: FLANK;GENERALIZED
LOCATION: CHEST;RIB CAGE
LOCATION: FLANK

## 2025-01-04 ASSESSMENT — PAIN - FUNCTIONAL ASSESSMENT: PAIN_FUNCTIONAL_ASSESSMENT: PREVENTS OR INTERFERES WITH MANY ACTIVE NOT PASSIVE ACTIVITIES

## 2025-01-04 NOTE — CARE COORDINATION
Transitional Planning: Spoke with patient and family and they are requesting referrals be sent to [facility] [facility]    Continued needs/services: Accepting ARU.

## 2025-01-04 NOTE — ANESTHESIA PRE PROCEDURE
Department of Anesthesiology  Preprocedure Note       Name:  Estelle Gunderson   Age:  79 y.o.  :  1945                                          MRN:  [id number]         Date:  2025      Surgeon: * No surgeons listed *    Procedure:     Medications prior to admission:   Prior to Admission medications    Medication Sig Start Date End Date Taking? Authorizing Provider   valsartan-hydroCHLOROthiazide (DIOVAN-HCT) 80-12.5 MG per tablet TAKE 1 TABLET BY MOUTH EVERY DAY 25   Jh Gotti MD   fluticasone-salmeterol (ADVAIR) 250-50 MCG/ACT AEPB diskus inhaler INHALE 1 PUFF IN THE MORNING AND BEFORE BEDTIME 24   Provider, MD Zoey   rOPINIRole (REQUIP) 0.5 MG tablet Take 1 tablet by mouth in the morning and at bedtime 24   Kamryn Briseno MD   pravastatin (PRAVACHOL) 20 MG tablet TAKE 1 TABLET BY MOUTH EVERY DAY IN THE EVENING 24   Kamryn Briseno MD   Estriol 10 % CREA 1 g by Does not apply route Twice a Week 25   Kamryn Briseno MD   traZODone (DESYREL) 50 MG tablet Take 1 tablet by mouth nightly 24   Kamryn Briseno MD   predniSONE (DELTASONE) 20 MG tablet Take 1 tablet by mouth daily for 5 days 24  Kamryn Briseno MD   doxycycline hyclate (VIBRA-TABS) 100 MG tablet Take 1 tablet by mouth 2 times daily for 7 days 24  Kamryn Briseno MD   carbamide peroxide (EAR DROPS) 6.5 % otic solution Place 5 drops into the right ear 2 times daily for 7 days 24  Kamryn Briseno MD   clobetasol (TEMOVATE) 0.05 % ointment APPLY TOPICALLY TO VULVA AND PERIANAL AREA TWICE DAILY 24   Myranda Rodriguez MD   montelukast (SINGULAIR) 10 MG tablet TAKE 1 TABLET BY MOUTH EVERY DAY IN THE EVENING 24   Gatica, Christopher A, MD   valACYclovir (VALTREX) 1 g tablet TAKE 1 TABLET BY MOUTH EVERY DAY 24   Myranda Rodriguez MD   clopidogrel (PLAVIX) 75 MG tablet TAKE 1 TABLET BY MOUTH EVERY DAY 24   Jh Gotti,

## 2025-01-04 NOTE — ANESTHESIA PROCEDURE NOTES
Peripheral Block    Patient location during procedure: pre-op  Reason for block: procedure for pain, post-op pain management and at surgeon's request  Start time: 1/4/2025 12:43 PM  End time: 1/4/2025 12:44 PM  Staffing  Performed: anesthesiologist   Anesthesiologist: Jose Peace MD  Performed by: Jose Peace MD  Authorized by: Jose Peace MD    Preanesthetic Checklist  Completed: patient identified, IV checked, site marked, risks and benefits discussed, surgical/procedural consents, equipment checked, pre-op evaluation, timeout performed, anesthesia consent given, oxygen available, monitors applied/VS acknowledged, fire risk safety assessment completed and verbalized and blood product R/B/A discussed and consented  Peripheral Block   Patient position: supine  Prep: ChloraPrep  Provider prep: mask and sterile gloves  Patient monitoring: cardiac monitor, continuous pulse ox, frequent blood pressure checks, IV access, oxygen and responsive to questions  Block type: Erector spinae  Laterality: right  Injection technique: single-shot  Guidance: ultrasound guided    Needle   Needle type: insulated echogenic nerve stimulator needle   Needle gauge: 22 G  Needle localization: ultrasound guidance  Needle length: 11 cm  Assessment   Injection assessment: negative aspiration for heme, no paresthesia on injection and local visualized surrounding nerve on ultrasound  Outcomes: patient tolerated procedure well and uncomplicated    Medications Administered  BUPivacaine (MARCAINE) PF injection 0.5% - Perineural   10 mL - 1/4/2025 12:43:00 PM  BUPivacaine liposome (EXPAREL) injection 1.3% - Perineural   20 mL - 1/4/2025 12:43:00 PM

## 2025-01-05 LAB
ANION GAP SERPL CALCULATED.3IONS-SCNC: 8 MMOL/L (ref 9–16)
BACTERIA URNS QL MICRO: NORMAL
BASOPHILS # BLD: <0.03 K/UL (ref 0–0.2)
BASOPHILS NFR BLD: 0 % (ref 0–2)
BILIRUB UR QL STRIP: NEGATIVE
BUN SERPL-MCNC: 37 MG/DL (ref 8–23)
CALCIUM SERPL-MCNC: 9.7 MG/DL (ref 8.6–10.4)
CASTS #/AREA URNS LPF: NORMAL /LPF (ref 0–8)
CHLORIDE SERPL-SCNC: 98 MMOL/L (ref 98–107)
CLARITY UR: CLEAR
CO2 SERPL-SCNC: 28 MMOL/L (ref 20–31)
COLOR UR: YELLOW
CREAT SERPL-MCNC: 1.5 MG/DL (ref 0.6–0.9)
CREAT UR-MCNC: 335 MG/DL (ref 28–217)
EOSINOPHIL # BLD: <0.03 K/UL (ref 0–0.44)
EOSINOPHILS RELATIVE PERCENT: 0 % (ref 1–4)
EPI CELLS #/AREA URNS HPF: NORMAL /HPF (ref 0–5)
ERYTHROCYTE [DISTWIDTH] IN BLOOD BY AUTOMATED COUNT: 15.2 % (ref 11.8–14.4)
GFR, ESTIMATED: 35 ML/MIN/1.73M2
GLUCOSE SERPL-MCNC: 134 MG/DL (ref 74–99)
GLUCOSE UR STRIP-MCNC: NEGATIVE MG/DL
HCT VFR BLD AUTO: 32.7 % (ref 36.3–47.1)
HGB BLD-MCNC: 10.3 G/DL (ref 11.9–15.1)
HGB UR QL STRIP.AUTO: NEGATIVE
IMM GRANULOCYTES # BLD AUTO: 0.03 K/UL (ref 0–0.3)
IMM GRANULOCYTES NFR BLD: 0 %
KETONES UR STRIP-MCNC: NEGATIVE MG/DL
LEUKOCYTE ESTERASE UR QL STRIP: ABNORMAL
LYMPHOCYTES NFR BLD: 1.85 K/UL (ref 1.1–3.7)
LYMPHOCYTES RELATIVE PERCENT: 17 % (ref 24–43)
MCH RBC QN AUTO: 28.3 PG (ref 25.2–33.5)
MCHC RBC AUTO-ENTMCNC: 31.5 G/DL (ref 28.4–34.8)
MCV RBC AUTO: 89.8 FL (ref 82.6–102.9)
MONOCYTES NFR BLD: 1.16 K/UL (ref 0.1–1.2)
MONOCYTES NFR BLD: 11 % (ref 3–12)
NEUTROPHILS NFR BLD: 72 % (ref 36–65)
NEUTS SEG NFR BLD: 7.89 K/UL (ref 1.5–8.1)
NITRITE UR QL STRIP: NEGATIVE
NRBC BLD-RTO: 0 PER 100 WBC
PH UR STRIP: 6 [PH] (ref 5–8)
PLATELET # BLD AUTO: 189 K/UL (ref 138–453)
PMV BLD AUTO: 10.5 FL (ref 8.1–13.5)
POTASSIUM SERPL-SCNC: 4.2 MMOL/L (ref 3.7–5.3)
PROT UR STRIP-MCNC: NEGATIVE MG/DL
RBC # BLD AUTO: 3.64 M/UL (ref 3.95–5.11)
RBC # BLD: ABNORMAL 10*6/UL
RBC #/AREA URNS HPF: NORMAL /HPF (ref 0–4)
SODIUM SERPL-SCNC: 134 MMOL/L (ref 136–145)
SODIUM UR-SCNC: 170 MMOL/L
SP GR UR STRIP: 1.02 (ref 1–1.03)
UROBILINOGEN UR STRIP-ACNC: NORMAL EU/DL (ref 0–1)
WBC #/AREA URNS HPF: NORMAL /HPF (ref 0–5)
WBC OTHER # BLD: 11 K/UL (ref 3.5–11.3)

## 2025-01-05 PROCEDURE — 6370000000 HC RX 637 (ALT 250 FOR IP): Performed by: STUDENT IN AN ORGANIZED HEALTH CARE EDUCATION/TRAINING PROGRAM

## 2025-01-05 PROCEDURE — 6370000000 HC RX 637 (ALT 250 FOR IP)

## 2025-01-05 PROCEDURE — 2500000003 HC RX 250 WO HCPCS: Performed by: STUDENT IN AN ORGANIZED HEALTH CARE EDUCATION/TRAINING PROGRAM

## 2025-01-05 PROCEDURE — 94761 N-INVAS EAR/PLS OXIMETRY MLT: CPT

## 2025-01-05 PROCEDURE — 2700000000 HC OXYGEN THERAPY PER DAY

## 2025-01-05 PROCEDURE — 94640 AIRWAY INHALATION TREATMENT: CPT

## 2025-01-05 PROCEDURE — 82570 ASSAY OF URINE CREATININE: CPT

## 2025-01-05 PROCEDURE — 84300 ASSAY OF URINE SODIUM: CPT

## 2025-01-05 PROCEDURE — 6360000002 HC RX W HCPCS

## 2025-01-05 PROCEDURE — 2580000003 HC RX 258

## 2025-01-05 PROCEDURE — 36415 COLL VENOUS BLD VENIPUNCTURE: CPT

## 2025-01-05 PROCEDURE — 85025 COMPLETE CBC W/AUTO DIFF WBC: CPT

## 2025-01-05 PROCEDURE — 99231 SBSQ HOSP IP/OBS SF/LOW 25: CPT | Performed by: SURGERY

## 2025-01-05 PROCEDURE — 2060000000 HC ICU INTERMEDIATE R&B

## 2025-01-05 PROCEDURE — 80048 BASIC METABOLIC PNL TOTAL CA: CPT

## 2025-01-05 PROCEDURE — 81001 URINALYSIS AUTO W/SCOPE: CPT

## 2025-01-05 PROCEDURE — 6360000002 HC RX W HCPCS: Performed by: STUDENT IN AN ORGANIZED HEALTH CARE EDUCATION/TRAINING PROGRAM

## 2025-01-05 RX ORDER — PHENOL 1.4 %
10 AEROSOL, SPRAY (ML) MUCOUS MEMBRANE NIGHTLY
COMMUNITY

## 2025-01-05 RX ORDER — ZOLPIDEM TARTRATE 5 MG/1
5 TABLET ORAL NIGHTLY PRN
COMMUNITY

## 2025-01-05 RX ORDER — ZOLPIDEM TARTRATE 5 MG/1
5 TABLET ORAL NIGHTLY PRN
Status: DISCONTINUED | OUTPATIENT
Start: 2025-01-05 | End: 2025-01-07 | Stop reason: HOSPADM

## 2025-01-05 RX ORDER — LIDOCAINE 4 G/G
1 PATCH TOPICAL DAILY
Status: DISCONTINUED | OUTPATIENT
Start: 2025-01-05 | End: 2025-01-07 | Stop reason: HOSPADM

## 2025-01-05 RX ORDER — 0.9 % SODIUM CHLORIDE 0.9 %
500 INTRAVENOUS SOLUTION INTRAVENOUS ONCE
Status: COMPLETED | OUTPATIENT
Start: 2025-01-05 | End: 2025-01-05

## 2025-01-05 RX ADMIN — BUDESONIDE AND FORMOTEROL FUMARATE DIHYDRATE 2 PUFF: 160; 4.5 AEROSOL RESPIRATORY (INHALATION) at 07:41

## 2025-01-05 RX ADMIN — POLYETHYLENE GLYCOL 3350 17 G: 17 POWDER, FOR SOLUTION ORAL at 09:56

## 2025-01-05 RX ADMIN — GABAPENTIN 100 MG: 100 CAPSULE ORAL at 09:30

## 2025-01-05 RX ADMIN — SODIUM CHLORIDE 500 ML: 9 INJECTION, SOLUTION INTRAVENOUS at 20:01

## 2025-01-05 RX ADMIN — VALSARTAN 80 MG: 80 TABLET, FILM COATED ORAL at 09:58

## 2025-01-05 RX ADMIN — ACETAMINOPHEN 1000 MG: 500 TABLET ORAL at 06:07

## 2025-01-05 RX ADMIN — GABAPENTIN 100 MG: 100 CAPSULE ORAL at 20:31

## 2025-01-05 RX ADMIN — HEPARIN SODIUM 5000 UNITS: 5000 INJECTION INTRAVENOUS; SUBCUTANEOUS at 20:31

## 2025-01-05 RX ADMIN — PRAVASTATIN SODIUM 20 MG: 20 TABLET ORAL at 20:31

## 2025-01-05 RX ADMIN — OXYCODONE 2.5 MG: 5 TABLET ORAL at 18:12

## 2025-01-05 RX ADMIN — Medication 600 MG: at 10:00

## 2025-01-05 RX ADMIN — SODIUM CHLORIDE, PRESERVATIVE FREE 10 ML: 5 INJECTION INTRAVENOUS at 20:31

## 2025-01-05 RX ADMIN — LANSOPRAZOLE 15 MG: 30 TABLET, ORALLY DISINTEGRATING, DELAYED RELEASE ORAL at 06:07

## 2025-01-05 RX ADMIN — DOXYCYCLINE HYCLATE 100 MG: 100 TABLET, COATED ORAL at 09:30

## 2025-01-05 RX ADMIN — Medication 400 UNITS: at 09:57

## 2025-01-05 RX ADMIN — ACETAMINOPHEN 1000 MG: 500 TABLET ORAL at 20:31

## 2025-01-05 RX ADMIN — CARVEDILOL 6.25 MG: 12.5 TABLET, FILM COATED ORAL at 18:14

## 2025-01-05 RX ADMIN — VALACYCLOVIR HYDROCHLORIDE 1000 MG: 500 TABLET, FILM COATED ORAL at 09:30

## 2025-01-05 RX ADMIN — GABAPENTIN 100 MG: 100 CAPSULE ORAL at 17:00

## 2025-01-05 RX ADMIN — ROPINIROLE HYDROCHLORIDE 0.5 MG: 0.25 TABLET, FILM COATED ORAL at 09:30

## 2025-01-05 RX ADMIN — ROPINIROLE HYDROCHLORIDE 0.5 MG: 0.25 TABLET, FILM COATED ORAL at 20:30

## 2025-01-05 RX ADMIN — Medication 2000 UNITS: at 09:57

## 2025-01-05 RX ADMIN — ZOLPIDEM TARTRATE 5 MG: 5 TABLET ORAL at 20:33

## 2025-01-05 RX ADMIN — BUDESONIDE AND FORMOTEROL FUMARATE DIHYDRATE 2 PUFF: 160; 4.5 AEROSOL RESPIRATORY (INHALATION) at 19:50

## 2025-01-05 RX ADMIN — HEPARIN SODIUM 5000 UNITS: 5000 INJECTION INTRAVENOUS; SUBCUTANEOUS at 06:17

## 2025-01-05 RX ADMIN — DOXYCYCLINE HYCLATE 100 MG: 100 TABLET, COATED ORAL at 20:31

## 2025-01-05 RX ADMIN — MONTELUKAST 10 MG: 10 TABLET, FILM COATED ORAL at 20:31

## 2025-01-05 RX ADMIN — ACETAMINOPHEN 1000 MG: 500 TABLET ORAL at 17:00

## 2025-01-05 RX ADMIN — Medication 10 MG: at 20:31

## 2025-01-05 RX ADMIN — AMLODIPINE BESYLATE 5 MG: 10 TABLET ORAL at 09:58

## 2025-01-05 RX ADMIN — ANTACID TABLETS 500 MG: 500 TABLET, CHEWABLE ORAL at 20:31

## 2025-01-05 RX ADMIN — SODIUM CHLORIDE, PRESERVATIVE FREE 10 ML: 5 INJECTION INTRAVENOUS at 09:58

## 2025-01-05 RX ADMIN — CLOPIDOGREL BISULFATE 75 MG: 75 TABLET ORAL at 09:57

## 2025-01-05 RX ADMIN — OXYCODONE 2.5 MG: 5 TABLET ORAL at 06:07

## 2025-01-05 RX ADMIN — HYDROCHLOROTHIAZIDE 12.5 MG: 25 TABLET ORAL at 09:59

## 2025-01-05 RX ADMIN — ONDANSETRON 4 MG: 2 INJECTION INTRAMUSCULAR; INTRAVENOUS at 19:54

## 2025-01-05 RX ADMIN — HEPARIN SODIUM 5000 UNITS: 5000 INJECTION INTRAVENOUS; SUBCUTANEOUS at 17:00

## 2025-01-05 RX ADMIN — CARVEDILOL 6.25 MG: 12.5 TABLET, FILM COATED ORAL at 09:58

## 2025-01-05 ASSESSMENT — PAIN SCALES - GENERAL
PAINLEVEL_OUTOF10: 3
PAINLEVEL_OUTOF10: 5
PAINLEVEL_OUTOF10: 5
PAINLEVEL_OUTOF10: 3
PAINLEVEL_OUTOF10: 2
PAINLEVEL_OUTOF10: 5
PAINLEVEL_OUTOF10: 3
PAINLEVEL_OUTOF10: 3
PAINLEVEL_OUTOF10: 2

## 2025-01-05 ASSESSMENT — PAIN DESCRIPTION - DESCRIPTORS
DESCRIPTORS: ACHING;DISCOMFORT;GNAWING
DESCRIPTORS: ACHING;DISCOMFORT;JABBING
DESCRIPTORS: ACHING;DISCOMFORT

## 2025-01-05 ASSESSMENT — PAIN DESCRIPTION - LOCATION
LOCATION: RIB CAGE
LOCATION: GENERALIZED;FLANK
LOCATION: RIB CAGE
LOCATION: GENERALIZED
LOCATION: FLANK
LOCATION: FLANK

## 2025-01-05 ASSESSMENT — PAIN DESCRIPTION - PAIN TYPE
TYPE: ACUTE PAIN
TYPE: ACUTE PAIN

## 2025-01-05 ASSESSMENT — PAIN - FUNCTIONAL ASSESSMENT
PAIN_FUNCTIONAL_ASSESSMENT: PREVENTS OR INTERFERES SOME ACTIVE ACTIVITIES AND ADLS
PAIN_FUNCTIONAL_ASSESSMENT: PREVENTS OR INTERFERES WITH MANY ACTIVE NOT PASSIVE ACTIVITIES

## 2025-01-05 ASSESSMENT — PAIN DESCRIPTION - ONSET: ONSET: ON-GOING

## 2025-01-05 ASSESSMENT — PAIN DESCRIPTION - ORIENTATION
ORIENTATION: RIGHT
ORIENTATION: RIGHT

## 2025-01-05 ASSESSMENT — PAIN SCALES - WONG BAKER: WONGBAKER_NUMERICALRESPONSE: HURTS A LITTLE BIT

## 2025-01-05 ASSESSMENT — PAIN DESCRIPTION - FREQUENCY: FREQUENCY: CONTINUOUS

## 2025-01-06 ENCOUNTER — APPOINTMENT (OUTPATIENT)
Dept: GENERAL RADIOLOGY | Age: 80
DRG: 184 | End: 2025-01-06
Payer: MEDICARE

## 2025-01-06 LAB
ANION GAP SERPL CALCULATED.3IONS-SCNC: 11 MMOL/L (ref 9–16)
ANION GAP SERPL CALCULATED.3IONS-SCNC: 7 MMOL/L (ref 9–16)
BASOPHILS # BLD: 0.03 K/UL (ref 0–0.2)
BASOPHILS NFR BLD: 0 % (ref 0–2)
BUN SERPL-MCNC: 43 MG/DL (ref 8–23)
BUN SERPL-MCNC: 45 MG/DL (ref 8–23)
CALCIUM SERPL-MCNC: 8.6 MG/DL (ref 8.6–10.4)
CALCIUM SERPL-MCNC: 9.1 MG/DL (ref 8.6–10.4)
CHLORIDE SERPL-SCNC: 100 MMOL/L (ref 98–107)
CHLORIDE SERPL-SCNC: 102 MMOL/L (ref 98–107)
CO2 SERPL-SCNC: 25 MMOL/L (ref 20–31)
CO2 SERPL-SCNC: 29 MMOL/L (ref 20–31)
CREAT SERPL-MCNC: 1.4 MG/DL (ref 0.6–0.9)
CREAT SERPL-MCNC: 1.6 MG/DL (ref 0.6–0.9)
EOSINOPHIL # BLD: 0.08 K/UL (ref 0–0.44)
EOSINOPHILS RELATIVE PERCENT: 1 % (ref 1–4)
ERYTHROCYTE [DISTWIDTH] IN BLOOD BY AUTOMATED COUNT: 15.3 % (ref 11.8–14.4)
GFR, ESTIMATED: 33 ML/MIN/1.73M2
GFR, ESTIMATED: 38 ML/MIN/1.73M2
GLUCOSE SERPL-MCNC: 130 MG/DL (ref 74–99)
GLUCOSE SERPL-MCNC: 97 MG/DL (ref 74–99)
HCT VFR BLD AUTO: 31.6 % (ref 36.3–47.1)
HGB BLD-MCNC: 9.7 G/DL (ref 11.9–15.1)
IMM GRANULOCYTES # BLD AUTO: 0.03 K/UL (ref 0–0.3)
IMM GRANULOCYTES NFR BLD: 0 %
LYMPHOCYTES NFR BLD: 2.89 K/UL (ref 1.1–3.7)
LYMPHOCYTES RELATIVE PERCENT: 30 % (ref 24–43)
MCH RBC QN AUTO: 27.8 PG (ref 25.2–33.5)
MCHC RBC AUTO-ENTMCNC: 30.7 G/DL (ref 28.4–34.8)
MCV RBC AUTO: 90.5 FL (ref 82.6–102.9)
MONOCYTES NFR BLD: 1.01 K/UL (ref 0.1–1.2)
MONOCYTES NFR BLD: 11 % (ref 3–12)
NEUTROPHILS NFR BLD: 58 % (ref 36–65)
NEUTS SEG NFR BLD: 5.46 K/UL (ref 1.5–8.1)
NRBC BLD-RTO: 0 PER 100 WBC
PLATELET # BLD AUTO: 183 K/UL (ref 138–453)
PMV BLD AUTO: 10.6 FL (ref 8.1–13.5)
POTASSIUM SERPL-SCNC: 4.2 MMOL/L (ref 3.7–5.3)
POTASSIUM SERPL-SCNC: 4.5 MMOL/L (ref 3.7–5.3)
RBC # BLD AUTO: 3.49 M/UL (ref 3.95–5.11)
RBC # BLD: ABNORMAL 10*6/UL
SODIUM SERPL-SCNC: 136 MMOL/L (ref 136–145)
SODIUM SERPL-SCNC: 138 MMOL/L (ref 136–145)
WBC OTHER # BLD: 9.5 K/UL (ref 3.5–11.3)

## 2025-01-06 PROCEDURE — 2060000000 HC ICU INTERMEDIATE R&B

## 2025-01-06 PROCEDURE — 97535 SELF CARE MNGMENT TRAINING: CPT

## 2025-01-06 PROCEDURE — 2580000003 HC RX 258

## 2025-01-06 PROCEDURE — 85025 COMPLETE CBC W/AUTO DIFF WBC: CPT

## 2025-01-06 PROCEDURE — 6370000000 HC RX 637 (ALT 250 FOR IP)

## 2025-01-06 PROCEDURE — 99231 SBSQ HOSP IP/OBS SF/LOW 25: CPT | Performed by: NURSE PRACTITIONER

## 2025-01-06 PROCEDURE — 6360000002 HC RX W HCPCS

## 2025-01-06 PROCEDURE — 6370000000 HC RX 637 (ALT 250 FOR IP): Performed by: STUDENT IN AN ORGANIZED HEALTH CARE EDUCATION/TRAINING PROGRAM

## 2025-01-06 PROCEDURE — 80048 BASIC METABOLIC PNL TOTAL CA: CPT

## 2025-01-06 PROCEDURE — 97530 THERAPEUTIC ACTIVITIES: CPT

## 2025-01-06 PROCEDURE — 2500000003 HC RX 250 WO HCPCS: Performed by: STUDENT IN AN ORGANIZED HEALTH CARE EDUCATION/TRAINING PROGRAM

## 2025-01-06 PROCEDURE — 2700000000 HC OXYGEN THERAPY PER DAY

## 2025-01-06 PROCEDURE — 36415 COLL VENOUS BLD VENIPUNCTURE: CPT

## 2025-01-06 PROCEDURE — 71045 X-RAY EXAM CHEST 1 VIEW: CPT

## 2025-01-06 PROCEDURE — 94640 AIRWAY INHALATION TREATMENT: CPT

## 2025-01-06 PROCEDURE — 94761 N-INVAS EAR/PLS OXIMETRY MLT: CPT

## 2025-01-06 PROCEDURE — 51798 US URINE CAPACITY MEASURE: CPT

## 2025-01-06 RX ORDER — 0.9 % SODIUM CHLORIDE 0.9 %
500 INTRAVENOUS SOLUTION INTRAVENOUS ONCE
Status: COMPLETED | OUTPATIENT
Start: 2025-01-06 | End: 2025-01-06

## 2025-01-06 RX ORDER — DOCUSATE SODIUM 100 MG/1
100 CAPSULE, LIQUID FILLED ORAL 2 TIMES DAILY
Qty: 28 CAPSULE | Refills: 0 | Status: SHIPPED | OUTPATIENT
Start: 2025-01-06 | End: 2025-01-20

## 2025-01-06 RX ORDER — ACETAMINOPHEN 500 MG
500 TABLET ORAL 4 TIMES DAILY PRN
Qty: 56 TABLET | Refills: 0 | Status: SHIPPED | OUTPATIENT
Start: 2025-01-06 | End: 2025-01-20

## 2025-01-06 RX ORDER — OXYCODONE HYDROCHLORIDE 5 MG/1
5 TABLET ORAL EVERY 6 HOURS PRN
Qty: 28 TABLET | Refills: 0 | Status: SHIPPED | OUTPATIENT
Start: 2025-01-06 | End: 2025-01-07

## 2025-01-06 RX ORDER — ALBUTEROL SULFATE 0.83 MG/ML
2.5 SOLUTION RESPIRATORY (INHALATION)
Status: DISCONTINUED | OUTPATIENT
Start: 2025-01-06 | End: 2025-01-07 | Stop reason: HOSPADM

## 2025-01-06 RX ADMIN — ALBUTEROL SULFATE 2.5 MG: 2.5 SOLUTION RESPIRATORY (INHALATION) at 14:09

## 2025-01-06 RX ADMIN — OXYCODONE 2.5 MG: 5 TABLET ORAL at 21:59

## 2025-01-06 RX ADMIN — CARVEDILOL 6.25 MG: 12.5 TABLET, FILM COATED ORAL at 11:05

## 2025-01-06 RX ADMIN — HEPARIN SODIUM 5000 UNITS: 5000 INJECTION INTRAVENOUS; SUBCUTANEOUS at 21:51

## 2025-01-06 RX ADMIN — HEPARIN SODIUM 5000 UNITS: 5000 INJECTION INTRAVENOUS; SUBCUTANEOUS at 14:27

## 2025-01-06 RX ADMIN — VALACYCLOVIR HYDROCHLORIDE 1000 MG: 500 TABLET, FILM COATED ORAL at 08:52

## 2025-01-06 RX ADMIN — ALBUTEROL SULFATE 2.5 MG: 2.5 SOLUTION RESPIRATORY (INHALATION) at 08:18

## 2025-01-06 RX ADMIN — GABAPENTIN 100 MG: 100 CAPSULE ORAL at 21:51

## 2025-01-06 RX ADMIN — GABAPENTIN 100 MG: 100 CAPSULE ORAL at 08:52

## 2025-01-06 RX ADMIN — PRAVASTATIN SODIUM 20 MG: 20 TABLET ORAL at 21:52

## 2025-01-06 RX ADMIN — OXYCODONE 2.5 MG: 5 TABLET ORAL at 06:19

## 2025-01-06 RX ADMIN — POLYETHYLENE GLYCOL 3350 17 G: 17 POWDER, FOR SOLUTION ORAL at 08:51

## 2025-01-06 RX ADMIN — DOXYCYCLINE HYCLATE 100 MG: 100 TABLET, COATED ORAL at 08:52

## 2025-01-06 RX ADMIN — HEPARIN SODIUM 5000 UNITS: 5000 INJECTION INTRAVENOUS; SUBCUTANEOUS at 06:19

## 2025-01-06 RX ADMIN — SODIUM CHLORIDE, PRESERVATIVE FREE 10 ML: 5 INJECTION INTRAVENOUS at 21:30

## 2025-01-06 RX ADMIN — HYDROCHLOROTHIAZIDE 12.5 MG: 25 TABLET ORAL at 11:05

## 2025-01-06 RX ADMIN — LANSOPRAZOLE 15 MG: 30 TABLET, ORALLY DISINTEGRATING, DELAYED RELEASE ORAL at 06:19

## 2025-01-06 RX ADMIN — ACETAMINOPHEN 1000 MG: 500 TABLET ORAL at 06:19

## 2025-01-06 RX ADMIN — VALSARTAN 80 MG: 80 TABLET, FILM COATED ORAL at 11:05

## 2025-01-06 RX ADMIN — CARVEDILOL 6.25 MG: 12.5 TABLET, FILM COATED ORAL at 16:24

## 2025-01-06 RX ADMIN — SODIUM CHLORIDE 500 ML: 9 INJECTION, SOLUTION INTRAVENOUS at 06:59

## 2025-01-06 RX ADMIN — Medication 400 UNITS: at 08:52

## 2025-01-06 RX ADMIN — MONTELUKAST 10 MG: 10 TABLET, FILM COATED ORAL at 21:52

## 2025-01-06 RX ADMIN — Medication 600 MG: at 08:53

## 2025-01-06 RX ADMIN — BUDESONIDE AND FORMOTEROL FUMARATE DIHYDRATE 2 PUFF: 160; 4.5 AEROSOL RESPIRATORY (INHALATION) at 08:18

## 2025-01-06 RX ADMIN — BUDESONIDE AND FORMOTEROL FUMARATE DIHYDRATE 2 PUFF: 160; 4.5 AEROSOL RESPIRATORY (INHALATION) at 20:57

## 2025-01-06 RX ADMIN — AMLODIPINE BESYLATE 5 MG: 10 TABLET ORAL at 11:06

## 2025-01-06 RX ADMIN — Medication 10 MG: at 21:53

## 2025-01-06 RX ADMIN — CLOPIDOGREL BISULFATE 75 MG: 75 TABLET ORAL at 08:52

## 2025-01-06 RX ADMIN — DOXYCYCLINE HYCLATE 100 MG: 100 TABLET, COATED ORAL at 21:54

## 2025-01-06 RX ADMIN — GABAPENTIN 100 MG: 100 CAPSULE ORAL at 14:27

## 2025-01-06 RX ADMIN — ALBUTEROL SULFATE 2.5 MG: 2.5 SOLUTION RESPIRATORY (INHALATION) at 20:57

## 2025-01-06 RX ADMIN — ZOLPIDEM TARTRATE 5 MG: 5 TABLET ORAL at 21:52

## 2025-01-06 RX ADMIN — ROPINIROLE HYDROCHLORIDE 0.5 MG: 0.25 TABLET, FILM COATED ORAL at 08:53

## 2025-01-06 RX ADMIN — Medication 2000 UNITS: at 08:52

## 2025-01-06 RX ADMIN — ACETAMINOPHEN 1000 MG: 500 TABLET ORAL at 14:27

## 2025-01-06 ASSESSMENT — PAIN DESCRIPTION - LOCATION
LOCATION: RIB CAGE

## 2025-01-06 ASSESSMENT — PAIN DESCRIPTION - DESCRIPTORS
DESCRIPTORS: ACHING;SORE
DESCRIPTORS: ACHING

## 2025-01-06 ASSESSMENT — PAIN SCALES - GENERAL
PAINLEVEL_OUTOF10: 7
PAINLEVEL_OUTOF10: 7
PAINLEVEL_OUTOF10: 2
PAINLEVEL_OUTOF10: 3

## 2025-01-06 ASSESSMENT — PAIN - FUNCTIONAL ASSESSMENT
PAIN_FUNCTIONAL_ASSESSMENT: ACTIVITIES ARE NOT PREVENTED
PAIN_FUNCTIONAL_ASSESSMENT: ACTIVITIES ARE NOT PREVENTED

## 2025-01-06 ASSESSMENT — PAIN SCALES - WONG BAKER: WONGBAKER_NUMERICALRESPONSE: HURTS A LITTLE BIT

## 2025-01-06 ASSESSMENT — PAIN DESCRIPTION - PAIN TYPE: TYPE: ACUTE PAIN

## 2025-01-06 ASSESSMENT — PAIN DESCRIPTION - ORIENTATION
ORIENTATION: LEFT
ORIENTATION: RIGHT

## 2025-01-06 NOTE — CARE COORDINATION
Transitional Plan    [address] IPR denied d/t no medical necessity.    Patient is going home with C, walker, daughter to .    Referral placed for gaurav Miller. Trauma to write for DME walker.

## 2025-01-06 NOTE — DISCHARGE INSTR - COC
SECTION    Prognosis: Good    Condition at Discharge: Stable    Rehab Potential (if transferring to Rehab): Fair    Recommended Labs or Other Treatments After Discharge: PT/OT    Physician Certification: I certify the above information and transfer of Estelle Gunderson  is necessary for the continuing treatment of the diagnosis listed and that she requires Home Care for less 30 days.     Update Admission H&P: No change in H&P    PHYSICIAN SIGNATURE:  Electronically signed by ROSALINDA Hoover CNP on 1/6/25 at 10:42 AM EST

## 2025-01-06 NOTE — CARE COORDINATION
Trauma Coordinator Rounding Note  Daily check in:  I met with [place] LIZBET Gunderson  at bedside to review their plan of care. I allowed opportunity for Estelle Gunderson to ask questions regarding their injuries, expected length of stay and disposition plan. All questions answered to verbal satisfaction. Patient was educated that placement in a SNF or rehab is not medically necessary due to her abilities documented by PT / OT and PM&R. Pt was educated on Life Alert button as an added measure of security which will enable her to continue living independently.    []Wounds  [x]PT/OT/speech  [x]Incentive spirometer  [x]Diet  [x]Activity  :  I provided a clinical update to the unit  and confirmed the disposition plan. Current barriers to discharge include:  none. Patient is to be discharged home today. Family to transport.   PIC Score  IS Goal Volume (15ml/kg IBW): 751.5   Pain  Patient reported 1-10 scale Inspiration  Incentive Spirometer    Volume obtained: 1250 ml Cough  Assessed by nurse     3  Mild  Pain intensity scale 0-4    [x] 4  Above goal volume  []   3  Strong    [x]    3  Goal to alert volume  []    2  Moderate  Pain intensity scale 5-7  [] 2  Below alert volume    [] 2   Weak    []   1  Severe   Pain intensity scale 8-10  [] 1  Unable to perform incentive spirometry    [] 1  Absent      []         Total: 10        Electronically signed by Alejandra Armstrong RN on 1/6/2025 at 10:07 AM

## 2025-01-07 ENCOUNTER — APPOINTMENT (OUTPATIENT)
Dept: GENERAL RADIOLOGY | Age: 80
DRG: 184 | End: 2025-01-07
Payer: MEDICARE

## 2025-01-07 VITALS
DIASTOLIC BLOOD PRESSURE: 56 MMHG | TEMPERATURE: 98.8 F | HEIGHT: 62 IN | OXYGEN SATURATION: 96 % | WEIGHT: 163.1 LBS | HEART RATE: 88 BPM | RESPIRATION RATE: 19 BRPM | BODY MASS INDEX: 30.01 KG/M2 | SYSTOLIC BLOOD PRESSURE: 113 MMHG

## 2025-01-07 LAB
ANION GAP SERPL CALCULATED.3IONS-SCNC: 10 MMOL/L (ref 9–16)
BASOPHILS # BLD: 0.04 K/UL (ref 0–0.2)
BASOPHILS NFR BLD: 0 % (ref 0–2)
BUN SERPL-MCNC: 39 MG/DL (ref 8–23)
CALCIUM SERPL-MCNC: 8.8 MG/DL (ref 8.6–10.4)
CHLORIDE SERPL-SCNC: 102 MMOL/L (ref 98–107)
CO2 SERPL-SCNC: 25 MMOL/L (ref 20–31)
CREAT SERPL-MCNC: 1.3 MG/DL (ref 0.6–0.9)
EOSINOPHIL # BLD: 0.27 K/UL (ref 0–0.44)
EOSINOPHILS RELATIVE PERCENT: 3 % (ref 1–4)
ERYTHROCYTE [DISTWIDTH] IN BLOOD BY AUTOMATED COUNT: 14.8 % (ref 11.8–14.4)
GFR, ESTIMATED: 42 ML/MIN/1.73M2
GLUCOSE SERPL-MCNC: 108 MG/DL (ref 74–99)
HCT VFR BLD AUTO: 33.1 % (ref 36.3–47.1)
HGB BLD-MCNC: 10.1 G/DL (ref 11.9–15.1)
IMM GRANULOCYTES # BLD AUTO: 0.07 K/UL (ref 0–0.3)
IMM GRANULOCYTES NFR BLD: 1 %
LYMPHOCYTES NFR BLD: 2.99 K/UL (ref 1.1–3.7)
LYMPHOCYTES RELATIVE PERCENT: 31 % (ref 24–43)
MCH RBC QN AUTO: 27.5 PG (ref 25.2–33.5)
MCHC RBC AUTO-ENTMCNC: 30.5 G/DL (ref 28.4–34.8)
MCV RBC AUTO: 90.2 FL (ref 82.6–102.9)
MONOCYTES NFR BLD: 1.05 K/UL (ref 0.1–1.2)
MONOCYTES NFR BLD: 11 % (ref 3–12)
NEUTROPHILS NFR BLD: 54 % (ref 36–65)
NEUTS SEG NFR BLD: 5.19 K/UL (ref 1.5–8.1)
NRBC BLD-RTO: 0 PER 100 WBC
PLATELET # BLD AUTO: 206 K/UL (ref 138–453)
PMV BLD AUTO: 10.7 FL (ref 8.1–13.5)
POTASSIUM SERPL-SCNC: 4.2 MMOL/L (ref 3.7–5.3)
RBC # BLD AUTO: 3.67 M/UL (ref 3.95–5.11)
RBC # BLD: ABNORMAL 10*6/UL
SODIUM SERPL-SCNC: 137 MMOL/L (ref 136–145)
WBC OTHER # BLD: 9.6 K/UL (ref 3.5–11.3)

## 2025-01-07 PROCEDURE — 6360000002 HC RX W HCPCS

## 2025-01-07 PROCEDURE — 94761 N-INVAS EAR/PLS OXIMETRY MLT: CPT

## 2025-01-07 PROCEDURE — 6370000000 HC RX 637 (ALT 250 FOR IP)

## 2025-01-07 PROCEDURE — 71045 X-RAY EXAM CHEST 1 VIEW: CPT

## 2025-01-07 PROCEDURE — 94640 AIRWAY INHALATION TREATMENT: CPT

## 2025-01-07 PROCEDURE — 6370000000 HC RX 637 (ALT 250 FOR IP): Performed by: STUDENT IN AN ORGANIZED HEALTH CARE EDUCATION/TRAINING PROGRAM

## 2025-01-07 PROCEDURE — 85025 COMPLETE CBC W/AUTO DIFF WBC: CPT

## 2025-01-07 PROCEDURE — 99232 SBSQ HOSP IP/OBS MODERATE 35: CPT | Performed by: SURGERY

## 2025-01-07 PROCEDURE — 36415 COLL VENOUS BLD VENIPUNCTURE: CPT

## 2025-01-07 PROCEDURE — 2500000003 HC RX 250 WO HCPCS: Performed by: STUDENT IN AN ORGANIZED HEALTH CARE EDUCATION/TRAINING PROGRAM

## 2025-01-07 PROCEDURE — 80048 BASIC METABOLIC PNL TOTAL CA: CPT

## 2025-01-07 RX ORDER — OXYCODONE HYDROCHLORIDE 5 MG/1
5 TABLET ORAL EVERY 6 HOURS PRN
Qty: 16 TABLET | Refills: 0 | Status: SHIPPED | OUTPATIENT
Start: 2025-01-07 | End: 2025-01-11

## 2025-01-07 RX ORDER — LIDOCAINE 4 G/G
1 PATCH TOPICAL DAILY
Qty: 3 EACH | Refills: 0 | Status: SHIPPED | OUTPATIENT
Start: 2025-01-08 | End: 2025-01-11

## 2025-01-07 RX ORDER — IPRATROPIUM BROMIDE AND ALBUTEROL SULFATE 2.5; .5 MG/3ML; MG/3ML
1 SOLUTION RESPIRATORY (INHALATION) ONCE
Status: COMPLETED | OUTPATIENT
Start: 2025-01-07 | End: 2025-01-07

## 2025-01-07 RX ADMIN — DOXYCYCLINE HYCLATE 100 MG: 100 TABLET, COATED ORAL at 09:16

## 2025-01-07 RX ADMIN — BUDESONIDE AND FORMOTEROL FUMARATE DIHYDRATE 2 PUFF: 160; 4.5 AEROSOL RESPIRATORY (INHALATION) at 08:40

## 2025-01-07 RX ADMIN — CARVEDILOL 6.25 MG: 12.5 TABLET, FILM COATED ORAL at 09:16

## 2025-01-07 RX ADMIN — IPRATROPIUM BROMIDE AND ALBUTEROL SULFATE 1 DOSE: .5; 2.5 SOLUTION RESPIRATORY (INHALATION) at 08:40

## 2025-01-07 RX ADMIN — ALBUTEROL SULFATE 2.5 MG: 2.5 SOLUTION RESPIRATORY (INHALATION) at 08:40

## 2025-01-07 RX ADMIN — HYDROCHLOROTHIAZIDE 12.5 MG: 25 TABLET ORAL at 09:16

## 2025-01-07 RX ADMIN — CLOPIDOGREL BISULFATE 75 MG: 75 TABLET ORAL at 09:16

## 2025-01-07 RX ADMIN — ACETAMINOPHEN 1000 MG: 500 TABLET ORAL at 04:55

## 2025-01-07 RX ADMIN — Medication 2000 UNITS: at 09:16

## 2025-01-07 RX ADMIN — Medication 400 UNITS: at 09:16

## 2025-01-07 RX ADMIN — VALACYCLOVIR HYDROCHLORIDE 1000 MG: 500 TABLET, FILM COATED ORAL at 09:17

## 2025-01-07 RX ADMIN — ROPINIROLE HYDROCHLORIDE 0.5 MG: 0.25 TABLET, FILM COATED ORAL at 09:17

## 2025-01-07 RX ADMIN — AMLODIPINE BESYLATE 5 MG: 10 TABLET ORAL at 09:17

## 2025-01-07 RX ADMIN — GABAPENTIN 100 MG: 100 CAPSULE ORAL at 09:16

## 2025-01-07 RX ADMIN — ALBUTEROL SULFATE 2.5 MG: 2.5 SOLUTION RESPIRATORY (INHALATION) at 11:28

## 2025-01-07 RX ADMIN — SODIUM CHLORIDE, PRESERVATIVE FREE 5 ML: 5 INJECTION INTRAVENOUS at 09:18

## 2025-01-07 RX ADMIN — OXYCODONE 2.5 MG: 5 TABLET ORAL at 04:55

## 2025-01-07 RX ADMIN — LANSOPRAZOLE 15 MG: 30 TABLET, ORALLY DISINTEGRATING, DELAYED RELEASE ORAL at 04:59

## 2025-01-07 RX ADMIN — HEPARIN SODIUM 5000 UNITS: 5000 INJECTION INTRAVENOUS; SUBCUTANEOUS at 04:59

## 2025-01-07 RX ADMIN — GABAPENTIN 100 MG: 100 CAPSULE ORAL at 14:06

## 2025-01-07 RX ADMIN — Medication 600 MG: at 09:17

## 2025-01-07 RX ADMIN — VALSARTAN 80 MG: 80 TABLET, FILM COATED ORAL at 09:17

## 2025-01-07 RX ADMIN — ACETAMINOPHEN 1000 MG: 500 TABLET ORAL at 14:05

## 2025-01-07 ASSESSMENT — PAIN - FUNCTIONAL ASSESSMENT: PAIN_FUNCTIONAL_ASSESSMENT: ACTIVITIES ARE NOT PREVENTED

## 2025-01-07 ASSESSMENT — PAIN SCALES - GENERAL
PAINLEVEL_OUTOF10: 3
PAINLEVEL_OUTOF10: 7
PAINLEVEL_OUTOF10: 3

## 2025-01-07 ASSESSMENT — PAIN DESCRIPTION - LOCATION
LOCATION: RIB CAGE
LOCATION: RIB CAGE

## 2025-01-07 ASSESSMENT — PAIN DESCRIPTION - PAIN TYPE: TYPE: ACUTE PAIN

## 2025-01-07 ASSESSMENT — PAIN DESCRIPTION - ORIENTATION
ORIENTATION: RIGHT
ORIENTATION: RIGHT

## 2025-01-07 ASSESSMENT — PAIN DESCRIPTION - DESCRIPTORS
DESCRIPTORS: ACHING
DESCRIPTORS: ACHING;SORE

## 2025-01-07 NOTE — CARE COORDINATION
Trauma Coordinator Rounding Note  Daily check in:  I met with Estelle Gunderson  at bedside to review their plan of care. I allowed opportunity for Estelle Gunderson to ask questions regarding their injuries, expected length of stay and disposition plan. All questions answered to verbal satisfaction.   [x]Wounds  [x]PT/OT/speech  [x]Incentive spirometer  [x]Diet  [x]Activity  :  I provided a clinical update to the unit  and confirmed the disposition plan. Current barriers to discharge include:  none. Await delivery of DME.  Requested DC order and order for HHC from Usha SOLANO. GREY done.   PIC Score  IS Goal Volume (15ml/kg IBW): 751.5   Pain  Patient reported 1-10 scale Inspiration  Incentive Spirometer    Volume obtained: 1250 ml Cough  Assessed by nurse     3  Mild  Pain intensity scale 0-4    [x] 4  Above goal volume  [x]   3  Strong    [x]    3  Goal to alert volume  []    2  Moderate  Pain intensity scale 5-7  [] 2  Below alert volume    [] 2   Weak    []   1  Severe   Pain intensity scale 8-10  [] 1  Unable to perform incentive spirometry    [] 1  Absent      []         Total: 10        Electronically signed by Alejandra Armstrong RN on 1/7/2025 at 11:54 AM

## 2025-01-07 NOTE — CARE COORDINATION
Transitional Planning: Plan is for discharge home independent. Needs rolling walker and required documents (f/s, order, f2f, & PT& OT notes) faxed to [facility] and daughter will  on the way home.

## 2025-01-07 NOTE — PLAN OF CARE
Problem: Chronic Conditions and Co-morbidities  Goal: Patient's chronic conditions and co-morbidity symptoms are monitored and maintained or improved  1/3/2025 1750 by Vaishali Brito RN  Outcome: Progressing  1/3/2025 0425 by Deedee Beth RN  Outcome: Progressing     Problem: Discharge Planning  Goal: Discharge to home or other facility with appropriate resources  1/3/2025 1750 by Vaishali Brito RN  Outcome: Progressing  1/3/2025 0425 by Deedee Beth RN  Outcome: Progressing     Problem: Pain  Goal: Verbalizes/displays adequate comfort level or baseline comfort level  1/3/2025 1750 by Vaishali Brito RN  Outcome: Progressing  1/3/2025 0425 by Deedee Beth RN  Outcome: Progressing     Problem: Safety - Adult  Goal: Free from fall injury  1/3/2025 1750 by Vaishali Brito RN  Outcome: Progressing  Flowsheets (Taken 1/3/2025 1123)  Free From Fall Injury: Instruct family/caregiver on patient safety  1/3/2025 0425 by Deedee Beth RN  Outcome: Progressing     Problem: ABCDS Injury Assessment  Goal: Absence of physical injury  Outcome: Progressing     
  Problem: Chronic Conditions and Co-morbidities  Goal: Patient's chronic conditions and co-morbidity symptoms are monitored and maintained or improved  1/3/2025 2232 by Dinora Perez RN  Outcome: Progressing  1/3/2025 1750 by Vaishali Brito RN  Outcome: Progressing     Problem: Discharge Planning  Goal: Discharge to home or other facility with appropriate resources  1/3/2025 2232 by Dinora Perez RN  Outcome: Progressing  1/3/2025 1750 by Vaishali Brito RN  Outcome: Progressing     Problem: Pain  Goal: Verbalizes/displays adequate comfort level or baseline comfort level  1/3/2025 2232 by Dinora Perez RN  Outcome: Progressing  1/3/2025 1750 by Vaishali Brito RN  Outcome: Progressing     Problem: Safety - Adult  Goal: Free from fall injury  1/3/2025 2232 by Dinora Perez RN  Outcome: Progressing  1/3/2025 1750 by Vaishali Brito RN  Outcome: Progressing  Flowsheets (Taken 1/3/2025 1123)  Free From Fall Injury: Instruct family/caregiver on patient safety     Problem: ABCDS Injury Assessment  Goal: Absence of physical injury  1/3/2025 2232 by Dinora Perez RN  Outcome: Progressing  1/3/2025 1750 by Vaishali Brito RN  Outcome: Progressing     Problem: Respiratory - Adult  Goal: Achieves optimal ventilation and oxygenation  1/3/2025 2232 by Dinora Perez RN  Outcome: Progressing  1/3/2025 1951 by Roxie Cage RCP  Outcome: Progressing     
  Problem: Chronic Conditions and Co-morbidities  Goal: Patient's chronic conditions and co-morbidity symptoms are monitored and maintained or improved  1/4/2025 2146 by Dinora Perez RN  Outcome: Progressing  1/4/2025 1841 by Beckie Thomas RN  Outcome: Progressing  Flowsheets (Taken 1/4/2025 0800)  Care Plan - Patient's Chronic Conditions and Co-Morbidity Symptoms are Monitored and Maintained or Improved: Monitor and assess patient's chronic conditions and comorbid symptoms for stability, deterioration, or improvement     Problem: Discharge Planning  Goal: Discharge to home or other facility with appropriate resources  1/4/2025 2146 by Dinora Perez RN  Outcome: Progressing  1/4/2025 1841 by Beckie Thomas RN  Outcome: Progressing     Problem: Pain  Goal: Verbalizes/displays adequate comfort level or baseline comfort level  1/4/2025 2146 by Dinora Perez RN  Outcome: Progressing  1/4/2025 1841 by Beckie Thomas RN  Outcome: Progressing     Problem: Safety - Adult  Goal: Free from fall injury  1/4/2025 2146 by Dinora Perez RN  Outcome: Progressing  1/4/2025 1841 by Beckie Thomas RN  Outcome: Progressing     Problem: ABCDS Injury Assessment  Goal: Absence of physical injury  1/4/2025 2146 by Dinora Perez RN  Outcome: Progressing  1/4/2025 1841 by Beckie Thomas RN  Outcome: Progressing     Problem: Respiratory - Adult  Goal: Achieves optimal ventilation and oxygenation  1/4/2025 2146 by Dinora Perez RN  Outcome: Progressing  1/4/2025 1841 by Beckie Thomas RN  Outcome: Progressing  Flowsheets (Taken 1/4/2025 0802 by Zora Galeano RCP)  Achieves optimal ventilation and oxygenation:   Assess for changes in respiratory status   Respiratory therapy support as indicated     Problem: Skin/Tissue Integrity  Goal: Absence of new skin breakdown  Description: 1.  Monitor for areas of redness and/or skin breakdown  2.  Assess vascular access sites hourly  3.  Every 4-6 hours minimum:  
  Problem: Chronic Conditions and Co-morbidities  Goal: Patient's chronic conditions and co-morbidity symptoms are monitored and maintained or improved  Outcome: Completed     Problem: Discharge Planning  Goal: Discharge to home or other facility with appropriate resources  Outcome: Completed     Problem: Pain  Goal: Verbalizes/displays adequate comfort level or baseline comfort level  Outcome: Completed     Problem: Safety - Adult  Goal: Free from fall injury  Outcome: Completed     Problem: ABCDS Injury Assessment  Goal: Absence of physical injury  Outcome: Completed     Problem: Respiratory - Adult  Goal: Achieves optimal ventilation and oxygenation  1/7/2025 1531 by Vincenzo Jean RN  Outcome: Completed  1/7/2025 0848 by Alexa Quiñonez RCP  Outcome: Progressing     Problem: Skin/Tissue Integrity  Goal: Absence of new skin breakdown  Description: 1.  Monitor for areas of redness and/or skin breakdown  2.  Assess vascular access sites hourly  3.  Every 4-6 hours minimum:  Change oxygen saturation probe site  4.  Every 4-6 hours:  If on nasal continuous positive airway pressure, respiratory therapy assess nares and determine need for appliance change or resting period.  Outcome: Completed     Problem: Neurosensory - Adult  Goal: Achieves stable or improved neurological status  Outcome: Completed     Problem: Skin/Tissue Integrity - Adult  Goal: Skin integrity remains intact  Outcome: Completed     Problem: Musculoskeletal - Adult  Goal: Return mobility to safest level of function  Outcome: Completed     Problem: Gastrointestinal - Adult  Goal: Minimal or absence of nausea and vomiting  Outcome: Completed     Problem: Genitourinary - Adult  Goal: Absence of urinary retention  Outcome: Completed     Problem: Infection - Adult  Goal: Absence of infection at discharge  Outcome: Completed     Problem: Metabolic/Fluid and Electrolytes - Adult  Goal: Electrolytes maintained within normal limits  Outcome: 
  Problem: Chronic Conditions and Co-morbidities  Goal: Patient's chronic conditions and co-morbidity symptoms are monitored and maintained or improved  Outcome: Progressing     Problem: Discharge Planning  Goal: Discharge to home or other facility with appropriate resources  Outcome: Progressing     Problem: Pain  Goal: Verbalizes/displays adequate comfort level or baseline comfort level  Outcome: Progressing     Problem: ABCDS Injury Assessment  Goal: Absence of physical injury  Outcome: Progressing     Problem: Respiratory - Adult  Goal: Achieves optimal ventilation and oxygenation  1/6/2025 1906 by Vincenzo Jean RN  Outcome: Progressing     
  Problem: Chronic Conditions and Co-morbidities  Goal: Patient's chronic conditions and co-morbidity symptoms are monitored and maintained or improved  Outcome: Progressing  Flowsheets (Taken 1/4/2025 0800)  Care Plan - Patient's Chronic Conditions and Co-Morbidity Symptoms are Monitored and Maintained or Improved: Monitor and assess patient's chronic conditions and comorbid symptoms for stability, deterioration, or improvement     Problem: Discharge Planning  Goal: Discharge to home or other facility with appropriate resources  Outcome: Progressing     Problem: Pain  Goal: Verbalizes/displays adequate comfort level or baseline comfort level  Outcome: Progressing     Problem: Safety - Adult  Goal: Free from fall injury  Outcome: Progressing     Problem: ABCDS Injury Assessment  Goal: Absence of physical injury  Outcome: Progressing     Problem: Respiratory - Adult  Goal: Achieves optimal ventilation and oxygenation  Outcome: Progressing  Flowsheets (Taken 1/4/2025 0802 by Zora Galeano RCP)  Achieves optimal ventilation and oxygenation:   Assess for changes in respiratory status   Respiratory therapy support as indicated     Problem: Skin/Tissue Integrity  Goal: Absence of new skin breakdown  Description: 1.  Monitor for areas of redness and/or skin breakdown  2.  Assess vascular access sites hourly  3.  Every 4-6 hours minimum:  Change oxygen saturation probe site  4.  Every 4-6 hours:  If on nasal continuous positive airway pressure, respiratory therapy assess nares and determine need for appliance change or resting period.  Outcome: Progressing     Problem: Neurosensory - Adult  Goal: Achieves stable or improved neurological status  Outcome: Progressing     Problem: Skin/Tissue Integrity - Adult  Goal: Skin integrity remains intact  Outcome: Progressing     Problem: Musculoskeletal - Adult  Goal: Return mobility to safest level of function  Outcome: Progressing     Problem: Gastrointestinal - Adult  Goal: 
  Problem: Respiratory - Adult  Goal: Achieves optimal ventilation and oxygenation  1/5/2025 0003 by Roxie Cage RCP  Outcome: Progressing     
  Problem: Respiratory - Adult  Goal: Achieves optimal ventilation and oxygenation  1/5/2025 1954 by Nehemiah Souza RCP  Outcome: Progressing   BRONCHOSPASM/BRONCHOCONSTRICTION     [x]         IMPROVE AERATION/BREATH SOUNDS  [x]   ADMINISTER BRONCHODILATOR THERAPY AS APPROPRIATE  [x]   ASSESS BREATH SOUNDS  []   IMPLEMENT AEROSOL/MDI PROTOCOL  [x]   PATIENT EDUCATION AS NEEDED    
  Problem: Respiratory - Adult  Goal: Achieves optimal ventilation and oxygenation  1/6/2025 1136 by Alexa Quiñonez, RAY  Outcome: Progressing   BRONCHOSPASM/BRONCHOCONSTRICTION     [x]         IMPROVE AERATION/BREATH SOUNDS  [x]   ADMINISTER BRONCHODILATOR THERAPY AS APPROPRIATE  [x]   ASSESS BREATH SOUNDS  [x]   IMPLEMENT AEROSOL/MDI PROTOCOL  [x]   PATIENT EDUCATION AS NEEDED    
  Problem: Respiratory - Adult  Goal: Achieves optimal ventilation and oxygenation  1/7/2025 0848 by Alexa Quiñonez, RAY  Outcome: Progressing   BRONCHOSPASM/BRONCHOCONSTRICTION     [x]         IMPROVE AERATION/BREATH SOUNDS  [x]   ADMINISTER BRONCHODILATOR THERAPY AS APPROPRIATE  [x]   ASSESS BREATH SOUNDS  [x]   IMPLEMENT AEROSOL/MDI PROTOCOL  [x]   PATIENT EDUCATION AS NEEDED    
  Problem: Respiratory - Adult  Goal: Achieves optimal ventilation and oxygenation  Outcome: Progressing     
Adult  Goal: Achieves stable or improved neurological status  Outcome: Progressing  Flowsheets (Taken 1/5/2025 0800)  Achieves stable or improved neurological status: Assess for and report changes in neurological status     Problem: Skin/Tissue Integrity - Adult  Goal: Skin integrity remains intact  Outcome: Progressing  Flowsheets  Taken 1/5/2025 1300  Skin Integrity Remains Intact: Monitor for areas of redness and/or skin breakdown  Taken 1/5/2025 1017  Skin Integrity Remains Intact: Monitor for areas of redness and/or skin breakdown  Taken 1/5/2025 0800  Skin Integrity Remains Intact: Monitor for areas of redness and/or skin breakdown     Problem: Musculoskeletal - Adult  Goal: Return mobility to safest level of function  Outcome: Progressing  Flowsheets (Taken 1/5/2025 0800)  Return Mobility to Safest Level of Function: Assess patient stability and activity tolerance for standing, transferring and ambulating with or without assistive devices     Problem: Gastrointestinal - Adult  Goal: Minimal or absence of nausea and vomiting  Outcome: Progressing  Flowsheets (Taken 1/5/2025 0800)  Minimal or absence of nausea and vomiting: Administer IV fluids as ordered to ensure adequate hydration     Problem: Genitourinary - Adult  Goal: Absence of urinary retention  Outcome: Progressing     Problem: Infection - Adult  Goal: Absence of infection at discharge  Outcome: Progressing  Flowsheets (Taken 1/5/2025 0800)  Absence of infection at discharge: Assess and monitor for signs and symptoms of infection     Problem: Metabolic/Fluid and Electrolytes - Adult  Goal: Electrolytes maintained within normal limits  Outcome: Progressing  Flowsheets (Taken 1/5/2025 0800)  Electrolytes maintained within normal limits: Monitor labs and assess patient for signs and symptoms of electrolyte imbalances     Problem: Hematologic - Adult  Goal: Maintains hematologic stability  Outcome: Progressing     
standing, transferring and ambulating with or without assistive devices     Problem: Gastrointestinal - Adult  Goal: Minimal or absence of nausea and vomiting  1/5/2025 2204 by Dinora Perez RN  Outcome: Progressing  1/5/2025 1641 by Beckie Thomas RN  Outcome: Progressing  Flowsheets (Taken 1/5/2025 0800)  Minimal or absence of nausea and vomiting: Administer IV fluids as ordered to ensure adequate hydration     Problem: Genitourinary - Adult  Goal: Absence of urinary retention  1/5/2025 2204 by Dinora Perez RN  Outcome: Progressing  1/5/2025 1641 by Beckie Thomas RN  Outcome: Progressing     Problem: Infection - Adult  Goal: Absence of infection at discharge  1/5/2025 2204 by Dinora Perez RN  Outcome: Progressing  1/5/2025 1641 by Beckie Thomas RN  Outcome: Progressing  Flowsheets (Taken 1/5/2025 0800)  Absence of infection at discharge: Assess and monitor for signs and symptoms of infection     Problem: Metabolic/Fluid and Electrolytes - Adult  Goal: Electrolytes maintained within normal limits  1/5/2025 2204 by Dinora Perez RN  Outcome: Progressing  1/5/2025 1641 by Beckie Thomas RN  Outcome: Progressing  Flowsheets (Taken 1/5/2025 0800)  Electrolytes maintained within normal limits: Monitor labs and assess patient for signs and symptoms of electrolyte imbalances     Problem: Hematologic - Adult  Goal: Maintains hematologic stability  1/5/2025 2204 by Dinora Perez RN  Outcome: Progressing  1/5/2025 1641 by Beckie Thomas RN  Outcome: Progressing

## 2025-01-08 ENCOUNTER — CARE COORDINATION (OUTPATIENT)
Dept: CARE COORDINATION | Age: 80
End: 2025-01-08

## 2025-01-08 DIAGNOSIS — S22.31XA TRAUMATIC CLOSED DISPLACED FRACTURE OF RIB, RIGHT, INITIAL ENCOUNTER: Primary | ICD-10-CM

## 2025-01-08 PROCEDURE — 1111F DSCHRG MED/CURRENT MED MERGE: CPT

## 2025-01-08 NOTE — CARE COORDINATION
Care Transitions Note    Initial Call #1 attempt- Call within 2 business days of discharge: Yes    Attempted to reach patient for transitions of care follow up. Unable to reach patient or daughter.  I contacted [facility] - HC nurse was unable to reach anyone after call attempts and a visit to patient's home - no one answered door, so [facility] has patient classified patient as \"non-admission\" and will not be following or making further attempts to contact per their protocol.    Outreach Attempts:   HIPAA compliant voicemail left for patient, daughter.     Patient: Estelle Gunderson      Patient : 1945   MRN: [id number]      Reason for Admission: fall at home, right rib fractures  Discharge Date: 25    RURS: Readmission Risk Score: 19.3    - STV admission for right rib fractures, flail chest after falling at home.  Discharged home with oxy + lido for pain.  Daughter's support + [facility] - patient lives alone.    Last Discharge Facility       Date Complaint Diagnosis Description Type Department Provider    25 Fall; Hip Pain Closed fracture of multiple ribs with flail chest, initial encounter ... ED to Hosp-Admission (Discharged) (ADMITTED) [facility] Shine Reza MD; Hym...            Was this an external facility discharge? No    Follow Up Appointment:   Patient does not have a follow up appointment scheduled at time of call.  Routed to PCP office pool since CTN unable to reach - noted there are HFU appts available within time frame.    Future Appointments         Provider Specialty Dept Phone    3/10/2025 2:15 PM Naina Watts DPM Podiatry [phone]    3/19/2025 1:40 PM Kamryn Briseno MD Family Medicine [phone]    6/3/2025 1:30 PM Nora Banuelos MD Oncology [phone]    2025 1:45 PM Myranda Rodriguez MD Dermatology [phone]            Plan for follow-up on next business day.      Ita Nava RN       
CTN scheduled patient's HFU)  How are you going to get to your appointment?: Car - family or friend to transport  Do you feel like you have everything you need to keep you well at home?: No (Comment: meds and walker have to be picked up, [facility] informed that patient visits need to be a daughter's home, not patient's home)  Care Transitions Interventions          Follow Up Appointment:   Discussed follow up appointments. Patient has hospital follow up appointment scheduled within 14 days of discharge.  CTN scheduled on day that daughter is able to take patient to appt  Future Appointments         Provider Specialty Dept Phone    1/17/2025 4:00 PM Kamryn Briseno MD Family Medicine [phone]    3/10/2025 2:15 PM Naina Watts DPM Podiatry [phone]    3/19/2025 1:40 PM Kamryn Briseno MD Family Medicine [phone]    6/3/2025 1:30 PM Nora Banuelos MD Oncology [phone]    6/5/2025 1:45 PM Myranda Rodriguez MD Dermatology [phone]            Care Transition Nurse provided contact information.  Plan for follow-up on next business day.  based on severity of symptoms and risk factors.  Plan for next call: symptom management-check respiratory status, cough/expectorant/wheeze, pain control, mobility - did walker get picked up and lido patch from pharmacy?  medication management-check lido patch  referrals-[facility] visit plan? - plans to see patient now at daughter's home for skilled/PT/OT      Ita Nava RN

## 2025-01-08 NOTE — PROGRESS NOTES
PROGRESS NOTE          PATIENT NAME: Estelle Gunderson  MEDICAL RECORD NO. [id number]  DATE: 1/3/2025    HD: # 1      DIAGNOSIS AND PLAN    Fall    Right rib fractures   Encourage IS-1000   PIC 9   Wean o2 per NC to off    Pain management   Continue prn kishore   Continue tylenol and neurontin   Patient refused anesthesia block but now wants to discuss again    Hypertension   Continue Norvasc, Coreg, Diovan/HCTZ    DVT proph   Continue heparin SC    Dispo   DC pending pt/ot   Home vs rehab   Pending pain control      Chief Complaint: \"pain\"    SUBJECTIVE    Patient seen at bedside.  States she is still in some pain but the medications do seem to help some. Patient is unsure of her plan for discharge and still needs to work with therapy.    OBJECTIVE  VITALS:   Vitals:    01/03/25 1232   BP: 131/62   Pulse: 71   Resp: 16   Temp: 97.9 °F (36.6 °C)   SpO2: 91%     Physical Exam  Constitutional:       General: She is awake.      Interventions: Nasal cannula in place.   HENT:      Head: Normocephalic.      Right Ear: Hearing normal.      Left Ear: Hearing normal.   Cardiovascular:      Rate and Rhythm: Normal rate.   Pulmonary:      Effort: Pulmonary effort is normal.   Skin:     General: Skin is warm.   Neurological:      Mental Status: She is alert.             LAB:  CBC:   Recent Labs     01/02/25  1154 01/03/25  0720   WBC 10.3 6.4   HGB 10.3* 10.7*   HCT 31.5* 35.9*   MCV 86.5 93.7    167     BMP:   Recent Labs     01/02/25  1154 01/03/25  0720    136   K 4.0 4.2   CL 99 101   CO2 27 25   BUN 27* 24*   CREATININE 1.3* 1.1*   GLUCOSE 136* 134*       RADIOLOGY:  CXR:   IMPRESSION:  No acute cardiopulmonary process.     Right rib fractures seen on prior CT are not well radiographically  appreciated.         Usha Renner, APRN - CNP  1/3/2025, 12:49 PM        
    PROGRESS NOTE          PATIENT NAME: Estelle Gunderson  MEDICAL RECORD NO. [id number]  DATE: 1/4/2025    HD: # 2      DIAGNOSIS AND PLAN    Fall    Right rib fractures   Encourage IS-1000, pain 7, strong   PIC 8   Wean o2 per NC to off    Pain management   Continue prn kishore   Continue tylenol and neurontin   Patient received anesthesia nerve block this morning    Hypertension   Continue Norvasc, Coreg, Diovan/HCTZ    DVT proph   Continue heparin SC    Dispo   DC pending pt/ot   [facility]    Pending pain control      Chief Complaint: \"pain\"    SUBJECTIVE    Patient seen and examined this morning, no acute event overnight.  Patient finally was able to agree to nerve block, discussed the benefit and risk for the procedure, and patient feels more comfortable with the block procedure after hearing the reasoning behind.    OBJECTIVE  VITALS:   Vitals:    01/04/25 1250   BP:    Pulse: 77   Resp:    Temp:    SpO2:      Physical Exam  Constitutional:       General: She is awake.      Interventions: Nasal cannula in place.   HENT:      Head: Normocephalic.      Right Ear: Hearing normal.      Left Ear: Hearing normal.   Cardiovascular:      Rate and Rhythm: Normal rate.   Pulmonary:      Effort: Pulmonary effort is normal.   Skin:     General: Skin is warm.   Neurological:      Mental Status: She is alert.             LAB:  CBC:   Recent Labs     01/02/25  1154 01/03/25  0720 01/04/25  1049   WBC 10.3 6.4 11.0   HGB 10.3* 10.7* 10.7*   HCT 31.5* 35.9* 35.7*   MCV 86.5 93.7 92.5    167 200     BMP:   Recent Labs     01/02/25  1154 01/03/25  0720 01/04/25  1049    136 135*   K 4.0 4.2 3.5*   CL 99 101 98   CO2 27 25 27   BUN 27* 24* 31*   CREATININE 1.3* 1.1* 1.3*   GLUCOSE 136* 134* 129*       RADIOLOGY:  XR CHEST PORTABLE    Result Date: 1/3/2025  No acute cardiopulmonary process. Right rib fractures seen on prior CT are not well radiographically 
    PROGRESS NOTE          PATIENT NAME: Estelle Gunderson  MEDICAL RECORD NO. [id number]  DATE: 1/5/2025    HD: # 3      DIAGNOSIS AND PLAN    Fall    Right rib fractures   Encourage IS-1000, pain 7, strong   PIC 8   Wean o2 per NC to off    Pain management   Continue prn kishore   Continue tylenol and neurontin   Patient received anesthesia nerve block this morning    Hypertension   Continue Norvasc, Coreg, Diovan/HCTZ    DVT proph   Continue heparin SC    Dispo   DC pending pt/ot   Plan for acute rehab[facility]    Pending pain control      Chief Complaint: \"pain less sharp\"    SUBJECTIVE    Patient seen and examined this morning, no acute event overnight.  Patient said that the pain is less sharp  OBJECTIVE  VITALS:   Vitals:    01/05/25 0300   BP: (!) 120/44   Pulse: 64   Resp: 16   Temp: 98.2 °F (36.8 °C)   SpO2: 92%     Physical Exam  Constitutional:       General: She is awake.      Interventions: Nasal cannula in place.   HENT:      Head: Normocephalic.      Right Ear: Hearing normal.      Left Ear: Hearing normal.   Cardiovascular:      Rate and Rhythm: Normal rate.   Pulmonary:      Effort: Pulmonary effort is normal.   Skin:     General: Skin is warm.   Neurological:      Mental Status: She is alert.             LAB:  CBC:   Recent Labs     01/03/25  0720 01/04/25  1049 01/05/25  0230   WBC 6.4 11.0 11.0   HGB 10.7* 10.7* 10.3*   HCT 35.9* 35.7* 32.7*   MCV 93.7 92.5 89.8    200 189     BMP:   Recent Labs     01/03/25  0720 01/04/25  1049 01/05/25  0230    135* 134*   K 4.2 3.5* 4.2    98 98   CO2 25 27 28   BUN 24* 31* 37*   CREATININE 1.1* 1.3* 1.5*   GLUCOSE 134* 129* 134*       RADIOLOGY:  XR CHEST PORTABLE    Result Date: 1/3/2025  No acute cardiopulmonary process. Right rib fractures seen on prior CT are not well radiographically appreciated.     CT CERVICAL SPINE WO CONTRAST    Addendum Date: 1/2/2025    ADDENDUM: There is partially imaged 
    PROGRESS NOTE          PATIENT NAME: Estelle Gunderson  MEDICAL RECORD NO. [id number]  DATE: 1/6/2025    HD: # 4      DIAGNOSIS AND PLAN    FFSH, +ASA/Plavix, R 5-7th rib fx's, Rib score 6   -PIC 9   -pulling 1000 on IS this morning,encourage ongoing use   -on RA   -pain controlled with MMPT   -poor po intake and elevated creat- receiving IV bolus now   -check creat @1400, encourage increased po intake    DVT ppx: Heparin sc    Dispo: rehab denied- patient ambulating 150 ft with cane and w/o difficulty. Plan for home with home care and walker possibly today vs tomorrow       Chief Complaint: \"hello\"    SUBJECTIVE    Patient seen and examined at the bedside. CM is talking with patient and states rehab has denied her because she is doing so well with PT. Her pain is tolerable with MMPT. She is pulling 1000 on IS and has a strong cough. She would like a walker and home care at discharge.     OBJECTIVE  VITALS:   Vitals:    01/06/25 0400   BP: (!) 112/51   Pulse: 60   Resp: 20   Temp: 98.3 °F (36.8 °C)   SpO2: 94%     Physical Exam  Constitutional:       General: She is awake.      Interventions: Nasal cannula in place.   HENT:      Head: Normocephalic.      Right Ear: Hearing normal.      Left Ear: Hearing normal.   Cardiovascular:      Rate and Rhythm: Normal rate.   Pulmonary:      Effort: Pulmonary effort is normal.   Skin:     General: Skin is warm.   Neurological:      Mental Status: She is alert.         LAB:  CBC:   Recent Labs     01/04/25  1049 01/05/25  0230 01/06/25  0403   WBC 11.0 11.0 9.5   HGB 10.7* 10.3* 9.7*   HCT 35.7* 32.7* 31.6*   MCV 92.5 89.8 90.5    189 183     BMP:   Recent Labs     01/04/25  1049 01/05/25  0230 01/06/25  0403   * 134* 136   K 3.5* 4.2 4.5   CL 98 98 100   CO2 27 28 29   BUN 31* 37* 43*   CREATININE 1.3* 1.5* 1.4*   GLUCOSE 129* 134* 97       RADIOLOGY:  XR CHEST PORTABLE    Result Date: 1/3/2025  No acute cardiopulmonary process. Right rib fractures seen on prior 
    PROGRESS NOTE    PATIENT NAME: Estelle Gunderson  MEDICAL RECORD NO. [id number]  DATE: 1/7/2025    HD: # 5      DIAGNOSIS AND PLAN    Right rib fractures 5-7  Encourage IS and incentive spirometer use   PIC 9  Strong cough, pain controlled, IS just under 1000 (goal 750, IDBW 50 kg). Upon reassessment PIC is a 10 with IS 1250.   Follow up AM CXR  Continue breathing treatments   Home HTN medications resumed  CKD   Baseline creatine appears to be 1.1-1.4  Uptrended to 1.6, has returned to baseline this morning at 1.3  Encourage PO intake  Monitor and record intake and output   Encourage OOB and up to chair  PT/OT   Discharge planning - planning for home with home health     Chief Complaint: \"So far I am okay\"    SUBJECTIVE  Patient seen and examined at bedside. No acute events overnight. VSS, afebrile. States her pain is well controlled. Rates at a 2-3 out of 10. Denies any shortness of breath. Slight expiratory wheeze noted. Strong cough when prompted. Utilizing IS, but pulling slightly less than 1000. Patient encouraged to continue deep breathing and IS work.     Upon re-assessment, slight wheeze has resolved and IS improved to > 1000    OBJECTIVE  VITALS:   Vitals:    01/07/25 0525   BP:    Pulse:    Resp: 16   Temp:    SpO2:      Physical Exam  Constitutional:       General: She is awake. She is not in acute distress.     Interventions: Nasal cannula in place.   HENT:      Head: Normocephalic and atraumatic.      Right Ear: Hearing and external ear normal.      Left Ear: Hearing and external ear normal.      Nose: Nose normal.      Mouth/Throat:      Mouth: Mucous membranes are moist.   Eyes:      Extraocular Movements: Extraocular movements intact.   Cardiovascular:      Rate and Rhythm: Normal rate.   Pulmonary:      Effort: Pulmonary effort is normal. No respiratory distress.      Breath sounds: Wheezing (slight expiratory wheeze noted) present.   Abdominal:      General: There is no distension.      
  Trauma Tertiary Survey    Admit Date: 1/2/2025  Hospital day 0    Chief Complaint: \"Fall\"    Subjective:     Patient reviewed in room, daughter and granddaughter present. Patient alert and oriented x 4, GCS 15/15. Patient is complaining of rights sided rib pain but no SOB when at rest, when coughing of moving has some SOB. Patient able to speak in full sentences and otherwise appears in no respiratory distress. Patient was able to do 1250 ml IS at bedside and has a weak cough.    Objective:   PHYSICAL EXAM:   Constitutional:       Appearance: Normal appearance.   HENT:      Mouth/Throat:      Mouth: Mucous membranes are moist.      Pharynx: Oropharynx is clear.   Eyes:      Extraocular Movements: Extraocular movements intact.      Pupils: Pupils are equal, round, and reactive to light.   Neck:      Comments: No midline cervical tenderness upon palpation, ROM  normal.  Cardiovascular:      Rate and Rhythm: Normal rate.   Pulmonary:      Comments: Good air movement bilaterally, lung sounds clear and equal bilaterally.  Significant right sided posterolateral chest wall tenderness upon palpation  Abdominal:      Soft and non-tender  Musculoskeletal:         General: Normal range of motion.   No CTLS midline tenderness, with no bony step-offs. There are no contusions/abrasions seen to the anterior or posterior trunk. Patient is able to move all limbs symmetrically, distal pulses 2+ throughout   Skin:     General: Skin is warm and dry.      Capillary Refill: Capillary refill takes less than 2 seconds.   Neurological:      General: No focal deficit present.      Mental Status: She is alert.     Spine:     Spine Tenderness ROM   Cervical 0 /10 Normal   Thoracic 0 /10 Normal   Lumbar 0 /10 Normal     Musculoskeletal    Joint Tenderness Swelling ROM   Right shoulder absent absent normal   Left shoulder absent absent normal   Right elbow absent absent normal   Left elbow absent absent normal   Right wrist absent absent 
  [facility] Inpatient Brief Diagnostic Assessment Note  GAURAV Arias   1/3/2025  11:04 AM  Estelle Gunderson  1945  [id number]      Time Spent with Patient: 15 minutes or less (Brief Diagnostic Assessment) [id number]    Pt was provided informed consent for the [facility]. Discussed with patient model of service to include the limits of confidentiality (i.e. abuse reporting, suicide intervention, etc.) and short-term intervention focused approach.  Pt indicated understanding.    [facility] Inpatient or Virtual Question: This was not a virtual session    Is consult a Victim of Crime?: No    Presenting Patient Report:    Patient presents as a 79 y.o. female subsequent to hospital admission following a fall resulting in injury.     Patient was able to complete the following screens: ITSS    Pt denies current anxiety/depression.  No hx of depression, but endorses hx of anxiety but no recent symptoms.  Pt expresses that she knows she \"is where she needs to be.\"  Therapist offered emotional support and psychoeducation.  Pt denied further needs.        MSE:     Appearance:   Appropriate Dress, Good Hygiene, and Good Eye Contact  Speech    spontaneous, normal rate, and normal volume  Affect Observed   Appropriate to Context and Normal  Thought Content    intact  Thought Process    linear, goal directed, and coherent  Associations    logical connections  Insight    Good  Judgment    Intact  Orientation    oriented to person, place, time, and general circumstances      Patient reports and/or exhibits the following symptoms:    Mood: Appropriate to Context and Euthymic    Cognitive symptoms: Appropriate to Context    Behaviors: Appropriate to Context    Somatic: None Reported        Assessment:  Pt presented in hospital bed.  Normal mood and affect, pt pleasant and coping appropriately.  Speech normal rate but soft, likely due to pain.  Pt with hx of anxiety and reports she was on citalopram but 
15 Variable Trauma-Specific Frailty Index   Comorbidities   Cancer History Yes (1) No (0)   Coronary Heart Disease MI (1) CABG (0.75) Mild (0.25)  No (0)   Dementia Severe (1) Moderate (0.5) Mild (0.25)  No (0)   Daily Activities   Help With Grooming Yes (1) No (0)   Help with Managing Money Yes (1) No (0)   Help doing Housework Yes (1) No (0)   Help with Toileting Yes (1) No (0)   Help Walking Wheelchair (1) Walker (0.75) Cane (0.5) No (0)   Health Attitude   Feel Less Useful Most Time (1) Sometimes (0.5) Never (0)   Feel Sad Most Time (1) Sometimes (0.5) Never (0)   Feel Effort to Do Everything Most Time (1) Sometimes (0.5) Never (0)   Feels Lonely Most Time (1) Sometimes (0.5) Never (0)   Falls Most Time (1) Sometimes (0.5) Never (0)   Function   Sexually Active Yes (0)  No(1)   Nutrition   Albumin < 3g/dL (1)  > 3g/dL   Scoring   Score   FI (Score/15)   > 0.25 = Frail   *Based on 2-weeks prior to hospital admission   Trauma Specific Fraility Index > or = to 4 (4/15 = 0.26)  Trauma Specific Fraility Index Score:    Not frail  
Asked to see patient for nerve block to help with rib pain. Patient reports no pain at rest . Risks benefits of nerve block discussed with patient including expected duration of block. She does not want the block as she feels she has adequate pain relief.   
CLINICAL PHARMACY NOTE: MEDS TO BEDS    Total # of Prescriptions Filled: 3   The following medications were delivered to the patient:  Dok  Oxycodone  tylenol    Additional Documentation:    
Comprehensive Nutrition Assessment    Type and Reason for Visit:  Initial, Consult    Nutrition Recommendations/Plan:   Continue current diet order.   Continue high calorie/high protein ONS.   Encourage adequate po intake.   Monitor po intake, weight, labs.      Malnutrition Assessment:  Malnutrition Status:  At risk for malnutrition (01/06/25 1414)    Context:  Acute Illness     Findings of the 6 clinical characteristics of malnutrition:  Energy Intake:  Mild decrease in energy intake  Weight Loss:  Mild weight loss     Body Fat Loss:  No body fat loss     Muscle Mass Loss:  No muscle mass loss    Fluid Accumulation:  No fluid accumulation     Strength:  Not Performed    Nutrition Assessment:    Patient seen for nutrition consult (poor intake/appetite). Patient admitted to the hospital for right sided chest pain following mechanical fall. PMH: anemia, breast cancer, back pain, heart disease, depression, HTN, CKD3. Per nursing documentation patient is consuming ~50% of her meals. High calorie/ high protein ONS in place TID. Per patient she consumed ~50% of her lunch tray today. Patient endorses fair appetite and stated she enjoys current ONS. Medications reviewed: hydrochlorothiazide, glycolax, vitamind D, vitamin E. Labs reviewd.    Nutrition Related Findings:    Last bowel movement 1/5. Wound Type: None       Current Nutrition Intake & Therapies:    Average Meal Intake: 26-50%  Average Supplements Intake: Unable to assess  ADULT DIET; Regular  ADULT ORAL NUTRITION SUPPLEMENT; Breakfast, Lunch, Dinner; Standard High Calorie/High Protein Oral Supplement    Anthropometric Measures:  Height: 157.5 cm (5' 2.01\")  Ideal Body Weight (IBW): 110 lbs (50 kg)    Admission Body Weight: 71.7 kg (158 lb 1.1 oz)  Current Body Weight: 71.7 kg (158 lb 1.1 oz), 143.7 % IBW. Weight Source: Bed scale  Current BMI (kg/m2): 28.9  Weight Adjustment For: No Adjustment  BMI Categories: Overweight (BMI 25.0-29.9)    Estimated Daily 
Patient discharged home with homecare. Patient educated on discharge instructions and verbalized understanding. All questions answered at this time. Patient belongings were collected per pt daughter and accounted for. Patient taken off unit in wheelchair and left in private vehicle.  
Patient has not received nutritional supplement. Original order placed incorrectly as AM snack, PM snack, HS snack. Dietary does not deliver on off hoursany \"snacks\". Writer replaced order with high protein nutritional supplement with breakfast, lunch and dinner.   
Patient requesting her Trazodone be discontinued . Patient states she took Trazodone at home before she fell. She also states it makes her lethargic. Patient received it last night and slept off and on all day.   Patients daughter requested at admission, she not receive Trazodone. It is listed on her MAR.    Patient is on Ambien 5 mg PO at HS for sleep at home.  MD notified  
[facility] ([facility]) Inpatient Discharge Summary  Tatyana GAURAV Hernandez  1/8/2025        Estelle Gunderson  1945  [id number]    Date & Time of Discharge: 1/7/2025  4:46 PM     Is consult a Victim of Crime?: No    Services provided:  Screenings: ITSS    Screen Results (If any):      ITSS:  Date Screen Completed: 1/3/25  Patient's score= 0 for PTSD risk. ( >= 2 is positive for PTSD risk. )  Patient's score= 1 for depression risk.  ( >= 2 is positive for Depression risk )      Discharge Recommendations:  No outpatient mental health services recommended      The therapist may be reached through the [facility] offices at [phone].   
Urine sent per order to lab  
[place] LIZBET Neptali was evaluated today and a DME order was entered for a rolling walker because she requires this to successfully complete daily living tasks of eating, bathing, toileting, ambulating, and hygiene.  A rolling walker is necessary due to the patient's impaired ambulation and mobility restrictions and she can ambulate only by using a walker instead of a lesser assistive device, such as a cane or crutch.  The need for this equipment was discussed with the patient and she understands and is in agreement.    
Restriction  Other Position/Activity Restrictions: Rib fractures 4-7; gait belt to be used low on abdomen     Subjective  General  Patient assessed for rehabilitation services?: Yes  Family/Caregiver Present: No  Follows Commands: Within Functional Limits  Subjective  Subjective: Pain 3/10 in ribs.  Treated with mobility and positioning with pillows.         Social/Functional History  Social/Functional History  Lives With: Alone  Type of Home: House  Home Layout: One level  Home Access: Stairs to enter without rails  Entrance Stairs - Number of Steps: 2  Home Equipment: Cane  Has the patient had two or more falls in the past year or any fall with injury in the past year?: Yes (this fall, no others in the last 6 months)  Prior Level of Assist for ADLs: Independent  Prior Level of Assist for Homemaking: Independent  Prior Level of Assist for Ambulation: Independent community ambulator, with or without device  Prior Level of Assist for Transfers: Independent  Active : Yes  Occupation: Retired  Type of Occupation:  [employer]  Additional Comments: Uses her straight cane only in the morning.  Vision/Hearing  Vision  Vision: Within Functional Limits  Hearing  Hearing: Within functional limits    Cognition   Orientation  Orientation Level: Oriented X4  Cognition  Overall Cognitive Status: WFL    Objective  Observation/Palpation  Observation: On supplemental oxygen. After standing for several minutes, patient became nauseated, dry heaved several times.  Needed several minutes for recovery.          Strength RLE  Strength RLE: WFL  Comment: Quick fatigue bilaterally.  Strength LLE  Strength LLE: WFL           Bed mobility  Supine to Sit: Stand by assistance  Transfers  Sit to Stand: Contact guard assistance  Stand to Sit: Contact guard assistance  Ambulation  Surface: Level tile  Device: Single point cane  Assistance: Contact guard assistance;Minimal assistance  Quality of Gait: 1 loss 
education needed    Goals  Short Term Goals  Time Frame for Short Term Goals: By discharge; Pt will  Short Term Goal 1: Complete UB ADL's IND  Short Term Goal 2: Complete LB ADL's Mod-I with AE/DME/modified techniques  Short Term Goal 3: Demo Good safety throughout session without cuing  Short Term Goal 4: Complete functional transfers/mobility Mod-I with LRAD  Short Term Goal 5: Demo functional reach from high<>low surfaces x4 trials at SBA to promote tolerance for functional tasks    Plan  Occupational Therapy Plan  Times Per Week: 3x/week  Current Treatment Recommendations: Functional mobility training, Balance training, ROM, Pain management, Safety education & training, Patient/Caregiver education & training, Self-Care / ADL, Home management training, Equipment evaluation, education, & procurement    Minutes  OT Individual Minutes  Time In: 0807 (943)  Time Out: 0820 (1008)  Minutes: 13  Time Code Minutes   Timed Code Treatment Minutes: 38 Minutes    Electronically signed by KAYLA Cano on 1/6/25 at 10:15 AM EST    
limits (R handed)  Tone: Normal  Sensation: Impaired (R hand numbness)     Objective  Orientation  Overall Orientation Status: Within Functional Limits  Orientation Level: Oriented X4  Cognition  Overall Cognitive Status: WFL    Activities of Daily Living  Feeding: Independent  Feeding Skilled Clinical Factors: Pt eating from lunch tray in recliner upon arrival  Grooming: Independent  UE Bathing: Stand by assistance  LE Bathing: Minimal assistance  LE Bathing Skilled Clinical Factors: Assist to clean jeovany area and B thighs in standing following incontinence as pt reports pain when attempting functional reach to perform in standing prior to sitting back into recliner. Pt able to perform further in seated position  UE Dressing: Stand by assistance  LE Dressing: Minimal assistance  LE Dressing Skilled Clinical Factors: Pt demo functional reach to don/doff shoes in figure four position. Assist expected to thred BLE through pants d/t pain with functional reach beyond figure four position  Toileting: Moderate assistance  Toileting Skilled Clinical Factors: Assist for hygiene following incontinence d/t increased pain. Similar assist anticipated for clothing management d/t pain, however not present this date. CGA anticipated for toilet transfers  Additional Comments: Scores based on clinical reasoning unless otherwise stated. Pain impacting overall performance    Balance  Balance  Sitting: Without support  Standing: With support (SBA/CGA dynamically with BUE release from support to engage in functional tasks. ~8 mins longest trial)    Transfers/Mobility  Bed mobility  Bed Mobility Comments: pt up in recliner at start and end of session    Transfers  Sit to stand: Contact guard assistance  Stand to sit: Contact guard assistance  Transfer Comments: with RW support from recliner         Functional Mobility: Contact guard assistance;Adaptive equipment  Functional Mobility Skilled Clinical Factors: Household distances performed

## 2025-01-08 NOTE — DISCHARGE SUMMARY
A DAY     fluticasone-salmeterol 250-50 MCG/ACT Aepb diskus inhaler  Commonly known as: ADVAIR     lansoprazole 15 MG delayed release capsule  Commonly known as: PREVACID     Melatonin 10 MG Tabs     montelukast 10 MG tablet  Commonly known as: SINGULAIR  TAKE 1 TABLET BY MOUTH EVERY DAY IN THE EVENING     pravastatin 20 MG tablet  Commonly known as: PRAVACHOL  TAKE 1 TABLET BY MOUTH EVERY DAY IN THE EVENING     PROBIOTIC DAILY PO     PROLIA SC     rOPINIRole 0.5 MG tablet  Commonly known as: REQUIP  Take 1 tablet by mouth in the morning and at bedtime     SIMPONI ARIA IV     therapeutic multivitamin-minerals tablet     valACYclovir 1 g tablet  Commonly known as: VALTREX  TAKE 1 TABLET BY MOUTH EVERY DAY     valsartan-hydroCHLOROthiazide 80-12.5 MG per tablet  Commonly known as: DIOVAN-HCT  TAKE 1 TABLET BY MOUTH EVERY DAY     vitamin D 50 MCG (2000 UT) Caps capsule  Commonly known as: CHOLECALCIFEROL     Vitamin E 400 units Tabs     zolpidem 5 MG tablet  Commonly known as: AMBIEN           * This list has 2 medication(s) that are the same as other medications prescribed for you. Read the directions carefully, and ask your doctor or other care provider to review them with you.                STOP taking these medications      predniSONE 20 MG tablet  Commonly known as: DELTASONE            ASK your doctor about these medications      doxycycline hyclate 100 MG tablet  Commonly known as: VIBRA-TABS  Take 1 tablet by mouth 2 times daily for 7 days  Ask about: Should I take this medication?     Ear Drops 6.5 % otic solution  Generic drug: carbamide peroxide  Place 5 drops into the right ear 2 times daily for 7 days  Ask about: Should I take this medication?     traZODone 50 MG tablet  Commonly known as: DESYREL  Take 1 tablet by mouth nightly               Where to Get Your Medications        These medications were sent to [place] [id number] [place] [address] [phone] -  [phone]

## 2025-01-09 ENCOUNTER — CARE COORDINATION (OUTPATIENT)
Dept: CARE COORDINATION | Age: 80
End: 2025-01-09

## 2025-01-09 NOTE — CARE COORDINATION
Care Transitions Note    Follow Up Call     Patient Current Location:  Home: [address] [place] [address] [place]  [place] [id number]    Care Transition Nurse contacted the patient by telephone. Verified name and  as identifiers.    Patient: Estelle Gunderson                                Patient : 1945   MRN: [id number]                               Reason for Admission: fall at home, right rib fractures  Discharge Date: 25           RURS: Readmission Risk Score: 19.3    - STV admission for right rib fractures, flail chest after falling at home.  Discharged home with oxy + lido for pain.  Daughter's support + [facility] - patient lives alone, but staying with daughter temporarily after discharge.       Additional needs identified to be addressed with provider   Patient will be seen by [facility] nurse 1/10, has PCP appt  when daughter can take her to appt                 Method of communication with provider: none.    Care Summary Note:   Spoke with daughter who was able to  lidocaine patch from pharmacy yesterday. Taking all medications as prescribed.  [facility] is activated and nurse will visit patient tomorrow.    Daughter says patient is up and around, doing OK, but was sleep walking last night.  She is concerned whether patient will be adequately capable of caring for herself when she is back home alone. She plans to check with insurance about any options for assisted or supervised living and may need some resources from  in future.    Noted some pedal edema and daughter plans to have her elevate legs when she is sitting down to see if this helps. She is continuing to take her losartan HCTZ.  Denies SOB, but did report some wheezing yesterday.    Plan for next call: symptom management-check swelling, stamina to care for self?  community resources-check if daughter wants social work referral for additional options re:process for assisted living, etc.   Remind of appt     Plan for next

## 2025-01-15 ENCOUNTER — CARE COORDINATION (OUTPATIENT)
Dept: CARE COORDINATION | Age: 80
End: 2025-01-15

## 2025-01-15 NOTE — CARE COORDINATION
Care Transitions Note    Follow Up Call     Patient Current Location:  Home: [facility]    [facility] Care Coordinator contacted the family, daughter  by telephone. Verified name and  as identifiers.    Additional needs identified to be addressed with provider   No needs identified                 Method of communication with provider: none.    Care Summary Note: Writer spoke with patient's daughter Roselyn for a follow up care transitions call. She states her mom is doing okay. Her pain is tolerable and she is mostly taking Tylenol with an occasional Oxycodone when the pain is worse. She states she started OT yesterday and was very sore last night so she took a pain pill to sleep. Dicussed possibly taking a pill 30-min to an hour before therapy is she is experiencing a lot of pain with therapy. She states her edema is improved with elevating her legs when sitting. She is not having any SOB or breathing issues. Discussed a referral to a  to assist with possible assisted living or an aide. Roselyn states they are not at that point yet-advised that we an refer at a later date if she would like. [place] has her PCP follow up tomorrow and daughter has a few questions she will address with her at that time.     Plan of care updates since last contact:  Review of patient management of conditions/medications:         Advance Care Planning:   Does patient have an Advance Directive: reviewed during previous call, see note. .    Medication Review:  No changes since last call.     Remote Patient Monitoring:  Offered patient enrollment in the Remote Patient Monitoring (RPM) program for in-home monitoring: Yes, but did not enroll at this time: limited patient ability to navigate RPM/equipment.    Assessments:  Care Transitions Subsequent and Final Call    Subsequent and Final Calls  Do you have any ongoing symptoms?: No  Have your medications changed?: No  Do you have any questions related

## 2025-01-17 ENCOUNTER — OFFICE VISIT (OUTPATIENT)
Age: 80
End: 2025-01-17

## 2025-01-17 VITALS
WEIGHT: 152.2 LBS | SYSTOLIC BLOOD PRESSURE: 122 MMHG | HEART RATE: 77 BPM | OXYGEN SATURATION: 96 % | DIASTOLIC BLOOD PRESSURE: 60 MMHG | TEMPERATURE: 97.2 F | BODY MASS INDEX: 27.83 KG/M2

## 2025-01-17 DIAGNOSIS — F51.3 SLEEPWALKING: ICD-10-CM

## 2025-01-17 DIAGNOSIS — Z09 HOSPITAL DISCHARGE FOLLOW-UP: Primary | ICD-10-CM

## 2025-01-17 DIAGNOSIS — S22.49XD CLOSED FRACTURE OF MULTIPLE RIBS WITH ROUTINE HEALING, UNSPECIFIED LATERALITY, SUBSEQUENT ENCOUNTER: ICD-10-CM

## 2025-01-17 DIAGNOSIS — R09.89 PHLEGM IN THROAT: ICD-10-CM

## 2025-01-17 DIAGNOSIS — Z91.81 AT HIGH RISK FOR FALLS: ICD-10-CM

## 2025-01-17 RX ORDER — CITALOPRAM HYDROBROMIDE 10 MG/1
TABLET ORAL
COMMUNITY
Start: 2025-01-09 | End: 2025-01-17

## 2025-01-17 RX ORDER — GUAIFENESIN 600 MG/1
600 TABLET, EXTENDED RELEASE ORAL 2 TIMES DAILY
Qty: 30 TABLET | Refills: 0 | Status: SHIPPED | OUTPATIENT
Start: 2025-01-17 | End: 2025-02-01

## 2025-01-17 RX ORDER — ZOLPIDEM TARTRATE 10 MG/1
TABLET ORAL
COMMUNITY
Start: 2025-01-15 | End: 2025-01-17 | Stop reason: SINTOL

## 2025-01-17 ASSESSMENT — PATIENT HEALTH QUESTIONNAIRE - PHQ9
SUM OF ALL RESPONSES TO PHQ QUESTIONS 1-9: 0
2. FEELING DOWN, DEPRESSED OR HOPELESS: NOT AT ALL
SUM OF ALL RESPONSES TO PHQ QUESTIONS 1-9: 0
SUM OF ALL RESPONSES TO PHQ QUESTIONS 1-9: 0
1. LITTLE INTEREST OR PLEASURE IN DOING THINGS: NOT AT ALL
SUM OF ALL RESPONSES TO PHQ QUESTIONS 1-9: 0
SUM OF ALL RESPONSES TO PHQ9 QUESTIONS 1 & 2: 0

## 2025-01-17 NOTE — PROGRESS NOTES
DAY     vitamin D 50 MCG (2000 UT) Caps capsule  Commonly known as: CHOLECALCIFEROL     Vitamin E 400 units Tabs           * This list has 2 medication(s) that are the same as other medications prescribed for you. Read the directions carefully, and ask your doctor or other care provider to review them with you.                STOP taking these medications      traZODone 50 MG tablet  Commonly known as: DESYREL     zolpidem 10 MG tablet  Commonly known as: AMBIEN               Where to Get Your Medications        These medications were sent to [facility], [place] [address] - P [phone] - F [phone] [address] [facility] [address] [place] [address]      Phone: [phone]   Estriol 10 % Crea  guaiFENesin 600 MG extended release tablet           Medications marked \"taking\" at this time  Outpatient Medications Marked as Taking for the 1/17/25 encounter (Office Visit) with Kamryn Briseno MD   Medication Sig Dispense Refill    [START ON 1/20/2025] Estriol 10 % CREA 1 g by Does not apply route Twice a Week 100 g 1    guaiFENesin (MUCINEX) 600 MG extended release tablet Take 1 tablet by mouth 2 times daily for 15 days 30 tablet 0    acetaminophen (TYLENOL) 500 MG tablet Take 1 tablet by mouth 4 times daily as needed for Pain 56 tablet 0    docusate sodium (COLACE) 100 MG capsule Take 1 capsule by mouth 2 times daily for 14 days 28 capsule 0    Melatonin 10 MG TABS Take 10 mg by mouth nightly      valsartan-hydroCHLOROthiazide (DIOVAN-HCT) 80-12.5 MG per tablet TAKE 1 TABLET BY MOUTH EVERY DAY 90 tablet 0    fluticasone-salmeterol (ADVAIR) 250-50 MCG/ACT AEPB diskus inhaler INHALE 1 PUFF IN THE MORNING AND BEFORE BEDTIME      rOPINIRole (REQUIP) 0.5 MG tablet Take 1 tablet by mouth in the morning and at bedtime 180 tablet 1    pravastatin (PRAVACHOL) 20 MG tablet TAKE 1 TABLET BY MOUTH EVERY DAY IN THE EVENING 90 tablet 0    clobetasol (TEMOVATE) 0.05 % ointment APPLY TOPICALLY

## 2025-01-18 PROBLEM — R09.89 PHLEGM IN THROAT: Status: ACTIVE | Noted: 2025-01-18

## 2025-01-18 PROBLEM — F51.3 SLEEPWALKING: Status: ACTIVE | Noted: 2025-01-18

## 2025-01-23 ENCOUNTER — CARE COORDINATION (OUTPATIENT)
Dept: CARE COORDINATION | Age: 80
End: 2025-01-23

## 2025-01-23 NOTE — CARE COORDINATION
Care Transitions Note    Follow Up Call     Attempted to reach patient for transitions of care follow up.  Unable to reach patient.      Outreach Attempts:#1  HIPAA compliant voicemail left for family, daughter, Roselyn.     Care Summary Note: 1st attempt     Follow Up Appointment:   Future Appointments         Provider Specialty Dept Phone    3/10/2025 2:15 PM Naina Watts DPM Podiatry [phone]    3/19/2025 1:40 PM Kamryn Briseno MD Family Medicine [phone]    6/3/2025 1:30 PM Nora Banuelos MD Oncology [phone]    6/5/2025 1:45 PM Myranda Rodriguez MD Dermatology [phone]            Plan for follow-up call in 2-5 days based on severity of symptoms and risk factors. Plan for next call: -how is pain? OT going okay?   follow-up appointment-review notes from PCP follow up.       Allie Harris LPN

## 2025-01-28 ENCOUNTER — TELEPHONE (OUTPATIENT)
Dept: FAMILY MEDICINE CLINIC | Age: 80
End: 2025-01-28

## 2025-01-28 NOTE — TELEPHONE ENCOUNTER
Sarah from [facility] is requesting clarification on the estriol cream. Prescription is for 10% but previous was for estriol 0.1% with Estradial at .01%.

## 2025-01-29 ENCOUNTER — CARE COORDINATION (OUTPATIENT)
Dept: CARE COORDINATION | Age: 80
End: 2025-01-29

## 2025-01-29 NOTE — CARE COORDINATION
Care Transitions Note    Follow Up Call     Attempted to reach patient for transitions of care follow up.  Unable to reach patient.      Outreach Attempts:#2  HIPAA compliant voicemail left for family, daughter Roselyn.     Care Summary Note: 2nd attempt will resolve episode if no return call. Noted patient attended hospital follow up.    Follow Up Appointment:   Future Appointments         Provider Specialty Dept Phone    3/10/2025 2:15 PM Naina Watts DPM Podiatry [phone]    3/19/2025 1:40 PM Kamryn Briseno MD Family Medicine [phone]    6/3/2025 1:30 PM Nora Banuelos MD Oncology [phone]    6/5/2025 1:45 PM Myranda Rodriguez MD Dermatology [phone]            No further follow-up call indicated based on severity of symptoms and risk factors. Allie Harris LPN

## 2025-01-30 ENCOUNTER — TELEPHONE (OUTPATIENT)
Dept: FAMILY MEDICINE CLINIC | Age: 80
End: 2025-01-30

## 2025-01-30 DIAGNOSIS — G47.00 INSOMNIA, UNSPECIFIED TYPE: Primary | ICD-10-CM

## 2025-01-30 NOTE — TELEPHONE ENCOUNTER
Patient states she was calling because was told to call when she was down to her last few Ambein and you would send in something else for her.

## 2025-01-31 RX ORDER — ALPRAZOLAM 0.5 MG
0.5 TABLET ORAL NIGHTLY PRN
Qty: 15 TABLET | Refills: 0 | Status: SHIPPED | OUTPATIENT
Start: 2025-01-31 | End: 2025-03-02

## 2025-01-31 NOTE — TELEPHONE ENCOUNTER
Alternative of Xanax sent to be used only as needed for sleep and will reassess for refill monthly as appropriate

## 2025-02-14 ENCOUNTER — TELEPHONE (OUTPATIENT)
Dept: FAMILY MEDICINE CLINIC | Age: 80
End: 2025-02-14

## 2025-02-14 DIAGNOSIS — F51.01 PRIMARY INSOMNIA: Primary | ICD-10-CM

## 2025-02-14 DIAGNOSIS — G47.00 INSOMNIA, UNSPECIFIED TYPE: ICD-10-CM

## 2025-02-14 NOTE — TELEPHONE ENCOUNTER
Patient states she still is having trouble sleeping the xanax helps but she still not getting rest, patient would like to know if you want to increase her dose ?. Please advise

## 2025-02-16 PROBLEM — Z09 HOSPITAL DISCHARGE FOLLOW-UP: Status: RESOLVED | Noted: 2025-01-17 | Resolved: 2025-02-16

## 2025-02-17 RX ORDER — ALPRAZOLAM 0.5 MG
1 TABLET ORAL NIGHTLY PRN
Qty: 10 TABLET | Refills: 0 | Status: SHIPPED | OUTPATIENT
Start: 2025-02-17 | End: 2025-03-19

## 2025-02-17 NOTE — TELEPHONE ENCOUNTER
Patient already sees a sleep specialist for asthma . Patient wants to know if you can give her another medication to help her sleep better.

## 2025-02-17 NOTE — TELEPHONE ENCOUNTER
I have increased the dose to 1 mg, please remind pt it is as needed medications that not supposed to be used daily. I suggest she discuss with her pulm/sleep specialist, they are specialized in that field

## 2025-02-27 ENCOUNTER — HOSPITAL ENCOUNTER (OUTPATIENT)
Age: 80
End: 2025-02-27
Payer: MEDICARE

## 2025-02-27 ENCOUNTER — HOSPITAL ENCOUNTER (OUTPATIENT)
Age: 80
Discharge: HOME | End: 2025-02-27
Payer: MEDICARE

## 2025-02-27 DIAGNOSIS — A60.00 HERPESVIRAL INFECTION OF UROGENITAL SYSTEM, UNSPECIFIED: ICD-10-CM

## 2025-02-27 DIAGNOSIS — R91.1 LUNG NODULE: ICD-10-CM

## 2025-02-27 LAB
ANION GAP SERPL CALCULATED.3IONS-SCNC: 11 MMOL/L (ref 9–16)
BUN SERPL-MCNC: 20 MG/DL (ref 8–23)
CALCIUM SERPL-MCNC: 9.7 MG/DL (ref 8.6–10.4)
CHLORIDE SERPL-SCNC: 101 MMOL/L (ref 98–107)
CO2 SERPL-SCNC: 27 MMOL/L (ref 20–31)
CREAT SERPL-MCNC: 1.2 MG/DL (ref 0.6–0.9)
GFR, ESTIMATED: 46 ML/MIN/1.73M2
GLUCOSE SERPL-MCNC: 129 MG/DL (ref 74–99)
POTASSIUM SERPL-SCNC: 4 MMOL/L (ref 3.7–5.3)
SODIUM SERPL-SCNC: 139 MMOL/L (ref 136–145)

## 2025-02-27 PROCEDURE — 36415 COLL VENOUS BLD VENIPUNCTURE: CPT

## 2025-02-27 PROCEDURE — 80048 BASIC METABOLIC PNL TOTAL CA: CPT

## 2025-02-27 RX ORDER — VALACYCLOVIR HYDROCHLORIDE 1 G/1
1000 TABLET, FILM COATED ORAL DAILY
Qty: 30 TABLET | Refills: 5 | Status: SHIPPED | OUTPATIENT
Start: 2025-02-27

## 2025-03-10 ENCOUNTER — OFFICE VISIT (OUTPATIENT)
Dept: PODIATRY | Age: 80
End: 2025-03-10
Payer: MEDICARE

## 2025-03-10 VITALS — BODY MASS INDEX: 29.26 KG/M2 | HEIGHT: 62 IN | WEIGHT: 159 LBS

## 2025-03-10 DIAGNOSIS — M21.619 BUNION: ICD-10-CM

## 2025-03-10 DIAGNOSIS — I73.9 PVD (PERIPHERAL VASCULAR DISEASE): ICD-10-CM

## 2025-03-10 DIAGNOSIS — M79.605 PAIN OF LEFT LOWER EXTREMITY: ICD-10-CM

## 2025-03-10 DIAGNOSIS — M20.41 HAMMER TOES OF BOTH FEET: ICD-10-CM

## 2025-03-10 DIAGNOSIS — M20.42 HAMMER TOES OF BOTH FEET: ICD-10-CM

## 2025-03-10 DIAGNOSIS — L60.0 INGROWN NAIL OF GREAT TOE OF RIGHT FOOT: ICD-10-CM

## 2025-03-10 DIAGNOSIS — B35.1 DERMATOPHYTOSIS OF NAIL: ICD-10-CM

## 2025-03-10 DIAGNOSIS — Z98.890 POSTOPERATIVE STATE: Primary | ICD-10-CM

## 2025-03-10 PROCEDURE — 1123F ACP DISCUSS/DSCN MKR DOCD: CPT | Performed by: PODIATRIST

## 2025-03-10 PROCEDURE — 1090F PRES/ABSN URINE INCON ASSESS: CPT | Performed by: PODIATRIST

## 2025-03-10 PROCEDURE — G8427 DOCREV CUR MEDS BY ELIG CLIN: HCPCS | Performed by: PODIATRIST

## 2025-03-10 PROCEDURE — 1125F AMNT PAIN NOTED PAIN PRSNT: CPT | Performed by: PODIATRIST

## 2025-03-10 PROCEDURE — G8399 PT W/DXA RESULTS DOCUMENT: HCPCS | Performed by: PODIATRIST

## 2025-03-10 PROCEDURE — 11721 DEBRIDE NAIL 6 OR MORE: CPT | Performed by: PODIATRIST

## 2025-03-10 PROCEDURE — 1036F TOBACCO NON-USER: CPT | Performed by: PODIATRIST

## 2025-03-10 PROCEDURE — 1159F MED LIST DOCD IN RCRD: CPT | Performed by: PODIATRIST

## 2025-03-10 PROCEDURE — G8417 CALC BMI ABV UP PARAM F/U: HCPCS | Performed by: PODIATRIST

## 2025-03-10 PROCEDURE — 99213 OFFICE O/P EST LOW 20 MIN: CPT | Performed by: PODIATRIST

## 2025-03-17 NOTE — PROGRESS NOTES
[facility] Replaced by [facility] PODIATRY [address]  [address]  [facility] [address]  Dept: [phone]  Dept Fax: [phone]    PATIENT PROGRESS NOTE  Date of patient's visit: 3/17/2025  Patient's Name:  Estelle Gunderson YOB: 1945            Patient Care Team:  Kamryn Briseno MD as PCP - General (Family Medicine)  Kamryn Brisen[facility] MD as PCP - [facility]  Jh Gotti MD as Consulting Physician (Cardiology)  Zachery Camp MD as Consulting Physician (Pulmonology)  Saeed Hudson MD (Obstetrics & Gynecology)  Eboni Tran DPM as Consulting Physician (Podiatry)  Kal Muñoz MD as Consulting Physician (Urology)  Jevon Braga DO (Rheumatology)  Myranda Rodriguez MD as Consulting Physician (Dermatology)  Antonio Martinez MD as Surgeon (Ophthalmology)  Ba Reed DPM as Consulting Physician (Podiatry)  Janiya Francois MD as Consulting Physician (Nephrology)  Sommer Mccollum MD as Consulting Physician (Cardiology)  Myranda Rodriguez MD as Consulting Physician (Dermatology)  Naina Watts DPM as Physician (Podiatry)        Chief Complaint   Patient presents with    Nail Problem     Toenail trim    Foot Pain     Bilateral foot         HPI:   Estelle Gunderson is a 80 y.o. female who presents to the office today complaining of ingrown nail, hammer toes & bunion to [place] feet.  Symptoms began 15 year(s) ago. Patient relates pain is Present.  Pain is rated 1 out of 10 and is described as intermittent, severe, excruciating.  Treatments prior to today's visit include: none.  Currently denies F/C/N/V. Pt's primary care physician is Kamryn Briseno MD last seen 07/10/2024     Allergies   Allergen Reactions    Azithromycin Diarrhea    Benadryl [Diphenhydramine]     Codeine Nausea Only    Lisinopril     Phenergan [Promethazine Hcl] Other (See Comments)     Dystonia    Amoxicillin Diarrhea       Past Medical

## 2025-03-19 ENCOUNTER — OFFICE VISIT (OUTPATIENT)
Dept: FAMILY MEDICINE CLINIC | Age: 80
End: 2025-03-19

## 2025-03-19 VITALS
DIASTOLIC BLOOD PRESSURE: 62 MMHG | HEART RATE: 86 BPM | SYSTOLIC BLOOD PRESSURE: 124 MMHG | TEMPERATURE: 97.9 F | BODY MASS INDEX: 29.89 KG/M2 | OXYGEN SATURATION: 96 % | WEIGHT: 163.4 LBS

## 2025-03-19 DIAGNOSIS — G47.00 INSOMNIA, UNSPECIFIED TYPE: Primary | ICD-10-CM

## 2025-03-19 DIAGNOSIS — S22.49XD CLOSED FRACTURE OF MULTIPLE RIBS WITH ROUTINE HEALING, UNSPECIFIED LATERALITY, SUBSEQUENT ENCOUNTER: ICD-10-CM

## 2025-03-19 DIAGNOSIS — M79.671 RIGHT FOOT PAIN: ICD-10-CM

## 2025-03-19 DIAGNOSIS — E78.00 PURE HYPERCHOLESTEROLEMIA: ICD-10-CM

## 2025-03-19 DIAGNOSIS — L40.50 PSORIATIC ARTHRITIS (HCC): ICD-10-CM

## 2025-03-19 DIAGNOSIS — R73.03 PREDIABETES: ICD-10-CM

## 2025-03-19 RX ORDER — ALPRAZOLAM 0.5 MG
0.5 TABLET ORAL NIGHTLY PRN
Qty: 10 TABLET | Refills: 0 | Status: SHIPPED | OUTPATIENT
Start: 2025-03-19 | End: 2025-04-18

## 2025-03-19 ASSESSMENT — ENCOUNTER SYMPTOMS
CONSTIPATION: 0
VOMITING: 0
ABDOMINAL PAIN: 0
DIARRHEA: 0
BACK PAIN: 1
NAUSEA: 0

## 2025-03-19 NOTE — ASSESSMENT & PLAN NOTE
Chronic, at goal (stable), She has been taking Xanax 0.5 mg, which she finds effective for sleep. Increasing the dose to 1 mg resulted in urinary incontinence. A prescription for 30 tablets of Xanax 0.5 mg will be provided, to be taken nightly as needed for sleep. She is advised to discuss her sleep issues with her sleep specialist or pulmonologist., medication adherence emphasized, and lifestyle modifications recommended    Orders:    ALPRAZolam (XANAX) 0.5 MG tablet; Take 1 tablet by mouth nightly as needed for Sleep or Anxiety for up to 30 days. Max Daily Amount: 0.5 mg

## 2025-03-19 NOTE — PROGRESS NOTES
This Encounter    ALPRAZolam (XANAX) 0.5 MG tablet     Sig: Take 1 tablet by mouth nightly as needed for Sleep or Anxiety for up to 30 days. Max Daily Amount: 0.5 mg     Dispense:  10 tablet     Refill:  0        Requested Prescriptions     Signed Prescriptions Disp Refills    ALPRAZolam (XANAX) 0.5 MG tablet 10 tablet 0     Sig: Take 1 tablet by mouth nightly as needed for Sleep or Anxiety for up to 30 days. Max Daily Amount: 0.5 mg       Medications Discontinued During This Encounter   Medication Reason    Probiotic Product (PROBIOTIC DAILY PO) LIST CLEANUP    ALPRAZolam (XANAX) 0.5 MG tablet REORDER       [place] received counseling on the following healthy behaviors: nutrition, exercise and medication adherence    Discussed use,benefit, and side effects of prescribed medications.  Barriers to medication compliance addressed.     Discussed with patient signs and symptoms that necessitate emergent medical attention going to ER.     All patient questions answered.  Pt voiced understanding.     No follow-ups on file.      Disclaimer: Some orall of this note was transcribed using voice-recognition software.This may cause typographical errors occasionally. Although all effort is made to fix these errors, please do not hesitate to contact our office if there isany concern with the understanding of this note.      Electronically signed by SERENA EMERY MD on 3/19/2025 at 2:10 PM

## 2025-03-19 NOTE — ASSESSMENT & PLAN NOTE
Chronic, at goal (stable), Her last cholesterol test was conducted in 2023. Orders for blood glucose and cholesterol tests have been placed. She can have these tests done without fasting and lifestyle modifications recommended  clopidogrel - 75 MG  pravastatin - 20 MG   Orders:    Lipid Panel; Future

## 2025-03-23 PROBLEM — R73.03 PREDIABETES: Status: ACTIVE | Noted: 2025-03-23

## 2025-03-23 PROBLEM — M79.671 RIGHT FOOT PAIN: Status: RESOLVED | Noted: 2024-12-31 | Resolved: 2025-03-23

## 2025-03-27 ENCOUNTER — PATIENT MESSAGE (OUTPATIENT)
Dept: FAMILY MEDICINE CLINIC | Age: 80
End: 2025-03-27

## 2025-03-31 ENCOUNTER — HOSPITAL ENCOUNTER (OUTPATIENT)
Age: 80
Discharge: HOME | End: 2025-03-31
Payer: MEDICARE

## 2025-03-31 DIAGNOSIS — R73.03 PREDIABETES: ICD-10-CM

## 2025-03-31 DIAGNOSIS — E78.00 PURE HYPERCHOLESTEROLEMIA: ICD-10-CM

## 2025-03-31 LAB
CHOLEST SERPL-MCNC: 182 MG/DL (ref 0–199)
CHOLESTEROL/HDL RATIO: 2.9
EST. AVERAGE GLUCOSE BLD GHB EST-MCNC: 131 MG/DL
HBA1C MFR BLD: 6.2 % (ref 4–6)
HDLC SERPL-MCNC: 62 MG/DL
LDLC SERPL CALC-MCNC: 69 MG/DL (ref 0–100)
TRIGL SERPL-MCNC: 255 MG/DL
VLDLC SERPL CALC-MCNC: 51 MG/DL (ref 1–30)

## 2025-03-31 PROCEDURE — 83036 HEMOGLOBIN GLYCOSYLATED A1C: CPT

## 2025-03-31 PROCEDURE — 80061 LIPID PANEL: CPT

## 2025-03-31 PROCEDURE — 36415 COLL VENOUS BLD VENIPUNCTURE: CPT

## 2025-04-01 ENCOUNTER — RESULTS FOLLOW-UP (OUTPATIENT)
Dept: FAMILY MEDICINE CLINIC | Age: 80
End: 2025-04-01

## 2025-04-11 DIAGNOSIS — L90.0 LICHEN SCLEROSUS: ICD-10-CM

## 2025-04-11 RX ORDER — CLOBETASOL PROPIONATE 0.5 MG/G
OINTMENT TOPICAL
Qty: 60 G | Refills: 2 | Status: SHIPPED | OUTPATIENT
Start: 2025-04-11

## 2025-04-23 ENCOUNTER — OFFICE VISIT (OUTPATIENT)
Dept: FAMILY MEDICINE CLINIC | Age: 80
End: 2025-04-23

## 2025-04-23 VITALS
BODY MASS INDEX: 29.63 KG/M2 | SYSTOLIC BLOOD PRESSURE: 112 MMHG | RESPIRATION RATE: 18 BRPM | HEART RATE: 87 BPM | OXYGEN SATURATION: 95 % | TEMPERATURE: 98.4 F | DIASTOLIC BLOOD PRESSURE: 58 MMHG | WEIGHT: 161 LBS | HEIGHT: 62 IN

## 2025-04-23 DIAGNOSIS — E78.00 PURE HYPERCHOLESTEROLEMIA: ICD-10-CM

## 2025-04-23 DIAGNOSIS — Z00.00 MEDICARE ANNUAL WELLNESS VISIT, SUBSEQUENT: Primary | ICD-10-CM

## 2025-04-23 RX ORDER — ALPRAZOLAM 0.5 MG
TABLET ORAL
COMMUNITY
Start: 2025-03-30

## 2025-04-23 RX ORDER — PRAVASTATIN SODIUM 20 MG
TABLET ORAL
Qty: 90 TABLET | Refills: 0 | Status: SHIPPED | OUTPATIENT
Start: 2025-04-23

## 2025-04-23 ASSESSMENT — PATIENT HEALTH QUESTIONNAIRE - PHQ9
7. TROUBLE CONCENTRATING ON THINGS, SUCH AS READING THE NEWSPAPER OR WATCHING TELEVISION: NOT AT ALL
SUM OF ALL RESPONSES TO PHQ QUESTIONS 1-9: 9
5. POOR APPETITE OR OVEREATING: NOT AT ALL
SUM OF ALL RESPONSES TO PHQ QUESTIONS 1-9: 9
SUM OF ALL RESPONSES TO PHQ QUESTIONS 1-9: 9
10. IF YOU CHECKED OFF ANY PROBLEMS, HOW DIFFICULT HAVE THESE PROBLEMS MADE IT FOR YOU TO DO YOUR WORK, TAKE CARE OF THINGS AT HOME, OR GET ALONG WITH OTHER PEOPLE: SOMEWHAT DIFFICULT
1. LITTLE INTEREST OR PLEASURE IN DOING THINGS: MORE THAN HALF THE DAYS
6. FEELING BAD ABOUT YOURSELF - OR THAT YOU ARE A FAILURE OR HAVE LET YOURSELF OR YOUR FAMILY DOWN: MORE THAN HALF THE DAYS
8. MOVING OR SPEAKING SO SLOWLY THAT OTHER PEOPLE COULD HAVE NOTICED. OR THE OPPOSITE, BEING SO FIGETY OR RESTLESS THAT YOU HAVE BEEN MOVING AROUND A LOT MORE THAN USUAL: NOT AT ALL
SUM OF ALL RESPONSES TO PHQ QUESTIONS 1-9: 9
2. FEELING DOWN, DEPRESSED OR HOPELESS: MORE THAN HALF THE DAYS
3. TROUBLE FALLING OR STAYING ASLEEP: NEARLY EVERY DAY
9. THOUGHTS THAT YOU WOULD BE BETTER OFF DEAD, OR OF HURTING YOURSELF: NOT AT ALL
4. FEELING TIRED OR HAVING LITTLE ENERGY: NOT AT ALL

## 2025-04-23 NOTE — PROGRESS NOTES
THE EVENING Yes Donald Gatica MD   clopidogrel (PLAVIX) 75 MG tablet TAKE 1 TABLET BY MOUTH EVERY DAY Yes Jh Gotti MD   amLODIPine (NORVASC) 5 MG tablet Take 1 tablet by mouth daily Yes Jh Gotti MD   albuterol sulfate HFA (VENTOLIN HFA) 108 (90 Base) MCG/ACT inhaler Inhale 2 puffs into the lungs 4 times daily as needed for Wheezing Yes Donald Gatica MD   calcium carbonate (OSCAL) 500 MG TABS tablet Take 600 mg by mouth daily Yes Zoey Gant MD   Multiple Vitamins-Minerals (THERAPEUTIC MULTIVITAMIN-MINERALS) tablet Take 1 tablet by mouth daily Yes Zoey Gant MD   Vitamin E 400 units TABS Take by mouth daily Yes Zoey Gant MD   Golimumab (SIMPONI ARIA IV) Infuse intravenously Every 8 weeks Yes Zoey Gant MD   Denosumab (PROLIA SC) Inject into the skin Every 6 months Yes Zoey Gant MD   Cholecalciferol (VITAMIN D3) 2000 UNITS CAPS Take by mouth daily Yes Zoey Gant MD   lansoprazole (PREVACID) 15 MG delayed release capsule Take 1 capsule by mouth daily Yes Zoey Gant MD   clobetasol (TEMOVATE) 0.05 % external solution Apply to affected nail twice daily  Patient not taking: Reported on 4/23/2025  Myranda Rodriguez MD   fluticasone (CUTIVATE) 0.05 % cream APPLY TO AFFECTED AREA TWICE A DAY  Patient not taking: Reported on 4/23/2025  Donald Gatica MD       [facility] (Including outside providers/suppliers regularly involved in providing care):   Patient Care Team:  Kamryn Briseno MD as PCP - General (Family Medicine)  Kamryn Briseno MD as PCP - Empaneled Provider  Jh Gotti MD as Consulting Physician (Cardiology)  Zachery Camp MD as Consulting Physician (Pulmonology)  Kal Muñoz MD as Consulting Physician (Urology)  Jevon Braga DO (Rheumatology)  Myranda Rodriguez MD as Consulting Physician (Dermatology)  Janiya Francois MD as Consulting Physician

## 2025-05-01 DIAGNOSIS — G47.00 INSOMNIA, UNSPECIFIED: ICD-10-CM

## 2025-05-01 NOTE — TELEPHONE ENCOUNTER
Estelle Gunderson is calling to request a refill on the following medication(s):    Medication Request:  Requested Prescriptions     Pending Prescriptions Disp Refills    ALPRAZolam (XANAX) 0.5 MG tablet [Pharmacy Med Name: ALPRAZOLAM 0.5 MG TABLET] 10 tablet 0     Sig: TAKE 1 TABLET BY MOUTH NIGHTLY AS NEEDED FOR SLEEP OR ANXIETY FOR UP TO 30 DAYS. MAX 1 TABLET/DAILY       Last Visit Date (If Applicable):  4/23/2025    Next Visit Date:    6/23/2025          Last refilled 3/30/2025 Rx Pending

## 2025-05-02 RX ORDER — ALPRAZOLAM 0.5 MG
TABLET ORAL
Qty: 10 TABLET | Refills: 0 | Status: SHIPPED | OUTPATIENT
Start: 2025-05-02 | End: 2025-06-01

## 2025-05-16 ENCOUNTER — OFFICE VISIT (OUTPATIENT)
Age: 80
End: 2025-05-16

## 2025-05-16 VITALS
OXYGEN SATURATION: 92 % | SYSTOLIC BLOOD PRESSURE: 121 MMHG | HEIGHT: 63 IN | HEART RATE: 88 BPM | DIASTOLIC BLOOD PRESSURE: 72 MMHG | BODY MASS INDEX: 28.53 KG/M2 | WEIGHT: 161 LBS | RESPIRATION RATE: 16 BRPM | TEMPERATURE: 98.2 F

## 2025-05-16 DIAGNOSIS — K52.9 GASTROENTERITIS, ACUTE: Primary | ICD-10-CM

## 2025-05-16 DIAGNOSIS — K52.9 COLITIS: ICD-10-CM

## 2025-05-16 ASSESSMENT — ENCOUNTER SYMPTOMS
BACK PAIN: 0
NAUSEA: 1
EYE REDNESS: 0
SHORTNESS OF BREATH: 0
SINUS PRESSURE: 0
VOICE CHANGE: 0
COLOR CHANGE: 0
CONSTIPATION: 0
TROUBLE SWALLOWING: 0
COUGH: 1
DIARRHEA: 1
CHEST TIGHTNESS: 1
SORE THROAT: 0
VOMITING: 1
EYE PAIN: 0
ABDOMINAL PAIN: 1

## 2025-05-16 NOTE — PROGRESS NOTES
Pulmonary effort is normal. No respiratory distress.      Breath sounds: Normal breath sounds and air entry. No wheezing.   Abdominal:      General: Abdomen is flat. Bowel sounds are normal. There is distension. There is no abdominal bruit.      Palpations: Abdomen is soft. There is no mass or pulsatile mass.      Tenderness: There is abdominal tenderness (Patient has diffuse abdominal tenderness.  Mainly around the right periumbilical area.) in the periumbilical area and suprapubic area. There is no right CVA tenderness, left CVA tenderness, guarding or rebound.      Hernia: No hernia is present.   Musculoskeletal:         General: No tenderness or deformity. Normal range of motion.      Cervical back: Normal range of motion and neck supple.      Comments: Normal range of motion of joints in general with no focal deformities.   Lymphadenopathy:      Cervical: No cervical adenopathy.   Skin:     General: Skin is warm and dry.   Neurological:      General: No focal deficit present.      Mental Status: She is alert and oriented to person, place, and time.      GCS: GCS eye subscore is 4. GCS verbal subscore is 5. GCS motor subscore is 6.      Cranial Nerves: Cranial nerves 2-12 are intact. No cranial nerve deficit.      Sensory: No sensory deficit.      Motor: No abnormal muscle tone.      Coordination: Coordination normal.   Psychiatric:         Attention and Perception: Attention normal.         Mood and Affect: Mood normal.         Speech: Speech normal.         Behavior: Behavior normal.           ASSESSMENT/PLAN:    Estelle Gunderson (: 1945) is a 80 y.o. female with :         ICD-10-CM    1. Gastroenteritis, acute  K52.9       2. Colitis  K52.9           Medical Decision Making     Acute abdominal pain.  Suspect gastroenteritis/colitis/enteritis.  Patient needs further management evaluation of her condition.  She needs to go to the ER for further investigation including lab work, CT abdomen pelvis and IV

## 2025-05-20 ENCOUNTER — TELEPHONE (OUTPATIENT)
Dept: FAMILY MEDICINE CLINIC | Age: 80
End: 2025-05-20

## 2025-05-20 NOTE — TELEPHONE ENCOUNTER
Patient was seen and admitted to  [facility] for diverticulitis and colon abscess. She was prescribed Amoxicillin upon discharge Sunday night but she is allergic to it. She is asking if something else can sent to [facility] in [employer] [facility] [address] [facility].

## 2025-05-21 RX ORDER — METRONIDAZOLE 500 MG/1
500 TABLET ORAL 2 TIMES DAILY
Qty: 14 TABLET | Refills: 0 | Status: SHIPPED | OUTPATIENT
Start: 2025-05-21 | End: 2025-05-28

## 2025-05-21 RX ORDER — CIPROFLOXACIN 500 MG/1
500 TABLET, FILM COATED ORAL 2 TIMES DAILY
Qty: 14 TABLET | Refills: 0 | Status: SHIPPED | OUTPATIENT
Start: 2025-05-21 | End: 2025-05-28

## 2025-05-27 ENCOUNTER — OFFICE VISIT (OUTPATIENT)
Dept: FAMILY MEDICINE CLINIC | Age: 80
End: 2025-05-27

## 2025-05-27 ENCOUNTER — TELEPHONE (OUTPATIENT)
Dept: FAMILY MEDICINE CLINIC | Age: 80
End: 2025-05-27

## 2025-05-27 VITALS
DIASTOLIC BLOOD PRESSURE: 52 MMHG | BODY MASS INDEX: 28.34 KG/M2 | TEMPERATURE: 97.2 F | SYSTOLIC BLOOD PRESSURE: 92 MMHG | HEART RATE: 83 BPM | WEIGHT: 160 LBS | OXYGEN SATURATION: 98 %

## 2025-05-27 DIAGNOSIS — Z09 HOSPITAL DISCHARGE FOLLOW-UP: Primary | ICD-10-CM

## 2025-05-27 DIAGNOSIS — K57.92 DIVERTICULITIS: ICD-10-CM

## 2025-05-27 RX ORDER — LACTOBACILLUS ACIDOPHILUS 500MM CELL
500 CAPSULE ORAL
COMMUNITY
Start: 2025-05-18 | End: 2025-06-17

## 2025-05-27 RX ORDER — ONDANSETRON 4 MG/1
4 TABLET, ORALLY DISINTEGRATING ORAL EVERY 8 HOURS PRN
COMMUNITY
Start: 2025-05-18

## 2025-05-27 RX ORDER — OXYCODONE AND ACETAMINOPHEN 5; 325 MG/1; MG/1
TABLET ORAL
COMMUNITY
Start: 2025-05-18

## 2025-05-27 NOTE — PROGRESS NOTES
Post-Discharge Transitional Care Management Progress Note      Estelle Gunderson   YOB: 1945    Date of Office Visit:  5/27/2025  Date of Hospital Admission: 5/16/25  Date of Hospital Discharge: 5/18/25    Care management risk score Rising risk (score 2-5) and Complex Care (Scores >=6): No Risk Score On File     Non face to face  following discharge, date last encounter closed (first attempt may have been earlier): *No documented post hospital discharge outreach found in the last 14 days *No documented post hospital discharge outreach found in the last 14 days    Call initiated 2 business days of discharge: Yes    ASSESSMENT/PLAN:   Hospital discharge follow-up  -     MA DISCHARGE MEDS RECONCILED W/ CURRENT OUTPATIENT MED LIST  Diverticulitis    - Slowly introduce foods into daily intake, discussed at visit.    - Finish final doses of antibiotics.  - Follow up with general surgery, name and number provided.    Medical Decision Making: moderate complexity  Return if symptoms worsen or fail to improve.      On this date 5/27/2025 I have spent 30 minutes reviewing previous notes, test results and face to face with the patient discussing the diagnosis and importance of compliance with the treatment plan as well as documenting on the day of the visit.     Subjective:   HPI:  Follow up of Hospital problems/diagnosis(es):     Here today for hospital follow up after recent admission from 5/16/25 - 5/18/25.  She was admitted to [facility] for Diverticulitis with abscess.  She was treated with IV Zosyn and discharged with PO Augmentin.  She is to follow up with general surgery, Dr. Chew within 2 weeks of discharge.  She is feeling better since discharge.  She has been maintaining a diet low in fiber and not had any meat since discharge.      She also had questions about her Xanax, advised to follow up with PCP.    Inpatient course: Discharge summary reviewed- see chart.    Interval history/Current status:

## 2025-05-27 NOTE — TELEPHONE ENCOUNTER
Patient asking for then 10 tablets of Xanax a month. Patient states she is only sleeping 2 out of 7 nights a week.

## 2025-05-27 NOTE — TELEPHONE ENCOUNTER
Patient can discuss sleeping concern with a pulmonologist as they deal with the sleeping issue as well if not I can refer to sleep specialist

## 2025-05-28 NOTE — TELEPHONE ENCOUNTER
Patient notified and stated she does not see the pulmonologist until September. I told her to call and see if they can get her in sooner or request the medication for her.

## 2025-05-29 ENCOUNTER — HOSPITAL ENCOUNTER (OUTPATIENT)
Age: 80
Discharge: HOME | End: 2025-05-29
Payer: MEDICARE

## 2025-05-29 DIAGNOSIS — D47.2 MGUS (MONOCLONAL GAMMOPATHY OF UNKNOWN SIGNIFICANCE): ICD-10-CM

## 2025-05-29 LAB
BASOPHILS # BLD: 0.06 K/UL (ref 0–0.2)
BASOPHILS NFR BLD: 1 % (ref 0–2)
EOSINOPHIL # BLD: 0.27 K/UL (ref 0–0.44)
EOSINOPHILS RELATIVE PERCENT: 2 % (ref 1–4)
ERYTHROCYTE [DISTWIDTH] IN BLOOD BY AUTOMATED COUNT: 16.2 % (ref 11.8–14.4)
FREE KAPPA/LAMBDA RATIO: 1.36 (ref 0.22–1.74)
HCT VFR BLD AUTO: 36.1 % (ref 36.3–47.1)
HGB BLD-MCNC: 11.7 G/DL (ref 11.9–15.1)
IGA SERPL-MCNC: <50 MG/DL (ref 70–400)
IGA, LOW RANGE: 37 MG/DL (ref 70–400)
IGG SERPL-MCNC: 1011 MG/DL (ref 700–1600)
IGM SERPL-MCNC: 262 MG/DL (ref 40–230)
IMM GRANULOCYTES # BLD AUTO: 0.04 K/UL (ref 0–0.3)
IMM GRANULOCYTES NFR BLD: 0 %
KAPPA LC FREE SER-MCNC: 33.9 MG/L
LAMBDA LC FREE SERPL-MCNC: 24.9 MG/L (ref 4.2–27.7)
LYMPHOCYTES NFR BLD: 2.33 K/UL (ref 1.1–3.7)
LYMPHOCYTES RELATIVE PERCENT: 21 % (ref 24–43)
MCH RBC QN AUTO: 28.5 PG (ref 25.2–33.5)
MCHC RBC AUTO-ENTMCNC: 32.4 G/DL (ref 28.4–34.8)
MCV RBC AUTO: 88 FL (ref 82.6–102.9)
MONOCYTES NFR BLD: 1.01 K/UL (ref 0.1–1.2)
MONOCYTES NFR BLD: 9 % (ref 3–12)
NEUTROPHILS NFR BLD: 67 % (ref 36–65)
NEUTS SEG NFR BLD: 7.36 K/UL (ref 1.5–8.1)
NRBC BLD-RTO: 0 PER 100 WBC
PLATELET # BLD AUTO: ABNORMAL K/UL (ref 138–453)
PLATELET, FLUORESCENCE: 262 K/UL (ref 138–453)
PLATELETS.RETICULATED NFR BLD AUTO: 2.6 % (ref 1.1–10.3)
RBC # BLD AUTO: 4.1 M/UL (ref 3.95–5.11)
RBC # BLD: ABNORMAL 10*6/UL
WBC OTHER # BLD: 11.1 K/UL (ref 3.5–11.3)

## 2025-05-29 PROCEDURE — 85025 COMPLETE CBC W/AUTO DIFF WBC: CPT

## 2025-05-29 PROCEDURE — 86334 IMMUNOFIX E-PHORESIS SERUM: CPT

## 2025-05-29 PROCEDURE — 85055 RETICULATED PLATELET ASSAY: CPT

## 2025-05-29 PROCEDURE — 82784 ASSAY IGA/IGD/IGG/IGM EACH: CPT

## 2025-05-29 PROCEDURE — 36415 COLL VENOUS BLD VENIPUNCTURE: CPT

## 2025-05-29 PROCEDURE — 83521 IG LIGHT CHAINS FREE EACH: CPT

## 2025-05-30 LAB
ITYP INTERPRETATION: NORMAL
PATH REV: NORMAL

## 2025-06-03 ENCOUNTER — OFFICE VISIT (OUTPATIENT)
Age: 80
End: 2025-06-03
Payer: MEDICARE

## 2025-06-03 ENCOUNTER — TELEPHONE (OUTPATIENT)
Age: 80
End: 2025-06-03

## 2025-06-03 VITALS
RESPIRATION RATE: 18 BRPM | DIASTOLIC BLOOD PRESSURE: 62 MMHG | TEMPERATURE: 98.5 F | BODY MASS INDEX: 27.28 KG/M2 | SYSTOLIC BLOOD PRESSURE: 107 MMHG | HEART RATE: 68 BPM | WEIGHT: 154 LBS

## 2025-06-03 DIAGNOSIS — D47.2 MGUS (MONOCLONAL GAMMOPATHY OF UNKNOWN SIGNIFICANCE): Primary | ICD-10-CM

## 2025-06-03 PROCEDURE — 99214 OFFICE O/P EST MOD 30 MIN: CPT | Performed by: INTERNAL MEDICINE

## 2025-06-03 PROCEDURE — 1036F TOBACCO NON-USER: CPT | Performed by: INTERNAL MEDICINE

## 2025-06-03 PROCEDURE — 1090F PRES/ABSN URINE INCON ASSESS: CPT | Performed by: INTERNAL MEDICINE

## 2025-06-03 PROCEDURE — 3078F DIAST BP <80 MM HG: CPT | Performed by: INTERNAL MEDICINE

## 2025-06-03 PROCEDURE — 3074F SYST BP LT 130 MM HG: CPT | Performed by: INTERNAL MEDICINE

## 2025-06-03 PROCEDURE — 1159F MED LIST DOCD IN RCRD: CPT | Performed by: INTERNAL MEDICINE

## 2025-06-03 PROCEDURE — G8399 PT W/DXA RESULTS DOCUMENT: HCPCS | Performed by: INTERNAL MEDICINE

## 2025-06-03 PROCEDURE — 1123F ACP DISCUSS/DSCN MKR DOCD: CPT | Performed by: INTERNAL MEDICINE

## 2025-06-03 PROCEDURE — G8427 DOCREV CUR MEDS BY ELIG CLIN: HCPCS | Performed by: INTERNAL MEDICINE

## 2025-06-03 PROCEDURE — 99213 OFFICE O/P EST LOW 20 MIN: CPT | Performed by: INTERNAL MEDICINE

## 2025-06-03 PROCEDURE — G8417 CALC BMI ABV UP PARAM F/U: HCPCS | Performed by: INTERNAL MEDICINE

## 2025-06-03 PROCEDURE — 1126F AMNT PAIN NOTED NONE PRSNT: CPT | Performed by: INTERNAL MEDICINE

## 2025-06-03 NOTE — TELEPHONE ENCOUNTER
V[place] HERE FOR MD VISIT  RV 6 months  Labs before RV  LAB ORDERS MAILED TO PT  MD VISIT 12/9/25 @ 1:15PM  AVS PRINTED W/ INSTRUCTIONS AND GIVEN TO PT ON EXIT

## 2025-06-04 NOTE — PROGRESS NOTES
Dermatology Patient Note  [facility]  [facility] DERMATOLOGY  [address] [place] [address]  Dept: [phone]  Dept Fax: [phone]      VISITDATE: 6/5/2025   REFERRING PROVIDER: No ref. provider found      Estelle Gunderson is a 80 y.o. female  who presents today in the office for:    Other (Lichen sclerosus follow )      HISTORY OF PRESENT ILLNESS:  As above. Patient presents for a 6 month follow up for biopsy proven lichen sclerosus. At the last visit, 12/5/24, continue clobetasol 0.05% ointment for LS and referral to rheumatology in reserve for psoriasis vs onychomadesis of nails.     Today, patient reports her LS persists and never entirely resolves. She experiences persistent pain in her urethra and intermittent flares on her vulva; consistently uses clobetasol ointment a few times a day. Even with clobetasol application, she experiences intermittent, sharp itching. Tried triple cream barrier paste when clobetasol is ineffective. She has tried to wean off use of pads and previously experienced intense discharge/leaking upon physical movement and daily activities, but this has improved.    She was recently admitted for diverticulitis and has been on a low-fiber diet. Started taking OTC probiotic. Her recent stools and attempting to clean herself seems to be contributing to the vulvar and perianal itching and pain. 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

## 2025-06-04 NOTE — PROGRESS NOTES
_     Chief Complaint   Patient presents with    Discuss Labs    Follow-Up from Hospital     DIAGNOSIS:       Paraproteinemia/monoclonal gammopathy  MGUS  Chronic renal insufficiency  Breast cancer in remission.  Diagnosed more than 25 years ago.  Multiple comorbidities as listed      CURRENT THERAPY:         Observation monitoring for MGUS    BRIEF CASE HISTORY:      Ms. Estelle Gunderson is a very pleasant 80 y.o. female with history of multiple co morbidities as listed.  Patient is referred for further management of abnormal kappa light chain immunoglobulin.  Patient had recent evaluation by nephrology for chronic renal insufficiency..  Work-up was done which included serum protein electrophoresis and immunofixation.  Patient was noted to have elevated kappa light chain immunoglobulin and slightly limited M protein with the mild monoclonal gammopathy.  Patient denies any fever or night sweats.  No repeated infections.  She had chronic body aches including chronic back pain.  No recent changes.  No chest pain or shortness of breath.  No fever.  No other complaints.  Patient denies smoking or alcohol drinking.    INTERIM HISTORY:   Patient seen for follow-up paraproteinemia.  Doing well clinically.  No chest pain or shortness of breath.  No back pain or bone aches.  No fever.  No weight loss or decreased appetite.    Will have valve surgery in July  No other complaints.          PAST MEDICAL HISTORY: has a past medical history of Anemia, unspecified, Anxiety, Aortic stenosis, Arthritis, Asthma, AV block, BBB (bundle branch block), Breast cancer (HCC), Bundle branch block, CAD (coronary artery disease), Carcinoma in situ of breast, Chronic back pain, Congenital heart disease, Depression, Esophageal reflux, Essential hypertension, benign, Fibromyalgia, Insulin resistance, Irritable bowel syndrome, MGUS (monoclonal gammopathy of

## 2025-06-05 ENCOUNTER — OFFICE VISIT (OUTPATIENT)
Age: 80
End: 2025-06-05
Payer: MEDICARE

## 2025-06-05 VITALS
WEIGHT: 154 LBS | TEMPERATURE: 97.6 F | SYSTOLIC BLOOD PRESSURE: 113 MMHG | DIASTOLIC BLOOD PRESSURE: 62 MMHG | OXYGEN SATURATION: 96 % | HEART RATE: 63 BPM | HEIGHT: 62 IN | BODY MASS INDEX: 28.34 KG/M2

## 2025-06-05 DIAGNOSIS — N76.2 ACUTE VULVITIS: ICD-10-CM

## 2025-06-05 DIAGNOSIS — L90.0 LICHEN SCLEROSUS: Primary | ICD-10-CM

## 2025-06-05 PROCEDURE — 1090F PRES/ABSN URINE INCON ASSESS: CPT | Performed by: DERMATOLOGY

## 2025-06-05 PROCEDURE — 99214 OFFICE O/P EST MOD 30 MIN: CPT | Performed by: DERMATOLOGY

## 2025-06-05 PROCEDURE — 1123F ACP DISCUSS/DSCN MKR DOCD: CPT | Performed by: DERMATOLOGY

## 2025-06-05 PROCEDURE — 1036F TOBACCO NON-USER: CPT | Performed by: DERMATOLOGY

## 2025-06-05 PROCEDURE — G8417 CALC BMI ABV UP PARAM F/U: HCPCS | Performed by: DERMATOLOGY

## 2025-06-05 PROCEDURE — 3074F SYST BP LT 130 MM HG: CPT | Performed by: DERMATOLOGY

## 2025-06-05 PROCEDURE — G8427 DOCREV CUR MEDS BY ELIG CLIN: HCPCS | Performed by: DERMATOLOGY

## 2025-06-05 PROCEDURE — 99213 OFFICE O/P EST LOW 20 MIN: CPT | Performed by: DERMATOLOGY

## 2025-06-05 PROCEDURE — G8399 PT W/DXA RESULTS DOCUMENT: HCPCS | Performed by: DERMATOLOGY

## 2025-06-05 PROCEDURE — 1159F MED LIST DOCD IN RCRD: CPT | Performed by: DERMATOLOGY

## 2025-06-05 PROCEDURE — 3078F DIAST BP <80 MM HG: CPT | Performed by: DERMATOLOGY

## 2025-06-05 RX ORDER — CLOTRIMAZOLE AND BETAMETHASONE DIPROPIONATE 10; .64 MG/G; MG/G
CREAM TOPICAL
Qty: 45 G | Refills: 2 | Status: SHIPPED | OUTPATIENT
Start: 2025-06-05

## 2025-06-05 RX ORDER — FLUCONAZOLE 200 MG/1
TABLET ORAL
Qty: 4 TABLET | Refills: 0 | Status: SHIPPED | OUTPATIENT
Start: 2025-06-05

## 2025-06-05 NOTE — PATIENT INSTRUCTIONS
- start Lotrisone (clotrimazole / betamethasone)  0.05% ointment twice daily to affected areas  - start diflucan 200 mg; take one tablet every week for 4 weeks  [facility]etric[facility],  Dr. Wolf

## 2025-06-23 ENCOUNTER — OFFICE VISIT (OUTPATIENT)
Dept: FAMILY MEDICINE CLINIC | Age: 80
End: 2025-06-23
Payer: MEDICARE

## 2025-06-23 VITALS
DIASTOLIC BLOOD PRESSURE: 50 MMHG | OXYGEN SATURATION: 97 % | HEART RATE: 54 BPM | BODY MASS INDEX: 29.52 KG/M2 | HEIGHT: 62 IN | WEIGHT: 160.4 LBS | SYSTOLIC BLOOD PRESSURE: 112 MMHG

## 2025-06-23 DIAGNOSIS — I50.22 CHRONIC SYSTOLIC (CONGESTIVE) HEART FAILURE (HCC): ICD-10-CM

## 2025-06-23 DIAGNOSIS — R11.10 ACUTE VOMITING: ICD-10-CM

## 2025-06-23 DIAGNOSIS — M25.473 ANKLE SWELLING, UNSPECIFIED LATERALITY: ICD-10-CM

## 2025-06-23 DIAGNOSIS — I10 ESSENTIAL HYPERTENSION: Primary | Chronic | ICD-10-CM

## 2025-06-23 DIAGNOSIS — R73.03 PREDIABETES: ICD-10-CM

## 2025-06-23 DIAGNOSIS — N18.32 STAGE 3B CHRONIC KIDNEY DISEASE (HCC): ICD-10-CM

## 2025-06-23 PROCEDURE — 1123F ACP DISCUSS/DSCN MKR DOCD: CPT

## 2025-06-23 PROCEDURE — 1159F MED LIST DOCD IN RCRD: CPT

## 2025-06-23 PROCEDURE — 3078F DIAST BP <80 MM HG: CPT

## 2025-06-23 PROCEDURE — G8417 CALC BMI ABV UP PARAM F/U: HCPCS

## 2025-06-23 PROCEDURE — 3074F SYST BP LT 130 MM HG: CPT

## 2025-06-23 PROCEDURE — G8427 DOCREV CUR MEDS BY ELIG CLIN: HCPCS

## 2025-06-23 PROCEDURE — 1090F PRES/ABSN URINE INCON ASSESS: CPT

## 2025-06-23 PROCEDURE — G8399 PT W/DXA RESULTS DOCUMENT: HCPCS

## 2025-06-23 PROCEDURE — 1036F TOBACCO NON-USER: CPT

## 2025-06-23 PROCEDURE — 99214 OFFICE O/P EST MOD 30 MIN: CPT

## 2025-06-23 RX ORDER — METFORMIN HYDROCHLORIDE 500 MG/1
500 TABLET, EXTENDED RELEASE ORAL
Qty: 30 TABLET | Refills: 1 | Status: SHIPPED | OUTPATIENT
Start: 2025-06-23

## 2025-06-23 NOTE — PROGRESS NOTES
Estelle Gunderson (:  1945) is a 80 y.o. female,Established patient, here for evaluation of the following chief complaint(s):  Hypertension    This is 80 years old female with Hx of balance is issue using cane, chronic lower back pain/hip pain, right hand neuropathy, essential HTN on Norvasc Coreg valsartan hydrochlorothiazide has home blood pressure monitor, bulbar herpes simplex follows with dermatology on Valtrex and clobetasol, primary insomnia was a previously on Ambien causing sleepwalking trazodone not effective, depression/anxiety on Celexa, bradycardia s/p pacemaker follows with Dr. Gotti cardiology, Hx of aortic valve replacement, CAD s/p PCI, MGUS follows with hematology Dr. Garcia every 6 months, Hx of left breast cancer s/p implant of the right breast, recurrent bronchitis and lung nodule follows with pulmonologist      History of Present Illness  The patient presents for evaluation of vomiting, prediabetes, nosebleed, and ankle swelling.    Vomiting  - Experienced an episode of nausea and vomiting on Thursday, during which she expelled yellow liquid.  - This episode occurred twice on the same day.  - Reports no recent consumption of food outside her home.  - No symptoms of fever, chills, abdominal pain, constipation, or diarrhea.  - Incorporating small amounts of fiber into her diet as advised by her surgeon following a bout of diverticulitis.  - Had a follow-up with the general surgeon after the hospital and was told everything is okay.    Nosebleed  - Observed a bloodstain on her bedsheet near her pillow one morning, but this has not recurred.  - Continues to use Singulair and inhalers for allergy management.    Ankle Swelling  - Her cardiologist has informed her that her varicose veins may be contributing to the swelling in her ankle.  - Does not believe her salt intake is excessive.  - Has not previously used compression socks.    Supplemental information: She has not had a

## 2025-06-27 DIAGNOSIS — D47.2 MGUS (MONOCLONAL GAMMOPATHY OF UNKNOWN SIGNIFICANCE): Primary | ICD-10-CM

## 2025-07-07 ENCOUNTER — OFFICE VISIT (OUTPATIENT)
Dept: PODIATRY | Age: 80
End: 2025-07-07

## 2025-07-07 VITALS — HEIGHT: 62 IN | BODY MASS INDEX: 29.44 KG/M2 | WEIGHT: 160 LBS

## 2025-07-07 DIAGNOSIS — D23.71 BENIGN NEOPLASM OF SKIN OF RIGHT FOOT: ICD-10-CM

## 2025-07-07 DIAGNOSIS — M21.619 BUNION: ICD-10-CM

## 2025-07-07 DIAGNOSIS — L60.0 INGROWN NAIL OF GREAT TOE OF RIGHT FOOT: Primary | ICD-10-CM

## 2025-07-07 DIAGNOSIS — M20.42 HAMMER TOES OF BOTH FEET: ICD-10-CM

## 2025-07-07 DIAGNOSIS — I73.9 PVD (PERIPHERAL VASCULAR DISEASE): ICD-10-CM

## 2025-07-07 DIAGNOSIS — M20.41 HAMMER TOES OF BOTH FEET: ICD-10-CM

## 2025-07-07 DIAGNOSIS — M79.604 PAIN IN BOTH LOWER EXTREMITIES: ICD-10-CM

## 2025-07-07 DIAGNOSIS — B35.1 DERMATOPHYTOSIS OF NAIL: ICD-10-CM

## 2025-07-07 DIAGNOSIS — M79.605 PAIN IN BOTH LOWER EXTREMITIES: ICD-10-CM

## 2025-07-10 NOTE — PROGRESS NOTES
tablet TAKE 1 TABLET BY MOUTH EVERY DAY 5/16/25  Yes Jh Gotti MD   pravastatin (PRAVACHOL) 20 MG tablet TAKE 1 TABLET BY MOUTH EVERY DAY IN THE EVENING 4/23/25  Yes Kamryn Briseno MD   clobetasol (TEMOVATE) 0.05 % ointment APPLY TOPICALLY TO VULVA AND PERIANAL AREA TWICE DAILY 4/11/25  Yes Myranda Rodriguez MD   valsartan-hydroCHLOROthiazide (DIOVAN-HCT) 80-12.5 MG per tablet TAKE 1 TABLET BY MOUTH EVERY DAY 4/7/25  Yes Jh Gotti MD   carvedilol (COREG) 6.25 MG tablet Take 1 tablet by mouth 2 times daily (with meals) PLEASE CALL FOR AN APPOINTMENT FOR FURTHER REFILLS 3/14/25  Yes Jh Gotti MD   valACYclovir (VALTREX) 1 g tablet TAKE 1 TABLET BY MOUTH EVERY DAY 2/27/25  Yes Myranda Rodriguez MD   Estriol 10 % CREA 1 g by Does not apply route Twice a Week 1/20/25  Yes Kamryn Briseno MD   Melatonin 10 MG TABS Take 10 mg by mouth nightly   Yes Zoey Gant MD   fluticasone-salmeterol (ADVAIR) 250-50 MCG/ACT AEPB diskus inhaler INHALE 1 PUFF IN THE MORNING AND BEFORE BEDTIME 12/4/24  Yes Zoey Gant MD   rOPINIRole (REQUIP) 0.5 MG tablet Take 1 tablet by mouth in the morning and at bedtime 12/31/24  Yes Kamryn Briseno MD   montelukast (SINGULAIR) 10 MG tablet TAKE 1 TABLET BY MOUTH EVERY DAY IN THE EVENING 8/12/24  Yes Donald Gatica MD   clopidogrel (PLAVIX) 75 MG tablet TAKE 1 TABLET BY MOUTH EVERY DAY 7/24/24  Yes Jh Gotti MD   clobetasol (TEMOVATE) 0.05 % external solution Apply to affected nail twice daily 7/18/24  Yes Myranda Rodriguez MD   albuterol sulfate HFA (VENTOLIN HFA) 108 (90 Base) MCG/ACT inhaler Inhale 2 puffs into the lungs 4 times daily as needed for Wheezing 4/9/24  Yes Dnoald Gatica MD   fluticasone (CUTIVATE) 0.05 % cream APPLY TO AFFECTED AREA TWICE A DAY 10/16/23  Yes Donald Gatica MD   calcium carbonate (OSCAL) 500 MG TABS tablet Take 600 mg by mouth daily   Yes Provider, MD Zoey   Multiple

## 2025-07-16 DIAGNOSIS — E78.00 PURE HYPERCHOLESTEROLEMIA: ICD-10-CM

## 2025-07-16 RX ORDER — PRAVASTATIN SODIUM 20 MG
TABLET ORAL
Qty: 90 TABLET | Refills: 0 | Status: SHIPPED | OUTPATIENT
Start: 2025-07-16

## 2025-07-16 NOTE — TELEPHONE ENCOUNTER
Estelle Gunderson is calling to request a refill on the following medication(s):    Medication Request:  Requested Prescriptions     Pending Prescriptions Disp Refills    pravastatin (PRAVACHOL) 20 MG tablet 90 tablet 0     Sig: TAKE 1 TABLET BY MOUTH EVERY DAY IN THE EVENING       Last Visit Date (If Applicable):  6/23/2025    Next Visit Date:    Visit date not found

## 2025-07-28 ENCOUNTER — OFFICE VISIT (OUTPATIENT)
Dept: FAMILY MEDICINE CLINIC | Age: 80
End: 2025-07-28

## 2025-07-28 VITALS
OXYGEN SATURATION: 95 % | SYSTOLIC BLOOD PRESSURE: 108 MMHG | WEIGHT: 158.8 LBS | HEART RATE: 81 BPM | HEIGHT: 62 IN | DIASTOLIC BLOOD PRESSURE: 50 MMHG | BODY MASS INDEX: 29.22 KG/M2

## 2025-07-28 DIAGNOSIS — K21.9 GASTROESOPHAGEAL REFLUX DISEASE WITHOUT ESOPHAGITIS: ICD-10-CM

## 2025-07-28 DIAGNOSIS — M25.511 ACUTE PAIN OF RIGHT SHOULDER: Primary | ICD-10-CM

## 2025-07-28 DIAGNOSIS — R09.89 PHLEGM IN THROAT: ICD-10-CM

## 2025-07-28 DIAGNOSIS — R11.0 NAUSEA: ICD-10-CM

## 2025-07-28 RX ORDER — LIDOCAINE 4 G/G
1 PATCH TOPICAL DAILY
Qty: 30 PATCH | Refills: 0 | Status: SHIPPED | OUTPATIENT
Start: 2025-07-28 | End: 2025-08-27

## 2025-07-29 NOTE — PROGRESS NOTES
Estelle Gunderson (:  1945) is a 80 y.o. female,Established patient, here for evaluation of the following chief complaint(s):  Shoulder Pain and Ear Pain (/)    This is 80 years old female with Hx of balance is issue using cane, chronic lower back pain/hip pain, right hand neuropathy, essential HTN on Norvasc Coreg valsartan hydrochlorothiazide has home blood pressure monitor, bulbar herpes simplex follows with dermatology on Valtrex and clobetasol, primary insomnia was a previously on Ambien causing sleepwalking trazodone not effective, depression/anxiety on Celexa, bradycardia s/p pacemaker follows with Dr. Gotti cardiology, Hx of aortic valve replacement, CAD s/p PCI, MGUS follows with hematology Dr. Garcia every 6 months, Hx of left breast cancer s/p implant of the right breast, recurrent bronchitis and lung nodule follows with pulmonologist      History of Present Illness  The patient presents for evaluation of shoulder pain and nausea.    Shoulder Pain  - She experienced soreness in her arm the day after using a hoe to remove weeds, which she initially attributed to the physical exertion.  - The pain persisted even after resting her arm.  - The discomfort is severe enough to prevent her from sleeping on her right side, forcing her to sleep on her back.  - She cannot sleep on her left side as it pulls the affected area.  - The pain originates from her shoulder and radiates up to her neck and down her arm.  - She reports numbness and tingling in three fingers.  - The back of her neck is not significantly affected, but the pain extends to her ear.  - She recently had an earache, which she managed with wax removal drops until she could obtain eardrops.  - She has not tried lidocaine patches for relief.  - She has been taking over-the-counter Tylenol Arthritis medication, but it has not alleviated the pain.  - Her shoulder hurts even when she is not moving it.  - The shoulder pain started approximately

## 2025-08-13 ENCOUNTER — HOSPITAL ENCOUNTER (OUTPATIENT)
Dept: PHYSICAL THERAPY | Facility: CLINIC | Age: 80
Setting detail: THERAPIES SERIES
Discharge: HOME | End: 2025-08-13
Payer: MEDICARE

## 2025-08-13 PROCEDURE — 97161 PT EVAL LOW COMPLEX 20 MIN: CPT

## 2025-08-13 PROCEDURE — 97110 THERAPEUTIC EXERCISES: CPT

## 2025-08-14 ENCOUNTER — OFFICE VISIT (OUTPATIENT)
Age: 80
End: 2025-08-14
Payer: MEDICARE

## 2025-08-14 VITALS
OXYGEN SATURATION: 97 % | HEIGHT: 60 IN | HEART RATE: 85 BPM | BODY MASS INDEX: 31.02 KG/M2 | WEIGHT: 158 LBS | TEMPERATURE: 98 F | SYSTOLIC BLOOD PRESSURE: 127 MMHG | DIASTOLIC BLOOD PRESSURE: 53 MMHG

## 2025-08-14 DIAGNOSIS — L90.0 LICHEN SCLEROSUS: Primary | ICD-10-CM

## 2025-08-14 PROCEDURE — 3074F SYST BP LT 130 MM HG: CPT | Performed by: DERMATOLOGY

## 2025-08-14 PROCEDURE — G8427 DOCREV CUR MEDS BY ELIG CLIN: HCPCS | Performed by: DERMATOLOGY

## 2025-08-14 PROCEDURE — 3078F DIAST BP <80 MM HG: CPT | Performed by: DERMATOLOGY

## 2025-08-14 PROCEDURE — 99214 OFFICE O/P EST MOD 30 MIN: CPT | Performed by: DERMATOLOGY

## 2025-08-14 PROCEDURE — G8399 PT W/DXA RESULTS DOCUMENT: HCPCS | Performed by: DERMATOLOGY

## 2025-08-14 PROCEDURE — 1090F PRES/ABSN URINE INCON ASSESS: CPT | Performed by: DERMATOLOGY

## 2025-08-14 PROCEDURE — G8417 CALC BMI ABV UP PARAM F/U: HCPCS | Performed by: DERMATOLOGY

## 2025-08-14 PROCEDURE — 1123F ACP DISCUSS/DSCN MKR DOCD: CPT | Performed by: DERMATOLOGY

## 2025-08-14 PROCEDURE — 1036F TOBACCO NON-USER: CPT | Performed by: DERMATOLOGY

## 2025-08-14 PROCEDURE — 99213 OFFICE O/P EST LOW 20 MIN: CPT | Performed by: DERMATOLOGY

## 2025-08-14 PROCEDURE — 1159F MED LIST DOCD IN RCRD: CPT | Performed by: DERMATOLOGY

## 2025-08-14 RX ORDER — CLOTRIMAZOLE AND BETAMETHASONE DIPROPIONATE 10; .64 MG/G; MG/G
CREAM TOPICAL
Qty: 45 G | Refills: 2 | Status: SHIPPED | OUTPATIENT
Start: 2025-08-14

## 2025-08-19 ENCOUNTER — HOSPITAL ENCOUNTER (OUTPATIENT)
Dept: PHYSICAL THERAPY | Facility: CLINIC | Age: 80
Setting detail: THERAPIES SERIES
Discharge: HOME | End: 2025-08-19
Payer: MEDICARE

## 2025-08-26 ENCOUNTER — HOSPITAL ENCOUNTER (OUTPATIENT)
Dept: PHYSICAL THERAPY | Facility: CLINIC | Age: 80
Setting detail: THERAPIES SERIES
Discharge: HOME | End: 2025-08-26
Payer: MEDICARE

## 2025-08-26 PROCEDURE — 97016 VASOPNEUMATIC DEVICE THERAPY: CPT

## 2025-08-26 PROCEDURE — 97110 THERAPEUTIC EXERCISES: CPT

## 2025-08-28 ENCOUNTER — HOSPITAL ENCOUNTER (OUTPATIENT)
Dept: PHYSICAL THERAPY | Facility: CLINIC | Age: 80
Setting detail: THERAPIES SERIES
Discharge: HOME | End: 2025-08-28
Payer: MEDICARE

## 2025-08-28 PROCEDURE — 97110 THERAPEUTIC EXERCISES: CPT

## 2025-09-05 ENCOUNTER — HOSPITAL ENCOUNTER (OUTPATIENT)
Dept: PHYSICAL THERAPY | Facility: CLINIC | Age: 80
Setting detail: THERAPIES SERIES
Discharge: HOME | End: 2025-09-05
Payer: MEDICARE

## 2025-09-05 PROCEDURE — 97110 THERAPEUTIC EXERCISES: CPT
